# Patient Record
Sex: FEMALE | Race: WHITE | Employment: OTHER | URBAN - METROPOLITAN AREA
[De-identification: names, ages, dates, MRNs, and addresses within clinical notes are randomized per-mention and may not be internally consistent; named-entity substitution may affect disease eponyms.]

---

## 2017-01-02 ENCOUNTER — OFFICE VISIT (OUTPATIENT)
Dept: URGENT CARE | Facility: URGENT CARE | Age: 27
End: 2017-01-02
Payer: COMMERCIAL

## 2017-01-02 VITALS
DIASTOLIC BLOOD PRESSURE: 80 MMHG | HEART RATE: 83 BPM | WEIGHT: 210 LBS | RESPIRATION RATE: 16 BRPM | OXYGEN SATURATION: 98 % | TEMPERATURE: 98.6 F | SYSTOLIC BLOOD PRESSURE: 122 MMHG

## 2017-01-02 DIAGNOSIS — J98.01 ACUTE BRONCHOSPASM: Primary | ICD-10-CM

## 2017-01-02 PROCEDURE — 99213 OFFICE O/P EST LOW 20 MIN: CPT | Mod: 25 | Performed by: PHYSICIAN ASSISTANT

## 2017-01-02 PROCEDURE — 94640 AIRWAY INHALATION TREATMENT: CPT | Performed by: PHYSICIAN ASSISTANT

## 2017-01-02 RX ORDER — PREDNISONE 20 MG/1
40 TABLET ORAL DAILY
Qty: 10 TABLET | Refills: 0 | Status: SHIPPED | OUTPATIENT
Start: 2017-01-02 | End: 2017-01-07

## 2017-01-02 RX ORDER — IPRATROPIUM BROMIDE AND ALBUTEROL SULFATE 2.5; .5 MG/3ML; MG/3ML
1 SOLUTION RESPIRATORY (INHALATION) ONCE
Qty: 3 ML | Refills: 0
Start: 2017-01-02 | End: 2017-03-23

## 2017-01-02 RX ORDER — BENZONATATE 100 MG/1
200 CAPSULE ORAL 3 TIMES DAILY PRN
Qty: 42 CAPSULE | Refills: 0 | Status: SHIPPED | OUTPATIENT
Start: 2017-01-02 | End: 2017-03-23

## 2017-01-02 NOTE — NURSING NOTE
The following nebulizer treatment was given:     MEDICATION: Duoneb  : ViClone  LOT #: [id number]   EXPIRATION DATE:  04/2018   NDC # 8957-5909-40  Merry Alcantar

## 2017-01-02 NOTE — NURSING NOTE
Chief Complaint   Patient presents with     Urgent Care     Cough     Pt was here on thursday and got antibiotics for bronchitis but pt states it has not helped and her cough has not improved.        Initial /80 mmHg  Pulse 83  Temp(Src) 98.6  F (37  C) (Tympanic)  Resp 16  Wt 210 lb (95.255 kg)  SpO2 98% There is no height on file to calculate BMI.  BP completed using cuff size: DOMINIQUE Kline

## 2017-01-09 NOTE — PROGRESS NOTES
SUBJECTIVE:  Rama Gutierrez is a 26 year old female who presents to the clinic today with a chief complaint of cough , wheezing. and chest congestion for 2 week(s).  Seen 4 days ago and given Zithromax for cough.  Not improved and coughing attacks.  Chest feels tight  Her cough is described as persistent, nonproductive and spasmodic.    The patient's symptoms are moderate and worsening.  Associated symptoms include none. The patient's symptoms are exacerbated by no particular triggers  Patient has been using OTC med  to improve symptoms.    No past medical history on file.    Current Outpatient Prescriptions   Medication Sig Dispense Refill     ipratropium - albuterol 0.5 mg/2.5 mg/3 mL (DUONEB) 0.5-2.5 (3) MG/3ML neb solution Take 1 vial (3 mLs) by nebulization once for 1 dose 3 mL 0     benzonatate (TESSALON) 100 MG capsule Take 2 capsules (200 mg) by mouth 3 times daily as needed for cough 42 capsule 0     Sertraline HCl (ZOLOFT PO) Take 50 mg by mouth daily       ALPRAZolam (XANAX PO) Take 0.5 mg by mouth as needed for anxiety       azithromycin (ZITHROMAX) 250 MG tablet Two tablets first day, then one tablet daily for four days. 6 tablet 0     albuterol (PROAIR HFA/PROVENTIL HFA/VENTOLIN HFA) 108 (90 BASE) MCG/ACT Inhaler Inhale 2 puffs into the lungs every 4 hours as needed for shortness of breath / dyspnea or wheezing 1 Inhaler 0       Social History   Substance Use Topics     Smoking status: Never Smoker      Smokeless tobacco: Not on file     Alcohol Use: Not on file       ROS  Review of systems negative except as stated above.    OBJECTIVE:  /80 mmHg  Pulse 83  Temp(Src) 98.6  F (37  C) (Tympanic)  Resp 16  Wt 210 lb (95.255 kg)  SpO2 98%  GENERAL APPEARANCE: healthy, alert and no distress  EYES: EOMI,  PERRL, conjunctiva clear  HENT: ear canals and TM's normal.  Nose and mouth without ulcers, erythema or lesions  NECK: supple, nontender, no lymphadenopathy  RESP: rhonchi throughout with late  expiratory wheezing.    CV: regular rates and rhythm, normal S1 S2, no murmur noted  SKIN: no suspicious lesions or rashes    NEB:  Duoneb in clinic with improved lung sounds,      assessment/plan:  (J98.01) Acute bronchospasm  (primary encounter diagnosis)  Comment:   Plan: ipratropium - albuterol 0.5 mg/2.5 mg/3 mL         (DUONEB) 0.5-2.5 (3) MG/3ML neb solution,         INHALATION/NEBULIZER TREATMENT, INITIAL,         predniSONE (DELTASONE) 20 MG tablet,         benzonatate (TESSALON) 100 MG capsule        Med as directed of Prednisone burst and Tessalon as needed.  Declines inhalers.  FU with PCP as needed

## 2017-03-23 ENCOUNTER — OFFICE VISIT (OUTPATIENT)
Dept: PEDIATRICS | Facility: CLINIC | Age: 27
End: 2017-03-23
Payer: COMMERCIAL

## 2017-03-23 VITALS
WEIGHT: 233 LBS | SYSTOLIC BLOOD PRESSURE: 112 MMHG | HEART RATE: 88 BPM | DIASTOLIC BLOOD PRESSURE: 66 MMHG | TEMPERATURE: 97.6 F | OXYGEN SATURATION: 98 % | HEIGHT: 68 IN | BODY MASS INDEX: 35.31 KG/M2

## 2017-03-23 DIAGNOSIS — Z00.00 ROUTINE GENERAL MEDICAL EXAMINATION AT A HEALTH CARE FACILITY: Primary | ICD-10-CM

## 2017-03-23 DIAGNOSIS — F41.8 DEPRESSION WITH ANXIETY: ICD-10-CM

## 2017-03-23 DIAGNOSIS — Z23 NEED FOR TDAP VACCINATION: ICD-10-CM

## 2017-03-23 PROCEDURE — 99395 PREV VISIT EST AGE 18-39: CPT | Mod: 25 | Performed by: PEDIATRICS

## 2017-03-23 PROCEDURE — 90471 IMMUNIZATION ADMIN: CPT | Performed by: PEDIATRICS

## 2017-03-23 PROCEDURE — 90715 TDAP VACCINE 7 YRS/> IM: CPT | Performed by: PEDIATRICS

## 2017-03-23 NOTE — PATIENT INSTRUCTIONS
FOLLOW UP in 6 months for depression/anxiety.  Will also check to see if you period has returned.    Stop drinking pop.    Tetanus shot today.      Preventive Health Recommendations  Female Ages 26 - 39  Yearly exam:   See your health care provider every year in order to    Review health changes.     Discuss preventive care.      Review your medicines if you your doctor has prescribed any.    Until age 30: Get a Pap test every three years (more often if you have had an abnormal result).    After age 30: Talk to your doctor about whether you should have a Pap test every 3 years or have a Pap test with HPV screening every 5 years.   You do not need a Pap test if your uterus was removed (hysterectomy) and you have not had cancer.  You should be tested each year for STDs (sexually transmitted diseases), if you're at risk.   Talk to your provider about how often to have your cholesterol checked.  If you are at risk for diabetes, you should have a diabetes test (fasting glucose).  Shots: Get a flu shot each year. Get a tetanus shot every 10 years.   Nutrition:     Eat at least 5 servings of fruits and vegetables each day.    Eat whole-grain bread, whole-wheat pasta and brown rice instead of white grains and rice.    Talk to your provider about Calcium and Vitamin D.     Lifestyle    Exercise at least 150 minutes a week (30 minutes a day, 5 days of the week). This will help you control your weight and prevent disease.    Limit alcohol to one drink per day.    No smoking.     Wear sunscreen to prevent skin cancer.    See your dentist every six months for an exam and cleaning.

## 2017-03-23 NOTE — PROGRESS NOTES
SUBJECTIVE:     CC: Rama Gutierrez is an 26 year old woman who presents for preventive health visit.     HPI Comments:     Works night shift. C/o leg and foot pain. States has gained weight recently.    Last pap: 1/2016    Physical   Annual:     Getting at least 3 servings of Calcium per day::  NO    Bi-annual eye exam::  NO    Dental care twice a year::  NO    Sleep apnea or symptoms of sleep apnea::  None    Diet::  Regular (no restrictions)    Frequency of exercise::  None    Taking medications regularly::  Yes    Medication side effects::  None    Additional concerns today::  No    Last PAP 1/2016 at [facility] in [place], [place] - normal.  Was previously on Depo, last dose 8/2016, does not want to restart.     40# weight gain in past 6 months - patient states eating more poorly due to shift work - often they don't have good food at home, end up eating out. Sometimes skips all but one meal per day.  Drinks 1 pop per day.  Also less active - works in [employer], but only stands in one spot (used to walk all the time at old job).    Today's PHQ-2 Score:   PHQ-2 ( 1999 Pfizer) 3/23/2017   Little interest or pleasure in doing things Several days   Feeling down, depressed or hopeless Several days   PHQ-2 Score 2       Abuse: Current or Past(Physical, Sexual or Emotional)- No  Do you feel safe in your environment - Yes    Social History   Substance Use Topics     Smoking status: Never Smoker     Smokeless tobacco: Not on file     Alcohol use No     The patient does not drink >3 drinks per day nor >7 drinks per week.    No results for input(s): CHOL, HDL, LDL, TRIG, CHOLHDLRATIO, NHDL in the last 70805 hours.    Reviewed orders with patient.  Reviewed health maintenance and updated orders accordingly - Yes    Mammo Decision Support:  Mammogram not appropriate for this patient based on age.    Pertinent mammograms are reviewed under the imaging tab.  History of abnormal Pap smear: NO - age 21-29 PAP every 3  "years recommended    Reviewed and updated as needed this visit by clinical staff  Tobacco  Allergies  Meds  Soc Hx        Reviewed and updated as needed this visit by Provider         ROS:  C: NEGATIVE for fever, chills, change in weight  I: NEGATIVE for worrisome rashes, moles or lesions  E: NEGATIVE for vision changes or irritation  ENT: NEGATIVE for ear, mouth and throat problems  R: NEGATIVE for significant cough or SOB  B: NEGATIVE for masses, tenderness or discharge  CV: NEGATIVE for chest pain, palpitations or peripheral edema  GI: NEGATIVE for nausea, abdominal pain, heartburn, or change in bowel habits  : NEGATIVE for unusual urinary or vaginal symptoms. Periods are regular.  M: NEGATIVE for significant arthralgias or myalgia  N: NEGATIVE for weakness, dizziness or paresthesias  P: NEGATIVE for changes in mood or affect    Problem list, Medication list, Allergies, and Medical/Social/Surgical histories reviewed in EPIC and updated as appropriate.  OBJECTIVE:     /66 (BP Location: Right arm, Patient Position: Chair, Cuff Size: Adult Regular)  Pulse 88  Temp 97.6  F (36.4  C) (Oral)  Ht 5' 7.75\" (1.721 m)  Wt 233 lb (105.7 kg)  SpO2 98%  Breastfeeding? No  BMI 35.69 kg/m2  EXAM:  GENERAL: healthy, alert and no distress  EYES: Eyes grossly normal to inspection, PERRL and conjunctivae and sclerae normal  HENT: ear canals and TM's normal, nose and mouth without ulcers or lesions  NECK: no adenopathy, no asymmetry, masses, or scars and thyroid normal to palpation  RESP: lungs clear to auscultation - no rales, rhonchi or wheezes  BREAST: normal without masses, tenderness or nipple discharge and no palpable axillary masses or adenopathy  CV: regular rate and rhythm, normal S1 S2, no S3 or S4, no murmur, click or rub, no peripheral edema and peripheral pulses strong  ABDOMEN: soft, nontender, no hepatosplenomegaly, no masses and bowel sounds normal  MS: no gross musculoskeletal defects noted, no " "edema  SKIN: no suspicious lesions or rashes  NEURO: Normal strength and tone, mentation intact and speech normal  PSYCH: mentation appears normal, affect normal/bright    ASSESSMENT/PLAN:     1. Depression with anxiety  [facility].  Zoloft working very well per patient.  Only uses her xanax once a month when on the zoloft. Agreed to take over prescribing both.  Follow up in 6 months.  - sertraline (ZOLOFT) 50 MG tablet; Take 1 tablet (50 mg) by mouth daily  Dispense: 90 tablet; Refill: 1    2. Need for Tdap vaccination  - TDAP VACCINE (ADACEL)  - ADMIN 1st VACCINE    3. Routine general medical examination at a health care facility  Weight - see PI.  First step - cut out pop.  No menses x 6 months (also 6 months since last depo shot) - will give 6 more months for menses to regulate post depo      COUNSELING:  Reviewed preventive health counseling, as reflected in patient instructions       Regular exercise       Healthy diet/nutrition       Vaccinated for: TDAP         reports that she has never smoked. She does not have any smokeless tobacco history on file.    Estimated body mass index is 35.69 kg/(m^2) as calculated from the following:    Height as of this encounter: 5' 7.75\" (1.721 m).    Weight as of this encounter: 233 lb (105.7 kg).   Weight management plan: Discussed healthy diet and exercise guidelines and patient will follow up in 12 months in clinic to re-evaluate.    Counseling Resources:  ATP IV Guidelines  Pooled Cohorts Equation Calculator  Breast Cancer Risk Calculator  FRAX Risk Assessment  ICSI Preventive Guidelines  Dietary Guidelines for Americans, 2010  USDA's MyPlate  ASA Prophylaxis  Lung CA Screening    Rozina Ricketts MD  [facility]  Answers for HPI/ROS submitted by the patient on 3/23/2017   Q1: Little interest or pleasure in doing things: 1=Several days  Q2: Feeling down, depressed or hopeless: 1=Several days  PHQ-2 Score: 2    Screening " Questionnaire for Adult Immunization    Are you sick today?   No   Do you have allergies to medications, food, a vaccine component or latex?   Yes   Have you ever had a serious reaction after receiving a vaccination?   No   Do you have a long-term health problem with heart disease, lung disease, asthma, kidney disease, metabolic disease (e.g. diabetes), anemia, or other blood disorder?   No   Do you have cancer, leukemia, HIV/AIDS, or any other immune system problem?   No   In the past 3 months, have you taken medications that affect  your immune system, such as prednisone, other steroids, or anticancer drugs; drugs for the treatment of rheumatoid arthritis, Crohn s disease, or psoriasis; or have you had radiation treatments?   No   Have you had a seizure, or a brain or other nervous system problem?   No   During the past year, have you received a transfusion of blood or blood     products, or been given immune (gamma) globulin or antiviral drug?   No   For women: Are you pregnant or is there a chance you could become        pregnant during the next month?   No   Have you received any vaccinations in the past 4 weeks?   No     Immunization questionnaire was positive for at least one answer.  Notified AF.      MNVFC doesn't apply on this patient    Per orders of Dr. Ricketts, injection of Tdap given by Margaret Martel. Patient instructed to remain in clinic for 20 minutes afterwards, and to report any adverse reaction to me immediately.       Screening performed by Margaret Martel on 3/23/2017 at 1:45 PM.

## 2017-03-23 NOTE — LETTER
My Depression Action Plan  Name: Rama Gutierrez   Date of Birth 1990  Date: 3/23/2017    My doctor: Rozina Ricketts   My clinic: [facility]  [address] [place] [address]  [address]  [facility] [address] [phone]          GREEN    ZONE   Good Control    What it looks like:     Things are going generally well. You have normal up s and down s. You may even feel depressed from time to time, but bad moods usually last less than a day.   What you need to do:  1. Continue to care for yourself (see self care plan)  2. Check your depression survival kit and update it as needed  3. Follow your physician s recommendations including any medication.  4. Do not stop taking medication unless you consult with your physician first.           YELLOW         ZONE Getting Worse    What it looks like:     Depression is starting to interfere with your life.     It may be hard to get out of bed; you may be starting to isolate yourself from others.    Symptoms of depression are starting to last most all day and this has happened for several days.     You may have suicidal thoughts but they are not constant.   What you need to do:     1. Call your care team, your response to treatment will improve if you keep your care team informed of your progress. Yellow periods are signs an adjustment may need to be made.     2. Continue your self-care, even if you have to fake it!    3. Talk to someone in your support network    4. Open up your depression survival kit           RED    ZONE Medical Alert - Get Help    What it looks like:     Depression is seriously interfering with your life.     You may experience these or other symptoms: You can t get out of bed most days, can t work or engage in other necessary activities, you have trouble taking care of basic hygiene, or basic responsibilities, thoughts of suicide or death that will not go away, self-injurious behavior.     What you need to do:  1. Call your  care team and request a same-day appointment. If they are not available (weekends or after hours) call your local crisis line, emergency room or 911.      Electronically signed by: Margaret Martel, March 23, 2017    Depression Self Care Plan / Survival Kit    Self-Care for Depression  Here s the deal. Your body and mind are really not as separate as most people think.  What you do and think affects how you feel and how you feel influences what you do and think. This means if you do things that people who feel good do, it will help you feel better.  Sometimes this is all it takes.  There is also a place for medication and therapy depending on how severe your depression is, so be sure to consult with your medical provider and/ or Behavioral Health Consultant if your symptoms are worsening or not improving.     In order to better manage my stress, I will:    Exercise  Get some form of exercise, every day. This will help reduce pain and release endorphins, the  feel good  chemicals in your brain. This is almost as good as taking antidepressants!  This is not the same as joining a gym and then never going! (they count on that by the way ) It can be as simple as just going for a walk or doing some gardening, anything that will get you moving.      Hygiene   Maintain good hygiene (Get out of bed in the morning, Make your bed, Brush your teeth, Take a shower, and Get dressed like you were going to work, even if you are unemployed).  If your clothes don't fit try to get ones that do.    Diet  I will strive to eat foods that are good for me, drink plenty of water, and avoid excessive sugar, caffeine, alcohol, and other mood-altering substances.  Some foods that are helpful in depression are: complex carbohydrates, B vitamins, flaxseed, fish or fish oil, fresh fruits and vegetables.    Psychotherapy  I agree to participate in Individual Therapy (if recommended).    Medication  If prescribed medications, I agree to take  them.  Missing doses can result in serious side effects.  I understand that drinking alcohol, or other illicit drug use, may cause potential side effects.  I will not stop my medication abruptly without first discussing it with my provider.    Staying Connected With Others  I will stay in touch with my friends, family members, and my primary care provider/team.    Use your imagination  Be creative.  We all have a creative side; it doesn t matter if it s oil painting, sand castles, or mud pies! This will also kick up the endorphins.    Witness Beauty  (AKA stop and smell the roses) Take a look outside, even in mid-winter. Notice colors, textures. Watch the squirrels and birds.     Service to others  Be of service to others.  There is always someone else in need.  By helping others we can  get out of ourselves  and remember the really important things.  This also provides opportunities for practicing all the other parts of the program.    Humor  Laugh and be silly!  Adjust your TV habits for less news and crime-drama and more comedy.    Control your stress  Try breathing deep, massage therapy, biofeedback, and meditation. Find time to relax each day.     My support system    Clinic Contact:  Phone number:    Contact 1:  Phone number:    Contact 2:  Phone number:    Protestant/:  Phone number:    Therapist:  Phone number:    Local crisis center:    Phone number:    Other community support:  Phone number:

## 2017-03-23 NOTE — NURSING NOTE
"Chief Complaint   Patient presents with     New Patient     Physical       Initial /66 (BP Location: Right arm, Patient Position: Chair, Cuff Size: Adult Regular)  Pulse 88  Temp 97.6  F (36.4  C) (Oral)  Ht 5' 7.75\" (1.721 m)  Wt 233 lb (105.7 kg)  SpO2 98%  Breastfeeding? No  BMI 35.69 kg/m2 Estimated body mass index is 35.69 kg/(m^2) as calculated from the following:    Height as of this encounter: 5' 7.75\" (1.721 m).    Weight as of this encounter: 233 lb (105.7 kg).  Medication Reconciliation: complete  "

## 2017-03-23 NOTE — MR AVS SNAPSHOT
After Visit Summary   3/23/2017    Rama Gutierrez    MRN: [id number]           Patient Information     Date Of Birth          1990        Visit Information        Provider Department      3/23/2017 1:20 PM Rozina Ricketts MD [facility]        Today's Diagnoses     Depression with anxiety    -  1      Care Instructions    FOLLOW UP in 6 months for depression/anxiety.  Will also check to see if you period has returned.    Stop drinking pop.    Tetanus shot today.      Preventive Health Recommendations  Female Ages 26 - 39  Yearly exam:   See your health care provider every year in order to    Review health changes.     Discuss preventive care.      Review your medicines if you your doctor has prescribed any.    Until age 30: Get a Pap test every three years (more often if you have had an abnormal result).    After age 30: Talk to your doctor about whether you should have a Pap test every 3 years or have a Pap test with HPV screening every 5 years.   You do not need a Pap test if your uterus was removed (hysterectomy) and you have not had cancer.  You should be tested each year for STDs (sexually transmitted diseases), if you're at risk.   Talk to your provider about how often to have your cholesterol checked.  If you are at risk for diabetes, you should have a diabetes test (fasting glucose).  Shots: Get a flu shot each year. Get a tetanus shot every 10 years.   Nutrition:     Eat at least 5 servings of fruits and vegetables each day.    Eat whole-grain bread, whole-wheat pasta and brown rice instead of white grains and rice.    Talk to your provider about Calcium and Vitamin D.     Lifestyle    Exercise at least 150 minutes a week (30 minutes a day, 5 days of the week). This will help you control your weight and prevent disease.    Limit alcohol to one drink per day.    No smoking.     Wear sunscreen to prevent skin cancer.    See your dentist every six months for an exam and  "cleaning.          Follow-ups after your visit        Who to contact     If you have questions or need follow up information about today's clinic visit or your schedule please contact [facility] [place] directly at [phone].  Normal or non-critical lab and imaging results will be communicated to you by MyChart, letter or phone within 4 business days after the clinic has received the results. If you do not hear from us within 7 days, please contact the clinic through MyChart or phone. If you have a critical or abnormal lab result, we will notify you by phone as soon as possible.  Submit refill requests through [employer] or call your pharmacy and they will forward the refill request to us. Please allow 3 business days for your refill to be completed.          Additional Information About Your Visit        Primekss Information     Prime[employer]s lets you send messages to your doctor, view your test results, renew your prescriptions, schedule appointments and more. To sign up, go to [email or web address] . Click on \"Log in\" on the left side of the screen, which will take you to the Welcome page. Then click on \"Sign up Now\" on the right side of the page.     You will be asked to enter the access code listed below, as well as some personal information. Please follow the directions to create your username and password.     Your access code is: [email or web address]  Expires: 3/29/2017 11:44 AM     Your access code will  in 90 days. If you need help or a new code, please call your [place] clinic or [phone].        Care EveryWhere ID     This is your Care EveryWhere ID. This could be used by other organizations to access your [place] medical records  [id number]        Your Vitals Were     Pulse Temperature Height Pulse Oximetry Breastfeeding? BMI (Body Mass Index)    88 97.6  F (36.4  C) (Oral) 5' 7.75\" (1.721 m) 98% No 35.69 kg/m2       Blood Pressure from Last 3 Encounters:   17 112/66   17 122/80 "   12/29/16 98/78    Weight from Last 3 Encounters:   03/23/17 233 lb (105.7 kg)   01/02/17 210 lb (95.3 kg)   12/29/16 210 lb (95.3 kg)              Today, you had the following     No orders found for display         Today's Medication Changes          These changes are accurate as of: 3/23/17  1:40 PM.  If you have any questions, ask your nurse or doctor.               These medicines have changed or have updated prescriptions.        Dose/Directions    sertraline 50 MG tablet   Commonly known as:  ZOLOFT   This may have changed:  medication strength   Used for:  Depression with anxiety   Changed by:  Rozina Ricketts MD        Dose:  50 mg   Take 1 tablet (50 mg) by mouth daily   Quantity:  90 tablet   Refills:  1            Where to get your medicines      These medications were sent to [facility] [place] - [place] - [address]   [address]  [address], [place] [address]     Phone:  [phone]     sertraline 50 MG tablet                Primary Care Provider Fax #    [facility] [place] [phone]       No address on file        Thank you!     Thank you for choosing [facility] [place]  for your care. Our goal is always to provide you with excellent care. Hearing back from our patients is one way we can continue to improve our services. Please take a few minutes to complete the written survey that you may receive in the mail after your visit with us. Thank you!             Your Updated Medication List - Protect others around you: Learn how to safely use, store and throw away your medicines at www.disposemymeds.org.          This list is accurate as of: 3/23/17  1:40 PM.  Always use your most recent med list.                   Brand Name Dispense Instructions for use    sertraline 50 MG tablet    ZOLOFT    90 tablet    Take 1 tablet (50 mg) by mouth daily       XANAX PO      Take 0.5 mg by mouth as needed for anxiety

## 2017-03-24 ASSESSMENT — PATIENT HEALTH QUESTIONNAIRE - PHQ9: SUM OF ALL RESPONSES TO PHQ QUESTIONS 1-9: 12

## 2017-08-10 ENCOUNTER — OFFICE VISIT (OUTPATIENT)
Dept: PEDIATRICS | Facility: CLINIC | Age: 27
End: 2017-08-10
Payer: COMMERCIAL

## 2017-08-10 VITALS
SYSTOLIC BLOOD PRESSURE: 98 MMHG | WEIGHT: 231 LBS | DIASTOLIC BLOOD PRESSURE: 70 MMHG | BODY MASS INDEX: 35.38 KG/M2 | RESPIRATION RATE: 20 BRPM | HEART RATE: 64 BPM | TEMPERATURE: 98.4 F

## 2017-08-10 DIAGNOSIS — M54.50 BILATERAL LOW BACK PAIN WITHOUT SCIATICA, UNSPECIFIED CHRONICITY: Primary | ICD-10-CM

## 2017-08-10 PROCEDURE — 99213 OFFICE O/P EST LOW 20 MIN: CPT | Mod: GE | Performed by: INTERNAL MEDICINE

## 2017-08-10 RX ORDER — CYCLOBENZAPRINE HCL 10 MG
5-10 TABLET ORAL 3 TIMES DAILY PRN
Qty: 30 TABLET | Refills: 0 | Status: SHIPPED | OUTPATIENT
Start: 2017-08-10 | End: 2018-08-30

## 2017-08-10 ASSESSMENT — ANXIETY QUESTIONNAIRES
2. NOT BEING ABLE TO STOP OR CONTROL WORRYING: SEVERAL DAYS
6. BECOMING EASILY ANNOYED OR IRRITABLE: SEVERAL DAYS
IF YOU CHECKED OFF ANY PROBLEMS ON THIS QUESTIONNAIRE, HOW DIFFICULT HAVE THESE PROBLEMS MADE IT FOR YOU TO DO YOUR WORK, TAKE CARE OF THINGS AT HOME, OR GET ALONG WITH OTHER PEOPLE: SOMEWHAT DIFFICULT
GAD7 TOTAL SCORE: 5
3. WORRYING TOO MUCH ABOUT DIFFERENT THINGS: SEVERAL DAYS
7. FEELING AFRAID AS IF SOMETHING AWFUL MIGHT HAPPEN: SEVERAL DAYS
1. FEELING NERVOUS, ANXIOUS, OR ON EDGE: SEVERAL DAYS
5. BEING SO RESTLESS THAT IT IS HARD TO SIT STILL: NOT AT ALL

## 2017-08-10 ASSESSMENT — PATIENT HEALTH QUESTIONNAIRE - PHQ9
5. POOR APPETITE OR OVEREATING: NOT AT ALL
SUM OF ALL RESPONSES TO PHQ QUESTIONS 1-9: 4

## 2017-08-10 NOTE — NURSING NOTE
"Chief Complaint   Patient presents with     Back Pain       Initial BP 98/70  Pulse 64  Temp 98.4  F (36.9  C) (Oral)  Resp 20  Wt 231 lb (104.8 kg)  LMP 07/16/2017  BMI 35.38 kg/m2 Estimated body mass index is 35.38 kg/(m^2) as calculated from the following:    Height as of 3/23/17: 5' 7.75\" (1.721 m).    Weight as of this encounter: 231 lb (104.8 kg).  Medication Reconciliation: complete   Janice J, CMA,AAMA      "

## 2017-08-10 NOTE — PATIENT INSTRUCTIONS
Nice to meet you today Terra!    For your back pain:  Continue using Ibuprofen 600-800 mg every 8 hours. You may also use tylenol 650 mg every 6 hours.    For severe pain, you may use Flexeril 5-10 mg. DO NOT DRIVE while on this medication.    I have referred you to physical therapy.    Follow up with myself or Dr. Ricketts in 4 weeks to see how things are going.

## 2017-08-10 NOTE — PROGRESS NOTES
SUBJECTIVE:                                                    Rama Gutierrez is a 27 year old female who presents to clinic today for the following health issues:    Back Pain       Duration: 6 years        Specific cause: spondlyosis    Description:   Location of pain: low back bilateral   Character of pain: deep dull ache  Pain radiation:radiates into the left buttocks  New numbness or weakness in legs, not attributed to pain:  no     Intensity: Currently 6/10, At its worst 9/10    History:   Pain interferes with job: YES, pt lifts up to 50lbs  History of back problems: Dx as an infant  Any previous MRI or X-rays: Yes--at [facility].  Date 2012 , spine and orthopedic surgeon   Sees a specialist for back pain:  No  Therapies tried without relief: IBU     Alleviating factors:   Improved by: PT      Precipitating factors:  Worsened by: Lifting, Bending, Standing and Lying Flat    Functional and Psychosocial Screen (Nikki STarT Back):        27-year-old F with anxiety, obesity and prior low back pain who presents to clinic for worsening low back pain.    Patient notes that she's had back pain since an accident that occurred in her childhood. In her early 20's, while living in [place], she was evaluated by a neurosurgeon. He did scans and told her she had 'spondylosis'. He sent her to PT and her symptoms improved, though he told her her symptoms would likely worsen down the road.    Today she tells me that over the past couple years her pain has been slowly worsening in severity. Describes it as low back pain. Worse in the morning after first waking up. Sometimes has slight radiation into her buttocks. No numbness, tingling, weakness in her lower extremities. No urinary symptoms. Has been using ibuprofen intermittently for the pain with only mild-moderate relief. Works for [employer] and does a lot of lifting/hauling things. Nonsmoker.    Problem list and histories reviewed & adjusted, as  indicated.  Additional history: none    There is no problem list on file for this patient.    Past Surgical History:   Procedure Laterality Date     TONSILLECTOMY         Social History   Substance Use Topics     Smoking status: Never Smoker     Smokeless tobacco: Not on file     Alcohol use No     Family History   Problem Relation Age of Onset     Breast Cancer Paternal Grandmother      CEREBROVASCULAR DISEASE Paternal Grandfather      DIABETES No family hx of      Coronary Artery Disease No family hx of      Colon Cancer No family hx of          Current Outpatient Prescriptions   Medication Sig Dispense Refill     cyclobenzaprine (FLEXERIL) 10 MG tablet Take 0.5-1 tablets (5-10 mg) by mouth 3 times daily as needed for muscle spasms 30 tablet 0     sertraline (ZOLOFT) 50 MG tablet Take 1 tablet (50 mg) by mouth daily 90 tablet 1     ALPRAZolam (XANAX PO) Take 0.5 mg by mouth as needed for anxiety       Allergies   Allergen Reactions     Codeine Nausea         Reviewed and updated as needed this visit by clinical staffAllergies       Reviewed and updated as needed this visit by Provider         ROS:  A complete 10 pt ROS was performed and negative besides stated in the HPI.     OBJECTIVE:     BP 98/70  Pulse 64  Temp 98.4  F (36.9  C) (Oral)  Resp 20  Wt 231 lb (104.8 kg)  LMP 07/16/2017  BMI 35.38 kg/m2  Body mass index is 35.38 kg/(m^2).  GENERAL: healthy, alert and no distress  EYES: Eyes grossly normal to inspection, PERRL and conjunctivae and sclerae normal  HENT: ear canals and TM's normal, nose and mouth without ulcers or lesions  NECK: no adenopathy, no asymmetry, masses, or scars and thyroid normal to palpation  RESP: lungs clear to auscultation - no rales, rhonchi or wheezes  CV: regular rate and rhythm, normal S1 S2, no S3 or S4, no murmur, click or rub, no peripheral edema and peripheral pulses strong  ABDOMEN: Obese abdomen, otherwise soft, nontender, no hepatosplenomegaly, no masses and bowel  sounds normal  BACK: No point tenderness along spinous processes. No paraspinous tenderness. Bilateral straight leg test negative.  MS: no gross musculoskeletal defects noted, no edema  SKIN: no suspicious lesions or rashes  NEURO: Normal strength and tone, mentation intact and speech normal  PSYCH: mentation appears normal, affect normal/bright    Diagnostic Test Results:  none     ASSESSMENT/PLAN:     (M54.5) Bilateral low back pain without sciatica, unspecified chronicity  (primary encounter diagnosis)  Chronic issue for the patient, though recently worsening in severity. Previously diagnosed with spondylosis ~5 years ago while in [place] and had some improvement with regular physical therapy. No signs/symptoms for lumbar radiculopathy. No red flag symptoms. No recent trauma. Will initiate PT and continue other supportive measures. Patient to follow up in the next month to see how things are going.   -PT referral   -Ibuprofen 600-800 mg q8h, tylenol 650 mg q6h   -Flexeril 10 mg q8h TID, 30 tablets given   -Ice to the area for 10 min 3x/day   -Letter for work written--> no lifting >10 lbs for the next 2 weeks   -Follow up with myself or Dr. Ricketts in 4 weeks    The patient was staffed with the attending physician, Dr. Potts.    Shelby Roque MD  [facility]

## 2017-08-10 NOTE — MR AVS SNAPSHOT
After Visit Summary   8/10/2017    Rama Gutierrez    MRN: [id number]           Patient Information     Date Of Birth          1990        Visit Information        Provider Department      8/10/2017 1:00 PM Shelby Roque MD [facility]        Today's Diagnoses     Bilateral low back pain without sciatica, unspecified chronicity    -  1      Care Instructions    Nice to meet you today Rama!    For your back pain:  Continue using Ibuprofen 600-800 mg every 8 hours. You may also use tylenol 650 mg every 6 hours.    For severe pain, you may use Flexeril 5-10 mg. DO NOT DRIVE while on this medication.    I have referred you to physical therapy.    Follow up with myself or Dr. Ricketts in 4 weeks to see how things are going.           Follow-ups after your visit        Additional Services     [facility] PT, HAND, AND CHIROPRACTIC REFERRAL       **This order will print in the [facility] Scheduling Office**    Physical Therapy, Hand Therapy and Chiropractic Care are available through:    *[facility]  *[facility]  *[facility]    Call one number to schedule at any of the above locations: [phone].    Your provider has referred you to: Physical Therapy at [facility] or [facility]    Indication/Reason for Referral: Low Back Pain  Onset of Illness: Chronic issue, though recently worsening in severity  Therapy Orders: Evaluate and Treat  Special Programs:   Special Request:    Nikki Jordan      Additional Comments for the Therapist or Chiropractor: None    Please be aware that coverage of these services is subject to the terms and limitations of your health insurance plan.  Call member services at your health plan with any benefit or coverage questions.      Please bring the following to your appointment:    *Your personal calendar for scheduling future appointments  *Comfortable clothing                  Who to contact     If you have questions or need follow up  "information about today's clinic visit or your schedule please contact [facility] [place] directly at [phone].  Normal or non-critical lab and imaging results will be communicated to you by MyChart, letter or phone within 4 business days after the clinic has received the results. If you do not hear from us within 7 days, please contact the clinic through [employer] or phone. If you have a critical or abnormal lab result, we will notify you by phone as soon as possible.  Submit refill requests through [employer] or call your pharmacy and they will forward the refill request to us. Please allow 3 business days for your refill to be completed.          Additional Information About Your Visit        MyChart Information     [employer]o lets you send messages to your doctor, view your test results, renew your prescriptions, schedule appointments and more. To sign up, go to [email or web address] . Click on \"Log in\" on the left side of the screen, which will take you to the Welcome page. Then click on \"Sign up Now\" on the right side of the page.     You will be asked to enter the access code listed below, as well as some personal information. Please follow the directions to create your username and password.     Your access code is: [email or web address]  Expires: 2017  1:29 PM     Your access code will  in 90 days. If you need help or a new code, please call your [place] clinic or [phone].        Care EveryWhere ID     This is your Care EveryWhere ID. This could be used by other organizations to access your [place] medical records  [id number]        Your Vitals Were     Pulse Temperature Respirations Last Period BMI (Body Mass Index)       64 98.4  F (36.9  C) (Oral) 20 2017 35.38 kg/m2        Blood Pressure from Last 3 Encounters:   08/10/17 98/70   17 112/66   17 122/80    Weight from Last 3 Encounters:   08/10/17 231 lb (104.8 kg)   17 233 lb (105.7 kg)   17 210 lb (95.3 kg)    "           We Performed the Following     [facility] PT, HAND, AND CHIROPRACTIC REFERRAL          Today's Medication Changes          These changes are accurate as of: 8/10/17  1:29 PM.  If you have any questions, ask your nurse or doctor.               Start taking these medicines.        Dose/Directions    cyclobenzaprine 10 MG tablet   Commonly known as:  FLEXERIL   Used for:  Bilateral low back pain without sciatica, unspecified chronicity   Started by:  Shelby Roque MD        Dose:  5-10 mg   Take 0.5-1 tablets (5-10 mg) by mouth 3 times daily as needed for muscle spasms   Quantity:  30 tablet   Refills:  0            Where to get your medicines      Some of these will need a paper prescription and others can be bought over the counter.  Ask your nurse if you have questions.     Bring a paper prescription for each of these medications     cyclobenzaprine 10 MG tablet                Primary Care Provider Office Phone # Fax #    Rozina Ricketts [phone] [address] [place] [address]        Equal Access to Services     [facility]: Hadii aad ku hadasho Soomaali, waaxda luqadaha, qaybta kaalmada adeegyada, waxay taniyain hayroz young . So [facility] [phone].    ATENCIÓN: Si habla español, tiene a oscar disposición servicios gratuitos de asistencia lingüística. [facility] [phone].    We comply with applicable federal civil rights laws and [place] laws. We do not discriminate on the basis of race, color, national origin, age, disability sex, sexual orientation or gender identity.            Thank you!     [facility] you [facility] choosing [facility]  for your care. Our goal is always to provide you with excellent care. Hearing back from our patients is one way we can continue to improve our services. Please take a few minutes to complete the written survey that you may receive in the mail after your visit with us. Thank you!             Your Updated Medication  List - Protect others around you: Learn how to safely use, store and throw away your medicines at www.disposemymeds.org.          This list is accurate as of: 8/10/17  1:29 PM.  Always use your most recent med list.                   Brand Name Dispense Instructions for use Diagnosis    cyclobenzaprine 10 MG tablet    FLEXERIL    30 tablet    Take 0.5-1 tablets (5-10 mg) by mouth 3 times daily as needed for muscle spasms    Bilateral low back pain without sciatica, unspecified chronicity       sertraline 50 MG tablet    ZOLOFT    90 tablet    Take 1 tablet (50 mg) by mouth daily    Depression with anxiety       XANAX PO      Take 0.5 mg by mouth as needed for anxiety

## 2017-08-10 NOTE — LETTER
[facility]  [address]  [address]  [place] [address]  Phone: [phone]  Fax: [phone]    August 10, 2017        Rama Gutierrez  [address]  [place] [address]    To whom it may concern:    RE: Rama Gutierrez was seen in our clinic today. She is currently being evaluated and treated for a medical condition. Please is to refrain from lifting anything greater than 10 lbs for the next 2 weeks. At that time she may slowly increase her activity level.    Thank you for your consideration in this matter.    Please contact me for questions or concerns.      Sincerely,        Shelby Roque MD  Internal Medicine  [facility]

## 2017-08-11 ASSESSMENT — ANXIETY QUESTIONNAIRES: GAD7 TOTAL SCORE: 5

## 2017-08-18 ENCOUNTER — THERAPY VISIT (OUTPATIENT)
Dept: PHYSICAL THERAPY | Facility: CLINIC | Age: 27
End: 2017-08-18
Payer: COMMERCIAL

## 2017-08-18 DIAGNOSIS — M54.42 CHRONIC BILATERAL LOW BACK PAIN WITH LEFT-SIDED SCIATICA: Primary | ICD-10-CM

## 2017-08-18 DIAGNOSIS — G89.29 CHRONIC BILATERAL LOW BACK PAIN WITH LEFT-SIDED SCIATICA: Primary | ICD-10-CM

## 2017-08-18 PROCEDURE — 97163 PT EVAL HIGH COMPLEX 45 MIN: CPT | Mod: GP | Performed by: PHYSICAL THERAPIST

## 2017-08-18 PROCEDURE — 97110 THERAPEUTIC EXERCISES: CPT | Mod: GP | Performed by: PHYSICAL THERAPIST

## 2017-08-18 NOTE — PROGRESS NOTES
[facility] Initial Evaluation    Subjective:    Patient is a 27 year old female presenting with rehab back hpi.   Rama Gutierrez is a 27 year old female with a lumbar condition.      This is a chronic condition  Pt has had a spondylolisthesis since age 1, has had low back pain off and on since that time.  Pt went through PT 2012 and her symptoms improved, however over the past month it has really been increasingly painful.      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  .    Patient reports pain:  Central lumbar spine, lumbar spine right and lumbar spine left.  Radiates to:  Thigh left, foot left, lower leg left and knee left (with standing).  Pain is described as aching and sharp and is constant and reported as 7/10.  Associated symptoms:  Numbness and tingling. Pain is worse in the A.M..  Symptoms are exacerbated by standing, bending and lifting (lying flat on back) Relieved by: sitting relieves leg pain.  Since onset symptoms are gradually worsening.  Special tests:  MRI (2012 - hairline fracture in lumbar spine).      General health as reported by patient is fair.  Pertinent medical history includes:  Overweight and depression (numbness/tingling).      Medication history: anti anxiety.    Patient is currently not working due to present treatment problem (lifting 10#).      Barriers include:  None as reported by the patient.    Red flags:  None as reported by the patient.                        Objective:    System         Lumbar/SI Evaluation    Lumbar Myotomes:  not assessed                Lumbar Dermtomes:  normal                                                                         Dom Lumbar Evaluation    Posture:  Sitting: fair  Standing: fair  Lordosis: Reduced  Lateral Shift: no  Correction of Posture: worse    Movement Loss:  Flexion (Flex): major and pain  Extension (EXT): mod and pain  Side [place] R (SG R):  min and pain  Side [place] L (SG L): min and pain  Test Movements:  FIS:   Repeat FIS: During: produces  After: no worse  Mechanical Response: IncROM    JODI:   Repeat JODI: During: produces  After: worse    EIL:   Repeat EIL: During: produces  After: no worse  Mechanical Response: DecrROM        Conclusion: derangement  Principle of Treatment:    Flexion: x                                             ROS    Assessment/Plan:      Patient is a 27 year old female with lumbar complaints.    Patient has the following significant findings with corresponding treatment plan.                Diagnosis 1:  Low back and L leg pain      Pain -  hot/cold therapy, manual therapy, self management, education, directional preference exercise and home program  Decreased ROM/flexibility - manual therapy and therapeutic exercise  Impaired muscle performance - neuro re-education  Decreased function - therapeutic activities  Impaired posture - neuro re-education    Therapy Evaluation Codes:   1) History comprised of:   Personal factors that impact the plan of care:      Gender, Past/current experiences, Profession, Time since onset of symptoms and Work status.    Comorbidity factors that impact the plan of care are:      Depression, Numbness/tingling, Overweight and anxiety.     Medications impacting care: anti-anxiety.  2) Examination of Body Systems comprised of:   Body structures and functions that impact the plan of care:      Ankle, Hip, Knee and Lumbar spine.   Activity limitations that impact the plan of care are:      Bending, Lifting, Standing and Walking.  3) Clinical presentation characteristics are:   Unstable/Unpredictable.  4) Decision-Making    High complexity using standardized patient assessment instrument and/or measureable assessment of functional outcome.  Cumulative Therapy Evaluation is: High complexity.    Previous and current functional limitations:  (See Goal Flow Sheet for this information)    Short term and Long  term goals: (See Goal Flow Sheet for this information)     Communication ability:  Patient appears to be able to clearly communicate and understand verbal and written communication and follow directions correctly.  Treatment Explanation - The following has been discussed with the patient:   RX ordered/plan of care  Anticipated outcomes  Possible risks and side effects  This patient would benefit from PT intervention to resume normal activities.   Rehab potential is fair, see above.    Frequency:  1 X week, once daily  Duration:  for 6 weeks  Discharge Plan:  Achieve all LTG.  Independent in home treatment program.  Reach maximal therapeutic benefit.    Please refer to the daily flowsheet for treatment today, total treatment time and time spent performing 1:1 timed codes.

## 2017-08-25 ENCOUNTER — THERAPY VISIT (OUTPATIENT)
Dept: PHYSICAL THERAPY | Facility: CLINIC | Age: 27
End: 2017-08-25
Payer: COMMERCIAL

## 2017-08-25 DIAGNOSIS — G89.29 CHRONIC BILATERAL LOW BACK PAIN WITH LEFT-SIDED SCIATICA: ICD-10-CM

## 2017-08-25 DIAGNOSIS — M54.42 CHRONIC BILATERAL LOW BACK PAIN WITH LEFT-SIDED SCIATICA: ICD-10-CM

## 2017-08-25 PROCEDURE — 97110 THERAPEUTIC EXERCISES: CPT | Mod: GP | Performed by: PHYSICAL THERAPIST

## 2017-08-25 PROCEDURE — 97112 NEUROMUSCULAR REEDUCATION: CPT | Mod: GP | Performed by: PHYSICAL THERAPIST

## 2017-09-01 ENCOUNTER — THERAPY VISIT (OUTPATIENT)
Dept: PHYSICAL THERAPY | Facility: CLINIC | Age: 27
End: 2017-09-01
Payer: COMMERCIAL

## 2017-09-01 DIAGNOSIS — G89.29 CHRONIC BILATERAL LOW BACK PAIN WITH LEFT-SIDED SCIATICA: ICD-10-CM

## 2017-09-01 DIAGNOSIS — M54.42 CHRONIC BILATERAL LOW BACK PAIN WITH LEFT-SIDED SCIATICA: ICD-10-CM

## 2017-09-01 PROCEDURE — 97112 NEUROMUSCULAR REEDUCATION: CPT | Mod: GP | Performed by: PHYSICAL THERAPIST

## 2017-09-01 PROCEDURE — 97110 THERAPEUTIC EXERCISES: CPT | Mod: GP | Performed by: PHYSICAL THERAPIST

## 2017-09-08 ENCOUNTER — OFFICE VISIT (OUTPATIENT)
Dept: PEDIATRICS | Facility: CLINIC | Age: 27
End: 2017-09-08
Payer: COMMERCIAL

## 2017-09-08 VITALS
WEIGHT: 231 LBS | OXYGEN SATURATION: 99 % | SYSTOLIC BLOOD PRESSURE: 118 MMHG | HEIGHT: 68 IN | DIASTOLIC BLOOD PRESSURE: 70 MMHG | HEART RATE: 94 BPM | TEMPERATURE: 97.7 F | BODY MASS INDEX: 35.01 KG/M2

## 2017-09-08 DIAGNOSIS — G89.29 CHRONIC BILATERAL LOW BACK PAIN WITH LEFT-SIDED SCIATICA: Primary | ICD-10-CM

## 2017-09-08 DIAGNOSIS — Z23 NEED FOR PROPHYLACTIC VACCINATION AND INOCULATION AGAINST INFLUENZA: ICD-10-CM

## 2017-09-08 DIAGNOSIS — F41.8 DEPRESSION WITH ANXIETY: ICD-10-CM

## 2017-09-08 DIAGNOSIS — M54.42 CHRONIC BILATERAL LOW BACK PAIN WITH LEFT-SIDED SCIATICA: Primary | ICD-10-CM

## 2017-09-08 PROCEDURE — 90471 IMMUNIZATION ADMIN: CPT | Performed by: PEDIATRICS

## 2017-09-08 PROCEDURE — 99214 OFFICE O/P EST MOD 30 MIN: CPT | Mod: 25 | Performed by: PEDIATRICS

## 2017-09-08 PROCEDURE — 90686 IIV4 VACC NO PRSV 0.5 ML IM: CPT | Performed by: PEDIATRICS

## 2017-09-08 NOTE — PROGRESS NOTES
Injectable Influenza Immunization Documentation    1.  Are you sick today? (Fever of 100.5 or higher on the day of the clinic)   No    2.  Have you ever had Guillain-[place] Syndrome within 6 weeks of an influenza vaccionation?  No    3. Do you have a life-threatening allergy to eggs?  No    4. Do you have a life-threatening allergy to a component of the vaccine? May include antibiotics, gelatin or latex.  No     5. Have you ever had a reaction to a dose of flu vaccine that needed immediate medical attention?  No     Form completed by Yasemin Cantu MA

## 2017-09-08 NOTE — PROGRESS NOTES
"  SUBJECTIVE:   Rama Gutierrez is a 27 year old female who presents to clinic today for the following health issues:      Back Pain follow up      Duration: March 2017         Specific cause: Spondylosis     Description:   Location of pain: low back bilateral  Character of pain: dull ache  Pain radiation:none  New numbness or weakness in legs, not attributed to pain:  no     Intensity: Currently 6/10    History:   Pain interferes with job: YES history of back problems: Diagnosed as infant, Recent diagnosis in 2012  Any previous MRI or X-rays: Yes--Dr Woodard.  Date 2012  Sees a specialist for back pain:  No  Therapies tried without relief: Physical Therapy    Alleviating factors:   Improved by: activity, Physical Therapy and stretch      Precipitating factors:  Worsened by: Lifting, Bending, Standing and Lying Flat    Functional and Psychosocial Screen (Nikki STarT Back):      Not performed today    Patient has been going to physical therapy weekly - now will start every other week.  Symptoms improving - she is now able to bend over w/o pain.  She only tried using the flexeril a few times - too drowsy.  Pain manageable with tylenol and ibuprofen.  She is pleased with physical therapy results.    Problem list and histories reviewed & adjusted, as indicated.  Additional history: as documented    Reviewed and updated as needed this visit by clinical staff       Reviewed and updated as needed this visit by Provider         ROS:  Constitutional, HEENT, cardiovascular, pulmonary, gi and gu systems are negative, except as otherwise noted.      OBJECTIVE:   /70 (BP Location: Right arm, Cuff Size: Adult Large)  Pulse 94  Temp 97.7  F (36.5  C)  Ht 5' 7.75\" (1.721 m)  Wt 231 lb (104.8 kg)  LMP 08/08/2017  SpO2 99%  BMI 35.38 kg/m2  Body mass index is 35.38 kg/(m^2).  GENERAL APPEARANCE: healthy, alert and no distress  CV: regular rates and rhythm, normal S1 S2, no S3 or S4 and no murmur, click or rub    Diagnostic " Test Results:  none     ASSESSMENT/PLAN:       1. Depression with anxiety  Controlled, refill and follow up in 6 months  - sertraline (ZOLOFT) 50 MG tablet; Take 1 tablet (50 mg) by mouth daily  Dispense: 90 tablet; Refill: 1    2. Chronic bilateral low back pain with left-sided sciatica  Improving - plan to continue physical therapy.  If pain returns in the future can address imaging at that time, but none needed today.    3. Need for prophylactic vaccination and inoculation against influenza  - FLU VAC, SPLIT VIRUS IM > 3 YO (QUADRIVALENT) [29786]    Patient Instructions   Continue physical therapy    Flu shot today    Follow up in 6 months for depression/anxiety.      Rozina Ricketts MD  [facility], [place]

## 2017-09-08 NOTE — MR AVS SNAPSHOT
"              After Visit Summary   2017    Rama Gutierrez    MRN: [id number]           Patient Information     Date Of Birth          1990        Visit Information        Provider Department      2017 1:00 PM Rozina [facility] MD [facility]        Today's Diagnoses     Chronic bilateral low back pain with left-sided sciatica    -  1    Depression with anxiety          Care Instructions    Continue physical therapy    Flu shot today    Follow up in 6 months for depression/anxiety.          Follow-ups after your visit        Who to contact     If you have questions or need follow up information about today's clinic visit or your schedule please contact [facility] directly at [phone].  Normal or non-critical lab and imaging results will be communicated to you by MyChart, letter or phone within 4 business days after the clinic has received the results. If you do not hear from us within 7 days, please contact the clinic through MyChart or phone. If you have a critical or abnormal lab result, we will notify you by phone as soon as possible.  Submit refill requests through [employer] or call your pharmacy and they will forward the refill request to us. Please allow 3 business days for your refill to be completed.          Additional Information About Your Visit        MyChart Information     [employer] lets you send messages to your doctor, view your test results, renew your prescriptions, schedule appointments and more. To sign up, go to [email or web address] . Click on \"Log in\" on the left side of the screen, which will take you to the Welcome page. Then click on \"Sign up Now\" on the right side of the page.     You will be asked to enter the access code listed below, as well as some personal information. Please follow the directions to create your username and password.     Your access code is: [email or web address]  Expires: 2017  1:29 PM     Your access code will  in 90 days. If " "you need help or a new code, please call your [facility] clinic or [phone].        Care EveryWhere ID     This is your Care EveryWhere ID. This could be used by other organizations to access your [facility] medical records  [id number]        Your Vitals Were     Pulse Temperature Height Last Period Pulse Oximetry BMI (Body Mass Index)    94 97.7  F (36.5  C) 5' 7.75\" (1.721 m) 08/08/2017 99% 35.38 kg/m2       Blood Pressure from Last 3 Encounters:   09/08/17 118/70   08/10/17 98/70   03/23/17 112/66    Weight from Last 3 Encounters:   09/08/17 231 lb (104.8 kg)   08/10/17 231 lb (104.8 kg)   03/23/17 233 lb (105.7 kg)              Today, you had the following     No orders found for display         Where to get your medicines      These medications were sent to [facility] [place] - [address]   [address]  [address], [place] [address]     Phone:  [phone]     sertraline 50 MG tablet          Primary Care Provider Office Phone # Fax #    Rozina Ricketts [phone]       [address] [place] [address]        Equal Access to Services     MARIA D AUGUSTIN AH: Hadii mayela bello hadasho Soomaali, waaxda luqadaha, qaybta kaalmada adeegyada, maria eugenia billingsley. So [facility] [phone].    ATENCIÓN: Si habla español, tiene a oscar disposición servicios gratuitos de asistencia lingüística. Llame al [phone].    We comply with applicable federal civil rights laws and [place] laws. We do not discriminate on the basis of race, color, national origin, age, disability sex, sexual orientation or gender identity.            Thank you!     Thank you for choosing [facility]  for your care. Our goal is always to provide you with excellent care. Hearing back from our patients is one way we can continue to improve our services. Please take a few minutes to complete the written survey that you may receive in the mail after " your visit with us. Thank you!             Your Updated Medication List - Protect others around you: Learn how to safely use, store and throw away your medicines at www.disposemymeds.org.          This list is accurate as of: 9/8/17  1:17 PM.  Always use your most recent med list.                   Brand Name Dispense Instructions for use Diagnosis    cyclobenzaprine 10 MG tablet    FLEXERIL    30 tablet    Take 0.5-1 tablets (5-10 mg) by mouth 3 times daily as needed for muscle spasms    Bilateral low back pain without sciatica, unspecified chronicity       sertraline 50 MG tablet    ZOLOFT    90 tablet    Take 1 tablet (50 mg) by mouth daily    Depression with anxiety       XANAX PO      Take 0.5 mg by mouth as needed for anxiety

## 2017-09-08 NOTE — NURSING NOTE
"Chief Complaint   Patient presents with     Back Pain       Initial /70 (BP Location: Right arm, Cuff Size: Adult Large)  Pulse 94  Temp 97.7  F (36.5  C)  Ht 5' 7.75\" (1.721 m)  Wt 231 lb (104.8 kg)  LMP 08/08/2017  SpO2 99%  BMI 35.38 kg/m2 Estimated body mass index is 35.38 kg/(m^2) as calculated from the following:    Height as of this encounter: 5' 7.75\" (1.721 m).    Weight as of this encounter: 231 lb (104.8 kg).  Medication Reconciliation: complete   Yasemin Cantu MA    "

## 2017-09-12 PROBLEM — F41.8 DEPRESSION WITH ANXIETY: Status: ACTIVE | Noted: 2017-09-12

## 2017-09-18 NOTE — PROGRESS NOTES
I have discussed the patient's presenting complaint(s) with Dr. Roque and agree with the history, physical exam and plan as documented above. Her progress note reflects our joint assessment and plan.     Ankur Potts M.D.  Internal Medicine-Pediatrics

## 2017-09-22 ENCOUNTER — THERAPY VISIT (OUTPATIENT)
Dept: PHYSICAL THERAPY | Facility: CLINIC | Age: 27
End: 2017-09-22
Payer: COMMERCIAL

## 2017-09-22 DIAGNOSIS — G89.29 CHRONIC BILATERAL LOW BACK PAIN WITH LEFT-SIDED SCIATICA: ICD-10-CM

## 2017-09-22 DIAGNOSIS — M54.42 CHRONIC BILATERAL LOW BACK PAIN WITH LEFT-SIDED SCIATICA: ICD-10-CM

## 2017-09-22 PROCEDURE — 97112 NEUROMUSCULAR REEDUCATION: CPT | Mod: GP | Performed by: PHYSICAL THERAPIST

## 2017-09-22 PROCEDURE — 97110 THERAPEUTIC EXERCISES: CPT | Mod: GP | Performed by: PHYSICAL THERAPIST

## 2017-10-13 ENCOUNTER — THERAPY VISIT (OUTPATIENT)
Dept: PHYSICAL THERAPY | Facility: CLINIC | Age: 27
End: 2017-10-13
Payer: COMMERCIAL

## 2017-10-13 DIAGNOSIS — M54.42 CHRONIC BILATERAL LOW BACK PAIN WITH LEFT-SIDED SCIATICA: ICD-10-CM

## 2017-10-13 DIAGNOSIS — G89.29 CHRONIC BILATERAL LOW BACK PAIN WITH LEFT-SIDED SCIATICA: ICD-10-CM

## 2017-10-13 PROCEDURE — 97110 THERAPEUTIC EXERCISES: CPT | Mod: GP | Performed by: PHYSICAL THERAPIST

## 2017-10-13 PROCEDURE — 97112 NEUROMUSCULAR REEDUCATION: CPT | Mod: GP | Performed by: PHYSICAL THERAPIST

## 2018-04-03 PROBLEM — G89.29 CHRONIC BILATERAL LOW BACK PAIN WITH LEFT-SIDED SCIATICA: Status: RESOLVED | Noted: 2017-08-18 | Resolved: 2018-04-03

## 2018-04-03 PROBLEM — M54.42 CHRONIC BILATERAL LOW BACK PAIN WITH LEFT-SIDED SCIATICA: Status: RESOLVED | Noted: 2017-08-18 | Resolved: 2018-04-03

## 2018-04-03 NOTE — PROGRESS NOTES
Discharge Note    Progress reporting period is from initial eval to Oct 13, 2017.     Rama failed to return for next follow up visit and current status is unknown.  Please see information below for last relevant information on current status.  Patient seen for 5 visits.  SUBJECTIVE  Subjective changes noted by patient:  Pt experiences worst pain in the AM, when sneezing, or coughing.  Pt is doing HEP 1-2x/day.  Once a week does her strengthening.    .  Current pain level is 3/10.     Previous pain level was  7/10.   Changes in function:  Yes (See Goal flowsheet attached for changes in current functional level)  Adverse reaction to treatment or activity: None    OBJECTIVE  Changes noted in objective findings: Lumbar ROM: flex wnl, ext wnl erp (abolished after ex), SG L and R wnl.  core TrA grade 3/5.     ASSESSMENT/PLAN  Diagnosis: low back and L leg pain   DIAGP:  The encounter diagnosis was Chronic bilateral low back pain with left-sided sciatica.  STG/LTGs have been met or progress has been made towards goals:  Yes, please see goal flowsheet for most current information  Assessment of Progress: current status is unknown.    Last current status: Pt has not made progress   Self Management Plans:  HEP  I have re-evaluated this patient and find that the nature, scope, duration and intensity of the therapy is appropriate for the medical condition of the patient.  Rama continues to require the following intervention to meet STG and LTG's:  HEP.    Recommendations:  Discharge with current home program.  Patient to follow up with MD as needed.    Please refer to the daily flowsheet for treatment today, total treatment time and time spent performing 1:1 timed codes.

## 2018-08-30 ENCOUNTER — OFFICE VISIT (OUTPATIENT)
Dept: PEDIATRICS | Facility: CLINIC | Age: 28
End: 2018-08-30
Payer: COMMERCIAL

## 2018-08-30 VITALS
WEIGHT: 251 LBS | DIASTOLIC BLOOD PRESSURE: 72 MMHG | HEART RATE: 68 BPM | TEMPERATURE: 98.4 F | HEIGHT: 68 IN | SYSTOLIC BLOOD PRESSURE: 112 MMHG | BODY MASS INDEX: 38.04 KG/M2 | OXYGEN SATURATION: 99 %

## 2018-08-30 DIAGNOSIS — F41.8 DEPRESSION WITH ANXIETY: ICD-10-CM

## 2018-08-30 DIAGNOSIS — M62.830 BACK MUSCLE SPASM: ICD-10-CM

## 2018-08-30 DIAGNOSIS — M54.50 CHRONIC MIDLINE LOW BACK PAIN WITHOUT SCIATICA: Primary | ICD-10-CM

## 2018-08-30 DIAGNOSIS — G89.29 CHRONIC MIDLINE LOW BACK PAIN WITHOUT SCIATICA: Primary | ICD-10-CM

## 2018-08-30 PROCEDURE — 99214 OFFICE O/P EST MOD 30 MIN: CPT | Performed by: PEDIATRICS

## 2018-08-30 RX ORDER — SERTRALINE HYDROCHLORIDE 100 MG/1
100 TABLET, FILM COATED ORAL DAILY
Qty: 90 TABLET | Refills: 1 | Status: SHIPPED | OUTPATIENT
Start: 2018-08-30 | End: 2018-11-05

## 2018-08-30 RX ORDER — METHOCARBAMOL 750 MG/1
750 TABLET, FILM COATED ORAL 4 TIMES DAILY PRN
Qty: 180 TABLET | Refills: 0 | Status: SHIPPED | OUTPATIENT
Start: 2018-08-30 | End: 2019-12-06

## 2018-08-30 ASSESSMENT — ANXIETY QUESTIONNAIRES
6. BECOMING EASILY ANNOYED OR IRRITABLE: NOT AT ALL
1. FEELING NERVOUS, ANXIOUS, OR ON EDGE: SEVERAL DAYS
5. BEING SO RESTLESS THAT IT IS HARD TO SIT STILL: NOT AT ALL
GAD7 TOTAL SCORE: 2
IF YOU CHECKED OFF ANY PROBLEMS ON THIS QUESTIONNAIRE, HOW DIFFICULT HAVE THESE PROBLEMS MADE IT FOR YOU TO DO YOUR WORK, TAKE CARE OF THINGS AT HOME, OR GET ALONG WITH OTHER PEOPLE: SOMEWHAT DIFFICULT
3. WORRYING TOO MUCH ABOUT DIFFERENT THINGS: NOT AT ALL
2. NOT BEING ABLE TO STOP OR CONTROL WORRYING: NOT AT ALL
7. FEELING AFRAID AS IF SOMETHING AWFUL MIGHT HAPPEN: SEVERAL DAYS

## 2018-08-30 ASSESSMENT — PATIENT HEALTH QUESTIONNAIRE - PHQ9: 5. POOR APPETITE OR OVEREATING: NOT AT ALL

## 2018-08-30 NOTE — PATIENT INSTRUCTIONS
Recommend seeing an orthopedist.    Increase zoloft to 100mg - follow up in 2 months.  Consider therapy.    Change muscle relaxant to robaxin.

## 2018-08-30 NOTE — PROGRESS NOTES
"  SUBJECTIVE:   Rama Gutierrez is a 28 year old female who presents to clinic today for the following health issues:      Medication Followup of  Zoloft    Taking Medication as prescribed: yes    Side Effects:  None    Medication Helping Symptoms:  yes     Patient has somehow made 6 months of pills last for a year - she states she sometimes forgets to take them.  She thinks it helps for the anxiety, but depression has been worse the past few months.  She works for [employer] which is stressful and she doesn't want to talk about today. Worried about finances.  Has some thoughts of hurting herself, but not plan - contracts for safety - states she is too chicken to do anything.     She thinks depression is worse b/c she cannot enjoy her hobbies anymore (Specifically art) 2/2 chronic back pain - states she was diagnosed with sponlysis in 2012 - has done physical therapy which helped in the past, but wondering if anything else can be done - interested in seeing ortho.  Bilateral low back pain - worse with movement, hard to lay flat. Occ radiation up spine and sometimes left leg becomes numb.     Problem list and histories reviewed & adjusted, as indicated.  Additional history: as documented    Reviewed and updated as needed this visit by clinical staff       Reviewed and updated as needed this visit by Provider         ROS:  Constitutional, HEENT, cardiovascular, pulmonary, gi and gu systems are negative, except as otherwise noted.    OBJECTIVE:     /72 (BP Location: Right arm, Cuff Size: Adult Large)  Pulse 68  Temp 98.4  F (36.9  C) (Oral)  Ht 5' 7.75\" (1.721 m)  Wt 251 lb (113.9 kg)  SpO2 99%  BMI 38.45 kg/m2  Body mass index is 38.45 kg/(m^2).  GENERAL APPEARANCE: healthy, alert and no distress  ORTHO: no spinal tenderness, mild lumbar paraspinal tenderness, no SI or buttock tenderness. Straight leg negative.     Diagnostic Test Results:  none     ASSESSMENT/PLAN:       1. Chronic midline low back pain without " sciatica  Discussed getting updated MRI - patient concerned about cost - would like to meet with romulo first - will continue physical therapy exercises at home.   - ORTHO  REFERRAL    2. Depression with anxiety  Uncontrolled - worsened by uncontrolled backpain that is impacting her quality of life.   - sertraline (ZOLOFT) 100 MG tablet; Take 1 tablet (100 mg) by mouth daily  Dispense: 90 tablet; Refill: 1    3. Back muscle spasm  Flexeril making too sleepy - will try robaxin, although may have same side effect - warned patient.   - methocarbamol (ROBAXIN) 750 MG tablet; Take 1 tablet (750 mg) by mouth 4 times daily as needed for muscle spasms  Dispense: 180 tablet; Refill: 0    Patient Instructions   Recommend seeing an orthopedist.    Increase zoloft to 100mg - follow up in 2 months.  Consider therapy.    Change muscle relaxant to robaxin.      Rozina Ricketts MD  [facility]

## 2018-08-30 NOTE — MR AVS SNAPSHOT
After Visit Summary   8/30/2018    Rama Gutierrez    MRN: [id number]           Patient Information     Date Of Birth          1990        Visit Information        Provider Department      8/30/2018 1:00 PM Rozina Ricketts MD [facility]        Today's Diagnoses     Chronic midline low back pain without sciatica    -  1    Depression with anxiety        Back muscle spasm          Care Instructions    Recommend seeing an orthopedist.    Increase zoloft to 100mg - follow up in 2 months.  Consider therapy.    Change muscle relaxant to robaxin.          Follow-ups after your visit        Additional Services     ORTHO  REFERRAL       Coverage of these services is subject to the terms and limitations of your health insurance plan. Please call member services at your health plan with any benefit or coverage questions.       [facility] is referring you to the Orthopedic  Services at [facility].       The  representative will assist you in the coordination of your orthopedic and musculoskeletal care as prescribed by your physician.    The  representative will call you within 24 hours or   You may contact the  representative at:   [phone] for [place] and [facility]  [phone] for [facility], and [facility]  [phone] for [facility]    Type of Referral : Spine: Lumbar  **Choose Medical Spine Specialist (unless patient was seen by a Medical Spine Specialist within the past 6 months).**  Surgical Evaluation is advised if the patient presents with one or more of the following red flags: Evidence of Spinal Tumor, Infection or Fracture, Cauda Equina Syndrome, Sudden or Progressive Weakness, Loss of Bowel or Bladder Control, or any other documented emergent neurological condition resulting from a Lumbar Spinal Condition. Medical Spine Specialist        Timeframe requested: Routine       If X-rays,  "CT or MRI's   have been performed, please contact the facility where they were done, to arrange for  prior to your scheduled appointment. Please bring this referral request to your appointment and present it to your specialist.                  Follow-up notes from your care team     Return in about 2 months (around 10/30/2018) for Routine Visit.      Who to contact     If you have questions or need follow up information about today's clinic visit or your schedule please contact [facility] directly at [phone].  Normal or non-critical lab and imaging results will be communicated to you by Kaskadohart, letter or phone within 4 business days after the clinic has received the results. If you do not hear from us within 7 days, please contact the clinic through Kaskadohart or phone. If you have a critical or abnormal lab result, we will notify you by phone as soon as possible.  Submit refill requests through Vollee or call your pharmacy and they will forward the refill request to us. Please allow 3 business days for your refill to be completed.          Additional Information About Your Visit        Vollee Information     Vollee lets you send messages to your doctor, view your test results, renew your prescriptions, schedule appointments and more. To sign up, go to [email or web address] . Click on \"Log in\" on the left side of the screen, which will take you to the Welcome page. Then click on \"Sign up Now\" on the right side of the page.     You will be asked to enter the access code listed below, as well as some personal information. Please follow the directions to create your username and password.     Your access code is: [email or web address]  Expires: 2018  1:31 PM     Your access code will  in 90 days. If you need help or a new code, please call your [place] clinic or [phone].        Care EveryWhere ID     This is your Care EveryWhere ID. This could be used by other organizations to " "access your [place] medical records  [id number]        Your Vitals Were     Pulse Temperature Height Pulse Oximetry BMI (Body Mass Index)       68 98.4  F (36.9  C) (Oral) 5' 7.75\" (1.721 m) 99% 38.45 kg/m2        Blood Pressure from Last 3 Encounters:   08/30/18 112/72   09/08/17 118/70   08/10/17 98/70    Weight from Last 3 Encounters:   08/30/18 251 lb (113.9 kg)   09/08/17 231 lb (104.8 kg)   08/10/17 231 lb (104.8 kg)              We Performed the Following     ORTHO  REFERRAL          Today's Medication Changes          These changes are accurate as of 8/30/18  1:31 PM.  If you have any questions, ask your nurse or doctor.               Start taking these medicines.        Dose/Directions    methocarbamol 750 MG tablet   Commonly known as:  ROBAXIN   Used for:  Back muscle spasm   Started by:  Rozina Ricketts MD        Dose:  750 mg   Take 1 tablet (750 mg) by mouth 4 times daily as needed for muscle spasms   Quantity:  180 tablet   Refills:  0         These medicines have changed or have updated prescriptions.        Dose/Directions    * sertraline 50 MG tablet   Commonly known as:  ZOLOFT   This may have changed:  Another medication with the same name was added. Make sure you understand how and when to take each.   Used for:  Depression with anxiety   Changed by:  Rozina Ricketts MD        Dose:  50 mg   Take 1 tablet (50 mg) by mouth daily   Quantity:  90 tablet   Refills:  1       * sertraline 100 MG tablet   Commonly known as:  ZOLOFT   This may have changed:  You were already taking a medication with the same name, and this prescription was added. Make sure you understand how and when to take each.   Used for:  Depression with anxiety   Changed by:  Rozina Ricketts MD        Dose:  100 mg   Take 1 tablet (100 mg) by mouth daily   Quantity:  90 tablet   Refills:  1       * Notice:  This list has 2 medication(s) that are the same as other medications prescribed for you. Read the " directions carefully, and ask your doctor or other care provider to review them with you.      Stop taking these medicines if you haven't already. Please contact your care team if you have questions.     cyclobenzaprine 10 MG tablet   Commonly known as:  FLEXERIL   Stopped by:  Rozina Ricketts MD                Where to get your medicines      These medications were sent to [facility] [place] [facility] [place] - [address]   [address], [place] [address]     Phone:  [phone]     methocarbamol 750 MG tablet    sertraline 100 MG tablet                Primary Care Provider Office Phone # Fax #    Rozina Ricketts [phone]       [address] [place] [address]        Equal Access to Services     [facility]: Hadii aad ku hadasho Soomaali, waaxda luqadaha, qaybta kaalmada adeegyada, waxay idiin haycheman bud young . So [facility] [phone].    ATENCIÓN: Si habla español, tiene a oscar disposición servicios gratuitos de asistencia lingüística. [facility] [phone].    We comply with applicable federal civil rights laws and [place] laws. We do not discriminate on the basis of race, color, national origin, age, disability, sex, sexual orientation, or gender identity.            Thank you!     Thank you for choosing [facility]  for your care. Our goal is always to provide you with excellent care. Hearing back from our patients is one way we can continue to improve our services. Please take a few minutes to complete the written survey that you may receive in the mail after your visit with us. Thank you!             Your Updated Medication List - Protect others around you: Learn how to safely use, store and throw away your medicines at www.disposemymeds.org.          This list is accurate as of 8/30/18  1:31 PM.  Always use your most recent med list.                   Brand Name Dispense Instructions for use Diagnosis     etonogestrel 68 MG Impl    IMPLANON/NEXPLANON     1 each by Subdermal route once        methocarbamol 750 MG tablet    ROBAXIN    180 tablet    Take 1 tablet (750 mg) by mouth 4 times daily as needed for muscle spasms    Back muscle spasm       * sertraline 50 MG tablet    ZOLOFT    90 tablet    Take 1 tablet (50 mg) by mouth daily    Depression with anxiety       * sertraline 100 MG tablet    ZOLOFT    90 tablet    Take 1 tablet (100 mg) by mouth daily    Depression with anxiety       XANAX PO      Take 0.5 mg by mouth as needed for anxiety        * Notice:  This list has 2 medication(s) that are the same as other medications prescribed for you. Read the directions carefully, and ask your doctor or other care provider to review them with you.

## 2018-08-31 ASSESSMENT — ANXIETY QUESTIONNAIRES: GAD7 TOTAL SCORE: 2

## 2018-08-31 ASSESSMENT — PATIENT HEALTH QUESTIONNAIRE - PHQ9: SUM OF ALL RESPONSES TO PHQ QUESTIONS 1-9: 8

## 2018-09-27 ENCOUNTER — RADIANT APPOINTMENT (OUTPATIENT)
Dept: GENERAL RADIOLOGY | Facility: CLINIC | Age: 28
End: 2018-09-27
Attending: NURSE PRACTITIONER
Payer: COMMERCIAL

## 2018-09-27 ENCOUNTER — OFFICE VISIT (OUTPATIENT)
Dept: NEUROSURGERY | Facility: CLINIC | Age: 28
End: 2018-09-27
Attending: NURSE PRACTITIONER
Payer: COMMERCIAL

## 2018-09-27 ENCOUNTER — HOSPITAL ENCOUNTER (OUTPATIENT)
Dept: MRI IMAGING | Facility: CLINIC | Age: 28
Discharge: HOME | End: 2018-09-27
Attending: NURSE PRACTITIONER | Admitting: NURSE PRACTITIONER
Payer: COMMERCIAL

## 2018-09-27 VITALS
SYSTOLIC BLOOD PRESSURE: 116 MMHG | WEIGHT: 251 LBS | HEIGHT: 67 IN | HEART RATE: 75 BPM | BODY MASS INDEX: 39.39 KG/M2 | OXYGEN SATURATION: 97 % | DIASTOLIC BLOOD PRESSURE: 75 MMHG

## 2018-09-27 DIAGNOSIS — M54.16 LUMBAR RADICULAR PAIN: ICD-10-CM

## 2018-09-27 DIAGNOSIS — M54.16 LUMBAR RADICULAR PAIN: Primary | ICD-10-CM

## 2018-09-27 PROCEDURE — 72110 X-RAY EXAM L-2 SPINE 4/>VWS: CPT

## 2018-09-27 PROCEDURE — 99243 OFF/OP CNSLTJ NEW/EST LOW 30: CPT | Performed by: NURSE PRACTITIONER

## 2018-09-27 PROCEDURE — 72148 MRI LUMBAR SPINE W/O DYE: CPT

## 2018-09-27 ASSESSMENT — PAIN SCALES - GENERAL: PAINLEVEL: SEVERE PAIN (6)

## 2018-09-27 NOTE — LETTER
9/27/2018         RE: Rama Gutierrez  [address]  [place] [facility] [address]        Dear Colleague,    Thank you for referring your patient, Rama Gutierrez, to the [facility]. Please see a copy of my visit note below.    Dr. Demond Freeman  [facility]  Neurosurgery Clinic Visit        CC: low back and left leg pain    Primary care Provider: Rozina Ricketts      Reason For Visit:   I was asked by Dr. Ricketts to consult on the patient for lumbar radicular pain.      HPI: Rama Gutierrez is a 28 year old female with lumbar radicular.  She notes that 2 years ago she had a work up at neurosurgery in [place]. She was told she had spondylolisthesis.  She went to PT and completed 2X. She states that now the pain is progressing. She notes pain across her low back with intermittent radicular pain down her left leg.  She continues to do her PT exercises which can help.  She currently works at a warehouse doing heavy lifting and standing. She denies any foot drop or drag. She has not had injections of her pain. At the end of a shift she feels like her back is painful that she feels like she is going to fall. She notes all activities makes it worse. She takes Ibuprofen for the pain.        Pain at its worst 10  Pain right now:  5    No past medical history on file.    Past Medical History reviewed with patient during visit.    Past Surgical History:   Procedure Laterality Date     TONSILLECTOMY       Past Surgical History reviewed with patient during visit.    Current Outpatient Prescriptions   Medication     ALPRAZolam (XANAX PO)     etonogestrel (IMPLANON/NEXPLANON) 68 MG IMPL     methocarbamol (ROBAXIN) 750 MG tablet     sertraline (ZOLOFT) 100 MG tablet     sertraline (ZOLOFT) 50 MG tablet     No current facility-administered medications for this visit.        Allergies   Allergen Reactions     Codeine Nausea       Social History     Social History     Marital status: Single      "Spouse name: N/A     Number of children: N/A     Years of education: N/A     Social History Main Topics     Smoking status: Never Smoker     Smokeless tobacco: Never Used     Alcohol use No     Drug use: No     Sexual activity: No     Other Topics Concern     Not on file     Social History Narrative       Family History   Problem Relation Age of Onset     Breast Cancer Paternal Grandmother      Cerebrovascular Disease Paternal Grandfather      Diabetes No family hx of      Coronary Artery Disease No family hx of      Colon Cancer No family hx of          Review Of Systems  Skin: negative  Eyes: negative  Ears/Nose/Throat: negative  Respiratory: No shortness of breath, dyspnea on exertion, cough, or hemoptysis  Cardiovascular: negative  Gastrointestinal: negative  Genitourinary: negative  Musculoskeletal: back pain  Neurologic: left leg pain  Psychiatric: anxiety and depression  Hematologic/Lymphatic/Immunologic: negative  Endocrine: negative     ROS: 10 point ROS neg other than the symptoms noted above in the HPI.      Vital Signs: /75 (BP Location: Right arm, Patient Position: Sitting, Cuff Size: Adult Large)  Pulse 75  Ht 5' 7\" (1.702 m)  Wt 251 lb (113.9 kg)  SpO2 97%  BMI 39.31 kg/m2      Constitutional:  Alert, well nourished, NAD.  Memory: recent and remote memory intact  HEENT: Normocephalic, atraumatic.   Pulm:  Without shortness of breath   CV:  No pitting edema of BLE.    Neurological:  Awake  Alert  Oriented x 3  Speech clear  Cranial nerves II - XII intact  PERRL  EOMI  Face symmetric  Tongue midline  Motor exam   Shoulder Abduction:  Right:  5/5   Left:  5/5  Biceps:                      Right:  5/5   Left:  5/5  Triceps:                     Right:  5/5   Left:  5/5  Wrist Extensors:       Right:  5/5   Left:  5/5  Wrist Flexors:           Right:  5/5   Left:  5/5  Intrinsics:                   Right:  5/5   Left:  5/5   Hip Flexor:                Right: 5/5  Left:  5/5  Hip Adductor:         "     Right:  5/5  Left:  5/5  Hip Abductor:             Right:  5/5  Left:  5/5  Gastroc Soleus:        Right:  5/5  Left:  5/5  Tib/Ant:                      Right:  5/5  Left:  5/5  EHL:                          Right:  5/5  Left:  5/5   Sensation normal to bilateral upper and lower extremities  Muscle tone to bilateral upper and lower extremities normal   Gait: Able to stand from a seated position. Normal non-antalgic, non-myelopathic gait.  Able to heel/toe walk without loss of balance    Lumbar examination reveals tenderness of the spine and paraspinous muscles. Pain with lumbar extension. Hip height is symmetrical. Negative SI joint, sciatic notch or greater trochanteric tenderness to palpation bilaterally.  Straight leg raise is negative bilaterally.      Imaging: NONE        Assessment/Plan:   Rama Gutierrez is a 28 year old female with lumbar radicular.  She notes that 2 years ago she had a work up at neurosurgery in [place]. She was told she had spondylolisthesis.  She went to PT and completed 2X. She states that now the pain is progressing. She notes pain across her low back with intermittent radicular pain down her left leg.  She continues to do her PT exercises which can help.  She currently works at a warehouse doing heavy lifting and standing. She denies any foot drop or drag. She has not had injections of her pain. At the end of a shift she feels like her back is painful that she feels like she is going to fall. She notes all activities makes it worse. She takes Ibuprofen for the pain.  The pt has pain with ROM and palpation.  Her lumbar MRI reports from 2016 shows spondylolisthesis with multiple levels of herniations.  We will have her undergo a lumbar xray and lumbar MRI. She is open to this.   The pts BMI is over 39.  She was educated that if they are a candidate for surgery we would recommend a BMI of less than 35 due to post op recovery and incision healing risks.  She reports that the pain does  make her depressed and inactive.  She was encouraged to start walking if able.     Patient Instructions   1.  Please call [facility] to have lumbar MRI completed. Call [phone] to schedule your scan.  I will contact you with results.     2.  Xray today.             Eneida Cowan CNP  [facility]  [address] [address]    Tel [phone]  Pager [phone]      Again, thank you for allowing me to participate in the care of your patient.        Sincerely,        JUAN Negron CNP

## 2018-09-27 NOTE — NURSING NOTE
"Rama Gutierrez is a 28 year old female who presents for:  Chief Complaint   Patient presents with     Neurologic Problem     Chronic midline low back pain, when patient has the pain it can affect her hips, legs, and feet, L side is worse. The bottom of her feet have numbness.        Vitals:    Vitals:    09/27/18 1441   BP: 116/75   BP Location: Right arm   Patient Position: Sitting   Cuff Size: Adult Large   Pulse: 75   SpO2: 97%   Weight: 251 lb (113.9 kg)   Height: 5' 7\" (1.702 m)       BMI:  Estimated body mass index is 39.31 kg/(m^2) as calculated from the following:    Height as of this encounter: 5' 7\" (1.702 m).    Weight as of this encounter: 251 lb (113.9 kg).    Pain Score:  Severe Pain (6)      Do you feel safe in your environment?  Yes      [place]          "

## 2018-09-27 NOTE — MR AVS SNAPSHOT
After Visit Summary   9/27/2018    Rama Gutierrez    MRN: [id number]           Patient Information     Date Of Birth          1990        Visit Information        Provider Department      9/27/2018 2:40 PM Eneida Cowan APRN CNP [facility]        Today's Diagnoses     Lumbar radicular pain    -  1      Care Instructions    1.  Please call [facility] to have lumbar MRI completed. Call [phone] to schedule your scan.  I will contact you with results.     2.  Xray today.            Follow-ups after your visit        Your next 10 appointments already scheduled     Nov 02, 2018  2:00 PM CDT   SHORT with Rozina Ricketts MD   [facility]    [address]  [address]  [place] [address] [phone]              Future tests that were ordered for you today     Open Future Orders        Priority Expected Expires Ordered    XR Lumbar Bending Only 2/3 Views Routine 9/27/2018 9/27/2019 9/27/2018    MR Lumbar Spine w/o Contrast Routine  9/27/2019 9/27/2018            Who to contact     If you have questions or need follow up information about today's clinic visit or your schedule please contact [facility] directly at [phone].  Normal or non-critical lab and imaging results will be communicated to you by GoHealthhart, letter or phone within 4 business days after the clinic has received the results. If you do not hear from us within 7 days, please contact the clinic through GoHealthhart or phone. If you have a critical or abnormal lab result, we will notify you by phone as soon as possible.  Submit refill requests through ezeep or call your pharmacy and they will forward the refill request to us. Please allow 3 business days for your refill to be completed.          Additional Information About Your Visit        ezeep Information     ezeep lets you send messages to your doctor, view your  "test results, renew your prescriptions, schedule appointments and more. To sign up, go to [email or web address] . Click on \"Log in\" on the left side of the screen, which will take you to the Welcome page. Then click on \"Sign up Now\" on the right side of the page.     You will be asked to enter the access code listed below, as well as some personal information. Please follow the directions to create your username and password.     Your access code is: [email or web address]  Expires: 2018  1:31 PM     Your access code will  in 90 days. If you need help or a new code, please call your [place] clinic or [phone].        Care EveryWhere ID     This is your Care EveryWhere ID. This could be used by other organizations to access your [place] medical records  [id number]        Your Vitals Were     Pulse Height Pulse Oximetry BMI (Body Mass Index)          75 5' 7\" (1.702 m) 97% 39.31 kg/m2         Blood Pressure from Last 3 Encounters:   18 116/75   18 112/72   17 118/70    Weight from Last 3 Encounters:   18 251 lb (113.9 kg)   18 251 lb (113.9 kg)   17 231 lb (104.8 kg)               Primary Care Provider Office Phone # Fax #    Rozina Ricketts [phone] [address] [place] [address]        Equal Access to Services     [facility]: Hadii aad ku hadasho Soomaali, waaxda luqadaha, qaybta kaalmada adeegyada, maria eugenia billingsley. So [facility] [phone].    ATENCIÓN: Si habla español, tiene a oscar disposición servicios gratuitos de asistencia lingüística. Llame al [phone].    We comply with applicable federal civil rights laws and [place] laws. We do not discriminate on the basis of race, color, national origin, age, disability, sex, sexual orientation, or gender identity.            Thank you!     Thank you for choosing [facility]  for your care. Our goal is always to provide you with " excellent care. Hearing back from our patients is one way we can continue to improve our services. Please take a few minutes to complete the written survey that you may receive in the mail after your visit with us. Thank you!             Your Updated Medication List - Protect others around you: Learn how to safely use, store and throw away your medicines at www.disposemymeds.org.          This list is accurate as of 9/27/18  2:51 PM.  Always use your most recent med list.                   Brand Name Dispense Instructions for use Diagnosis    etonogestrel 68 MG Impl    IMPLANON/NEXPLANON     1 each by Subdermal route once        methocarbamol 750 MG tablet    ROBAXIN    180 tablet    Take 1 tablet (750 mg) by mouth 4 times daily as needed for muscle spasms    Back muscle spasm       * sertraline 50 MG tablet    ZOLOFT    90 tablet    Take 1 tablet (50 mg) by mouth daily    Depression with anxiety       * sertraline 100 MG tablet    ZOLOFT    90 tablet    Take 1 tablet (100 mg) by mouth daily    Depression with anxiety       XANAX PO      Take 0.5 mg by mouth as needed for anxiety        * Notice:  This list has 2 medication(s) that are the same as other medications prescribed for you. Read the directions carefully, and ask your doctor or other care provider to review them with you.

## 2018-09-27 NOTE — PATIENT INSTRUCTIONS
1.  Please call [facility] to have lumbar MRI completed. Call [phone] to schedule your scan.  I will contact you with results.     2.  Xray today.

## 2018-09-27 NOTE — PROGRESS NOTES
Dr. Demond Freeman  [facility]  Neurosurgery Clinic Visit        CC: low back and left leg pain    Primary care Provider: Rozina Ricketts      Reason For Visit:   I was asked by Dr. Ricketts to consult on the patient for lumbar radicular pain.      HPI: Rama Gutierrez is a 28 year old female with lumbar radicular.  She notes that 2 years ago she had a work up at neurosurgery in [place]. She was told she had spondylolisthesis.  She went to PT and completed 2X. She states that now the pain is progressing. She notes pain across her low back with intermittent radicular pain down her left leg.  She continues to do her PT exercises which can help.  She currently works at a warehouse doing heavy lifting and standing. She denies any foot drop or drag. She has not had injections of her pain. At the end of a shift she feels like her back is painful that she feels like she is going to fall. She notes all activities makes it worse. She takes Ibuprofen for the pain.        Pain at its worst 10  Pain right now:  5    No past medical history on file.    Past Medical History reviewed with patient during visit.    Past Surgical History:   Procedure Laterality Date     TONSILLECTOMY       Past Surgical History reviewed with patient during visit.    Current Outpatient Prescriptions   Medication     ALPRAZolam (XANAX PO)     etonogestrel (IMPLANON/NEXPLANON) 68 MG IMPL     methocarbamol (ROBAXIN) 750 MG tablet     sertraline (ZOLOFT) 100 MG tablet     sertraline (ZOLOFT) 50 MG tablet     No current facility-administered medications for this visit.        Allergies   Allergen Reactions     Codeine Nausea       Social History     Social History     Marital status: Single     Spouse name: N/A     Number of children: N/A     Years of education: N/A     Social History Main Topics     Smoking status: Never Smoker     Smokeless tobacco: Never Used     Alcohol use No     Drug use: No     Sexual activity: No     Other  "Topics Concern     Not on file     Social History Narrative       Family History   Problem Relation Age of Onset     Breast Cancer Paternal Grandmother      Cerebrovascular Disease Paternal Grandfather      Diabetes No family hx of      Coronary Artery Disease No family hx of      Colon Cancer No family hx of          Review Of Systems  Skin: negative  Eyes: negative  Ears/Nose/Throat: negative  Respiratory: No shortness of breath, dyspnea on exertion, cough, or hemoptysis  Cardiovascular: negative  Gastrointestinal: negative  Genitourinary: negative  Musculoskeletal: back pain  Neurologic: left leg pain  Psychiatric: anxiety and depression  Hematologic/Lymphatic/Immunologic: negative  Endocrine: negative     ROS: 10 point ROS neg other than the symptoms noted above in the HPI.      Vital Signs: /75 (BP Location: Right arm, Patient Position: Sitting, Cuff Size: Adult Large)  Pulse 75  Ht 5' 7\" (1.702 m)  Wt 251 lb (113.9 kg)  SpO2 97%  BMI 39.31 kg/m2      Constitutional:  Alert, well nourished, NAD.  Memory: recent and remote memory intact  HEENT: Normocephalic, atraumatic.   Pulm:  Without shortness of breath   CV:  No pitting edema of BLE.    Neurological:  Awake  Alert  Oriented x 3  Speech clear  Cranial nerves II - XII intact  PERRL  EOMI  Face symmetric  Tongue midline  Motor exam   Shoulder Abduction:  Right:  5/5   Left:  5/5  Biceps:                      Right:  5/5   Left:  5/5  Triceps:                     Right:  5/5   Left:  5/5  Wrist Extensors:       Right:  5/5   Left:  5/5  Wrist Flexors:           Right:  5/5   Left:  5/5  Intrinsics:                   Right:  5/5   Left:  5/5   Hip Flexor:                Right: 5/5  Left:  5/5  Hip Adductor:             Right:  5/5  Left:  5/5  Hip Abductor:             Right:  5/5  Left:  5/5  Gastroc Soleus:        Right:  5/5  Left:  5/5  Tib/Ant:                      Right:  5/5  Left:  5/5  EHL:                          Right:  5/5  Left:  " 5/5   Sensation normal to bilateral upper and lower extremities  Muscle tone to bilateral upper and lower extremities normal   Gait: Able to stand from a seated position. Normal non-antalgic, non-myelopathic gait.  Able to heel/toe walk without loss of balance    Lumbar examination reveals tenderness of the spine and paraspinous muscles. Pain with lumbar extension. Hip height is symmetrical. Negative SI joint, sciatic notch or greater trochanteric tenderness to palpation bilaterally.  Straight leg raise is negative bilaterally.      Imaging: NONE        Assessment/Plan:   Rama Gutierrez is a 28 year old female with lumbar radicular.  She notes that 2 years ago she had a work up at neurosurgery in [place]. She was told she had spondylolisthesis.  She went to PT and completed 2X. She states that now the pain is progressing. She notes pain across her low back with intermittent radicular pain down her left leg.  She continues to do her PT exercises which can help.  She currently works at a warehouse doing heavy lifting and standing. She denies any foot drop or drag. She has not had injections of her pain. At the end of a shift she feels like her back is painful that she feels like she is going to fall. She notes all activities makes it worse. She takes Ibuprofen for the pain.  The pt has pain with ROM and palpation.  Her lumbar MRI reports from 2016 shows spondylolisthesis with multiple levels of herniations.  We will have her undergo a lumbar xray and lumbar MRI. She is open to this.   The pts BMI is over 39.  She was educated that if they are a candidate for surgery we would recommend a BMI of less than 35 due to post op recovery and incision healing risks.  She reports that the pain does make her depressed and inactive.  She was encouraged to start walking if able.     Patient Instructions   1.  Please call [facility] to have lumbar MRI completed. Call [phone] to schedule your scan.  I will contact  you with results.     2.  Xray today.             [facility]  [address] [place] [address]    Tel [phone]  Pager [phone]

## 2018-10-01 ENCOUNTER — TELEPHONE (OUTPATIENT)
Dept: NEUROSURGERY | Facility: CLINIC | Age: 28
End: 2018-10-01

## 2018-10-01 ENCOUNTER — TELEPHONE (OUTPATIENT)
Dept: PALLIATIVE MEDICINE | Facility: CLINIC | Age: 28
End: 2018-10-01

## 2018-10-01 DIAGNOSIS — M51.369 LUMBAR DEGENERATIVE DISC DISEASE: Primary | ICD-10-CM

## 2018-10-01 NOTE — TELEPHONE ENCOUNTER
LM on  for pt to schedule Lumbar KARLEE.      Finn MARY    [facility] [place]

## 2018-10-01 NOTE — TELEPHONE ENCOUNTER
TC to patient to review MRI results; disc extrusion L5-S1, left sided with severe left foraminal narrowing.  Pt's pain mainly left sided into the left buttock/hip with occasional pain extending into LLE.  Pt has had PT in past and is interested in lumbar KARLEE.  Order placed.  Also, flex/ext XR stable.

## 2018-10-02 NOTE — TELEPHONE ENCOUNTER
Pre-screening Questions for Radiology Injections:    Injection to be done at which interventional clinic site? [facility]    Instruct patient to arrive as directed prior to the scheduled appointment time:    [place]: 30 minutes before      Procedure ordered by Torri Ortega    Procedure ordered? Lumbar Epidural Steroid Injection    What insurance would patient like us to bill for this procedure? BCBS      Worker's comp or MVA (motor vehicle accident) -Any injection DO NOT SCHEDULE and route to Heidy Arias.      [employer] insurance - For SI joint injections, DO NOT SCHEDULE and route Rowan Aparicio. [employer] FREEDOM NO PA REQUIRED EFFECTIVE 11/1/2017      [employer]- MBB's must be scheduled at LEAST two weeks apart      [employer] - Any injection besides hip/shoulder/knee joint DO NOT SCHEDULE and route to Rowan Aparicio. She will obtain PA and call pt back to schedule procedure or notify pt of denial.       [employer]-Route to Rowan for review    Any chance of pregnancy? NO   If YES, do NOT schedule and route to RN pool    Is an  needed? No     Patient has a drive home? (mandatory) YES: INFORMED    Is patient taking any blood thinners (plavix, coumadin, jantoven, warfarin, heparin, pradaxa or dabigatran )? No   If hold needed, do NOT schedule, route to RN pool     Is patient taking any aspirin products (includes Excedrin and Fiorinal)? No     If more than 325mg/day do NOT schedule; route to RN pool     For CERVICAL procedures, hold all aspirin products for 6 days.     Tell pt that if aspirin product is not held for 6 days, the procedure WILL BE cancelled.      Does the patient have a bleeding or clotting disorder? No     If YES, okay to schedule AND route to RN nurse pool    For any patients with platelet count <100, must be forwarded to provider    Is patient diabetic?  No  If YES, have them bring their glucometer.    Does patient have an active infection or treated for  one within the past week? No     Is patient currently taking any antibiotics?  No     For patients on chronic, preventative, or prophylactic antibiotics, procedures may be scheduled.     For patients on antibiotics for active or recent infection:    Sonny Ratliff Burton, [place]-antibiotic course must have been completed for 4 days    Is patient currently taking any steroid medications? (i.e. Prednisone, Medrol)  No     For patients on steroid medications:    Sonny Ratliff Burton, [place]-steroid course must have been completed for 4 days    Reviewed with patient:  If you are started on any steroids or antibiotics between now and your appointment, you must contact us because the procedure may need to be cancelled.  Yes    Is patient actively being treated for cancer or immunocompromised? No  If YES, do NOT schedule and route to RN pool     Are you able to get on and off an exam table with minimal or no assistance? Yes  If NO, do NOT schedule and route to RN pool    Are you able to roll over and lay on your stomach with minimal or no assistance? Yes  If NO, do NOT schedule and route to RN pool     Any allergies to contrast dye, iodine, shellfish, or numbing and steroid medications? No  If YES, route to RN pool AND add allergy information to appointment notes    Allergies: Codeine      Has the patient had a flu shot or any other vaccinations within 7 days before or after the procedure.  No     Does patient have an MRI/CT?  YES: MRI  (SI joint, hip injections, lumbar sympathetic blocks, and stellate ganglion blocks do not require an MRI)    Was the MRI done w/in the last 3 years?  Yes    Was MRI done at [facility]? Yes      If not, where was it done? N/A       If MRI was not done at [facility], [facility] or [facility] do NOT schedule and route to nursing.  If pt has an imaging disc, the injection may be scheduled but pt has to bring disc to appt. If they show up w/out disc the injection cannot be  done    Reminders (please tell patient if applicable):       Instructed pt to arrive 30 minutes early for IV start if this is for a cervical procedure, ALL sympathetic (stellate ganglion, hypogastric, or lumbar sympathetic block) and all sedation procedures (RFA, spinal cord stimulation trials).  Not Applicable   -IVs are not routinely placed for Dr. Garvin cervical cases   -Dr. Vu: IVs for cervical ESIs and cervical TBDs (not CMBBs/facet inj)      If NPO for sedation, informed patient that it is okay to take medications with sips of water (except if they are to hold blood thinners).  Not Applicable   *DO take blood pressure medication if it is prescribed*      If this is for a cervical KARLEE, informed patient that aspirin needs to be held for 6 days.   Not Applicable      For all patients not having spinal cord stimulator (SCS) trials or radiofrequency ablations (RFAs), informed patient:    IV sedation is not provided for this procedure.  If you feel that an oral anti-anxiety medication is needed, you can discuss this further with your referring provider or primary care provider.  The Pain Clinic provider will discuss specifics of what the procedure includes at your appointment.  Most procedures last 10-20 minutes.  We use numbing medications to help with any discomfort during the procedure.  Not Applicable      Do not schedule procedures requiring IV placement in the first appointment of the day or first appointment after lunch. Do NOT schedule at 0745, 0815 or 1245. N/A      For patients 85 or older we recommend having an adult stay w/ them for the remainder of the day.   N/A    Does the patient have any questions?  NO  Sherie Tomlinson  [facility]

## 2018-10-03 NOTE — PROGRESS NOTES
[facility] - Procedure Note    Date of Visit: 10/4/2018    Procedure performed: Lumbar L5-S1 interlaminar epidural steroid injection  Diagnosis: Lumbar spondylosis; Lumbar radiculitis/radiculopathy  : Montserrat Garvin MD   Anesthesia: none    Indications: Rama Gutierrez is a 28 year old female who is seen at the request of Torri Ortega CNP for a lumbar epidural steroid injection. The patient describes bilateral low back pain radiating into both hips and down the back of both legs.  Left side is worse than the right and pain can extend on that side all the way to the bottom of her foot. The patient has been exhibiting symptoms consistent with lumbar intraspinal inflammation and radiculopathy. Symptoms have been persistent, disabling, and intermittently severe. The patient reports minimal improvement with conservative treatment, including PT and medications.    Lumbar MRI was done on 9/27/2018 which showed   FINDINGS: There are 5 lumbar-type vertebral bodies assumed for the  purposes of this dictation. The distal spinal cord and cauda equina  appear normal.      There is grade 1 anterolisthesis of L5 on S1 secondary to bilateral L5  pars defects. No loss of vertebral body height. There are no  destructive osseous lesions. Marked loss of intervertebral disc space  with disc desiccation and Modic-type degenerative endplate changes at  L5-S1. No significant endplate marrow edema. There is left-sided facet  joint effusions at L4-L5 and L5-S1.      Level by level as follows:      T12-L1: No significant spinal canal or neural foraminal narrowing.      L1-L2: No significant spinal canal or neural foraminal narrowing.      L2-L3: No significant spinal canal or neural foraminal narrowing.      L3-L4: No significant spinal canal or neural foraminal narrowing.      L4-L5: Trace posterior disc bulge along with bilateral facet  hypertrophy and left facet joint effusion. Findings result in mild  bilateral  neural foraminal narrowing. No spinal canal narrowing.      L5-S1: Grade 1 anterolisthesis with uncovering of the disc and left  foraminal disc extrusion extending up into the neural foramen. This  results in severe left neural foraminal narrowing. There is also mild  right neural foraminal narrowing. No significant spinal canal  narrowing.      Paraspinous soft tissues: Normal.        IMPRESSION:    1. Grade 1 anterolisthesis of L5 on S1 with bilateral L5 pars defects.  Modic-type degenerative endplate changes and loss of disc height at  L5-S1.  2. Uncovering of the disc at L5-S1 with superimposed left foraminal  disc extrusion extending superiorly into the neural foramen. This  causes severe left neural foraminal narrowing at L5-S1.  3. Mild bilateral neural foraminal narrowing at L4-L5 and mild right  neural foraminal narrowing at L5-S1. No significant spinal canal  narrowing at any level.       Allergies:      Allergies   Allergen Reactions     Codeine Nausea        Vitals:  /77  Pulse 80  SpO2 97%    Review of Systems: The patient denies recent fever, chills, illness, use of antibiotics or anticoagulants. All other 10-point review of systems negative.     Procedure: The procedure and risks were explained, and informed written consent was obtained from the patient. Risks include but are not limited to: infection, bleeding, increased pain, and damage to soft tissue, nerve, muscle, and vasculature structures. After getting informed consent, patient was brought into the procedure suite and was placed in a prone position on the procedure table. A Pause for the Cause was performed. Patient was prepped and draped in sterile fashion.     The L5-S1 interspace was identified with use of fluoroscopy in AP view. A 25-gauge, 1.5 inch needle was used to anesthetize the skin and subcutaneous tissue entry site with a total of 2 ml of 1% lidocaine. Under fluoroscopic visualization, a 22-gauge, 4.5 inch Tuohy epidural  needle was slowly advanced towards the epidural space a few millimeters left of midline. The latter part of the needle advancement was guided with fluoroscopy in the lateral view. The epidural space was identified using loss of resistance technique. After negative aspiration for heme and cerebrospinal fluid, a total of 1 mL of non-ionic contrast was injected to confirm needle placement with 9 mL of contrast wasted. Epidurogram confirmed spread within the posterior epidural space. 2 ml of 40mg/ml of triamcinolone, 2 ml of 1% lidocaine, and 1 ml of preservative free saline was injected. The needle was removed.  Images were saved to PACS.    The patient tolerated the procedure well, and there was no evidence of procedural complications. No new sensory or motor deficits were noted following the procedure. The patient was stable and able to ambulate on discharge home. Post-procedure instructions were provided.     Pre-procedure pain score: 3/10 in the back, 2/10 in the leg  Post-procedure pain score: 0/10 in the back, 0/10 in the leg    Assessment/Plan: Rama Gutierrez is a 28 year old female s/p lumbar interlaminar epidural steroid injection today for lumbar spondylosis and radiculitis/radiculopathy.     1. Following today's procedure, the patient was advised to contact the [facility] for any of the following:   Fever, chills, or night sweats   New onset of pain, numbness, or weakness   Any questions/concerns regarding the procedure  If unable to contact the Pain Center, the patient was instructed to go to a local Emergency Room for any complications.   2. The patient will receive a follow-up call in 1 week.   3. Follow-up with the referring provider in 2 weeks for post-procedure evaluation.      Montserrat Garvin MD   [facility]

## 2018-10-04 ENCOUNTER — RADIOLOGY INJECTION OFFICE VISIT (OUTPATIENT)
Dept: PALLIATIVE MEDICINE | Facility: CLINIC | Age: 28
End: 2018-10-04
Payer: COMMERCIAL

## 2018-10-04 ENCOUNTER — RADIANT APPOINTMENT (OUTPATIENT)
Dept: GENERAL RADIOLOGY | Facility: CLINIC | Age: 28
End: 2018-10-04
Attending: ANESTHESIOLOGY
Payer: COMMERCIAL

## 2018-10-04 VITALS — DIASTOLIC BLOOD PRESSURE: 77 MMHG | OXYGEN SATURATION: 97 % | SYSTOLIC BLOOD PRESSURE: 118 MMHG | HEART RATE: 80 BPM

## 2018-10-04 DIAGNOSIS — M54.16 LUMBAR RADICULOPATHY: Primary | ICD-10-CM

## 2018-10-04 DIAGNOSIS — M54.16 LUMBAR RADICULOPATHY: ICD-10-CM

## 2018-10-04 PROCEDURE — 62323 NJX INTERLAMINAR LMBR/SAC: CPT | Performed by: ANESTHESIOLOGY

## 2018-10-04 NOTE — NURSING NOTE
Discharge Information    IV Discontiued Time:  NA    Amount of Fluid Infused:  NA    Discharge Criteria = When patient returns to baseline or as per MD order    Consciousness:  Pt is fully awake    Circulation:  BP +/- 20% of pre-procedure level    Respiration:  Patient is able to breathe deeply    O2 Sat:  Patient is able to maintain O2 Sat >92% on room air    Activity:  Moves 4 extremities on command    Ambulation:  Patient is able to stand and walk or stand and pivot into wheelchair    Dressing:  Clean/dry or No Dressing    Notes:   Discharge instructions and AVS given to patient    Patient meets criteria for discharge?  YES    Admitted to PCU?  No    Responsible adult present to accompany patient home?  Yes    Signature/Title:    Henny Crespo RN Care Coordinator  [facility] [place]

## 2018-10-04 NOTE — MR AVS SNAPSHOT
After Visit Summary   10/4/2018    Rama Gutierrez    MRN: [id number]           Patient Information     Date Of Birth          1990        Visit Information        Provider Department      10/4/2018 3:15 PM Montserrat Garvin MD [facility]        Care Instructions    [facility] Procedure Discharge Instructions    Today you saw:   Dr. Montserrat Garvin    Your procedure: Lumbar  Epidural steroid injection     Medications used:  Lidocaine (anesthetic) Kenalog (steroid)  Omnipaque (contrast)             Be cautious when walking as numbness and/or weakness in the legs may occur up to 6-8 hours after the procedure due to effect of the local anesthetic    Do not drive for 6 hours. The effect of the local anesthetic could slow your reflexes.     Avoid strenuous activity for the first 24 hours. You may resume your regular activities after that.     You may shower, however avoid swimming, tub baths or hot tubs for 24 hours following your procedure    You may have a mild to moderate increase in pain for several days following the injection.      You may use ice packs for 10-15 minutes, 3 to 4 times a day at the injection site for comfort    Do not use heat to painful areas for 6 to 8 hours. This will give the local anesthetic time to wear off and prevent you from accidentally burning your skin.    You may use anti-inflammatory medications (such as Ibuprofen/Advil or Aleve) or Tylenol for pain control if necessary    With diabetes, check your blood sugar more frequently than usual as your blood sugar may be higher than normal for 10-14 days following a steroid injection. Contact your doctor who manages your diabetes if your blood sugar is higher than usual    It may take up to 14 days for the steroid medication to start working although you may feel the effect as early as a few days after the procedure.     Follow up with your referring provider in 2-3 weeks      If you experience any of the  following, call the pain center line during work hours at [phone] or on-call physician after hours at [phone]:  -Fever over 100 degree F  -Swelling, bleeding, redness, drainage, warmth at the injection site  -Progressive weakness or numbness in your legs   -Loss of bowel or bladder function  -Unusual headache that is not relieved by Tylenol or your regular headache medication  -Unusual new onset of pain that is not improving            Follow-ups after your visit        Your next 10 appointments already scheduled     Oct 04, 2018  3:15 PM CDT   Radiology Injections with Montserrat Garvin MD   [facility] ([facility])    [address]  [address]  [facility] [address]   [phone]            Oct 04, 2018  3:15 PM CDT   XR LUMBAR EPIDURAL INJECTION with [email or web address]   [facility] ([facility])    [address]  [address]  [facility] [address]   [phone]           How do I prepare for my exam? (Food and drink instructions) No Food and Drink Restrictions.  How do I prepare for my exam? (Other instructions) * If you take Coumadin (or any other blood thinners) you may need to stop taking them up to 5 days prior to the exam. Talk to your doctor before stopping. * If you take Plavix, Ticlid, Pletal or Persantine, please ask your doctor if you should stop these medicines. You may need extra tests on the morning of your scan. * If you take Xarelto (Rivaroxaban), you may need to stop taking it 24 hours before treatment. Talk to your doctor before stopping this medicine.  What should I wear: Wear comfortable clothes.  How long does the exam take: Injections take about 30 to 45 minutes. Most people spend up to 2 hours in the clinic or hospital.  What should I bring: Please bring any scans or X-rays taken at other hospitals, if similar tests were done. Also bring a list of your medicines, including vitamins, minerals and  over-the-counter drugs. It is safest to leave personal items at home. You will need a  : Plan to have someone drive you home afterward.  What do I need to tell my doctor: Tell your doctor in advance: * If you are allergic to X-ray dye (contrast fluid). * If you may be pregnant.  What should I do after the exam: You may have slight cramping during this exam. The cramps should go away afterward. You may have some spotting after the exam.  What is this test: A spinal shot is done in or near the spine. You may receive steroid medicine (to reduce swelling) or contrast fluid (dye that makes the area show up more clearly on X-rays). A nerve root shot is a shot into the nerve near the spine. It may reduce inflammation near the nerve root or spinal cord and reduce pain in the arm or leg.  Who should I call with questions: If you have any questions, please call the Imaging Department where you will have your exam. Directions, parking instructions, and other information is available on our website, [email or web address].            Nov 02, 2018  2:00 PM CDT   [facility]    [address] [place] [address]  [address]  [facility] [address] [phone]              Who to contact     If you have questions or need follow up information about today's clinic visit or your schedule please contact [facility] directly at [phone].  Normal or non-critical lab and imaging results will be communicated to you by MyChart, letter or phone within 4 business days after the clinic has received the results. If you do not hear from us within 7 days, please contact the clinic through MyChart or phone. If you have a critical or abnormal lab result, we will notify you by phone as soon as possible.  Submit refill requests through [employer] or call your pharmacy and they will forward the refill request to us. Please allow 3 business days for your refill to  be completed.          Additional Information About Your Visit        Care EveryWhere ID     This is your Care EveryWhere ID. This could be used by other organizations to access your [facility] medical records  [id number]        Your Vitals Were     Pulse Pulse Oximetry                60 97%           Blood Pressure from Last 3 Encounters:   10/04/18 113/76   09/27/18 116/75   08/30/18 112/72    Weight from Last 3 Encounters:   09/27/18 113.9 kg (251 lb)   08/30/18 113.9 kg (251 lb)   09/08/17 104.8 kg (231 lb)              Today, you had the following     No orders found for display       Primary Care Provider Office Phone # Fax #    Rozina Ricketts [phone] [address] [place] [address]        Equal Access to Services     MARIA D AUGUSTIN : Hadii aad ku hadasho Sodavi, waaxda luqadaha, qaybta kaalmada adeegyada, maria eugenia young . So [facility] [phone].    ATENCIÓN: Si habla español, tiene a oscar disposición servicios gratuitos de asistencia lingüística. Ll[facility] [phone].    We comply with applicable federal civil rights laws and [place] laws. We do not discriminate on the basis of race, color, national origin, age, disability, sex, sexual orientation, or gender identity.            Thank you!     Thank you for choosing [facility]  for your care. Our goal is always to provide you with excellent care. Hearing back from our patients is one way we can continue to improve our services. Please take a few minutes to complete the written survey that you may receive in the mail after your visit with us. Thank you!             Your Updated Medication List - Protect others around you: Learn how to safely use, store and throw away your medicines at www.disposemymeds.org.          This list is accurate as of 10/4/18  2:43 PM.  Always use your most recent med list.                   Brand Name Dispense Instructions for use Diagnosis     etonogestrel 68 MG Impl    IMPLANON/NEXPLANON     1 each by Subdermal route once        methocarbamol 750 MG tablet    ROBAXIN    180 tablet    Take 1 tablet (750 mg) by mouth 4 times daily as needed for muscle spasms    Back muscle spasm       * sertraline 50 MG tablet    ZOLOFT    90 tablet    Take 1 tablet (50 mg) by mouth daily    Depression with anxiety       * sertraline 100 MG tablet    ZOLOFT    90 tablet    Take 1 tablet (100 mg) by mouth daily    Depression with anxiety       XANAX PO      Take 0.5 mg by mouth as needed for anxiety        * Notice:  This list has 2 medication(s) that are the same as other medications prescribed for you. Read the directions carefully, and ask your doctor or other care provider to review them with you.

## 2018-10-04 NOTE — Clinical Note
Ideally I would have liked to do a left L5-S1 TRANSFORAMINAL EPIDURAL STEROID INJECTION, however, with the degree of narrowing at that level I do not think there is any way I could accomplish this.  She does have pain bilaterally so hopefully this will be helpful.

## 2018-10-04 NOTE — PATIENT INSTRUCTIONS
[facility] Procedure Discharge Instructions    Today you saw:   Dr. Montserrat Garvin    Your procedure: Lumbar  Epidural steroid injection     Medications used:  Lidocaine (anesthetic) Kenalog (steroid)  Omnipaque (contrast)             Be cautious when walking as numbness and/or weakness in the legs may occur up to 6-8 hours after the procedure due to effect of the local anesthetic    Do not drive for 6 hours. The effect of the local anesthetic could slow your reflexes.     Avoid strenuous activity for the first 24 hours. You may resume your regular activities after that.     You may shower, however avoid swimming, tub baths or hot tubs for 24 hours following your procedure    You may have a mild to moderate increase in pain for several days following the injection.      You may use ice packs for 10-15 minutes, 3 to 4 times a day at the injection site for comfort    Do not use heat to painful areas for 6 to 8 hours. This will give the local anesthetic time to wear off and prevent you from accidentally burning your skin.    You may use anti-inflammatory medications (such as Ibuprofen/Advil or Aleve) or Tylenol for pain control if necessary    With diabetes, check your blood sugar more frequently than usual as your blood sugar may be higher than normal for 10-14 days following a steroid injection. Contact your doctor who manages your diabetes if your blood sugar is higher than usual    It may take up to 14 days for the steroid medication to start working although you may feel the effect as early as a few days after the procedure.     Follow up with your referring provider in 2-3 weeks      If you experience any of the following, call the pain center line during work hours at [phone] or on-call physician after hours at [phone]:  -Fever over 100 degree F  -Swelling, bleeding, redness, drainage, warmth at the injection site  -Progressive weakness or numbness in your legs   -Loss of bowel or bladder  function  -Unusual headache that is not relieved by Tylenol or your regular headache medication  -Unusual new onset of pain that is not improving

## 2018-10-19 ENCOUNTER — MYC MEDICAL ADVICE (OUTPATIENT)
Dept: PEDIATRICS | Facility: CLINIC | Age: 28
End: 2018-10-19

## 2018-10-19 NOTE — TELEPHONE ENCOUNTER
Reason for Call:  Form, our goal is to have forms completed with 72 hours, however, some forms may require a visit or additional information.    Type of letter, form or note:  medical    Who is the form from?: Patient    Where did the form come from: Patient or family brought in       What clinic location was the form placed at?: [facility]    Where the form was placed: 's Box    What number is listed as a contact on the form?: please complete form and call patient for  at [phone]       Additional comments: please complete form and call patient for  at [phone]    Call taken on 10/19/2018 at 3:24 PM by Merry Mar

## 2018-10-19 NOTE — TELEPHONE ENCOUNTER
Please review.  Patient dropped off the form.    Em Hernandez RN  Message handled by Nurse Triage.

## 2018-10-30 NOTE — TELEPHONE ENCOUNTER
Cannot complete forms without seeing her.   Will do forms at appointment 11/2/18    Annie Walter MA   October 30, 2018,  8:22 AM

## 2018-11-05 ENCOUNTER — OFFICE VISIT (OUTPATIENT)
Dept: PEDIATRICS | Facility: CLINIC | Age: 28
End: 2018-11-05
Payer: COMMERCIAL

## 2018-11-05 VITALS
HEIGHT: 67 IN | HEART RATE: 85 BPM | TEMPERATURE: 98.1 F | SYSTOLIC BLOOD PRESSURE: 120 MMHG | BODY MASS INDEX: 38.77 KG/M2 | WEIGHT: 247 LBS | OXYGEN SATURATION: 98 % | DIASTOLIC BLOOD PRESSURE: 78 MMHG

## 2018-11-05 DIAGNOSIS — Z23 NEED FOR PROPHYLACTIC VACCINATION AND INOCULATION AGAINST INFLUENZA: Primary | ICD-10-CM

## 2018-11-05 DIAGNOSIS — M54.42 CHRONIC BILATERAL LOW BACK PAIN WITH LEFT-SIDED SCIATICA: ICD-10-CM

## 2018-11-05 DIAGNOSIS — G89.29 CHRONIC BILATERAL LOW BACK PAIN WITH LEFT-SIDED SCIATICA: ICD-10-CM

## 2018-11-05 DIAGNOSIS — F41.8 DEPRESSION WITH ANXIETY: ICD-10-CM

## 2018-11-05 PROCEDURE — 90471 IMMUNIZATION ADMIN: CPT | Performed by: PEDIATRICS

## 2018-11-05 PROCEDURE — 90686 IIV4 VACC NO PRSV 0.5 ML IM: CPT | Performed by: PEDIATRICS

## 2018-11-05 PROCEDURE — 99214 OFFICE O/P EST MOD 30 MIN: CPT | Mod: 25 | Performed by: PEDIATRICS

## 2018-11-05 RX ORDER — SERTRALINE HYDROCHLORIDE 100 MG/1
100 TABLET, FILM COATED ORAL DAILY
Qty: 90 TABLET | Refills: 1 | Status: SHIPPED | OUTPATIENT
Start: 2018-11-05 | End: 2019-03-18

## 2018-11-05 ASSESSMENT — ANXIETY QUESTIONNAIRES
1. FEELING NERVOUS, ANXIOUS, OR ON EDGE: SEVERAL DAYS
7. FEELING AFRAID AS IF SOMETHING AWFUL MIGHT HAPPEN: NOT AT ALL
5. BEING SO RESTLESS THAT IT IS HARD TO SIT STILL: NOT AT ALL
2. NOT BEING ABLE TO STOP OR CONTROL WORRYING: NOT AT ALL
IF YOU CHECKED OFF ANY PROBLEMS ON THIS QUESTIONNAIRE, HOW DIFFICULT HAVE THESE PROBLEMS MADE IT FOR YOU TO DO YOUR WORK, TAKE CARE OF THINGS AT HOME, OR GET ALONG WITH OTHER PEOPLE: SOMEWHAT DIFFICULT
3. WORRYING TOO MUCH ABOUT DIFFERENT THINGS: SEVERAL DAYS
GAD7 TOTAL SCORE: 2
6. BECOMING EASILY ANNOYED OR IRRITABLE: NOT AT ALL

## 2018-11-05 ASSESSMENT — PATIENT HEALTH QUESTIONNAIRE - PHQ9
5. POOR APPETITE OR OVEREATING: NOT AT ALL
SUM OF ALL RESPONSES TO PHQ QUESTIONS 1-9: 9

## 2018-11-05 NOTE — PROGRESS NOTES
"  SUBJECTIVE:   Rama Gutierrez is a 28 year old female who presents to clinic today for the following health issues:    FMLA forms filled out     Medication Followup of     Taking Medication as prescribed: yes    Side Effects:  None    Medication Helping Symptoms:  yes       Sertraline dose increased from 50mg to 100mg after last visit - she does think this is helping, but currently overwhelmed with stress of work and other \"situational\" things.  She does think the med is helping her cope with things versus not being on it.    She currently works at [employer] and is asking for accommodations for her chronic bilateral back pain - she currently dose have some left sided radiculopathy as well.  She is seeing pain clinic and has injections for this and has done physical therapy in the past.  She stands for 10 hours and only has 2 breaks, the entire time at work is lifting, bending and twisting.  This is making her back pain worse.      Problem list and histories reviewed & adjusted, as indicated.  Additional history: as documented        Reviewed and updated as needed this visit by clinical staff       Reviewed and updated as needed this visit by Provider         ROS:  Constitutional, HEENT, cardiovascular, pulmonary, gi and gu systems are negative, except as otherwise noted.    OBJECTIVE:     /78 (BP Location: Right arm, Cuff Size: Adult Large)  Pulse 85  Temp 98.1  F (36.7  C) (Oral)  Ht 5' 7\" (1.702 m)  Wt 247 lb (112 kg)  SpO2 98%  BMI 38.69 kg/m2  Body mass index is 38.69 kg/(m^2).  NA    Diagnostic Test Results:  none     ASSESSMENT/PLAN:             1. Depression with anxiety  Controlled, continue current dose and follow up in 6 months.   - sertraline (ZOLOFT) 100 MG tablet; Take 1 tablet (100 mg) by mouth daily  Dispense: 90 tablet; Refill: 1    2. Need for prophylactic vaccination and inoculation against influenza    - FLU VACCINE, SPLIT VIRUS, IM (QUADRIVALENT) [39069]- >3 YRS    3. Chronic bilateral low " back pain with left-sided sciatica  Discussed accomodations for work and completed forms - recommened 10 min break every 10 hours.  Also option of sitting as well.  Also no lifting >10 pounds. Set these accommodations for 6 months - can be reevaluated if needed at next appt.  Forms abstracted and faxed.       Patient Instructions   Continue sertraline 100mg and follow up in 6 months.    Flu shot today.          Rozina Ricketts MD  [facility]    Injectable Influenza Immunization Documentation    1.  Is the person to be vaccinated sick today?   No    2. Does the person to be vaccinated have an allergy to a component   of the vaccine?   No  Egg Allergy Algorithm Link    3. Has the person to be vaccinated ever had a serious reaction   to influenza vaccine in the past?   No    4. Has the person to be vaccinated ever had Guillain-Barré syndrome?   No    Form completed by Yasemin Cantu MA

## 2018-11-05 NOTE — MR AVS SNAPSHOT
After Visit Summary   11/5/2018    Rama Gutierrez    MRN: [id number]           Patient Information     Date Of Birth          1990        Visit Information        Provider Department      11/5/2018 2:20 PM Rozina Ricketts MD [facility]        Today's Diagnoses     Depression with anxiety          Care Instructions    Continue sertraline 100mg and follow up in 6 months.    Flu shot today.              Follow-ups after your visit        Follow-up notes from your care team     Return in about 6 months (around 5/5/2019) for depression/anxiety.      Who to contact     If you have questions or need follow up information about today's clinic visit or your schedule please contact [facility] directly at [phone].  Normal or non-critical lab and imaging results will be communicated to you by [employer], letter or phone within 4 business days after the clinic has received the results. If you do not hear from us within 7 days, please contact the clinic through [employer] or phone. If you have a critical or abnormal lab result, we will notify you by phone as soon as possible.  Submit refill requests through [employer] or call your pharmacy and they will forward the refill request to us. Please allow 3 business days for your refill to be completed.          Additional Information About Your Visit        MyChart Information     [employer] gives you secure access to your electronic health record. If you see a primary care provider, you can also send messages to your care team and make appointments. If you have questions, please call your primary care clinic.  If you do not have a primary care provider, please call [phone] and they will assist you.        Care EveryWhere ID     This is your Care EveryWhere ID. This could be used by other organizations to access your [facility] medical records  [id number]        Your Vitals Were     Pulse Temperature Height Pulse Oximetry BMI (Body Mass Index)  "      85 98.1  F (36.7  C) (Oral) 5' 7\" (1.702 m) 98% 38.69 kg/m2        Blood Pressure from Last 3 Encounters:   11/05/18 120/78   10/04/18 118/77   09/27/18 116/75    Weight from Last 3 Encounters:   11/05/18 247 lb (112 kg)   09/27/18 251 lb (113.9 kg)   08/30/18 251 lb (113.9 kg)              We Performed the Following     DEPRESSION ACTION PLAN (DAP)          Where to get your medicines      These medications were sent to [facility] [place] - [address]   [address], [place] [address]     Phone:  [phone]     sertraline 100 MG tablet          Primary Care Provider Office Phone # Fax #    Rozina Ricketts [phone] [address] [place] [address]        Equal Access to Services     [facility] AH: Hadii aad ku hadasho Soomaali, waaxda luqadaha, qaybta kaalmada adeegyada, waxay taniyain hayroz young . So [facility] [phone].    ATENCIÓN: Si kelly petit, tiene a oscar disposición servicios gratuitos de asistencia lingüística. Llame al [phone].    We comply with applicable federal civil rights laws and [place] laws. We do not discriminate on the basis of race, color, national origin, age, disability, sex, sexual orientation, or gender identity.            Thank you!     Thank you for choosing [facility]  for your care. Our goal is always to provide you with excellent care. Hearing back from our patients is one way we can continue to improve our services. Please take a few minutes to complete the written survey that you may receive in the mail after your visit with us. Thank you!             Your Updated Medication List - Protect others around you: Learn how to safely use, store and throw away your medicines at www.disposemymeds.org.          This list is accurate as of 11/5/18  3:26 PM.  Always use your most recent med list.                   Brand Name Dispense Instructions for use " Diagnosis    etonogestrel 68 MG Impl    IMPLANON/NEXPLANON     1 each by Subdermal route once        methocarbamol 750 MG tablet    ROBAXIN    180 tablet    Take 1 tablet (750 mg) by mouth 4 times daily as needed for muscle spasms    Back muscle spasm       * sertraline 50 MG tablet    ZOLOFT    90 tablet    Take 1 tablet (50 mg) by mouth daily    Depression with anxiety       * sertraline 100 MG tablet    ZOLOFT    90 tablet    Take 1 tablet (100 mg) by mouth daily    Depression with anxiety       XANAX PO      Take 0.5 mg by mouth as needed for anxiety        * Notice:  This list has 2 medication(s) that are the same as other medications prescribed for you. Read the directions carefully, and ask your doctor or other care provider to review them with you.

## 2018-11-06 ASSESSMENT — ANXIETY QUESTIONNAIRES: GAD7 TOTAL SCORE: 2

## 2019-03-18 ENCOUNTER — OFFICE VISIT (OUTPATIENT)
Dept: PEDIATRICS | Facility: CLINIC | Age: 29
End: 2019-03-18
Payer: COMMERCIAL

## 2019-03-18 VITALS
DIASTOLIC BLOOD PRESSURE: 70 MMHG | SYSTOLIC BLOOD PRESSURE: 106 MMHG | BODY MASS INDEX: 39.55 KG/M2 | HEIGHT: 67 IN | TEMPERATURE: 98 F | OXYGEN SATURATION: 99 % | WEIGHT: 252 LBS | HEART RATE: 79 BPM

## 2019-03-18 DIAGNOSIS — F41.0 PANIC ATTACK: ICD-10-CM

## 2019-03-18 DIAGNOSIS — F41.8 DEPRESSION WITH ANXIETY: Primary | ICD-10-CM

## 2019-03-18 DIAGNOSIS — M54.42 CHRONIC BILATERAL LOW BACK PAIN WITH LEFT-SIDED SCIATICA: ICD-10-CM

## 2019-03-18 DIAGNOSIS — G89.29 CHRONIC BILATERAL LOW BACK PAIN WITH LEFT-SIDED SCIATICA: ICD-10-CM

## 2019-03-18 PROCEDURE — 99214 OFFICE O/P EST MOD 30 MIN: CPT | Performed by: PEDIATRICS

## 2019-03-18 RX ORDER — SERTRALINE HYDROCHLORIDE 100 MG/1
150 TABLET, FILM COATED ORAL DAILY
Qty: 135 TABLET | Refills: 0 | Status: SHIPPED | OUTPATIENT
Start: 2019-03-18 | End: 2019-05-02

## 2019-03-18 RX ORDER — ALPRAZOLAM 0.5 MG
0.5 TABLET ORAL 3 TIMES DAILY PRN
Qty: 10 TABLET | Refills: 0 | Status: SHIPPED | OUTPATIENT
Start: 2019-03-18 | End: 2019-10-21

## 2019-03-18 ASSESSMENT — MIFFLIN-ST. JEOR: SCORE: 1895.69

## 2019-03-18 NOTE — PATIENT INSTRUCTIONS
1. Contact pain clinic for repeat steroid injection and follow up.    2. Increase zoloft to 150mg and then you will be called to set up therapy and appt with psychiatry    Drop off any paperwork you need filled out.

## 2019-03-18 NOTE — PROGRESS NOTES
"  SUBJECTIVE:   Rama Gutierrez is a 28 year old female who presents to clinic today for the following health issues:      Reevaluate back problems,  Paid leave is almost up at work.  Applied for disability.  Need a note from Doctor to return to return to work.      After our last visit we had requested accomdations (see in Epic media) - she was on these for a few weeks, but then [employer] company told her they could not accomodate her and placed her on leave.    Back pain - last steroid injection on 10/4/18 - did help for a while, but now pain back - now progressing to right side as well, still with left sided radiculopathy.  She is wondering if she should get another injection.    Mental health - taking zoloft 100mg.  Worse - with thoughts of suicide, no active plans, but feel that she needs to see a therapist and would like to meet with a psychiatrist as well.  She has tried celexa and paxil in the past with side effects of vivid dreams and weight gain.    Problem list and histories reviewed & adjusted, as indicated.  Additional history: as documented    Reviewed and updated as needed this visit by clinical staff       Reviewed and updated as needed this visit by Provider         ROS:  Constitutional, HEENT, cardiovascular, pulmonary, gi and gu systems are negative, except as otherwise noted.    OBJECTIVE:     /70 (BP Location: Right arm, Cuff Size: Adult Large)   Pulse 79   Temp 98  F (36.7  C) (Oral)   Ht 1.702 m (5' 7\")   Wt 114.3 kg (252 lb)   SpO2 99%   BMI 39.47 kg/m    Body mass index is 39.47 kg/m .  GENERAL APPEARANCE: healthy, alert and no distress  ORTHO: +left SI tenderness, left straight leg positive, and right with pulling at back    Diagnostic Test Results:  none     ASSESSMENT/PLAN:       1. Depression with anxiety  Uncontrolled - increase zoloft to 150mg and plan for follow up with collaborative care.  Gave 10# zoloft for panic attacks (patient usually only uses these for flying).  - " sertraline (ZOLOFT) 100 MG tablet; Take 1.5 tablets (150 mg) by mouth daily  Dispense: 135 tablet; Refill: 0  - MENTAL HEALTH REFERRAL  - Adult; Outpatient Treatment, Psychiatry and Medication Management; Individual/Couples/Family/Group Therapy/Health Psychology; [facility] [facility] [phone]; We will contact you to schedule the appointmen...    2. Chronic bilateral low back pain with left-sided sciatica  Needs to follow up with pain clinic.     3. Panic attack  See #!  - ALPRAZolam (XANAX) 0.5 MG tablet; Take 1 tablet (0.5 mg) by mouth 3 times daily as needed (panic attacks)  Dispense: 10 tablet; Refill: 0    Patient Instructions   1. Contact pain clinic for repeat steroid injection and follow up.    2. Increase zoloft to 150mg and then you will be called to set up therapy and appt with psychiatry    Drop off any paperwork you need filled out.      Rozina Ricketts MD  [facility] [place]

## 2019-03-20 ENCOUNTER — TELEPHONE (OUTPATIENT)
Dept: PEDIATRICS | Facility: CLINIC | Age: 29
End: 2019-03-20

## 2019-03-20 NOTE — TELEPHONE ENCOUNTER
Reason for Call:  Form, our goal is to have forms completed with 72 hours, however, some forms may require a visit or additional information.    Type of letter, form or note:  medical    Who is the form from?: Patient    Where did the form come from: Patient or family brought in       What clinic location was the form placed at?:  Clinic    Where the form was placed: Drs box    What number is listed as a contact on the form?: [phone]       Additional comments: Please fax form to [phone].   Please call pt at [phone] to confirm form has been completed and faxed.  Thank you.    Call taken on 3/20/2019 at 5:22 PM by Marilee Helm

## 2019-04-02 NOTE — TELEPHONE ENCOUNTER
Patient came into clinic to check on form, form found in Dr Ricketts's inbox.  Will call patient tomorrow and give patient status of form.  Dr Ricketts not in today and form looks basically done, but unclear, fax form when completed and call patient.  Sylvai Guadarrama LPN

## 2019-04-03 NOTE — TELEPHONE ENCOUNTER
Called and talked with patient per Dr Ricketts's request, needed return to work date.  Return to work date is 4/10/19  Form faxed to [employer] at [phone]  Original copy mailed to patient.  Copy to abstracting.  Sylvia Guadarrama LPN

## 2019-05-02 ENCOUNTER — OFFICE VISIT (OUTPATIENT)
Dept: PSYCHIATRY | Facility: CLINIC | Age: 29
End: 2019-05-02
Attending: PEDIATRICS
Payer: COMMERCIAL

## 2019-05-02 ENCOUNTER — OFFICE VISIT (OUTPATIENT)
Dept: PSYCHOLOGY | Facility: CLINIC | Age: 29
End: 2019-05-02
Payer: COMMERCIAL

## 2019-05-02 VITALS
RESPIRATION RATE: 16 BRPM | OXYGEN SATURATION: 95 % | HEART RATE: 73 BPM | WEIGHT: 256 LBS | TEMPERATURE: 97.6 F | SYSTOLIC BLOOD PRESSURE: 100 MMHG | DIASTOLIC BLOOD PRESSURE: 70 MMHG | BODY MASS INDEX: 40.18 KG/M2 | HEIGHT: 67 IN

## 2019-05-02 DIAGNOSIS — F41.8 DEPRESSION WITH ANXIETY: ICD-10-CM

## 2019-05-02 DIAGNOSIS — F33.1 MAJOR DEPRESSIVE DISORDER, RECURRENT EPISODE, MODERATE (H): ICD-10-CM

## 2019-05-02 DIAGNOSIS — F40.10 SOCIAL ANXIETY DISORDER: ICD-10-CM

## 2019-05-02 DIAGNOSIS — F41.1 GENERALIZED ANXIETY DISORDER: Primary | ICD-10-CM

## 2019-05-02 DIAGNOSIS — F41.1 GAD (GENERALIZED ANXIETY DISORDER): Primary | ICD-10-CM

## 2019-05-02 PROBLEM — E66.01 MORBID OBESITY (H): Status: ACTIVE | Noted: 2019-05-02

## 2019-05-02 PROCEDURE — 90791 PSYCH DIAGNOSTIC EVALUATION: CPT | Performed by: PSYCHOLOGIST

## 2019-05-02 PROCEDURE — 90792 PSYCH DIAG EVAL W/MED SRVCS: CPT | Performed by: NURSE PRACTITIONER

## 2019-05-02 RX ORDER — BIOTIN 10 MG
TABLET ORAL
COMMUNITY
End: 2019-10-21

## 2019-05-02 RX ORDER — SERTRALINE HYDROCHLORIDE 100 MG/1
200 TABLET, FILM COATED ORAL DAILY
Qty: 60 TABLET | Refills: 1 | Status: SHIPPED | OUTPATIENT
Start: 2019-05-02 | End: 2019-08-07

## 2019-05-02 RX ORDER — BUPROPION HYDROCHLORIDE 150 MG/1
150 TABLET ORAL EVERY MORNING
Qty: 30 TABLET | Refills: 1 | Status: SHIPPED | OUTPATIENT
Start: 2019-05-02 | End: 2019-06-14 | Stop reason: SINTOL

## 2019-05-02 RX ORDER — OMEGA-3 FATTY ACIDS/FISH OIL 300-1000MG
600-800 CAPSULE ORAL EVERY 4 HOURS PRN
COMMUNITY
End: 2019-12-06

## 2019-05-02 ASSESSMENT — ANXIETY QUESTIONNAIRES
1. FEELING NERVOUS, ANXIOUS, OR ON EDGE: MORE THAN HALF THE DAYS
5. BEING SO RESTLESS THAT IT IS HARD TO SIT STILL: SEVERAL DAYS
7. FEELING AFRAID AS IF SOMETHING AWFUL MIGHT HAPPEN: SEVERAL DAYS
GAD7 TOTAL SCORE: 11
3. WORRYING TOO MUCH ABOUT DIFFERENT THINGS: MORE THAN HALF THE DAYS
4. TROUBLE RELAXING: SEVERAL DAYS
GAD7 TOTAL SCORE: 11
7. FEELING AFRAID AS IF SOMETHING AWFUL MIGHT HAPPEN: SEVERAL DAYS
6. BECOMING EASILY ANNOYED OR IRRITABLE: MORE THAN HALF THE DAYS
GAD7 TOTAL SCORE: 11
2. NOT BEING ABLE TO STOP OR CONTROL WORRYING: MORE THAN HALF THE DAYS

## 2019-05-02 ASSESSMENT — PATIENT HEALTH QUESTIONNAIRE - PHQ9
10. IF YOU CHECKED OFF ANY PROBLEMS, HOW DIFFICULT HAVE THESE PROBLEMS MADE IT FOR YOU TO DO YOUR WORK, TAKE CARE OF THINGS AT HOME, OR GET ALONG WITH OTHER PEOPLE: EXTREMELY DIFFICULT
SUM OF ALL RESPONSES TO PHQ QUESTIONS 1-9: 13
SUM OF ALL RESPONSES TO PHQ QUESTIONS 1-9: 13

## 2019-05-02 ASSESSMENT — PAIN SCALES - GENERAL: PAINLEVEL: NO PAIN (1)

## 2019-05-02 ASSESSMENT — MIFFLIN-ST. JEOR: SCORE: 1913.84

## 2019-05-02 NOTE — PROGRESS NOTES
"                                                                                                                                                                      Adult Intake Structured Interview  Standard Diagnostic Assessment      CLIENT'S NAME: Rama Gutierrez  MRN:   [id number]  :   1990  ACCT. NUMBER: [id number]  DATE OF SERVICE: 19  VIDEO VISIT: No     cgi =5/4     Identifying Information:  Client is a 28 year old, , single female. Client was referred for counseling by Dr. Ricketts  at [facility]. Client is currently employed full time. Client attended the session alone.       Client's Statement of Presenting Concern:  Client reports the reason for seeking therapy at this time as help with mood disorder.  Client stated that her symptoms have resulted in the following functional impairments: management of the household and or completion of tasks, relationship(s), self-care and social interactions      History of Presenting Concern:  Client reports that these problem(s) began : 1st dx of anxiety at 16. Client has attempted to resolve these concerns in the past through medication and therapy. Client reports that other professional(s) are not involved in providing support / services.       Social History:  Client reported she grew up in [place], [place]. They were the first born of 2 children. This is an intact family and parents remain . Client reported that her childhood was \" good\", was an outgoing kid- but that changed in adolescence . Client described her current relationships with family of origin as ok.    Client reported a history of 0 committed relationships and 0  marriages. Client has been single for  Most her adult years. Client reported having 0 children. Client identified some stable and meaningful social connections. Client reported that she has not been involved with the legal system.  Client's highest education level was college graduate. Client did " not identify any learning problems. There are no ethnic, cultural or Samaritan factors that may be relevant for therapy. Client identified her preferred language to be English. Client reported she does not need the assistance of an  or other support involved in therapy. Modifications will not be used to assist communication in therapy. Client did not serve in the .     Client reports family history includes Breast Cancer in her paternal grandmother; Cerebrovascular Disease in her paternal grandfather.    Mental Health History:  Client reported the following biological family members or relatives with mental health issues: Mother experienced Anxiety, Dad and brother with ADHD. Client previously received the following mental health diagnosis: Anxiety and Depression.  Client has received the following mental health services in the past: counseling and medication(s) from physician / PCP.  Hospitalizations: None.  Client is currently receiving the following services: counseling and psychiatry.      Chemical Health History:  Client reported no family history of chemical health issues. Client has not received chemical dependency treatment in the past. Client is not currently receiving any chemical dependency treatment. Client reports no problems as a result of their drinking / drug use.      Client Reports:  Client denies using alcohol.  Client denies using tobacco.  Client reports using marijuana edibles 1 times per week and 1 at a time. Client started using these at age 28.  Also is using CBD oil for andxiety.  Client reports using caffeine 1 times per week and drinks 1 at a time. Client started using caffeine at age unknown.  Client denies using street drugs.  Client denies the non-medical use of prescription or over the counter drugs.    CAGE: None of the patient's responses to the CAGE screening were positive / Negative CAGE score   Based on the negative Cage-Aid score and clinical interview there   are not indications of drug or alcohol abuse.    Discussed the general effects of drugs and alcohol on health and well-being. Therapist gave client printed information about the effects of chemical use on her health and well being.      Significant Losses / Trauma / Abuse / Neglect Issues:  none    Issues of possible neglect are not present.      Medical Issues:  Client has had a physical exam to rule out medical causes for current symptoms. Date of last physical exam was within the past year. Client was encouraged to follow up with PCP if symptoms were to develop. The client has a [facility] Primary Care Provider, who is named Rozina Ricketts. The client has a psychiatrist whose name and location are: [facility]. Client reports the following current medical concerns: see emr. The client reports the presence of chronic or episodic pain in the form of back pain. The pain level is moderate and has a frequency of ongoing.. There are some significant nutritional concerns: gained 50 pounds in 2 years ( she  thinks connected to starting an serotonin specific reuptake inhibitor).     Client reports current meds as:   Current Outpatient Medications   Medication Sig     ALPRAZolam (XANAX) 0.5 MG tablet Take 1 tablet (0.5 mg) by mouth 3 times daily as needed (panic attacks)     buPROPion (WELLBUTRIN XL) 150 MG 24 hr tablet Take 1 tablet (150 mg) by mouth every morning     etonogestrel (IMPLANON/NEXPLANON) 68 MG IMPL 1 each by Subdermal route once     ibuprofen (ADVIL/MOTRIN) 200 MG capsule Take 600-800 mg by mouth every 4 hours as needed for pain     methocarbamol (ROBAXIN) 750 MG tablet Take 1 tablet (750 mg) by mouth 4 times daily as needed for muscle spasms (Patient not taking: Reported on 3/18/2019)     Multiple Vitamins-Minerals (MULTIVITAMIN ADULT) CHEW      Multiple Vitamins-Minerals (ZINC PO) Take by mouth daily     sertraline (ZOLOFT) 100 MG tablet Take 2 tablets (200 mg) by mouth daily     No current  "facility-administered medications for this visit.        Client Allergies:  Allergies   Allergen Reactions     Codeine Nausea         Medical History:  No past medical history on file.      Medication Adherence:  Client reports taking prescribed medications as prescribed.    Client was provided recommendation to follow-up with prescribing physician.    Mental Status Assessment:  Appearance:   Appropriate   Eye Contact:   Good   Psychomotor Behavior: Normal   Attitude:   Cooperative   Orientation:   All  Speech   Rate / Production: Normal    Volume:  Normal   Mood:    Anxious  Normal  Affect:    Appropriate  Worrisome   Thought Content:  Clear   Thought Form:  Coherent  Logical   Insight:    Good       Review of Symptoms:  Depression: Interest Energy Concentration Ruminations Irritability  Sho:  No symptoms  Psychosis: No symptoms  Anxiety: Worries Nervousness  Panic:  Palpitations  Post Traumatic Stress Disorder: No symptoms  Obsessive Compulsive Disorder: No symptoms  Eating Disorder: No symptoms  Oppositional Defiant Disorder: No symptoms  ADD / ADHD: No symptoms  Conduct Disorder: No symptoms      Safety Assessment:    History of Safety Concerns:   Client reported a history of suicidal ideation.  Onset: adolescent years   and frequency: very occassionally (maybe quarterly) .  Client identified the following triggers to suicidal ideation: social anxiety and parents expectations.  Client denied a history of suicide attempts.    Client denied a history of homicidal ideation.    Client denied a history of self-injurious ideation and behaviors.    Client denied a history of personal safety concerns.    Client denied a history of assaultive behaviors.        Current Safety Concerns:  Client denies current suicidal ideation.   \"I have never had the desire to hurt myself, I don't see the point in it, I've never understood how that would help, I don't like pain\".  At times because of the back and anxiety , \" It would be " "nice just not to exist \".  Client denies current homicidal ideation and behaviors.  Client denies current self-injurious ideation and behaviors.    Client denies current concerns for personal safety.    Client reports the following protective factors: dedication to family/friends, safe and stable environment, secure attachment, abstinence from substances, adherence with prescribed medication, living with other people, daily obligations, structured day, effective problem-solving skills and committment to well-being    Client reports there are no firearms in the house.     Plan for Safety and Risk Management:  A safety and risk management plan has been developed including: Client consented to co-developed safety plan.  [facility]'s safety and risk management plan was completed.  Client agreed to use safety plan should any safety concerns arise.  A copy was given to the patient.    Rama Gutierrez     SAFETY PLAN:  Step 1: Warning signs / cues (Thoughts, images, mood, situation, behavior) that a crisis may be developing:    Thoughts: \"I don't matter\", \"I can't do this anymore\", \"I just want this to end\" and \"Nothing makes it better\"    Images: none    Thinking Processes: ruminations (can't stop thinking about my problems): ., intrusive thoughts (bothersome, unwanted thoughts that come out of nowhere): ., highly critical and negative thoughts: . and depersonalization    Mood: worsening depression, hopelessness, helplessness, intense anger, intense worry, agitation, disinhibited (not caring about things or consequences) and mood swings    Behaviors: isolating/withdrawing  and not taking care of my responsibilities    Situations: financial stress and medical condition / diagnosis: back pain   Step 2: Coping strategies - Things I can do to take my mind off of my problems without contacting another person (relaxation technique, physical activity):    Distress Tolerance Strategies:  arts and crafts: digital and play with my pet , " "music    Physical Activities: go for a walk    Focus on helpful thoughts:  \"It always passes\" and \"it wont last forever\"  Step 3: People and social settings that provide distraction:   Name: friend Hannah,  [phone]    zoo   Step 4: Remind myself of people and things that are important to me and worth living for:  My cat, Khadra      Step 5: When I am in crisis, I can ask these people to help me use my safety plan:   Name: Hannah  [phone]  Step 6: Making the environment safe:     be around others  Step 7: Professionals or agencies I can contact during a crisis:    [facility] Daytime and After Hours Crisis Number: [phone]    Suicide Prevention Lifeline: 1-242-623-YNHA (1174)    Crisis Text Line Service (available 24 hours a day, 7 days a week): Text [place] to 046525  Local Crisis Services: [facility]    Call 911 or go to my nearest emergency department.   I helped develop this safety plan and agree to use it when needed.  I have been given a copy of this plan.              Client's Strengths and Limitations:  Client identified the following strengths or resources that will help her succeed in counseling: commitment to health and well being. Client identified the following supports: friends. Things that may interfere with the client's success in counseling include: time.      Diagnostic Criteria:   - Restlessness or feeling keyed up or on edge.    - Being easily fatigued.    - Difficulty concentrating or mind going blank.    - Irritability.    - Muscle tension.    - Sleep disturbance (difficulty falling or staying asleep, or restless unsatisfying sleep).    - Depressed mood. Note: In children and adolescents, can be irritable mood.     - Diminished interest or pleasure in all, or almost all, activities.    - Psychomotor activity    - Fatigue or loss of energy.    - Feelings of worthlessness or  guilt.    - Diminished ability to think or concentrate, or indecisiveness.       Functional " Status:  Client's symptoms have caused and are causing reduced functional status in the following areas: Occupational / Vocational - work stress  Social / Relational - socai; anxiety      DSM5 Diagnoses: (Sustained by DSM5 Criteria Listed Above)  Diagnoses: MARÍA, MDD, Social Anxiety by history  Psychosocial & Contextual Factors:   WHODAS 2.0 (12 item)            This questionnaire asks about difficulties due to health conditions. Health conditions  include  disease or illnesses, other health problems that may be short or long lasting,  injuries, mental health or emotional problems, and problems with alcohol or drugs.                     Think back over the past 30 days and answer these questions, thinking about how much  difficulty you had doing the following activities. For each question, please [place] only  one response.    S1 Standing for long periods such as 30 minutes? None =         1   S2 Taking care of household responsibilities? Mild =           2   S3 Learning a new task, for example, learning how to get to a new place? Mild =           2   S4 How much of a problem do you have joining community activities (for example, festivals, Rastafarian or other activities) in the same way as anyone else can? Severe =       4   S5 How much have you been emotionally affected by your health problems? Severe =       4     In the past 30 days, how much difficulty did you have in:   S6 Concentrating on doing something for ten minutes? Mild =           2   S7 Walking a long distance such as a kilometer (or equivalent)? None =         1   S8 Washing your whole body? None =         1   S9 Getting dressed? None =         1   S10 Dealing with people you do not know? Severe =       4   S11 Maintaining a friendship? Moderate =   3   S12 Your day to day work? Moderate =   3     H1 Overall, in the past 30 days, how many days were these difficulties present? Record number of days 30   H2 In the past 30 days, for how many days were you  totally unable to carry out your usual activities or work because of any health condition? Record number of days  0   H3 In the past 30 days, not counting the days that you were totally unable, for how many days did you cut back or reduce your usual activities or work because of any health condition? Record number of days 1/2     Attendance Agreement:  Client has signed Attendance Agreement:Yes      Collaboration:  The client is receiving treatment / structured support from the following professional(s) / service and treatment. Collaboration will be initiated with: primary care physician and psychiatry.      Preliminary Treatment Plan:  The client reports no currently identified Hinduism, ethnic or cultural issues relevant to therapy.     services are not indicated.    Modifications to assist communication are not indicated.    The concerns identified by the client will be addressed in therapy.    Initial Treatment will focus on: Depressed Mood - .  Anxiety - .. Cgi= 5/4 on 5-02 -19.    As a preliminary treatment goal, client will experience a reduction in depressed mood, will develop more effective coping skills to manage depressive symptoms, will develop healthy cognitive patterns and beliefs, will increase ability to function adaptively and will continue to take medications as prescribed / participate in supportive activities and services  and will experience a reduction in anxiety, will develop more effective coping skills to manage anxiety symptoms, will develop healthy cognitive patterns and beliefs and will increase ability to function adaptively.    The focus of initial interventions will be to alleviate anxiety, increase ability to function adaptively, increase coping skills, reduce panic attacks, teach CBT skills, teach distress tolerance skills, teach relaxation strategies and teach stress mangement techniques.    Referral to another professional/service is not indicated at this time..    A  Release of Information is not needed at this time.    Report to child / adult protection services was NA.    Client will have access to their [facility]' medical record.    Fior Murguia LP  May 2, 2019

## 2019-05-02 NOTE — Clinical Note
Thank you for the Psychiatry referral to the [facility] ([facility]). Our psychiatry providers act as a specialty service for Primary Care Providers in the [facility] that seek to optimize medications for unstable patients.  Once medications have been optimized, our providers discharge the patient back to the referring Primary Care Provider for ongoing medication management.  This type of system allows our providers to serve a high volume of patients. Please see my Impression and Plan. If you have any questions or concerns, please let me know. Sarah

## 2019-05-02 NOTE — NURSING NOTE
"Chief Complaint   Patient presents with     Consult     Referred by Rozina Ricketts-anxiety and depression, medication increase since 11/18 helping a little not where pt would like to be .      initial /70 (BP Location: Left arm, Patient Position: Chair, Cuff Size: Adult Large)   Pulse 73   Temp 97.6  F (36.4  C) (Oral)   Resp 16   Ht 1.702 m (5' 7\")   Wt 116.1 kg (256 lb)   SpO2 95%   BMI 40.10 kg/m   Estimated body mass index is 40.1 kg/m  as calculated from the following:    Height as of this encounter: 1.702 m (5' 7\").    Weight as of this encounter: 116.1 kg (256 lb)..  bp completed using cuff size large    "

## 2019-05-02 NOTE — Clinical Note
Alvarez Ricketts, Patient has not had updated labs and this could be helpful to rule out medical causes of symptoms. She sees you for a physical next week so many be a good time to do this. I usually do TSH/T4, CBC, CMP, Vitamin D but I defer to you for those and any other labs you feel appropriate. Sarah

## 2019-05-02 NOTE — PATIENT INSTRUCTIONS
Treatment Plan:    Increase Zoloft (sertraline) to 200 mg by mouth daily for mood and anxiety    Start Wellbutrin (buproprion)  mg by mouth daily in AM for depression. May consider dose increase if needed.     Continue Xanax (alprazolam) 0.5 mg by mouth daily as needed for panic per primary care provider.     Continue all other medications as reviewed per electronic medical record today.     Safety plan reviewed. To the Emergency Department as needed or call after hours crisis line at [phone] or [phone]. [place] Crisis Text Line: Text [place] to 315308  or  Suicide LifeLine Chat: suicidepreventionlifeline.org/chat    Continue individual therapy as planned with Fior today at 11 am.    Schedule an appointment with me in 4-6 weeks or sooner as needed.    Follow up with primary care provider as planned or for acute medical concerns.    Call the psychiatric nurse line with medication questions or concerns at [phone].    MyChart may be used to communicate with your provider, but this is not intended to be used for emergencies.

## 2019-05-03 ASSESSMENT — PATIENT HEALTH QUESTIONNAIRE - PHQ9: SUM OF ALL RESPONSES TO PHQ QUESTIONS 1-9: 13

## 2019-05-03 ASSESSMENT — ANXIETY QUESTIONNAIRES: GAD7 TOTAL SCORE: 11

## 2019-05-08 ASSESSMENT — ENCOUNTER SYMPTOMS
HEMATURIA: 0
HEARTBURN: 0
EYE PAIN: 0
SHORTNESS OF BREATH: 0
PALPITATIONS: 0
FREQUENCY: 0
HEMATOCHEZIA: 0
COUGH: 0
BREAST MASS: 0
MYALGIAS: 0
SORE THROAT: 0
PARESTHESIAS: 0
CONSTIPATION: 0
NERVOUS/ANXIOUS: 0
DIZZINESS: 0
CHILLS: 0
ARTHRALGIAS: 0
DIARRHEA: 0
ABDOMINAL PAIN: 0
DYSURIA: 0
JOINT SWELLING: 0
HEADACHES: 0
FEVER: 0
WEAKNESS: 0

## 2019-05-08 ASSESSMENT — PATIENT HEALTH QUESTIONNAIRE - PHQ9
SUM OF ALL RESPONSES TO PHQ QUESTIONS 1-9: 12
10. IF YOU CHECKED OFF ANY PROBLEMS, HOW DIFFICULT HAVE THESE PROBLEMS MADE IT FOR YOU TO DO YOUR WORK, TAKE CARE OF THINGS AT HOME, OR GET ALONG WITH OTHER PEOPLE: SOMEWHAT DIFFICULT
SUM OF ALL RESPONSES TO PHQ QUESTIONS 1-9: 12

## 2019-05-09 ASSESSMENT — PATIENT HEALTH QUESTIONNAIRE - PHQ9: SUM OF ALL RESPONSES TO PHQ QUESTIONS 1-9: 12

## 2019-05-10 ENCOUNTER — OFFICE VISIT (OUTPATIENT)
Dept: PEDIATRICS | Facility: CLINIC | Age: 29
End: 2019-05-10
Payer: COMMERCIAL

## 2019-05-10 VITALS
OXYGEN SATURATION: 98 % | HEART RATE: 85 BPM | TEMPERATURE: 98.2 F | HEIGHT: 67 IN | DIASTOLIC BLOOD PRESSURE: 64 MMHG | SYSTOLIC BLOOD PRESSURE: 106 MMHG | WEIGHT: 254 LBS | BODY MASS INDEX: 39.87 KG/M2

## 2019-05-10 DIAGNOSIS — Z01.419 ENCOUNTER FOR GYNECOLOGICAL EXAMINATION WITHOUT ABNORMAL FINDING: Primary | ICD-10-CM

## 2019-05-10 DIAGNOSIS — Z12.4 SCREENING FOR CERVICAL CANCER: ICD-10-CM

## 2019-05-10 DIAGNOSIS — F41.8 DEPRESSION WITH ANXIETY: ICD-10-CM

## 2019-05-10 DIAGNOSIS — R11.0 NAUSEA: ICD-10-CM

## 2019-05-10 LAB
ERYTHROCYTE [DISTWIDTH] IN BLOOD BY AUTOMATED COUNT: 15.1 % (ref 10–15)
HCT VFR BLD AUTO: 40.1 % (ref 35–47)
HGB BLD-MCNC: 12.5 G/DL (ref 11.7–15.7)
MCH RBC QN AUTO: 24.1 PG (ref 26.5–33)
MCHC RBC AUTO-ENTMCNC: 31.2 G/DL (ref 31.5–36.5)
MCV RBC AUTO: 77 FL (ref 78–100)
PLATELET # BLD AUTO: 313 10E9/L (ref 150–450)
RBC # BLD AUTO: 5.19 10E12/L (ref 3.8–5.2)
WBC # BLD AUTO: 11.3 10E9/L (ref 4–11)

## 2019-05-10 PROCEDURE — 80053 COMPREHEN METABOLIC PANEL: CPT | Performed by: PEDIATRICS

## 2019-05-10 PROCEDURE — 36415 COLL VENOUS BLD VENIPUNCTURE: CPT | Performed by: PEDIATRICS

## 2019-05-10 PROCEDURE — 82306 VITAMIN D 25 HYDROXY: CPT | Performed by: PEDIATRICS

## 2019-05-10 PROCEDURE — G0145 SCR C/V CYTO,THINLAYER,RESCR: HCPCS | Performed by: PEDIATRICS

## 2019-05-10 PROCEDURE — 87624 HPV HI-RISK TYP POOLED RSLT: CPT | Performed by: PEDIATRICS

## 2019-05-10 PROCEDURE — 84443 ASSAY THYROID STIM HORMONE: CPT | Performed by: PEDIATRICS

## 2019-05-10 PROCEDURE — 99395 PREV VISIT EST AGE 18-39: CPT | Performed by: PEDIATRICS

## 2019-05-10 PROCEDURE — 85027 COMPLETE CBC AUTOMATED: CPT | Performed by: PEDIATRICS

## 2019-05-10 ASSESSMENT — ENCOUNTER SYMPTOMS
FEVER: 0
SORE THROAT: 0
CONSTIPATION: 0
BREAST MASS: 0
EYE PAIN: 0
MYALGIAS: 0
CHILLS: 0
PALPITATIONS: 0
FREQUENCY: 0
DIARRHEA: 0
HEADACHES: 0
PARESTHESIAS: 0
HEMATURIA: 0
HEARTBURN: 0
HEMATOCHEZIA: 0
WEAKNESS: 0
NERVOUS/ANXIOUS: 0
DYSURIA: 0
JOINT SWELLING: 0
ABDOMINAL PAIN: 0
COUGH: 0
SHORTNESS OF BREATH: 0
DIZZINESS: 0
ARTHRALGIAS: 0

## 2019-05-10 ASSESSMENT — MIFFLIN-ST. JEOR: SCORE: 1904.77

## 2019-05-10 NOTE — PROGRESS NOTES
SUBJECTIVE:   CC: Rama Gutierrez is an 28 year old woman who presents for preventive health visit.     Healthy Habits:     Getting at least 3 servings of Calcium per day:  NO    Bi-annual eye exam:  NO    Dental care twice a year:  Yes    Sleep apnea or symptoms of sleep apnea:  Daytime drowsiness    Diet:  Regular (no restrictions)    Frequency of exercise:  1 day/week    Duration of exercise:  Other    Taking medications regularly:  Yes    Medication side effects:  Other    PHQ-2 Total Score: 3    Additional concerns today:  Yes    Mild nausea since starting wellbutrin - managable.  Vomited once 5 days ago.  No other recent changes.  No recent sexual activity - not pregnant.  No diarrhea.    Today's PHQ-2 Score:   PHQ-2 ( 1999 Pfizer) 5/8/2019   Q1: Little interest or pleasure in doing things 1   Q2: Feeling down, depressed or hopeless 2   PHQ-2 Score 3   Q1: Little interest or pleasure in doing things Several days   Q2: Feeling down, depressed or hopeless More than half the days   PHQ-2 Score 3       Abuse: Current or Past(Physical, Sexual or Emotional)- No  Do you feel safe in your environment? Yes    Social History     Tobacco Use     Smoking status: Never Smoker     Smokeless tobacco: Never Used   Substance Use Topics     Alcohol use: No     Alcohol/week: 0.0 oz     Comment: on a rare occ      If you drink alcohol do you typically have >3 drinks per day or >7 drinks per week? No    Alcohol Use 5/10/2019   Prescreen: >3 drinks/day or >7 drinks/week? -   Prescreen: >3 drinks/day or >7 drinks/week? No       Reviewed orders with patient.  Reviewed health maintenance and updated orders accordingly - Yes      Mammogram not appropriate for this patient based on age.    Pertinent mammograms are reviewed under the imaging tab.  History of abnormal Pap smear: NO - age 30-65 PAP every 5 years with negative HPV co-testing recommended     Reviewed and updated as needed this visit by clinical staff  Tobacco  Allergies   "Meds  Med Hx  Surg Hx  Fam Hx  Soc Hx        Reviewed and updated as needed this visit by Provider            Review of Systems   Constitutional: Negative for chills and fever.   HENT: Negative for congestion, ear pain, hearing loss and sore throat.    Eyes: Negative for pain and visual disturbance.   Respiratory: Negative for cough and shortness of breath.    Cardiovascular: Negative for chest pain, palpitations and peripheral edema.   Gastrointestinal: Negative for abdominal pain, constipation, diarrhea, heartburn and hematochezia.   Breasts:  Negative for tenderness, breast mass and discharge.   Genitourinary: Negative for dysuria, frequency, genital sores, hematuria, pelvic pain, urgency, vaginal bleeding and vaginal discharge.   Musculoskeletal: Negative for arthralgias, joint swelling and myalgias.   Skin: Negative for rash.   Neurological: Negative for dizziness, weakness, headaches and paresthesias.   Psychiatric/Behavioral: Negative for mood changes. The patient is not nervous/anxious.           OBJECTIVE:   /64 (BP Location: Right arm, Cuff Size: Adult Large)   Pulse 85   Temp 98.2  F (36.8  C) (Oral)   Ht 1.702 m (5' 7\")   Wt 115.2 kg (254 lb)   SpO2 98%   BMI 39.78 kg/m    Physical Exam  GENERAL: healthy, alert and no distress  EYES: Eyes grossly normal to inspection, PERRL and conjunctivae and sclerae normal  HENT: ear canals and TM's normal, nose and mouth without ulcers or lesions  NECK: no adenopathy, no asymmetry, masses, or scars and thyroid normal to palpation  RESP: lungs clear to auscultation - no rales, rhonchi or wheezes  BREAST: normal without masses, tenderness or nipple discharge and no palpable axillary masses or adenopathy  CV: regular rate and rhythm, normal S1 S2, no S3 or S4, no murmur, click or rub, no peripheral edema and peripheral pulses strong  ABDOMEN: soft, nontender, no hepatosplenomegaly, no masses and bowel sounds normal   (female): normal female external " "genitalia, normal urethral meatus, vaginal mucosa pink, moist, well rugated, and normal cervix/adnexa/uterus without masses or discharge  MS: no gross musculoskeletal defects noted, no edema  SKIN: no suspicious lesions or rashes  NEURO: Normal strength and tone, mentation intact and speech normal  PSYCH: mentation appears normal, affect normal/bright    Diagnostic Test Results:  none     ASSESSMENT/PLAN:   1. Encounter for gynecological examination without abnormal finding  Given weight should have lipids at next fasting visit.    2. Depression with anxiety - just got a temp to hire job, relieved.  Labs requested by psychiatry - will continue to follow with collaborative care and then we will accept care back.   - TSH with free T4 reflex  - CBC with platelets  - Comprehensive metabolic panel  - Vitamin D Deficiency    3. Screening for cervical cancer  - Pap imaged thin layer screen with HPV - recommended age 30 - 65 years (select HPV order below)  - HPV High Risk Types DNA Cervical    4. Nausea  Secondary to wellbutrin - tolerable per patient - advised to give another 1-2 weeks, should start subsiding.      COUNSELING:  Reviewed preventive health counseling, as reflected in patient instructions    Estimated body mass index is 39.78 kg/m  as calculated from the following:    Height as of this encounter: 1.702 m (5' 7\").    Weight as of this encounter: 115.2 kg (254 lb).    Weight management plan: Discussed healthy diet and exercise guidelines     reports that she has never smoked. She has never used smokeless tobacco.      Counseling Resources:  ATP IV Guidelines  Pooled Cohorts Equation Calculator  Breast Cancer Risk Calculator  FRAX Risk Assessment  ICSI Preventive Guidelines  Dietary Guidelines for Americans, 2010  USDA's MyPlate  ASA Prophylaxis  Lung CA Screening    Rozina Ricketts MD  [facility]  Answers for HPI/ROS submitted by the patient on 5/8/2019   Annual Exam:  If you checked off any " problems, how difficult have these problems made it for you to do your work, take care of things at home, or get along with other people?: Somewhat difficult  PHQ9 TOTAL SCORE: 12

## 2019-05-11 LAB
ALBUMIN SERPL-MCNC: 3.7 G/DL (ref 3.4–5)
ALP SERPL-CCNC: 147 U/L (ref 40–150)
ALT SERPL W P-5'-P-CCNC: 42 U/L (ref 0–50)
ANION GAP SERPL CALCULATED.3IONS-SCNC: 2 MMOL/L (ref 3–14)
AST SERPL W P-5'-P-CCNC: 29 U/L (ref 0–45)
BILIRUB SERPL-MCNC: 0.3 MG/DL (ref 0.2–1.3)
BUN SERPL-MCNC: 10 MG/DL (ref 7–30)
CALCIUM SERPL-MCNC: 8.9 MG/DL (ref 8.5–10.1)
CHLORIDE SERPL-SCNC: 109 MMOL/L (ref 94–109)
CO2 SERPL-SCNC: 28 MMOL/L (ref 20–32)
CREAT SERPL-MCNC: 0.96 MG/DL (ref 0.52–1.04)
DEPRECATED CALCIDIOL+CALCIFEROL SERPL-MC: 28 UG/L (ref 20–75)
GFR SERPL CREATININE-BSD FRML MDRD: 79 ML/MIN/{1.73_M2}
GLUCOSE SERPL-MCNC: 88 MG/DL (ref 70–99)
POTASSIUM SERPL-SCNC: 4 MMOL/L (ref 3.4–5.3)
PROT SERPL-MCNC: 7.6 G/DL (ref 6.8–8.8)
SODIUM SERPL-SCNC: 139 MMOL/L (ref 133–144)
TSH SERPL DL<=0.005 MIU/L-ACNC: 1 MU/L (ref 0.4–4)

## 2019-05-14 LAB
COPATH REPORT: NORMAL
PAP: NORMAL

## 2019-05-16 LAB
FINAL DIAGNOSIS: NORMAL
HPV HR 12 DNA CVX QL NAA+PROBE: NEGATIVE
HPV16 DNA SPEC QL NAA+PROBE: NEGATIVE
HPV18 DNA SPEC QL NAA+PROBE: NEGATIVE
SPECIMEN DESCRIPTION: NORMAL
SPECIMEN SOURCE CVX/VAG CYTO: NORMAL

## 2019-05-18 ENCOUNTER — MYC MEDICAL ADVICE (OUTPATIENT)
Dept: PEDIATRICS | Facility: CLINIC | Age: 29
End: 2019-05-18

## 2019-05-20 NOTE — TELEPHONE ENCOUNTER
Pt sent a [employer] message requesting blood type.  See [employer] message.    Summer Card RN - Triage  [facility]

## 2019-05-21 ENCOUNTER — OFFICE VISIT (OUTPATIENT)
Dept: PSYCHOLOGY | Facility: CLINIC | Age: 29
End: 2019-05-21
Payer: COMMERCIAL

## 2019-05-21 DIAGNOSIS — F41.1 GENERALIZED ANXIETY DISORDER: Primary | ICD-10-CM

## 2019-05-21 DIAGNOSIS — F33.1 MAJOR DEPRESSIVE DISORDER, RECURRENT EPISODE, MODERATE (H): ICD-10-CM

## 2019-05-21 PROCEDURE — 90834 PSYTX W PT 45 MINUTES: CPT | Performed by: PSYCHOLOGIST

## 2019-05-21 ASSESSMENT — PATIENT HEALTH QUESTIONNAIRE - PHQ9
SUM OF ALL RESPONSES TO PHQ QUESTIONS 1-9: 8
5. POOR APPETITE OR OVEREATING: NOT AT ALL

## 2019-05-21 ASSESSMENT — ANXIETY QUESTIONNAIRES
3. WORRYING TOO MUCH ABOUT DIFFERENT THINGS: NOT AT ALL
6. BECOMING EASILY ANNOYED OR IRRITABLE: NOT AT ALL
2. NOT BEING ABLE TO STOP OR CONTROL WORRYING: SEVERAL DAYS
GAD7 TOTAL SCORE: 3
7. FEELING AFRAID AS IF SOMETHING AWFUL MIGHT HAPPEN: SEVERAL DAYS
5. BEING SO RESTLESS THAT IT IS HARD TO SIT STILL: NOT AT ALL
1. FEELING NERVOUS, ANXIOUS, OR ON EDGE: SEVERAL DAYS
IF YOU CHECKED OFF ANY PROBLEMS ON THIS QUESTIONNAIRE, HOW DIFFICULT HAVE THESE PROBLEMS MADE IT FOR YOU TO DO YOUR WORK, TAKE CARE OF THINGS AT HOME, OR GET ALONG WITH OTHER PEOPLE: SOMEWHAT DIFFICULT

## 2019-05-21 NOTE — PROGRESS NOTES
Progress Note    Client Name: Rama Gutierrez  Date: 5-21-19         Service Type: Individual  Video Visit: No     Session Start Time: 10:40 Session End Time: 11:25     Session Length: 38- 52 min   Session #: 2    Attendees: Client     Treatment Plan Last Reviewed: pending 3rd visit  PHQ-9 / MARÍA-7 :see emr  Anxiety - .. Cgi= 5/4 on 5-02 -19.    DATA  Interactive Complexity: No  Crisis: No       Progress Since Last Session (Related to Symptoms / Goals / Homework):   Symptoms: stable.  Has abotu 10 =15 minutes of nausea upon taking her medication.    Homework: Completed in session      Episode of Care Goals: Satisfactory progress - ACTION (Actively working towards change); Intervened by reinforcing change plan / affirming steps taken     Current / Ongoing Stressors and Concerns:   Health   Job     Treatment Objective(s) Addressed in This Session:   use at least 4 coping skills for anxiety management in the next 16 weeks       Intervention:   CBT, Interpersonal Therapy    Gathered additional history.   Reviewed symptoms and stressors.  Discussed engery deficits.  Brainstormed adaptions.  Explored nutrition and self care .   Introduced CBT.        ASSESSMENT: Current Emotional / Mental Status (status of significant symptoms):   Risk status (Self / Other harm or suicidal ideation)   Client denies current fears or concerns for personal safety.   Client denies current or recent suicidal ideation or behaviors.   Client denies current or recent homicidal ideation or behaviors.   Client denies current or recent self injurious behavior or ideation.   Client denies other safety concerns.   Client Client reports there has been no change in risk factors since their last session.     Client Client reports there has been no change in protective factors since their last session.     A safety and risk management plan has not been developed at this time, however client was given the after-hours  number / 911 should there be a change in any of these risk factors.     Appearance:   Appropriate    Eye Contact:   Good    Psychomotor Behavior: Normal    Attitude:   Cooperative    Orientation:   All   Speech    Rate / Production: Normal     Volume:  Normal    Mood:    Anxious  Depressed  Normal   Affect:    Appropriate    Thought Content:  Clear    Thought Form:  Coherent  Logical    Insight:    Good      Medication Review:   No changes to current psychiatric medication(s)     Medication Compliance:   Yes     Changes in Health Issues:   None reported     Chemical Use Review:   Substance Use: Chemical use reviewed, no active concerns identified      Tobacco Use: No current tobacco use.      Diagnosis:  1. Generalized anxiety disorder    2. Major depressive disorder, recurrent episode, moderate (H)        Collateral Reports Completed:   Routed note to PCP as needed.    PLAN: (Client Tasks / Therapist Tasks / Other)  Daily goals of self care:  Eat regular meals. Go to be at a regular time. Hydrate. Reduce inflammatory foods .  Access support system        Fior Murguia LP                                                         ______________________________________________________________________    Treatment Plan    Client's Name: Rama Gutierrez  YOB: 1990    Date: 5-21-19    DSM-V Diagnoses: MDD, MARÍA   Psychosocial / Contextual Factors:   WHODAS: see emr    Referral / Collaboration:  Referral to another professional/service is not indicated at this time..    Anticipated number of session or this episode of care: 12    Initial Treatment will focus on: Depressed Mood - .  Anxiety - .. Cgi= 5/4 on 5-02 -19.    As a preliminary treatment goal, client will experience a reduction in depressed mood, will develop more effective coping skills to manage depressive symptoms, will develop healthy cognitive patterns and beliefs, will increase ability to function adaptively and will continue to take medications as  prescribed / participate in supportive activities and services  and will experience a reduction in anxiety, will develop more effective coping skills to manage anxiety symptoms, will develop healthy cognitive patterns and beliefs and will increase ability to function adaptively.    The focus of initial interventions will be to alleviate anxiety, increase ability to function adaptively, increase coping skills, reduce panic attacks, teach CBT skills, teach distress tolerance skills, teach relaxation strategies and teach stress mangement techniques.      MeasurableTreatment Goal(s) related to diagnosis / functional impairment(s)  Goal 1: Client will     I will know I've met my goal when .            Client has reviewed and agreed to the above plan.      Fior Murguia LP  May 21, 2019

## 2019-05-22 ASSESSMENT — ANXIETY QUESTIONNAIRES: GAD7 TOTAL SCORE: 3

## 2019-06-13 ENCOUNTER — E-VISIT (OUTPATIENT)
Dept: PSYCHIATRY | Facility: CLINIC | Age: 29
End: 2019-06-13

## 2019-06-13 ENCOUNTER — TELEPHONE (OUTPATIENT)
Dept: PSYCHIATRY | Facility: CLINIC | Age: 29
End: 2019-06-13

## 2019-06-13 DIAGNOSIS — F33.1 MAJOR DEPRESSIVE DISORDER, RECURRENT EPISODE, MODERATE (H): ICD-10-CM

## 2019-06-13 DIAGNOSIS — F40.10 SOCIAL ANXIETY DISORDER: ICD-10-CM

## 2019-06-13 DIAGNOSIS — F41.1 GAD (GENERALIZED ANXIETY DISORDER): Primary | ICD-10-CM

## 2019-06-13 PROCEDURE — 99444 ZZC PHYSICIAN ONLINE EVALUATION & MANAGEMENT SERVICE: CPT | Performed by: NURSE PRACTITIONER

## 2019-06-13 NOTE — TELEPHONE ENCOUNTER
Reason for Call:  Medication / Symptoms    Do you use a [facility]?  Name of the pharmacy and phone number for the current request:  [facility] - [phone]    Name of the medication requested: Wellbutrin and zoloft    Other request: Patient indicated that while taking both medications she has been experiencing nausea, intense hot flashes/ night sweats, dizziness, and things similar to that. She indicated she needs to cx on 6/18 due to her not having insurance. Pt expressed she will not have insurance coverage until 08/2019. Pt is having concerns with medications  And not being able to come in due to costs. Writer informed her that she may be able to request an E -Visit through [facility]  But pt requested a callback to review she situation and how to proceed. Please call back pt.     Can we leave a detailed message on this number? YES    Phone number patient can be reached at: Work number on file:  There is no work phone number on file. or Cell number on file:    Telephone Information:   Mobile [phone]         Call taken on 6/13/2019 at 2:26 PM by Parish Guzman

## 2019-06-14 NOTE — TELEPHONE ENCOUNTER
I last saw Patient for psychiatry consult on 5/2/2019.  During that visit,    Increase Zoloft (sertraline) to 200 mg by mouth daily for mood and anxiety    Start Wellbutrin (buproprion)  mg by mouth daily in AM for depression. May consider dose increase if needed.     Continue Xanax (alprazolam) 0.5 mg by mouth daily as needed for panic per primary care provider.       Patient has been having side effects of combination.   Would recommend stopping the Wellbutrin (buproprion).   Follow with me in 1-2 weeks or sooner as needed to see if symptoms improve.   Follow with me in clinic in 2 months or sooner as needed.

## 2019-06-14 NOTE — TELEPHONE ENCOUNTER
Patient states she scheduled an e-visit already and it gave her the cost upfront, which she can afford. Patient is ok to wait until then to discuss her concerns with Sarah.     Carmel Huerta RN  06/14/19  9:07 AM

## 2019-08-06 DIAGNOSIS — F41.8 DEPRESSION WITH ANXIETY: ICD-10-CM

## 2019-08-06 NOTE — TELEPHONE ENCOUNTER
"Requested Prescriptions   Pending Prescriptions Disp Refills     sertraline (ZOLOFT) 100 MG tablet  Last Written Prescription Date:  5/2/19  Last Fill Quantity: 60,  # refills: 1   Last office visit: 5/2/2019 with prescribing provider:  6/13/19 e-visit with rj weinberg    Future Office Visit:   Next 5 appointments (look out 90 days)    Aug 20, 2019  5:30 PM CDT  Return Visit with Fior Murguia LP  [facility] ([facility] [address] [place] [id number] [phone]          60 tablet 1     Sig: Take 2 tablets (200 mg) by mouth daily       SSRIs Protocol Passed - 8/6/2019  2:30 PM        Passed - Recent (12 mo) or future (30 days) visit within the authorizing provider's specialty     Patient had office visit in the last 12 months or has a visit in the next 30 days with authorizing provider or within the authorizing provider's specialty.  See \"Patient Info\" tab in inbasket, or \"Choose Columns\" in Meds & Orders section of the refill encounter.              Passed - Medication is active on med list        Passed - Patient is age 18 or older        Passed - No active pregnancy on record        Passed - No positive pregnancy test in last 12 months          "

## 2019-08-07 RX ORDER — SERTRALINE HYDROCHLORIDE 100 MG/1
200 TABLET, FILM COATED ORAL DAILY
Qty: 60 TABLET | Refills: 0 | Status: SHIPPED | OUTPATIENT
Start: 2019-08-07 | End: 2019-10-16

## 2019-08-07 NOTE — TELEPHONE ENCOUNTER
Patient due back for an office visit in clinic with Sarah Martinez, PhD, APRN, CNP.   One month mary refill provided and we will attempt to schedule her.

## 2019-08-13 NOTE — TELEPHONE ENCOUNTER
Voicemail left for patient with options of e-visit vs following up with her PCP. Informed patient she will need to be in regular communication with any provider that refills her medications.

## 2019-08-14 NOTE — TELEPHONE ENCOUNTER
Agreed - patient was still in collaborative care program.  If she can't go there she needs some kind of visit with me (evisit, phone or OV).    Rozina Ricketts MD  Internal Medicine/Pediatrics  [facility]

## 2019-09-19 ENCOUNTER — OFFICE VISIT (OUTPATIENT)
Dept: URGENT CARE | Facility: URGENT CARE | Age: 29
End: 2019-09-19
Payer: COMMERCIAL

## 2019-09-19 VITALS
HEART RATE: 65 BPM | OXYGEN SATURATION: 97 % | BODY MASS INDEX: 39.87 KG/M2 | HEIGHT: 67 IN | TEMPERATURE: 98.8 F | SYSTOLIC BLOOD PRESSURE: 120 MMHG | RESPIRATION RATE: 12 BRPM | DIASTOLIC BLOOD PRESSURE: 68 MMHG | WEIGHT: 254 LBS

## 2019-09-19 DIAGNOSIS — R53.83 FATIGUE, UNSPECIFIED TYPE: ICD-10-CM

## 2019-09-19 DIAGNOSIS — R19.7 DIARRHEA, UNSPECIFIED TYPE: Primary | ICD-10-CM

## 2019-09-19 DIAGNOSIS — R11.0 NAUSEA: ICD-10-CM

## 2019-09-19 DIAGNOSIS — R19.7 DIARRHEA, UNSPECIFIED TYPE: ICD-10-CM

## 2019-09-19 LAB
ALBUMIN SERPL-MCNC: 3.5 G/DL (ref 3.4–5)
ALP SERPL-CCNC: 129 U/L (ref 40–150)
ALT SERPL W P-5'-P-CCNC: 26 U/L (ref 0–50)
ANION GAP SERPL CALCULATED.3IONS-SCNC: 7 MMOL/L (ref 3–14)
AST SERPL W P-5'-P-CCNC: 22 U/L (ref 0–45)
BASOPHILS # BLD AUTO: 0 10E9/L (ref 0–0.2)
BASOPHILS NFR BLD AUTO: 0.2 %
BILIRUB SERPL-MCNC: 0.4 MG/DL (ref 0.2–1.3)
BUN SERPL-MCNC: 11 MG/DL (ref 7–30)
CALCIUM SERPL-MCNC: 8.9 MG/DL (ref 8.5–10.1)
CHLORIDE SERPL-SCNC: 106 MMOL/L (ref 94–109)
CO2 SERPL-SCNC: 30 MMOL/L (ref 20–32)
CREAT SERPL-MCNC: 1 MG/DL (ref 0.52–1.04)
DIFFERENTIAL METHOD BLD: ABNORMAL
EOSINOPHIL # BLD AUTO: 0.1 10E9/L (ref 0–0.7)
EOSINOPHIL NFR BLD AUTO: 0.6 %
ERYTHROCYTE [DISTWIDTH] IN BLOOD BY AUTOMATED COUNT: 15.7 % (ref 10–15)
GFR SERPL CREATININE-BSD FRML MDRD: 74 ML/MIN/{1.73_M2}
GLUCOSE SERPL-MCNC: 83 MG/DL (ref 70–99)
HCT VFR BLD AUTO: 42.6 % (ref 35–47)
HGB BLD-MCNC: 12.7 G/DL (ref 11.7–15.7)
LYMPHOCYTES # BLD AUTO: 2.5 10E9/L (ref 0.8–5.3)
LYMPHOCYTES NFR BLD AUTO: 23.9 %
MCH RBC QN AUTO: 22.6 PG (ref 26.5–33)
MCHC RBC AUTO-ENTMCNC: 29.8 G/DL (ref 31.5–36.5)
MCV RBC AUTO: 76 FL (ref 78–100)
MONOCYTES # BLD AUTO: 0.7 10E9/L (ref 0–1.3)
MONOCYTES NFR BLD AUTO: 7.2 %
NEUTROPHILS # BLD AUTO: 7 10E9/L (ref 1.6–8.3)
NEUTROPHILS NFR BLD AUTO: 68.1 %
PLATELET # BLD AUTO: 318 10E9/L (ref 150–450)
POTASSIUM SERPL-SCNC: 4.3 MMOL/L (ref 3.4–5.3)
PROT SERPL-MCNC: 7.1 G/DL (ref 6.8–8.8)
RBC # BLD AUTO: 5.62 10E12/L (ref 3.8–5.2)
SODIUM SERPL-SCNC: 143 MMOL/L (ref 133–144)
WBC # BLD AUTO: 10.3 10E9/L (ref 4–11)

## 2019-09-19 PROCEDURE — 85025 COMPLETE CBC W/AUTO DIFF WBC: CPT | Performed by: FAMILY MEDICINE

## 2019-09-19 PROCEDURE — 87177 OVA AND PARASITES SMEARS: CPT | Mod: XU | Performed by: FAMILY MEDICINE

## 2019-09-19 PROCEDURE — 87206 SMEAR FLUORESCENT/ACID STAI: CPT | Performed by: FAMILY MEDICINE

## 2019-09-19 PROCEDURE — 87506 IADNA-DNA/RNA PROBE TQ 6-11: CPT | Performed by: FAMILY MEDICINE

## 2019-09-19 PROCEDURE — 82306 VITAMIN D 25 HYDROXY: CPT | Performed by: FAMILY MEDICINE

## 2019-09-19 PROCEDURE — 87328 CRYPTOSPORIDIUM AG IA: CPT | Performed by: FAMILY MEDICINE

## 2019-09-19 PROCEDURE — 87209 SMEAR COMPLEX STAIN: CPT | Performed by: FAMILY MEDICINE

## 2019-09-19 PROCEDURE — 80053 COMPREHEN METABOLIC PANEL: CPT | Performed by: FAMILY MEDICINE

## 2019-09-19 PROCEDURE — 36415 COLL VENOUS BLD VENIPUNCTURE: CPT | Performed by: FAMILY MEDICINE

## 2019-09-19 PROCEDURE — 99214 OFFICE O/P EST MOD 30 MIN: CPT | Performed by: FAMILY MEDICINE

## 2019-09-19 PROCEDURE — 84443 ASSAY THYROID STIM HORMONE: CPT | Performed by: FAMILY MEDICINE

## 2019-09-19 RX ORDER — ONDANSETRON 4 MG/1
4 TABLET, ORALLY DISINTEGRATING ORAL EVERY 8 HOURS PRN
Qty: 12 TABLET | Refills: 0 | Status: SHIPPED | OUTPATIENT
Start: 2019-09-19 | End: 2019-10-21

## 2019-09-19 ASSESSMENT — MIFFLIN-ST. JEOR: SCORE: 1899.77

## 2019-09-19 ASSESSMENT — PAIN SCALES - GENERAL: PAINLEVEL: MODERATE PAIN (5)

## 2019-09-19 NOTE — PROGRESS NOTES
SUBJECTIVE  HPI:  Rama Gutierrez is a 29 year old female who presents with the CC of nausea, diarrhea, fatigue.    Patient has been feeling unwell, thinks that symptoms started after starting Wellbutrin, this was around June.  Patient did stop the medication and felt a little better but symptoms seems to still be present.  Patient states that diarrhea has been present for several months, will vary from 1 upwards to 6 times a day - loose generally and will get watery at times.  Patient also complain of worsening symptoms of ?anal fissure due to excessive BM.  No fever.  No blood in stools.  Denies any URI symptoms.  Denies any rash.  Has been feeling more tired.  Had history of low vitamin d in the past.    Denies any family history of colitis or colon cancer.    History reviewed. No pertinent past medical history.  Current Outpatient Medications   Medication Sig Dispense Refill     ALPRAZolam (XANAX) 0.5 MG tablet Take 1 tablet (0.5 mg) by mouth 3 times daily as needed (panic attacks) 10 tablet 0     etonogestrel (IMPLANON/NEXPLANON) 68 MG IMPL 1 each by Subdermal route once       ibuprofen (ADVIL/MOTRIN) 200 MG capsule Take 600-800 mg by mouth every 4 hours as needed for pain       methocarbamol (ROBAXIN) 750 MG tablet Take 1 tablet (750 mg) by mouth 4 times daily as needed for muscle spasms 180 tablet 0     Multiple Vitamins-Minerals (MULTIVITAMIN ADULT) CHEW        Multiple Vitamins-Minerals (ZINC PO) Take by mouth daily       ondansetron (ZOFRAN ODT) 4 MG ODT tab Take 1 tablet (4 mg) by mouth every 8 hours as needed for nausea 12 tablet 0     sertraline (ZOLOFT) 100 MG tablet Take 2 tablets (200 mg) by mouth daily 60 tablet 0     Social History     Tobacco Use     Smoking status: Never Smoker     Smokeless tobacco: Never Used   Substance Use Topics     Alcohol use: No     Alcohol/week: 0.0 oz     Comment: on a rare occ        ROS:  Review of systems negative except as stated above.    OBJECTIVE:  /68   Pulse  "65   Temp 98.8  F (37.1  C) (Tympanic)   Resp 12   Ht 1.702 m (5' 7\")   Wt 115.2 kg (254 lb)   LMP  (LMP Unknown)   SpO2 97%   BMI 39.78 kg/m    GENERAL APPEARANCE: healthy, alert and no distress  PSYCH: mentation appears normal and anxious    Results for orders placed or performed in visit on 09/19/19   CBC with platelets and differential   Result Value Ref Range    WBC 10.3 4.0 - 11.0 10e9/L    RBC Count 5.62 (H) 3.8 - 5.2 10e12/L    Hemoglobin 12.7 11.7 - 15.7 g/dL    Hematocrit 42.6 35.0 - 47.0 %    MCV 76 (L) 78 - 100 fl    MCH 22.6 (L) 26.5 - 33.0 pg    MCHC 29.8 (L) 31.5 - 36.5 g/dL    RDW 15.7 (H) 10.0 - 15.0 %    Platelet Count 318 150 - 450 10e9/L    % Neutrophils 68.1 %    % Lymphocytes 23.9 %    % Monocytes 7.2 %    % Eosinophils 0.6 %    % Basophils 0.2 %    Absolute Neutrophil 7.0 1.6 - 8.3 10e9/L    Absolute Lymphocytes 2.5 0.8 - 5.3 10e9/L    Absolute Monocytes 0.7 0.0 - 1.3 10e9/L    Absolute Eosinophils 0.1 0.0 - 0.7 10e9/L    Absolute Basophils 0.0 0.0 - 0.2 10e9/L    Diff Method Automated Method    Comprehensive metabolic panel   Result Value Ref Range    Sodium 143 133 - 144 mmol/L    Potassium 4.3 3.4 - 5.3 mmol/L    Chloride 106 94 - 109 mmol/L    Carbon Dioxide 30 20 - 32 mmol/L    Anion Gap 7 3 - 14 mmol/L    Glucose 83 70 - 99 mg/dL    Urea Nitrogen 11 7 - 30 mg/dL    Creatinine 1.00 0.52 - 1.04 mg/dL    GFR Estimate 74 >60 mL/min/[1.73_m2]    GFR Estimate If Black 86 >60 mL/min/[1.73_m2]    Calcium 8.9 8.5 - 10.1 mg/dL    Bilirubin Total 0.4 0.2 - 1.3 mg/dL    Albumin 3.5 3.4 - 5.0 g/dL    Protein Total 7.1 6.8 - 8.8 g/dL    Alkaline Phosphatase 129 40 - 150 U/L    ALT 26 0 - 50 U/L    AST 22 0 - 45 U/L       ASSESSMENT/PLAN:  (R19.7) Diarrhea, unspecified type  (primary encounter diagnosis)  Comment: prolong  Plan: CBC with platelets and differential,         Comprehensive metabolic panel, TSH with free T4        reflex, Enteric Bacteria and Virus Panel by [facility]        Stool, " Cryptosporidium in stool, stain,         Clostridium difficile Toxin B PCR,         Cryptosporidium/Giardia Immunoassay, Ova and         Parasite Exam Routine            (R53.83) Fatigue, unspecified type  Plan: CBC with platelets and differential,         Comprehensive metabolic panel, TSH with free T4        reflex, Vitamin D Deficiency            (R11.0) Nausea  Plan: ondansetron (ZOFRAN ODT) 4 MG ODT tab          Patient here with concerns of GI symptoms and fatigue for several months.  Unclear etiology at this time.  Initial labs obtain to assess for bacterial or electrolyte abnormalities.  Will obtain stool testing and treat if positive.  RX zofran given to help with nausea.  Will follow up on pending results and notify if any abnormalities.      Recommend to follow up with primary provider for further evaluation of chronic GI concerns in 1-2 weeks    Johnathan Garcia MD, MD  September 20, 2019 8:13 PM

## 2019-09-20 LAB
C COLI+JEJUNI+LARI FUSA STL QL NAA+PROBE: NOT DETECTED
DEPRECATED CALCIDIOL+CALCIFEROL SERPL-MC: 24 UG/L (ref 20–75)
EC STX1 GENE STL QL NAA+PROBE: NOT DETECTED
EC STX2 GENE STL QL NAA+PROBE: NOT DETECTED
ENTERIC PATHOGEN COMMENT: NORMAL
NOROV GI+II ORF1-ORF2 JNC STL QL NAA+PR: NOT DETECTED
O+P STL MICRO: NORMAL
O+P STL MICRO: NORMAL
RVA NSP5 STL QL NAA+PROBE: NOT DETECTED
SALMONELLA SP RPOD STL QL NAA+PROBE: NOT DETECTED
SHIGELLA SP+EIEC IPAH STL QL NAA+PROBE: NOT DETECTED
SPECIMEN SOURCE: NORMAL
TSH SERPL DL<=0.005 MIU/L-ACNC: 0.96 MU/L (ref 0.4–4)
V CHOL+PARA RFBL+TRKH+TNAA STL QL NAA+PR: NOT DETECTED
Y ENTERO RECN STL QL NAA+PROBE: NOT DETECTED

## 2019-09-23 LAB
O+P STL CONC: NORMAL
O+P STL CONC: NORMAL
SPECIMEN SOURCE: NORMAL

## 2019-09-25 ENCOUNTER — OFFICE VISIT (OUTPATIENT)
Dept: PEDIATRICS | Facility: CLINIC | Age: 29
End: 2019-09-25
Payer: COMMERCIAL

## 2019-09-25 VITALS
OXYGEN SATURATION: 96 % | TEMPERATURE: 98.9 F | SYSTOLIC BLOOD PRESSURE: 118 MMHG | BODY MASS INDEX: 41.5 KG/M2 | DIASTOLIC BLOOD PRESSURE: 70 MMHG | WEIGHT: 265 LBS | HEART RATE: 100 BPM

## 2019-09-25 DIAGNOSIS — R19.7 DIARRHEA, UNSPECIFIED TYPE: Primary | ICD-10-CM

## 2019-09-25 PROCEDURE — 99213 OFFICE O/P EST LOW 20 MIN: CPT | Performed by: FAMILY MEDICINE

## 2019-09-25 NOTE — PROGRESS NOTES
CHIEF COMPLAINT    Diarrhea  Cramping abdominal pain.      HISTORY    29-year-old woman seen in follow-up of urgent care visit.  She is having episodic crampy abdominal pain and diarrhea.  Symptoms go back a couple of months.  Urgent care note was reviewed.    She is not having fever or vomiting.  No blood is present in stool and there is no melena.    Stool tests done including abnormal bacteria, Cryptosporidium and parasites were negative.    Patient has not traveled out of the area recently.  She has not recently been on antibiotics.    She has not yet had a C. difficile test.      Patient Active Problem List   Diagnosis     MARÍA (generalized anxiety disorder)     Chronic midline low back pain without sciatica     Chronic bilateral low back pain with left-sided sciatica     Major depressive disorder, recurrent episode, moderate (H)     Morbid obesity (H)     Social anxiety disorder     Current Outpatient Medications   Medication Sig Dispense Refill     ALPRAZolam (XANAX) 0.5 MG tablet Take 1 tablet (0.5 mg) by mouth 3 times daily as needed (panic attacks) 10 tablet 0     etonogestrel (IMPLANON/NEXPLANON) 68 MG IMPL 1 each by Subdermal route once       ibuprofen (ADVIL/MOTRIN) 200 MG capsule Take 600-800 mg by mouth every 4 hours as needed for pain       methocarbamol (ROBAXIN) 750 MG tablet Take 1 tablet (750 mg) by mouth 4 times daily as needed for muscle spasms 180 tablet 0     Multiple Vitamins-Minerals (MULTIVITAMIN ADULT) CHEW        Multiple Vitamins-Minerals (ZINC PO) Take by mouth daily       ondansetron (ZOFRAN ODT) 4 MG ODT tab Take 1 tablet (4 mg) by mouth every 8 hours as needed for nausea 12 tablet 0     sertraline (ZOLOFT) 100 MG tablet Take 2 tablets (200 mg) by mouth daily 60 tablet 0       REVIEW OF SYSTEMS    No weight loss.  May have gained some weight.  No cough or S OB.  No chest pain.  No urinary difficulty.  No edema.  No rashes.      History reviewed. No pertinent past medical  history.      EXAM  /70 (BP Location: Right arm, Patient Position: Sitting, Cuff Size: Adult Regular)   Pulse 100   Temp 98.9  F (37.2  C) (Oral)   Wt 120.2 kg (265 lb)   LMP  (LMP Unknown)   SpO2 96%   BMI 41.50 kg/m      BMI noted.  NAD.  HEENT: No scleral icterus.  Pharynx normal.  Neck without masses.  Nonlabored breathing.  Cardiac rhythm regular.  Rate normal.  Abdomen nondistended, no localized areas of tenderness or guarding.  Skin unremarkable.      (R19.7) Diarrhea, unspecified type  (primary encounter diagnosis)  Comment:     In view of persistent symptoms, GI opinion was recommended and she is agreeable with this plan.  I did ask her to try to obtain a C. difficile test in order to include this in her work-up.    Plan: Clostridium difficile Toxin B PCR,         GASTROENTEROLOGY ADULT REF CONSULT ONLY

## 2019-09-26 ENCOUNTER — TRANSFERRED RECORDS (OUTPATIENT)
Dept: HEALTH INFORMATION MANAGEMENT | Facility: CLINIC | Age: 29
End: 2019-09-26

## 2019-10-05 ENCOUNTER — MYC REFILL (OUTPATIENT)
Dept: PSYCHIATRY | Facility: CLINIC | Age: 29
End: 2019-10-05

## 2019-10-05 DIAGNOSIS — F41.8 DEPRESSION WITH ANXIETY: ICD-10-CM

## 2019-10-08 ENCOUNTER — MYC REFILL (OUTPATIENT)
Dept: PSYCHIATRY | Facility: CLINIC | Age: 29
End: 2019-10-08

## 2019-10-08 DIAGNOSIS — F41.8 DEPRESSION WITH ANXIETY: ICD-10-CM

## 2019-10-08 RX ORDER — SERTRALINE HYDROCHLORIDE 100 MG/1
200 TABLET, FILM COATED ORAL DAILY
Qty: 60 TABLET | Refills: 0 | OUTPATIENT
Start: 2019-10-08

## 2019-10-08 NOTE — TELEPHONE ENCOUNTER
Refill denied. Patient received her last courtesy refill in August. She has still not scheduled nor had any contact with our clinic.

## 2019-10-09 NOTE — TELEPHONE ENCOUNTER
Refill denied.   Patient last had refill in August as a mary refill.  Patient still has not been seen or scheduled a follow up.     Per TE from 8/6, PCP in agreement that patient needs to have regular contact/visits with any provider she wants to fill her meds.     Will encourage patient to schedule a visit with Sarah or PCP for future medication fills.      Carmel Huerta RN  10/09/19  9:49 AM

## 2019-10-10 NOTE — TELEPHONE ENCOUNTER
Carmel - Yes, I am booked out several months for regular appts.  If she is wishing to return back to primary care I can squeeze her in on Monday 10/21 at 2:45pm (ok to book in PCP approved slot). I will have my nurses reach out to her.     [facility] team - please reach out to patient and offer her above appt.  If she is needing an appt before then she will need to see another provider.     Rozina Ricketts MD  Internal Medicine/Pediatrics  [facility]

## 2019-10-10 NOTE — TELEPHONE ENCOUNTER
I spoke to patient and scheduled appointment as Dr. Ricketts indicated. Patient had no other questions at this time.    JENELLE HOSKINS MA on 10/10/2019 at 4:46 PM

## 2019-10-11 RX ORDER — SERTRALINE HYDROCHLORIDE 100 MG/1
200 TABLET, FILM COATED ORAL DAILY
Qty: 60 TABLET | Refills: 0 | OUTPATIENT
Start: 2019-10-11

## 2019-10-16 RX ORDER — SERTRALINE HYDROCHLORIDE 100 MG/1
200 TABLET, FILM COATED ORAL DAILY
Qty: 60 TABLET | Refills: 0 | Status: SHIPPED | OUTPATIENT
Start: 2019-10-16 | End: 2019-10-21

## 2019-10-16 NOTE — TELEPHONE ENCOUNTER
Patient would like to see if there is any way to do this appointment sooner than the 10/21 and she is out of her medication and not doing well.  I wasn't sure if we are able to offer a telephone visit at all?  Please call patient back to advise on possible telephone appointment when available     Call back is [phone]

## 2019-10-16 NOTE — TELEPHONE ENCOUNTER
I will send a months worth of medication - this will be more than enough for her to get to appt.    Rozina Ricketts MD  Internal Medicine/Pediatrics  [facility]

## 2019-10-21 ENCOUNTER — OFFICE VISIT (OUTPATIENT)
Dept: PEDIATRICS | Facility: CLINIC | Age: 29
End: 2019-10-21
Payer: COMMERCIAL

## 2019-10-21 VITALS
BODY MASS INDEX: 42.06 KG/M2 | HEIGHT: 67 IN | HEART RATE: 85 BPM | WEIGHT: 268 LBS | DIASTOLIC BLOOD PRESSURE: 70 MMHG | OXYGEN SATURATION: 96 % | TEMPERATURE: 99.1 F | SYSTOLIC BLOOD PRESSURE: 120 MMHG

## 2019-10-21 DIAGNOSIS — F41.1 GAD (GENERALIZED ANXIETY DISORDER): ICD-10-CM

## 2019-10-21 DIAGNOSIS — F41.0 PANIC ATTACK: ICD-10-CM

## 2019-10-21 DIAGNOSIS — Z23 NEED FOR PROPHYLACTIC VACCINATION AND INOCULATION AGAINST INFLUENZA: ICD-10-CM

## 2019-10-21 DIAGNOSIS — F33.1 MAJOR DEPRESSIVE DISORDER, RECURRENT EPISODE, MODERATE (H): Primary | ICD-10-CM

## 2019-10-21 PROCEDURE — 90471 IMMUNIZATION ADMIN: CPT | Performed by: PEDIATRICS

## 2019-10-21 PROCEDURE — 96127 BRIEF EMOTIONAL/BEHAV ASSMT: CPT | Performed by: PEDIATRICS

## 2019-10-21 PROCEDURE — 99214 OFFICE O/P EST MOD 30 MIN: CPT | Mod: 25 | Performed by: PEDIATRICS

## 2019-10-21 PROCEDURE — 90686 IIV4 VACC NO PRSV 0.5 ML IM: CPT | Performed by: PEDIATRICS

## 2019-10-21 RX ORDER — SERTRALINE HYDROCHLORIDE 100 MG/1
150 TABLET, FILM COATED ORAL DAILY
Qty: 60 TABLET | Refills: 0 | COMMUNITY
Start: 2019-10-21 | End: 2019-12-06

## 2019-10-21 RX ORDER — DULOXETIN HYDROCHLORIDE 60 MG/1
60 CAPSULE, DELAYED RELEASE ORAL DAILY
Qty: 90 CAPSULE | Refills: 0 | Status: SHIPPED | OUTPATIENT
Start: 2019-10-21 | End: 2019-12-06

## 2019-10-21 RX ORDER — ALPRAZOLAM 1 MG
1 TABLET ORAL 3 TIMES DAILY PRN
Qty: 10 TABLET | Refills: 0 | Status: SHIPPED | OUTPATIENT
Start: 2019-10-21 | End: 2020-09-10

## 2019-10-21 ASSESSMENT — ANXIETY QUESTIONNAIRES
5. BEING SO RESTLESS THAT IT IS HARD TO SIT STILL: NOT AT ALL
GAD7 TOTAL SCORE: 9
2. NOT BEING ABLE TO STOP OR CONTROL WORRYING: MORE THAN HALF THE DAYS
3. WORRYING TOO MUCH ABOUT DIFFERENT THINGS: MORE THAN HALF THE DAYS
6. BECOMING EASILY ANNOYED OR IRRITABLE: SEVERAL DAYS
1. FEELING NERVOUS, ANXIOUS, OR ON EDGE: MORE THAN HALF THE DAYS
IF YOU CHECKED OFF ANY PROBLEMS ON THIS QUESTIONNAIRE, HOW DIFFICULT HAVE THESE PROBLEMS MADE IT FOR YOU TO DO YOUR WORK, TAKE CARE OF THINGS AT HOME, OR GET ALONG WITH OTHER PEOPLE: SOMEWHAT DIFFICULT
7. FEELING AFRAID AS IF SOMETHING AWFUL MIGHT HAPPEN: SEVERAL DAYS

## 2019-10-21 ASSESSMENT — PATIENT HEALTH QUESTIONNAIRE - PHQ9
5. POOR APPETITE OR OVEREATING: SEVERAL DAYS
SUM OF ALL RESPONSES TO PHQ QUESTIONS 1-9: 17

## 2019-10-21 ASSESSMENT — MIFFLIN-ST. JEOR: SCORE: 1963.27

## 2019-10-21 NOTE — PATIENT INSTRUCTIONS
Cross taper:    1. Start Cymbalta 60mg daily  2. At SAME time decrease sertraline to 100mg for 10 days, then 50mg (1/2 tab) for 10 days, the done.

## 2019-10-21 NOTE — PROGRESS NOTES
Subjective     Rama Gutierrez is a 29 year old female who presents to clinic today for the following health issues:    HPI   Depression and Anxiety Follow-Up    How are you doing with your depression since your last visit? Worsened - collaborative care tried her on Wellbutrin - caused a lot of nausea - stopped this in June - also cut her sertraline back to 150mg at this same time.    How are you doing with your anxiety since your last visit?  Worsened     Are you having other symptoms that might be associated with depression or anxiety? Yes:  Tired, less motivated    Have you had a significant life event? Job Concerns and Financial Concerns     Do you have any concerns with your use of alcohol or other drugs? No    Social History     Tobacco Use     Smoking status: Never Smoker     Smokeless tobacco: Never Used   Substance Use Topics     Alcohol use: No     Alcohol/week: 0.0 standard drinks     Comment: on a rare occ      Drug use: Yes     Comment: edible marijuana treats weekly     PHQ 5/8/2019 5/21/2019 10/21/2019   PHQ-9 Total Score 12 8 17   Q9: Thoughts of better off dead/self-harm past 2 weeks Not at all Not at all Nearly every day   F/U: Thoughts of suicide or self-harm - - Yes   F/U: Self harm-plan - - Yes   F/U: Self-harm action - - No   F/U: Safety concerns - - No     MARÍA-7 SCORE 5/2/2019 5/21/2019 10/21/2019   Total Score 11 (moderate anxiety) - -   Total Score 11 3 9     No flowsheet data found.  No flowsheet data found.      Previous history with medications:    tried celexa and paxil in the past with side effects of vivid dreams and weight gain.  wellbutrin +nausea    Reviewed and updated as needed this visit by Provider         Review of Systems   ROS COMP: Constitutional, HEENT, cardiovascular, pulmonary, gi and gu systems are negative, except as otherwise noted.      Objective    /70 (BP Location: Right arm, Cuff Size: Adult Large)   Pulse 85   Temp 99.1  F (37.3  C) (Tympanic)   Ht 1.702 m (5'  "7\")   Wt 121.6 kg (268 lb)   SpO2 96%   BMI 41.97 kg/m    Body mass index is 41.97 kg/m .  Physical Exam   NA    Diagnostic Test Results:  Labs reviewed in Epic        Assessment & Plan       ICD-10-CM    1. Major depressive disorder, recurrent episode, moderate (H) - uncontrolled - see PI.  Failed three serotonin specific reuptake inhibitor, try SNRI now. F33.1 DULoxetine (CYMBALTA) 60 MG capsule   2. Panic attack F41.0 ALPRAZolam (XANAX) 1 MG tablet   3. MARÍA (generalized anxiety disorder) F41.1 DULoxetine (CYMBALTA) 60 MG capsule          Patient Instructions   Cross taper:    1. Start Cymbalta 60mg daily  2. At SAME time decrease sertraline to 100mg for 10 days, then 50mg (1/2 tab) for 10 days, the done.      Return in about 2 months (around 12/21/2019) for Depression/Anxiety.    Rozina Ricketts MD  [facility]      "

## 2019-10-22 ASSESSMENT — ANXIETY QUESTIONNAIRES: GAD7 TOTAL SCORE: 9

## 2019-11-26 ENCOUNTER — MYC MEDICAL ADVICE (OUTPATIENT)
Dept: PEDIATRICS | Facility: CLINIC | Age: 29
End: 2019-11-26

## 2019-12-02 NOTE — TELEPHONE ENCOUNTER
Please see [employer] note:    1. Form completed - in my outbox.    2. Patient requesting to change appt on 12/19/19.   I can see her on Friday 12/6/19 at 9:05am instead - have her arrive at 8:50am.    Rozina Ricketts MD  Internal Medicine/Pediatrics  [facility]

## 2019-12-03 NOTE — PROGRESS NOTES
"Subjective     Rama Gutierrez is a 29 year old female who presents to clinic today for the following health issues:    HPI   Medication Followup of ***    Taking Medication as prescribed: {.:424142::\"yes\"}    Side Effects:  {NONEORCHOOSE:674787::\"None\"}    Medication Helping Symptoms:  {.:761341::\"yes\"}     Abnormal Mood Symptoms      Duration: ***    Description:  Depression: { :679677::\"no\"}  Anxiety: { :894540::\"no\"}  Panic attacks: { :076281::\"no\"}     Accompanying signs and symptoms: see PHQ-9 and MARÍA scores    History (similar episodes/previous evaluation): {.:316325::\"None\"}    Precipitating or alleviating factors: {.:494787::\"None\"}    Therapies tried and outcome: {.:855997::\"none\"}    {additonal problems for provider to add (Optional):923755}    {HIST REVIEW/ LINKS 2 (Optional):957367}    {Additional problems for the provider to add (optional):698474}  Reviewed and updated as needed this visit by Provider         Review of Systems   {ROS COMP (Optional):335518}      Objective    There were no vitals taken for this visit.  There is no height or weight on file to calculate BMI.  Physical Exam   {Exam List (Optional):901858}    {Diagnostic Test Results (Optional):018531::\"Diagnostic Test Results:\",\"Labs reviewed in Epic\"}        {PROVIDER CHARTING PREFERENCE:650337}        Pre-Visit Planning     Future Appointments   Date Time Provider Department Center   12/6/2019  9:05 AM Rozina Ricketts MD [facility]   12/19/2019  1:45 PM Rozina Ricketts MD [facility]     Arrival Time for this Appointment:  8:45 AM   Appointment Notes for this encounter:   recheck medication     Questionnaires Reviewed/Assigned  {PCT PVP Questionnaire Review:235224}  {SCRIPTING IF PT ANSWERS \"Hi, my name is [your name] and I am calling on behalf of your provider's office at [facility].  I am calling to confirm and prep your upcoming appointment on [see above for date/time/place/provider].  I see that you are coming in for [see above " "for appt notes]. Are there any additional questions or concerns you'd like to review with your provider during your visit?\" (Optional):701014}  {SCRIPTING FOR VOICEMAIL \"Hello, this is [your name] and I am calling to get you prepped for an upcoming visit, which will help speed up your actual visit. Please call us back at [phone]\" (Optional) :108151}  Patient preferred phone number: [phone]    Coming in for a medication recheck?    ***    1. Start Cymbalta 60mg daily    ***    2. At SAME time decrease sertraline to 100mg for 10 days, then 50mg (1/2 tab) for 10 days    ***    Are you having any problems taking your medications as prescribed?    ***    Are you having any problems getting your prescriptions?    ***    Has there been any changes in your symptoms since your last appointment?    ***    Have you had any new symptoms since your last appointment?    ***    Do you have any additional questions and concerns that you would like to talk to the provider about at this upcoming appointment?    ***  "

## 2019-12-06 ENCOUNTER — OFFICE VISIT (OUTPATIENT)
Dept: PEDIATRICS | Facility: CLINIC | Age: 29
End: 2019-12-06
Payer: COMMERCIAL

## 2019-12-06 ENCOUNTER — PATIENT OUTREACH (OUTPATIENT)
Dept: PEDIATRICS | Facility: CLINIC | Age: 29
End: 2019-12-06

## 2019-12-06 VITALS
SYSTOLIC BLOOD PRESSURE: 122 MMHG | OXYGEN SATURATION: 96 % | WEIGHT: 268.6 LBS | DIASTOLIC BLOOD PRESSURE: 76 MMHG | RESPIRATION RATE: 16 BRPM | HEIGHT: 67 IN | BODY MASS INDEX: 42.16 KG/M2 | TEMPERATURE: 97.8 F | HEART RATE: 106 BPM

## 2019-12-06 DIAGNOSIS — F33.1 MAJOR DEPRESSIVE DISORDER, RECURRENT EPISODE, MODERATE (H): Primary | ICD-10-CM

## 2019-12-06 DIAGNOSIS — F41.1 GAD (GENERALIZED ANXIETY DISORDER): ICD-10-CM

## 2019-12-06 DIAGNOSIS — M62.830 BACK MUSCLE SPASM: ICD-10-CM

## 2019-12-06 PROCEDURE — 99213 OFFICE O/P EST LOW 20 MIN: CPT | Performed by: PEDIATRICS

## 2019-12-06 RX ORDER — CYCLOBENZAPRINE HCL 10 MG
5-10 TABLET ORAL 3 TIMES DAILY PRN
Qty: 30 TABLET | Refills: 3 | Status: SHIPPED | OUTPATIENT
Start: 2019-12-06 | End: 2021-08-18

## 2019-12-06 RX ORDER — DULOXETIN HYDROCHLORIDE 60 MG/1
60 CAPSULE, DELAYED RELEASE ORAL DAILY
Qty: 90 CAPSULE | Refills: 1 | Status: SHIPPED | OUTPATIENT
Start: 2019-12-06 | End: 2020-03-09

## 2019-12-06 ASSESSMENT — ANXIETY QUESTIONNAIRES
1. FEELING NERVOUS, ANXIOUS, OR ON EDGE: NEARLY EVERY DAY
GAD7 TOTAL SCORE: 12
4. TROUBLE RELAXING: SEVERAL DAYS
2. NOT BEING ABLE TO STOP OR CONTROL WORRYING: NEARLY EVERY DAY
7. FEELING AFRAID AS IF SOMETHING AWFUL MIGHT HAPPEN: NEARLY EVERY DAY
3. WORRYING TOO MUCH ABOUT DIFFERENT THINGS: MORE THAN HALF THE DAYS
GAD7 TOTAL SCORE: 14
6. BECOMING EASILY ANNOYED OR IRRITABLE: MORE THAN HALF THE DAYS
2. NOT BEING ABLE TO STOP OR CONTROL WORRYING: MORE THAN HALF THE DAYS
GAD7 TOTAL SCORE: 14
7. FEELING AFRAID AS IF SOMETHING AWFUL MIGHT HAPPEN: NEARLY EVERY DAY
5. BEING SO RESTLESS THAT IT IS HARD TO SIT STILL: NOT AT ALL
6. BECOMING EASILY ANNOYED OR IRRITABLE: SEVERAL DAYS
GAD7 TOTAL SCORE: 14
5. BEING SO RESTLESS THAT IT IS HARD TO SIT STILL: SEVERAL DAYS
3. WORRYING TOO MUCH ABOUT DIFFERENT THINGS: MORE THAN HALF THE DAYS
1. FEELING NERVOUS, ANXIOUS, OR ON EDGE: MORE THAN HALF THE DAYS
7. FEELING AFRAID AS IF SOMETHING AWFUL MIGHT HAPPEN: NEARLY EVERY DAY

## 2019-12-06 ASSESSMENT — PATIENT HEALTH QUESTIONNAIRE - PHQ9
SUM OF ALL RESPONSES TO PHQ QUESTIONS 1-9: 12
SUM OF ALL RESPONSES TO PHQ QUESTIONS 1-9: 12
5. POOR APPETITE OR OVEREATING: SEVERAL DAYS
10. IF YOU CHECKED OFF ANY PROBLEMS, HOW DIFFICULT HAVE THESE PROBLEMS MADE IT FOR YOU TO DO YOUR WORK, TAKE CARE OF THINGS AT HOME, OR GET ALONG WITH OTHER PEOPLE: VERY DIFFICULT

## 2019-12-06 ASSESSMENT — MIFFLIN-ST. JEOR: SCORE: 1965.99

## 2019-12-06 NOTE — PATIENT INSTRUCTIONS
Continue Cymbalta daily.    Ok to use Flexeril as needed.    Go back to free clinic for therapy.    Plan to follow up in 3 months.

## 2019-12-06 NOTE — PROGRESS NOTES
"Subjective       Rama Gutierrez is a 29 year old female who presents to clinic today for the following health issues:    HPI   Depression and Anxiety Follow-Up    How are you doing with your depression since your last visit? No change    How are you doing with your anxiety since your last visit?  No change    Are you having other symptoms that might be associated with depression or anxiety? Yes: with some suicidal thoughts     Have you had a significant life event? Job Concerns-Pt lost job      Do you have any concerns with your use of alcohol or other drugs? No     After last visit patient tapered from zoloft to cymbalta.  Getting off the zoloft was very bad - lowest point was 11/20/19 - she left work mid-day - had plans to hurt herself, but went to free clinic for counseling instead - she found this extremely helpful and is planning to go back.  Currently on unemployment and feelings things \"starting to even out\"  She likes that the cymbalta is helping a lot with her pain and would like to continue this.    Finances continue to be an issue and she would like to talk to someone about more resources.    She is planning to go back home to [place] [facility] [place] for the next month.       Social History     Tobacco Use     Smoking status: Never Smoker     Smokeless tobacco: Never Used   Substance Use Topics     Alcohol use: No     Alcohol/week: 0.0 standard drinks     Comment: on a rare occ      Drug use: Yes     Comment: edible marijuana treats weekly     PHQ 10/21/2019 12/6/2019 12/6/2019   PHQ-9 Total Score 17 12 14   Q9: Thoughts of better off dead/self-harm past 2 weeks Nearly every day Several days Several days   F/U: Thoughts of suicide or self-harm Yes Yes -   F/U: Self harm-plan Yes Yes -   F/U: Self-harm action No No -   F/U: Safety concerns No No -     MARÍA-7 SCORE 10/21/2019 12/6/2019 12/6/2019   Total Score - - 14 (moderate anxiety)   Total Score 9 14 12     Last PHQ-9 12/6/2019   1.  Little interest or pleasure in doing " things 1   2.  Feeling down, depressed, or hopeless 2   3.  Trouble falling or staying asleep, or sleeping too much 1   4.  Feeling tired or having little energy 3   5.  Poor appetite or overeating 3   6.  Feeling bad about yourself 2   7.  Trouble concentrating 1   8.  Moving slowly or restless 0   Q9: Thoughts of better off dead/self-harm past 2 weeks 1   PHQ-9 Total Score 14   Difficulty at work, home, or with people Very difficult   In the past two weeks have you had thoughts of suicide or self harm? -   Do you have concerns about your personal safety or the safety of others? -   In the past 2 weeks have you thought about a plan or had intention to harm yourself? -   In the past 2 weeks have you acted on these thoughts in any way? -     MARÍA-7  12/6/2019   1. Feeling nervous, anxious, or on edge 3   2. Not being able to stop or control worrying 2   3. Worrying too much about different things 2   4. Trouble relaxing 1   5. Being so restless that it is hard to sit still 0   6. Becoming easily annoyed or irritable 1   7. Feeling afraid, as if something awful might happen 3   MARÍA-7 Total Score 12   If you checked any problems, how difficult have they made it for you to do your work, take care of things at home, or get along with other people? -       Suicide Assessment Five-step Evaluation and Treatment (SAFE-T)      How many servings of fruits and vegetables do you eat daily?  0-1    On average, how many sweetened beverages do you drink each day (Examples: soda, juice, sweet tea, etc.  Do NOT count diet or artificially sweetened beverages)?   0  How many days per week do you miss taking your medication? 1    What makes it hard for you to take your medications?  remembering to take    Reviewed and updated as needed this visit by Provider  Meds         Review of Systems   ROS COMP: Constitutional, HEENT, cardiovascular, pulmonary, gi and gu systems are negative, except as otherwise noted.      Objective    /76  "(BP Location: Right arm, Patient Position: Chair, Cuff Size: Adult Large)   Pulse 106   Temp 97.8  F (36.6  C) (Oral)   Resp 16   Ht 1.702 m (5' 7\")   Wt 121.8 kg (268 lb 9.6 oz)   SpO2 96%   BMI 42.07 kg/m    Body mass index is 42.07 kg/m .  Physical Exam   NA    Diagnostic Test Results:  Labs reviewed in Epic        Assessment & Plan     1. Major depressive disorder, recurrent episode, moderate (H)  Stable - continue cymbalta.  Patient met with [facility] PAL today to discuss further resources.  Close follow up for this patient.  Encouraged to continue therapy at free clinic as well. PAL to check in with patient in 1-2 weeks.   - DULoxetine (CYMBALTA) 60 MG capsule; Take 1 capsule (60 mg) by mouth daily  Dispense: 90 capsule; Refill: 1    2. MARÍA (generalized anxiety disorder)  See #1  - DULoxetine (CYMBALTA) 60 MG capsule; Take 1 capsule (60 mg) by mouth daily  Dispense: 90 capsule; Refill: 1    3. Back muscle spasm  Ok to continue in evening for occ back spasms  - cyclobenzaprine (FLEXERIL) 10 MG tablet; Take 0.5-1 tablets (5-10 mg) by mouth 3 times daily as needed for muscle spasms  Dispense: 30 tablet; Refill: 3             Patient Instructions   Continue Cymbalta daily.    Ok to use Flexeril as needed.    Go back to free clinic for therapy.    Plan to follow up in 3 months.       Return in about 3 months (around 3/6/2020) for Depression/Anxiety.    Rozina Ricketts MD  [facility]         Answers for HPI/ROS submitted by the patient on 12/6/2019   If you checked off any problems, how difficult have these problems made it for you to do your work, take care of things at home, or get along with other people?: Very difficult  PHQ9 TOTAL SCORE: 12  MARÍA 7 TOTAL SCORE: 14    "

## 2019-12-06 NOTE — PROGRESS NOTES
Clinic Health Guide met with patient to talk about concerns with financial and work/job issues. CHG brought CC in to see patient and get Terra the paperwork needed to begin the process of finding and getting recourses needed to begin addressing said issues. CHG along with CC gave Rama their contact info and informed her she can call with questions and concerns as needed.    Randy Zhao on 12/6/2019 at 12:57 PM      CHG plans to reach out to patient via telephone on the 12/12/2019 or 12/13/2019.

## 2019-12-12 ENCOUNTER — TELEPHONE (OUTPATIENT)
Dept: PEDIATRICS | Facility: CLINIC | Age: 29
End: 2019-12-12

## 2019-12-12 NOTE — TELEPHONE ENCOUNTER
"Called patient for outreach and to see how she was doing with [facility] [employer] work paperwork. State that she has applied for financial assistance along with financial counseling.     Also discussed job hunting, she stats that she had applied for \"several jobs\" but has not yet heard back from any of them. She also says that she is interested in job training services offered through [facility]. 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    Patient will be leaving to visit family in [place] for approxomitly the next month. She says that she is coming back \"the first Friday of January\" that would be Friday January 3rd. Will reach back out to her the following week.    Randy Zhao Clinic Health Guide on 12/12/2019 at 9:24 AM   "

## 2020-01-22 ENCOUNTER — TELEPHONE (OUTPATIENT)
Dept: PEDIATRICS | Facility: CLINIC | Age: 30
End: 2020-01-22

## 2020-01-22 NOTE — TELEPHONE ENCOUNTER
"Patient outreach call to follow up with patient after last phone call and being away visiting family for a month.    Patient says that she is working on the paperwork from the [facility] for assistance but is waiting on getting some paperwork filled out by her former employer before she can proceed.    Says that she has talked to some people about advice on getting an IT job. She said that she didn't really get any advice from them other than to just apply.     Has been applying for jobs, mainly IT and graphic design jobs, but says that she has been applying to \"just about everything\"or \"anything I think I could be good at\".    Said that she has just gotten her car fixed and is looking at going to the [facility] mental health therapy clinic in [place]. Says she has to look at their hours before deciding when she will go.    When asked if there was anything else she wanted to talk about before ending the call she said that she \"feels like there was something, but I can't remember what\"     [facility] PAL reminded her that she can call if something comes up that she needs help with, patient says that she has the [facility] PALs card and number on her poster board and will call if needed.     Will call for next outreach in 2 to 3 weeks    Randy Zhao at 10:13 AM on 1/22/2020  [facility] Clinic Health Guide  Phone [phone]    "

## 2020-02-14 NOTE — TELEPHONE ENCOUNTER
CHG outreach call to patient. Patient did not answer, CHG LVM asking them to call back on CHG direct extension.    Randy Zhao at 3:51 PM on 2/14/2020  [facility] Health Guide  Phone [phone]

## 2020-02-17 NOTE — TELEPHONE ENCOUNTER
CHG outreach call to patient. Patient did not answer, CHG LVM asking them to call back on CHG direct extension.    Contact attempt #2    Randy Zhao at 1:56 PM on 2/17/2020  [facility] Health Guide  Phone [phone]

## 2020-03-06 ENCOUNTER — TELEPHONE (OUTPATIENT)
Dept: PEDIATRICS | Facility: CLINIC | Age: 30
End: 2020-03-06

## 2020-03-06 DIAGNOSIS — F41.1 GAD (GENERALIZED ANXIETY DISORDER): Primary | ICD-10-CM

## 2020-03-06 NOTE — TELEPHONE ENCOUNTER
Called Patient for PVP. Patient answered, PVP completed. Patient has financial concerns, CC referral attached to this encounter.    Randy Zhao at 3:06 PM on 3/6/2020  [facility] Health Guide  Phone [phone] \

## 2020-03-06 NOTE — PROGRESS NOTES
"Pre-Visit Planning     Future Appointments   Date Time Provider Department Center   3/9/2020  8:10 AM Rozina Ricketts MD [facility]     Arrival Time for this Appointment:  8:00 AM   Appointment Notes for this encounter:   follow up    Questionnaires Reviewed/Assigned  No additional questionnaires are needed        Patient preferred phone number: [phone]    Spoke to patient via phone. Patient does have additional questions or concerns. Adjusted Appointment Notes.       Visit is not preventive.    Health Maintenance Due   Topic Date Due     ANNUAL REVIEW OF HM ORDERS  1990   [facility]  Patient is active on [employer].    Questionnaire Review   Offered information on completing questionnaires via [employer] [facility].    Call Summary  \"Thank you for your time today.  If anything comes up before your appointment, please feel free to contact us at [phone].\"    CHG able to contact patient, see outreach dated 01/22/2020 for more in depth information.     Patient says that she has not gone to free counseling clinics yet, roommate lost their job last week and don't have rent for next month yet (Care Coordination informed and referral sent) says she has job interview on Wednesday 03/11/2020. Has not gone to free counseling clinic yet, has not started therapy yet. Has disability paperwork they want Dr. Iqbal to fill out.    Randy Zaho at 2:56 PM on 3/6/2020  [facility] Health Guide  Phone [phone]    "

## 2020-03-06 NOTE — TELEPHONE ENCOUNTER
"CHG able to contact patient as part of PVP. Patient informed CHG that their roommate lost their job last week (week of 02/24/2020 through 02/28/2020) and that they have a job interview on Wednesday 03/11/2020, but they don't have rent for next month yet. She said that she still has not gone to the free counseling clinic yet, and has not started therapy yet.    Patient stated that they the have \"medical opinion paperwork for disability paperwork\"    When addressing possible needs patient said that they are \"okay\" for food right now and that they are on SNAP and their roommate will be applying for it also, says that they \"don't yet\" have rent for next month. Are applying for MA and states that they have a [facility] . CHG huddled with  and they are unable to find records of them receiving assistance from the [facility]. CHG will fill out CC referral and submit it.     Randy Zhao at 3:04 PM on 3/6/2020  [facility] Clinic Health Guide  Phone [phone]    "

## 2020-03-09 ENCOUNTER — PATIENT OUTREACH (OUTPATIENT)
Dept: CARE COORDINATION | Facility: CLINIC | Age: 30
End: 2020-03-09

## 2020-03-09 ENCOUNTER — OFFICE VISIT (OUTPATIENT)
Dept: PEDIATRICS | Facility: CLINIC | Age: 30
End: 2020-03-09
Payer: MEDICAID

## 2020-03-09 ENCOUNTER — DOCUMENTATION ONLY (OUTPATIENT)
Dept: PEDIATRICS | Facility: CLINIC | Age: 30
End: 2020-03-09

## 2020-03-09 VITALS
TEMPERATURE: 97.9 F | SYSTOLIC BLOOD PRESSURE: 110 MMHG | OXYGEN SATURATION: 96 % | BODY MASS INDEX: 42.38 KG/M2 | HEART RATE: 96 BPM | RESPIRATION RATE: 16 BRPM | DIASTOLIC BLOOD PRESSURE: 70 MMHG | WEIGHT: 270 LBS | HEIGHT: 67 IN

## 2020-03-09 DIAGNOSIS — F41.1 GAD (GENERALIZED ANXIETY DISORDER): ICD-10-CM

## 2020-03-09 DIAGNOSIS — R53.83 OTHER FATIGUE: ICD-10-CM

## 2020-03-09 DIAGNOSIS — G89.29 CHRONIC MIDLINE LOW BACK PAIN WITHOUT SCIATICA: ICD-10-CM

## 2020-03-09 DIAGNOSIS — F33.1 MAJOR DEPRESSIVE DISORDER, RECURRENT EPISODE, MODERATE (H): Primary | ICD-10-CM

## 2020-03-09 DIAGNOSIS — M54.50 CHRONIC MIDLINE LOW BACK PAIN WITHOUT SCIATICA: ICD-10-CM

## 2020-03-09 PROCEDURE — 99214 OFFICE O/P EST MOD 30 MIN: CPT | Performed by: PEDIATRICS

## 2020-03-09 PROCEDURE — 96127 BRIEF EMOTIONAL/BEHAV ASSMT: CPT | Performed by: PEDIATRICS

## 2020-03-09 RX ORDER — DULOXETIN HYDROCHLORIDE 60 MG/1
60 CAPSULE, DELAYED RELEASE ORAL DAILY
Qty: 90 CAPSULE | Refills: 1 | Status: SHIPPED | OUTPATIENT
Start: 2020-03-09 | End: 2020-07-29

## 2020-03-09 SDOH — ECONOMIC STABILITY: TRANSPORTATION INSECURITY
IN THE PAST 12 MONTHS, HAS THE LACK OF TRANSPORTATION KEPT YOU FROM MEDICAL APPOINTMENTS OR FROM GETTING MEDICATIONS?: NO

## 2020-03-09 SDOH — ECONOMIC STABILITY: TRANSPORTATION INSECURITY
IN THE PAST 12 MONTHS, HAS LACK OF TRANSPORTATION KEPT YOU FROM MEETINGS, WORK, OR FROM GETTING THINGS NEEDED FOR DAILY LIVING?: NO

## 2020-03-09 SDOH — ECONOMIC STABILITY: FOOD INSECURITY: WITHIN THE PAST 12 MONTHS, YOU WORRIED THAT YOUR FOOD WOULD RUN OUT BEFORE YOU GOT MONEY TO BUY MORE.: SOMETIMES TRUE

## 2020-03-09 SDOH — ECONOMIC STABILITY: FOOD INSECURITY: WITHIN THE PAST 12 MONTHS, THE FOOD YOU BOUGHT JUST DIDN'T LAST AND YOU DIDN'T HAVE MONEY TO GET MORE.: SOMETIMES TRUE

## 2020-03-09 SDOH — HEALTH STABILITY: MENTAL HEALTH
STRESS IS WHEN SOMEONE FEELS TENSE, NERVOUS, ANXIOUS, OR CAN'T SLEEP AT NIGHT BECAUSE THEIR MIND IS TROUBLED. HOW STRESSED ARE YOU?: NOT ASKED

## 2020-03-09 SDOH — ECONOMIC STABILITY: INCOME INSECURITY: HOW HARD IS IT FOR YOU TO PAY FOR THE VERY BASICS LIKE FOOD, HOUSING, MEDICAL CARE, AND HEATING?: HARD

## 2020-03-09 ASSESSMENT — ANXIETY QUESTIONNAIRES
GAD7 TOTAL SCORE: 12
6. BECOMING EASILY ANNOYED OR IRRITABLE: MORE THAN HALF THE DAYS
7. FEELING AFRAID AS IF SOMETHING AWFUL MIGHT HAPPEN: NEARLY EVERY DAY
5. BEING SO RESTLESS THAT IT IS HARD TO SIT STILL: NOT AT ALL
2. NOT BEING ABLE TO STOP OR CONTROL WORRYING: MORE THAN HALF THE DAYS
1. FEELING NERVOUS, ANXIOUS, OR ON EDGE: MORE THAN HALF THE DAYS
3. WORRYING TOO MUCH ABOUT DIFFERENT THINGS: MORE THAN HALF THE DAYS
IF YOU CHECKED OFF ANY PROBLEMS ON THIS QUESTIONNAIRE, HOW DIFFICULT HAVE THESE PROBLEMS MADE IT FOR YOU TO DO YOUR WORK, TAKE CARE OF THINGS AT HOME, OR GET ALONG WITH OTHER PEOPLE: EXTREMELY DIFFICULT

## 2020-03-09 ASSESSMENT — PATIENT HEALTH QUESTIONNAIRE - PHQ9
5. POOR APPETITE OR OVEREATING: SEVERAL DAYS
SUM OF ALL RESPONSES TO PHQ QUESTIONS 1-9: 20

## 2020-03-09 ASSESSMENT — ACTIVITIES OF DAILY LIVING (ADL): DEPENDENT_IADLS:: INDEPENDENT

## 2020-03-09 ASSESSMENT — MIFFLIN-ST. JEOR: SCORE: 1972.34

## 2020-03-09 NOTE — PROGRESS NOTES
Programs Applying For: MA/SNAP/CASH/[place]  County:  Case #:  [facility] Worker:   Kyung #:   PMI #:   Tracking:   Date Applied:     Outreach:   3/9/20: FRW called patient on referral. Patient stated she applied for health insurance and is working with her financial worker to get verifications she needs. Patient is receiving SNAP and applied for CASH assistance and just faxed the Medical Opinion Form to the [facility]. Patient has FRW # in case she has questions in the future. No further FRW needs, taking patient off panel.      Referral:   Program Interested In:  MA/SNAP/CASH/GA     [facility] Benefits:     Is patient requesting help applying for SNAP/CASH?  Yes   Yes- Answer the screening questions below     Screening Questions:   1. Have you recently applied for any [facility] benefits? If so, what and when?   2. How many people in your household?   3. Do you buy/eat food together?   4. What is the monthly gross income for the household (wages, social security, workers comp, and pension)?     Insurance:   Is patient requesting help applying for insurance?   Yes   Yes- Check mn-its first and then answer the screening questions below   **If pt needs help with a renewal, you do not need to ask the questions below. Send referral to FRW**     Mn-its outcome (insert mn-its here):   Request Validation Messages   Subscriber is inactive.       Screening Questions:   1. Have you recently applied for insurance or do for a renewal? If so, when?   2. How many people in your household?   3. Do you file taxes, who do you claim?   5. What is the monthly gross income for the household (wages, social security, workers comp, and pension)?         Any other information for FRW?       CHW will tell patient:   Thank you for answering all the questions, based on screening questions, our Financial Resource Worker will reach out to you with additional questions and next steps

## 2020-03-09 NOTE — PROGRESS NOTES
Clinic Care Coordination Contact    Clinic Care Coordination Contact  OUTREACH    Referral Information:  Referral Source: Care Team    Primary Diagnosis: Psychosocial    Chief Complaint   Patient presents with     Clinic Care Coordination - Face To Face     Seen in Clinic- Financial Resources        Clinical Concerns:  Current Medical Concerns:    Patient Active Problem List   Diagnosis     MARÍA (generalized anxiety disorder)     Chronic midline low back pain without sciatica     Chronic bilateral low back pain with left-sided sciatica     Major depressive disorder, recurrent episode, moderate (H)     Morbid obesity (H)     Social anxiety disorder       Current Behavioral Concerns: Social Stressors r/t recent loss of job and financial hardship.      Education Provided to patient: Care Coordination, community health worker, and financial resource worker roles and availability.       Health Maintenance Reviewed: Up to date    Medication Management:  Filled. Independent.      Functional Status:  Dependent ADLs:: Independent  Dependent IADLs:: Independent  Bed or wheelchair confined:: No  Mobility Status: Independent  Fallen 2 or more times in the past year?: No  Any fall with injury in the past year?: No    Living Situation:  Current living arrangement:: I live alone  Type of residence:: Apartment    Lifestyle & Psychosocial Needs:     Social Needs     Financial resource strain: Hard     Food insecurity     Worry: Sometimes true     Inability: Sometimes true     Transportation needs     Medical: No     Non-medical: No     Diet:: Regular  Inadequate nutrition (GOAL):: No  Tube Feeding: No  Inadequate activity/exercise (GOAL):: No  Significant changes in sleep pattern (GOAL): No  Transportation means:: Regular car     Presybeterian or spiritual beliefs that impact treatment:: No  Mental health DX:: Yes  Mental health DX how managed:: Medication, Outpatient Counseling  Mental health management concern (GOAL):: No  Informal  Support system:: Friends   Socioeconomic History     Marital status: Single     Spouse name: Not on file     Number of children: Not on file     Years of education: Not on file     Highest education level: Not on file     Tobacco Use     Smoking status: Never Smoker     Smokeless tobacco: Never Used   Substance and Sexual Activity     Alcohol use: No     Alcohol/week: 0.0 standard drinks     Comment: on a rare occ      Drug use: Yes     Comment: edible marijuana treats weekly     Sexual activity: Never       SWCC met with pt in clinic on this date.  Huddled with PAL prior to visit to discuss pt concerns r/t housing/risk of homelessness, financial issues, job loss, and social stressors.      SWCC and PAL spoke with pt on this date to review [facility] Homelessness prevention programs.  Provided contact.  Offered 211 and discussed supports available through them. SWCC and pt review applications pt is in the process of completing.  Pt indicates she just completed Disability paperwork and is in the process of completing General Assistance.  Pt is unsure what other programs she may qualify for.      CC discussed Care Coordination role as well as Community Health Worker and Financial Resources worker [facility] has access to for assistance as well.  Pt agreed to contact from all supports discussed.  CC encouraged pt continue to work with the [facility] to complete applications for programs and assistance.     SWCC and pt discussed Mood and supports: Pt was weepy in clinic. Pt indicates she had been going to the free clinic for therapy and acknowledges she needs to get back there for follow up.  Pt states her roommate and she are close and they are experiencing similar stressors right now.  Pt expressed feeling badly for not being able to be as supportive of her roommate right now as she deals with her own stressors.  Affirmed pt feelings and encouraged pt to identify the importance of focusing on herself and navigating  "through her stressors which as they minimize will allow her to have more emotional bandwidth to support her roommate.  Additionally, explained that although it doesn't feel great, her roommate also likely can relate to having minimal emotional \"funds\" to spend beyond the effort needed to assure her affairs are taking care of first.  Pt agreed.        Resources and Interventions:  Community Resources: None  Supplies used at home:: None  Equipment Currently Used at Home: none    Advance Care Plan/Directive  Advanced Care Plans/Directives on file:: No  Advanced Care Plan/Directive Status: Not Applicable    Referrals Placed: Community Resources, County Resources, Financial Services, Prescription Assistance Programs     Goals:   Goals        General    1. Financial Wellbeing (pt-stated)     Notes - Note created  3/9/2020  9:31 AM by Terrence Paulino LSW    Goal Statement: I would like to apply and qualify for financial and County supports to allow me to afford my basic needs.   Date Goal set: 3/9/2020  Barriers: Recent loss of job.   Strengths: Independent.   Date to Achieve By: 6/1/2020  Patient expressed understanding of goal: yes    Action steps to achieve this goal:  1. I will continue to work with the [facility] to apply for [place], Cash assistance, and SNAP.    2. I will work with my Community Health worker and Financial Resource worker to assure I am applying for all appropriate programs in their entirety.    3. I will work with the [facility] for homelessness prevention.         2. Mental Health Management (pt-stated)     Notes - Note created  3/9/2020 10:29 AM by Terrence Paulino LSW    Goal Statement: I would like to establish with a therapist  Date Goal set: 3/9/2020  Date to Achieve By: 4/1/2020  Patient expressed understanding of goal: yes  Action steps to achieve this goal:  1. I will review mental health resources provided by my care coordinator and consider establishing with supports.  2. I will outreach to my care " coordinator for questions or concerns.    3. I will call to schedule an appointment to establish with a therapist.                      Patient/Caregiver understanding: Pt reports understanding and denies any additional questions or concerns at this times.  CC engaged in AIDET communication during encounter.    Outreach Frequency: monthly  Future Appointments              In 3 months Rozina Ricketts MD [facility] OCTAVIO [facility]          Plan: [facility] to refer to CHW and FRW for assist with [place], [facility] support and programs and housing/homlessness prevention.    CHW will: Refer to [facility] for assistance with [facility] program applications.   CHW Delegation:   1)  Due Date:  ASAP       Delegation: Refer to FRW    2)  Due Date: 2 weeks       Delegation: Assume Follow ups to check on Housing and mental health supports- Has she returned to free counseling clinic? Has she started working with the [facility], [facility] or [facility] for Homelessness Prevention?    CC to f/u again in 6 weeks.       GHISLAINE Nash  Clinic Care Coordinator  [facility]  [facility]  [phone]  [email or web address]

## 2020-03-09 NOTE — PROGRESS NOTES
"Subjective     Rama Gutierrez is a 29 year old female who presents to clinic today for the following health issues:     HPI   Depression and Anxiety Follow-Up    How are you doing with your depression since your last visit? Worsened     Patient has a lot of financial stresses right now.  Applying for disability due to mental health and chronic pain issues.  She does have an interview on Wed though.  Her roommate also lost job thought, so concerned about paying rent. Will meet with [facility] PAL after appt.     Taking cymbalta - however forgetting 3-4 days per week to take despite having a pill box.      Fatigued, \"whole body\" pain/aches.  Low back hurts after minimal activities, ADLS \"after 5 min of washing dishes\" has to sit down. Review recent labs - low vit D, not on supplement, otherwise CBC, CMP, TSH normal.    States she is staying hydrated 3-4 large water bottles per day.  Getting some light headed from sitting to standing though.       How are you doing with your anxiety since your last visit?  Worsened     Are you having other symptoms that might be associated with depression or anxiety? Yes fatigued     Have you had a significant life event? No     Do you have any concerns with your use of alcohol or other drugs? No    Social History     Tobacco Use     Smoking status: Never Smoker     Smokeless tobacco: Never Used   Substance Use Topics     Alcohol use: No     Alcohol/week: 0.0 standard drinks     Comment: on a rare occ      Drug use: Yes     Comment: edible marijuana treats weekly     PHQ 10/21/2019 12/6/2019 12/6/2019   PHQ-9 Total Score 17 12 14   Q9: Thoughts of better off dead/self-harm past 2 weeks Nearly every day Several days Several days   F/U: Thoughts of suicide or self-harm Yes Yes -   F/U: Self harm-plan Yes Yes -   F/U: Self-harm action No No -   F/U: Safety concerns No No -     MARÍA-7 SCORE 10/21/2019 12/6/2019 12/6/2019   Total Score - - 14 (moderate anxiety)   Total Score 9 14 12     Last PHQ-9 " 12/6/2019   1.  Little interest or pleasure in doing things 1   2.  Feeling down, depressed, or hopeless 2   3.  Trouble falling or staying asleep, or sleeping too much 1   4.  Feeling tired or having little energy 3   5.  Poor appetite or overeating 3   6.  Feeling bad about yourself 2   7.  Trouble concentrating 1   8.  Moving slowly or restless 0   Q9: Thoughts of better off dead/self-harm past 2 weeks 1   PHQ-9 Total Score 14   Difficulty at work, home, or with people Very difficult   In the past two weeks have you had thoughts of suicide or self harm? -   Do you have concerns about your personal safety or the safety of others? -   In the past 2 weeks have you thought about a plan or had intention to harm yourself? -   In the past 2 weeks have you acted on these thoughts in any way? -     MARÍA-7  12/6/2019   1. Feeling nervous, anxious, or on edge 3   2. Not being able to stop or control worrying 2   3. Worrying too much about different things 2   4. Trouble relaxing 1   5. Being so restless that it is hard to sit still 0   6. Becoming easily annoyed or irritable 1   7. Feeling afraid, as if something awful might happen 3   MARÍA-7 Total Score 12   If you checked any problems, how difficult have they made it for you to do your work, take care of things at home, or get along with other people? -         Suicide Assessment Five-step Evaluation and Treatment (SAFE-T)      How many servings of fruits and vegetables do you eat daily?  0-1    On average, how many sweetened beverages do you drink each day (Examples: soda, juice, sweet tea, etc.  Do NOT count diet or artificially sweetened beverages)?   0    How many days per week do you miss taking your medication? 0    Reviewed and updated as needed this visit by Provider         Review of Systems   ROS COMP: Constitutional, HEENT, cardiovascular, pulmonary, gi and gu systems are negative, except as otherwise noted.      Objective    /70 (BP Location: Right arm,  "Patient Position: Chair, Cuff Size: Adult Large)   Pulse 96   Temp 97.9  F (36.6  C) (Oral)   Resp 16   Ht 1.702 m (5' 7\")   Wt 122.5 kg (270 lb)   SpO2 96%   BMI 42.29 kg/m    Body mass index is 42.29 kg/m .  Physical Exam   GENERAL APPEARANCE: healthy, alert and no distress  HENT: ear canals and TM's normal and nose and mouth without ulcers or lesions  NECK: no adenopathy, no asymmetry, masses, or scars and thyroid normal to palpation  RESP: lungs clear to auscultation - no rales, rhonchi or wheezes  CV: regular rates and rhythm, normal S1 S2, no S3 or S4 and no murmur, click or rub  ABDOMEN: soft, nontender, without hepatosplenomegaly or masses and bowel sounds normal    Diagnostic Test Results:  Labs reviewed in Epic        Assessment & Plan     1. Major depressive disorder, recurrent episode, moderate (H)  Uncontrolled - on max dose cymbalta.  Current worse state likely due to current financial situation - met with PAL to go over resources etc.  Appreciate help.  Patient hesitant to do any more labs for fatigue (incl Vit D) - this was previously low (24).  She has not been replaced for this.  This could be making both mental health and body pains worse.  Also - missing medications almost half the week making symptoms hard to manage as well.  Will not add any more prescription medications until she can take cymbalta on a regular basis. Patient to follow up with therapy at free clinic.  - DULoxetine (CYMBALTA) 60 MG capsule; Take 1 capsule (60 mg) by mouth daily  Dispense: 90 capsule; Refill: 1    2. MARÍA (generalized anxiety disorder)  See #!  - DULoxetine (CYMBALTA) 60 MG capsule; Take 1 capsule (60 mg) by mouth daily  Dispense: 90 capsule; Refill: 1    3. Chronic midline low back pain without sciatica  Discussed starting walking plan - see PI. Offered topical heat, lidocaine suggestions as well.     4. Other fatigue  As #1 - patient does not want to do more labs - last vit D low/normal, but patient would " benefit from daily vitamin D 2000 international unit(s).  Also reviewed CBC - hgb normal, but microcytic, concerned for starting TRINITY - suggest starting daily iron as well.      Filled out disability paperwork (>45 d, temporary) for mental health and ongoing pain.  Cannot work for the short term.     Patient Instructions   Start taking vitamin D daily - get the 2000 international unit(s) over the counter.    Also may want to consider after this starting over the counter iron supplement - taking once per day.    Please start walking daily - start with 5 min. Outside if possible.    Follow-up in 3 months.      Return in about 3 months (around 6/9/2020) for Follow up Office Visit.    Rozina Ricketts MD  [facility]

## 2020-03-09 NOTE — PROGRESS NOTES
"CHG met with patient once they were done with their PCP appointment. CHG huddled with PCP before seeing patient to inform them they had suggested a Vitamin D Supplement and that the patient told them they were missing their medications 3 times a week. CHG informed CC that the PCP was done with the appointment and what room the patient was in, CHG met CC in the room with the patient. See CC notes for services they provided patient.    CHG discussed missed medications doses with patient, and patient has set alarm on their phone to remind them to take their medications.     Patient said that they will \"probably stop of at the pharmacy on my home to get that\" when talking about Vitamin D Supplement. CHG also scheduled 3 month follow up as stated in AVS.    CHG has provided patient with information about Food Shelves in the area.    Patient and CHG disscused phone call follow up in 2 weeks to address remembering medications, progress of [facility] paperwork Vitamin D and if she has patsy back to the free therapy clinic.    Randy Zhao at 9:58 AM on 3/9/2020  [facility] Clinic Health Guide  Phone [phone]    "

## 2020-03-09 NOTE — PATIENT INSTRUCTIONS
Start taking vitamin D daily - get the 2000 international unit(s) over the counter.    Also may want to consider after this starting over the counter iron supplement - taking once per day.    Please start walking daily - start with 5 min. Outside if possible.    Follow-up in 3 months.

## 2020-03-09 NOTE — LETTER
[place] CARE COORDINATION  [address] [place] [address]    March 9, 2020    Rama Gutierrez  [address]  [place] [address]      Dear Rama,    I am a clinic care coordinator who works with Rozina Ricketts MD. I wanted to thank you for spending the time to talk with me.  Below is a description of clinic care coordination and how I can further assist you.      The clinic care coordinator team is made up of a registered nurse,  and community health worker who understand the health care system. The goal of clinic care coordination is to help you manage your health and improve access to the health care system in the most efficient manner. The team can assist you in meeting your health care goals by providing education, coordinating services, strengthening the communication among your providers  and supporting you with any resource needs.    Please feel free to contact the Community Health Worker, at [phone] with any questions or concerns. We are focused on providing you with the highest-quality healthcare experience possible and that all starts with you.     Sincerely,     Terrence Paulino [facility]  Clinic Care Coordinator  [facility]  [facility]  [phone]  [email or web address]      Enclosed: I have enclosed a copy of the Complex Care Plan. This has helpful information and goals that we have talked about. Please keep this in an easy to access place to use as needed.

## 2020-03-09 NOTE — LETTER
Critical access hospital  Complex Care Plan  About Me:    Patient Name:  Rama Gutierrez    YOB: 1990  Age:         29 year old   [place] MRN:    [id number] Telephone Information:  Home Phone [phone]   Mobile [phone]       Address:  [address] [place] [address] [address] [place] [address] Email address:  [email or web address]      Emergency Contact(s)    Name Relationship Lgl Grd Work Phone Home Phone Mobile Phone   1. EDDIE ALICIA* Friend  NONE NONE [phone]   2. DECLLINED PER * Declined               Primary language:  English     needed? No   [facility]:  [phone] op. 1  Other communication barriers: None  Preferred Method of Communication:  Mail  Current living arrangement: I live alone  Mobility Status/ Medical Equipment: Independent    Health Maintenance  Health Maintenance Reviewed: Up to date    My Access Plan  Medical Emergency 911   Primary Clinic Line [facility] - [phone]   24 Hour Appointment Line [phone] or  [phone]) (toll-free)   24 Hour Nurse Line [phone] (toll-free)   Preferred Urgent Care [facility] [place], [phone]   Preferred Hospital [facility]  [phone]   Preferred Pharmacy [facility] - [address]      Behavioral Health Crisis Line The National Suicide Prevention Lifeline at 1-523.843.3278 or 911             My Care Team Members  Patient Care Team       Relationship Specialty Notifications Start End    Rozina Ricketts MD PCP - General Internal Medicine  3/23/17     Phone: [phone] Fax: [phone]         [address] [place] [address] [address] [place] [address]    Rozina Ricketts MD Assigned PCP   3/9/17     Phone: [phone] Fax: [phone]         [address] [place] [address] [address] [place] [address]    Sarah Martinez NP Nurse Practitioner Nurse Practitioner Psych/Mental Health  5/2/19     Phone: [phone] Fax:  [phone]         [facility] [address] [facility] [address]    Randy Zhao Personal Advocate & Liaison (PAL)  Admissions 11/27/19     Terrence Paulino LSW Lead Care Coordinator Primary Care - CC  3/9/20     Phone: [phone]         Francisco Hayden Community Health Worker   3/9/20             My Care Plans  Self Management and Treatment Plan  Goals and (Comments)  Goals        General    Financial Wellbeing (pt-stated)     Notes - Note created  3/9/2020  9:31 AM by Terrence Paulino LSW    Goal Statement: I would like to apply and qualify for financial and County supports to allow me to afford my basic needs.   Date Goal set: 3/9/2020  Barriers: Recent loss of job.   Strengths: Independent.   Date to Achieve By: 6/1/2020  Patient expressed understanding of goal: yes    Action steps to achieve this goal:  1. I will continue to work with the [facility] to apply for [place], Cash assistance, and SNAP.    2. I will work with my Community Health worker and Financial Resource worker to assure I am applying for all appropriate programs in their entirety.    3. I will work with [facility] for homelessness prevention.                     My Medical and Care Information  Problem List   Patient Active Problem List   Diagnosis     MARÍA (generalized anxiety disorder)     Chronic midline low back pain without sciatica     Chronic bilateral low back pain with left-sided sciatica     Major depressive disorder, recurrent episode, moderate (H)     Morbid obesity (H)     Social anxiety disorder      Current Medications and Allergies:    Current Outpatient Medications   Medication     ALPRAZolam (XANAX) 1 MG tablet     cyclobenzaprine (FLEXERIL) 10 MG tablet     DULoxetine (CYMBALTA) 60 MG capsule     etonogestrel (IMPLANON/NEXPLANON) 68 MG IMPL     No current facility-administered medications for this visit.          Care Coordination Start Date: 3/9/2020   Frequency of Care Coordination: monthly   Form Last Updated: 03/09/2020

## 2020-03-09 NOTE — PROGRESS NOTES
CHW delegation from CHARMAINE Nash on 03/09/20:  CHW will: Refer to [facility] for assistance with [facility] program applications.   CHW Delegation:   1)  Due Date:  ASAP       Delegation: Refer to FRW    03/09/20:  FRW referral has been made.  Delegation completed     2)  Due Date: 2 weeks       Delegation: Assume Follow ups to check on Housing and mental health supports- Has she returned to free counseling clinic? Has she started working with the [facility] or [facility] for Homelessness Prevention?    ______________________  Next Outreach: 03/24/20  Planned Outreach Frequency: Monthly  Preferred Phone Number: [phone]    Enrollment Date: 03/09/20  Last Care Plan Assessment: 03/09/20  ______________________  VANESSA Damon                                                        Clinic Care Coordination                                                  [facility] Clin[facility] : [place], and [place]                             Phone: [phone]

## 2020-03-10 ASSESSMENT — ANXIETY QUESTIONNAIRES: GAD7 TOTAL SCORE: 12

## 2020-03-23 ENCOUNTER — TELEPHONE (OUTPATIENT)
Dept: PEDIATRICS | Facility: CLINIC | Age: 30
End: 2020-03-23

## 2020-03-23 NOTE — TELEPHONE ENCOUNTER
Please call patient:    Schedule for telephone visit (atleast 20 min) Please do NOT do Thurs AM as I am precepting.    I got a lot of paperwork faxed to me that I need to review with her.    Thanks,    Rozina Ricketts MD  Internal Medicine/Pediatrics  [facility]

## 2020-03-24 ENCOUNTER — PATIENT OUTREACH (OUTPATIENT)
Dept: CARE COORDINATION | Facility: CLINIC | Age: 30
End: 2020-03-24

## 2020-03-24 NOTE — PROGRESS NOTES
Clinic Care Coordination Contact  [facility]/Voicemail       Clinical Data: Care Coordinator Outreach  Outreach attempted x 1.  Left message on patient's voicemail with call back information and requested return call.    Plan: Care Coordinator will try to reach patient again in 10 business days.    VANESSA Damon  Clinic Care Coordination  [facility] Clinics : [place], [place], [place], and [place]  Phone: [phone]    _____________________  CHW delegation from CHARMAINE Nash on 03/09/20:  CHW will: Refer to [facility] for assistance with [facility] program applications.   CHW Delegation:      2)  Due Date: 2 weeks       Delegation: Assume Follow ups to check on Housing and mental health supports- Has she returned to free counseling clinic? Has she started working with the [facility] or [facility] for Homelessness Prevention?    03/24/20:  LMTCB #1  ______________________  Next Outreach: 03/24/20  Planned Outreach Frequency: Monthly  Preferred Phone Number: [phone]    Enrollment Date: 03/09/20  Last Care Plan Assessment: 03/09/20  ______________________

## 2020-03-25 ENCOUNTER — VIRTUAL VISIT (OUTPATIENT)
Dept: PEDIATRICS | Facility: CLINIC | Age: 30
End: 2020-03-25
Payer: MEDICAID

## 2020-03-25 ENCOUNTER — TELEPHONE (OUTPATIENT)
Dept: PEDIATRICS | Facility: CLINIC | Age: 30
End: 2020-03-25

## 2020-03-25 DIAGNOSIS — F40.10 SOCIAL ANXIETY DISORDER: ICD-10-CM

## 2020-03-25 DIAGNOSIS — G89.29 CHRONIC BILATERAL LOW BACK PAIN WITH LEFT-SIDED SCIATICA: Primary | ICD-10-CM

## 2020-03-25 DIAGNOSIS — F41.1 GAD (GENERALIZED ANXIETY DISORDER): ICD-10-CM

## 2020-03-25 DIAGNOSIS — F33.1 MAJOR DEPRESSIVE DISORDER, RECURRENT EPISODE, MODERATE (H): ICD-10-CM

## 2020-03-25 DIAGNOSIS — M54.42 CHRONIC BILATERAL LOW BACK PAIN WITH LEFT-SIDED SCIATICA: Primary | ICD-10-CM

## 2020-03-25 PROCEDURE — 99213 OFFICE O/P EST LOW 20 MIN: CPT | Mod: TEL | Performed by: PEDIATRICS

## 2020-03-25 NOTE — PROGRESS NOTES
"Rama Gutierrez is a 29 year old female who is being evaluated via a telephone visit.      The patient has been notified of following (by M.A) Terra Beltran MA       \"We have found that certain health care needs can be provided without the need for a physical exam.  This service lets us provide the care you need with a short phone conversation.  If a prescription is necessary we can send it directly to your pharmacy.  If lab work is needed we can place an order for that and you can then stop by our lab to have the test done at a later time.    This telephone visit will be conducted via 3 way call with the you (the patient) , the physician/provider, and a me all on the line at the same time.  This allows your physician/provider to have the phone conversation with you while I will be taking notes for your medical record.  We will have full access to your [facility] medical record during this entire phone call.    Since this is like an office visit,  will bill your insurance company for this service.  Please check with your medical insurance if this type of telephone/virtual is covered . You may be responsible for the cost of this service if insurance coverage is denied.  The typical cost is $30 (10min), $59(11-20min) and $85 (21-30min)     If during the course of the call the physician/provider feels a telephone visit is not appropriate, you will not be charged for this service\"    Consent has been obtained for this service by care team member: yes.  See the scanned image in the medical record.    S: The history as provided by the patient to the provider during this 3 way call include:    Phone call to go over paperwork for social security disability.  Reviewed with patient.      Pertinent parts of the the patient's medical history reviewed and confirmed by the provider included : as above     Total time of call between patient and provider was 15 minutes     Rozina Ricketts MD  Internal Medicine/Pediatrics  [facility]     (MD signature)  ===================================================    I have reviewed the note as documented above.  This accurately captures the substance of my conversation with the patient,    Additional provider notes:    Most limitations are related to social interactions and physical limitations due to low back pain with radiculopathy.    Assessment/Plan:     Chronic bilateral low back pain with left-sided sciatica  MARÍA (generalized anxiety disorder)  Major depressive disorder, recurrent episode, moderate (H)  Social anxiety disorder    Paperwork faxed and abstracted.    Rozina Ricketts MD  Internal Medicine/Pediatrics  [facility]

## 2020-04-06 ENCOUNTER — TELEPHONE (OUTPATIENT)
Dept: PEDIATRICS | Facility: CLINIC | Age: 30
End: 2020-04-06

## 2020-04-06 NOTE — TELEPHONE ENCOUNTER
"CHG outreach to patient. Patient says that they have finished the process for applying for unemployment and are just waiting on the money. She says that they are \"okay\" on food. She says that she would like to go to therapy but doesn't want to to go out. CHG informed her that the free walk in clinics they gave them info for are now doing phone and video clinics for free. Patient says that the will call and look into scheduling an appointment with them. CHG will follow up next week.    Randy Zhao at 2:16 PM on 4/6/2020  [facility] Health Guide  Phone [phone]    "

## 2020-04-08 NOTE — PROGRESS NOTES
"Clinic Care Coordination Contact    Follow Up Progress Note   Spoke to Rama    Discussed the Following:  Apply for financial aid and county supports    Rama states that she's now receiving GA, CASH and MA.    She received her MA cards yesterday and chose [employer] as her plan, sent in as of yesterday.    Patient felt confident in completing this goal.    However, as for \"homelessness prevention\" patient has called around and can't really do anything at this time unless if she has an eviction notice.    However, due to pandemic, evictions are not possible.    Patient states that she's not able to pay rent this month but has been working on getting her unemployment income.    Once she receives her unemployment income she should be able to pay her rent.    CHW will hold off on completing goal until further updates.    Establishing care with a Therapist    Rama states that this goal was kind of at a stand still with pandemic.    Patient just learned recently that some clinics are still allowing phone/video appointments.    CHW encouraged and assured patient that therapy services are still available and are mainly being done over phone and video conference.    Rama states that she's planning on looking more into setting up an appointment this week.    CHW encouraged patient to reach out to CC if she's in need of any assistance or support.    Patient acknowledged understanding.    Goals addressed this encounter:   Goals Addressed                 This Visit's Progress      1. Financial Wellbeing (pt-stated)        Goal Statement: I would like to apply and qualify for financial and County supports to allow me to afford my basic needs.   Date Goal set: 3/9/2020  Barriers: Recent loss of job.   Strengths: Independent.   Date to Achieve By: 6/1/2020  Patient expressed understanding of goal: yes    Action steps to achieve this goal:  1. I will continue to work with the [facility] to apply for GA, Cash assistance, and SNAP.    2. I " will work with my Community Health worker and Financial Resource worker to assure I am applying for all appropriate programs in their entirety.    3. I will work with the [facility] for homelessness prevention.     As of today's date 4/8/2020 goal is met at 51 - 75%.   Goal Status:  Showing progress          2. Mental Health Management (pt-stated)        Goal Statement: I would like to establish with a therapist  Date Goal set: 3/9/2020  Date to Achieve By: 4/1/2020  Patient expressed understanding of goal: yes  Action steps to achieve this goal:  1. I will review mental health resources provided by my care coordinator and consider establishing with supports.  2. I will outreach to my care coordinator for questions or concerns.    3. I will call to schedule an appointment to establish with a therapist.      As of today's date 4/8/2020 goal is met at 0 - 25%.   Goal Status:  Ongoing                   Intervention/Education provided during outreach:   Patient had no other needs or assistance at this time.  CHW introduced self and role with CC.  Encouraged patient to reach out if she was in need of any other resources or support.  Patient acknowledged understanding.     Plan:   CHW will follow up in 1 month    VANESSA Damon  Clinic Care Coordination  [facility] Clinics : [place], [place], [place], and [place]  Phone: [phone]    _____________________  CHW delegation from CHARMAINE Nash on 03/09/20:  CHW will: Refer to [facility] for assistance with [facility] program applications.   CHW Delegation:      2)  Due Date: 2 weeks       Delegation: Assume Follow ups to check on Housing and mental health supports- Has she returned to free counseling clinic? Has she started working with the [facility] or [facility] for Homelessness Prevention?    03/24/20:  LMTCB #1    04/08/20:  See notes above  Delegation completed  ______________________  Next Outreach:  05/08/20  Planned Outreach Frequency: Monthly  Preferred  Phone Number: [phone]     Enrollment Date: 03/09/20  Last Care Plan Assessment: 03/09/20

## 2020-04-17 ENCOUNTER — TELEPHONE (OUTPATIENT)
Dept: PEDIATRICS | Facility: CLINIC | Age: 30
End: 2020-04-17

## 2020-04-17 NOTE — TELEPHONE ENCOUNTER
"Chg outreach to patient to follow up on calling free therapy clinic to see if they are able to do either phone or video appointment.    -patient has not called yet, said it might help to have someone continue to encourage her to call and try to schedule appointment. CHG will check in next week to see if she has done so.    -has not gotten unemployment benefits yet says there are issues that she is unable to get explanations for.    -got stimulus check and used it to pay rent along with paying off a few bills.    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    Randy Zhao at 2:16 PM on 4/17/2020  [facility] Health Guide  Phone [phone]    "

## 2020-04-22 NOTE — TELEPHONE ENCOUNTER
CHG outreach to patient to follow up on if they have called the free therapy clinic.   Patient did not answer, CHG left detailed messaged asking if they called therapy clinic and asked them to call back on CHG direct extension.    Randy Zhao at 1:49 PM on 4/22/2020  [facility] Health Guide  Phone [phone]

## 2020-04-29 NOTE — TELEPHONE ENCOUNTER
CHG followup outreach with no answer, LVM asking them to call back on CHG direct extension.    Randy Zhao at 2:39 PM on 4/29/2020  [facility] Health Guide  Phone [phone]

## 2020-04-29 NOTE — TELEPHONE ENCOUNTER
Patient returned call, says she was in a Zoom appointment with a therapist when CHG called. Is not able to get unemployment and unsure how they will pay next rent. Is still on SNAP and able to get get food.     During therapy appointment their therapist suggested scheduling an appointment with PCP to discus depression medications.    CHG will work with PCP MA to find appointment with PCP.     Randy Zhao at 3:58 PM on 4/29/2020  [facility] Health Guide  Phone [phone]

## 2020-05-06 ENCOUNTER — PATIENT OUTREACH (OUTPATIENT)
Dept: CARE COORDINATION | Facility: CLINIC | Age: 30
End: 2020-05-06

## 2020-05-06 ASSESSMENT — ACTIVITIES OF DAILY LIVING (ADL): DEPENDENT_IADLS:: INDEPENDENT

## 2020-05-06 NOTE — PROGRESS NOTES
Situation: Patient chart reviewed by care coordinator.     Background: Care Coordination following patient to assist with Goal as below.     Assessment: Per chart review, patient has been actively working with CC CHW to accomplish Goal.  Patient's goal remains appropriate and relevant at this time.    Goals       1. Financial Wellbeing (pt-stated)      Goal Statement: I would like to apply and qualify for financial and County supports to allow me to afford my basic needs.   Date Goal set: 3/9/2020  Barriers: Recent loss of job.   Strengths: Independent.   Date to Achieve By: 6/1/2020  Patient expressed understanding of goal: yes    Action steps to achieve this goal:  1. I will continue to work with the [facility] to apply for GA, Cash assistance, and SNAP.    2. I will work with my Community Health worker and Financial Resource worker to assure I am applying for all appropriate programs in their entirety.    3. I will work with [facility] for homelessness prevention.     As of today's date 4/8/2020 goal is met at 51 - 75%.   Goal Status:  Showing progress          2. Mental Health Management (pt-stated)      Goal Statement: I would like to establish with a therapist  Date Goal set: 3/9/2020  Date to Achieve By: 4/1/2020  Patient expressed understanding of goal: yes  Action steps to achieve this goal:  1. I will review mental health resources provided by my care coordinator and consider establishing with supports.  2. I will outreach to my care coordinator for questions or concerns.    3. I will call to schedule an appointment to establish with a therapist.      As of today's date 4/8/2020 goal is met at 0 - 25%.   Goal Status:  Ongoing                  Plan/Recommendations: The patient will continue working with Care Coordination to achieve Goal as above.  CC  will review patient chart again in approximately 6 weeks to assess patient status and goal progress with outreach to patient as indicated.    Terrence Paulino,  GHISLAINE  Clinic Care Coordinator  [facility]  [phone]  [email or web address]

## 2020-05-07 NOTE — TELEPHONE ENCOUNTER
CHG able to find availability for video visit with PCP on 02/28/2020 and scheduled. Patient okay with this time and date. Closing encounter.    Randy Zhao at 10:26 AM on 5/7/2020  [facility] Health Guide  Phone [phone]

## 2020-05-14 ENCOUNTER — PATIENT OUTREACH (OUTPATIENT)
Dept: CARE COORDINATION | Facility: CLINIC | Age: 30
End: 2020-05-14

## 2020-05-14 NOTE — PROGRESS NOTES
Clinic Care Coordination Contact  [facility]/Voicemail       Clinical Data: Care Coordinator Outreach  Outreach attempted x 1.  Left message on patient's voicemail with call back information and requested return call.    Plan:  Care Coordinator will try to reach patient again in 10 business days.    VANESSA Damon  Clinic Care Coordination  [facility] Clinics : [facility] [place], [place], and [place]  Phone: [phone]    ______________________  Next Outreach:  05/28/20  Planned Outreach Frequency: Monthly  Preferred Phone Number: [phone]     Enrollment Date: 03/09/20  Last Care Plan Assessment: 03/09/20

## 2020-05-27 ENCOUNTER — TELEPHONE (OUTPATIENT)
Dept: PEDIATRICS | Facility: CLINIC | Age: 30
End: 2020-05-27

## 2020-05-27 ASSESSMENT — ANXIETY QUESTIONNAIRES
IF YOU CHECKED OFF ANY PROBLEMS ON THIS QUESTIONNAIRE, HOW DIFFICULT HAVE THESE PROBLEMS MADE IT FOR YOU TO DO YOUR WORK, TAKE CARE OF THINGS AT HOME, OR GET ALONG WITH OTHER PEOPLE: SOMEWHAT DIFFICULT
1. FEELING NERVOUS, ANXIOUS, OR ON EDGE: NEARLY EVERY DAY
6. BECOMING EASILY ANNOYED OR IRRITABLE: SEVERAL DAYS
GAD7 TOTAL SCORE: 12
5. BEING SO RESTLESS THAT IT IS HARD TO SIT STILL: MORE THAN HALF THE DAYS
2. NOT BEING ABLE TO STOP OR CONTROL WORRYING: MORE THAN HALF THE DAYS
3. WORRYING TOO MUCH ABOUT DIFFERENT THINGS: SEVERAL DAYS
7. FEELING AFRAID AS IF SOMETHING AWFUL MIGHT HAPPEN: MORE THAN HALF THE DAYS

## 2020-05-27 ASSESSMENT — PATIENT HEALTH QUESTIONNAIRE - PHQ9
SUM OF ALL RESPONSES TO PHQ QUESTIONS 1-9: 14
5. POOR APPETITE OR OVEREATING: SEVERAL DAYS

## 2020-05-27 NOTE — TELEPHONE ENCOUNTER
CHG outreach to patient for PVP/video visit check in.    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    Has SSI hearing on June 6th 2020    Still going to [facility] for therapy, has therapy every other week. Is going to be assessed for ADHD through [facility].    Still on SNAP, says that they are good on food for the moment.    Randy Zhao at 1:29 PM on 5/27/2020  [facility] Health Guide  Phone [phone]

## 2020-05-27 NOTE — TELEPHONE ENCOUNTER
"Pre-Visit Planning     Future Appointments   Date Time Provider Department [place]   5/28/2020  9:10 AM Rozina Ricketts MD [facility]   6/11/2020  2:55 PM Rozina Ricketts MD [facility]     Arrival Time for this Appointment:    Appointment Notes for this encounter:   Data Unavailable    Questionnaires Reviewed/Assigned  PHQ-9 and MARÍA-7 compleated during PVP/check in call        Patient preferred phone number: [phone]      Spoke to patient via phone. Patient does not have additional questions or concerns.        Visit is not preventive.    Patient is established.    Patient reminded of date and time. Barriers addressed: none needed for this appointment.  Chief complaint confirmed. yes  Clarified visit purpose: yes  Checked for changes of symptoms or new symptoms: yes, feels like she is having hot flashes on one side of face. Sx is inconsistant  Any additional needs (transportation, financial, mobility)? No trasport needs, financial; has SSI hearing on June 6th 2020.    Health Maintenance Due   Topic Date Due     PREVENTIVE CARE VISIT  05/10/2020     Patient is due for:  none right now  No appointment needed.    [employer]  Patient is active on [employer].    Questionnaire Review   questionnaires compleated during check in    Call Summary  \"Thank you for your time today.  If anything comes up before your appointment, please feel free to contact us at [phone].\"    Randy Zhao at 1:19 PM on 5/27/2020  [facility] Health Guide  Phone [phone]      "

## 2020-05-27 NOTE — PROGRESS NOTES
"Rama Gutierrez is a 29 year old female who is being evaluated via a billable video visit.      The patient has been notified of following:     \"This video visit will be conducted via a call between you and your physician/provider. We have found that certain health care needs can be provided without the need for an in-person physical exam.  This service lets us provide the care you need with a video conversation.  If a prescription is necessary we can send it directly to your pharmacy.  If lab work is needed we can place an order for that and you can then stop by our lab to have the test done at a later time.    Video visits are billed at different rates depending on your insurance coverage.  Please reach out to your insurance provider with any questions.    If during the course of the call the physician/provider feels a video visit is not appropriate, you will not be charged for this service.\"    Patient has given verbal consent for Video visit? Yes    How would you like to obtain your AVS? Tone    Patient would like the video invitation sent by: Send to e-mail at: [email or web address]    Will anyone else be joining your video visit? No      Subjective     Rama Gutierrez is a 29 year old female who presents today via video visit for the following health issues:    HPI  Medication Followup of cymbalta    Taking Medication as prescribed: yes    Side Effects:  Feeling really hot like hot flash on one side of face.    Medication Helping Symptoms:  Yes, not as much as you like.       Overall feels the \"same as last time\".  Patient wants to discuss medication increases. Depression work than anxiety.     Doing therapy at [facility] - doing once weekly - has had 3 sessions, initially weekly, then every other week ongoing.  Patient getting ADHD assessment in place as well.     Current stresses:    \"precarious\" per patient - early April patient gave up unemployment - she didn't qualify.  Was able to pay rent with stimulus.  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  Social Security hearing is 6/4/20.     Physical pain has been \"ok\" - trying to walk more often.  When lays down at night - back pain radiating down from back. Tylenol helping with pain, occ uses flexeril.    Panic attacks - last attack in April.    Patient started taking vitamin D as well, 2000 international unit(s) per day     Video Start Time: 905am    Reviewed and updated as needed this visit by Provider  Meds         Review of Systems   Constitutional, HEENT, cardiovascular, pulmonary, gi and gu systems are negative, except as otherwise noted.      Objective    There were no vitals taken for this visit.  Estimated body mass index is 42.29 kg/m  as calculated from the following:    Height as of 3/9/20: 1.702 m (5' 7\").    Weight as of 3/9/20: 122.5 kg (270 lb).  Physical Exam     GENERAL: Healthy, alert and no distress  EYES: Eyes grossly normal to inspection.  No discharge or erythema, or obvious scleral/conjunctival abnormalities.  RESP: No audible wheeze, cough, or visible cyanosis.  No visible retractions or increased work of breathing.    SKIN: Visible skin clear. No significant rash, abnormal pigmentation or lesions.  NEURO: Cranial nerves grossly intact.  Mentation and speech appropriate for age.  PSYCH: Mentation appears normal, affect normal/bright, judgement and insight intact, normal speech and appearance well-groomed.      Diagnostic Test Results:  none         Assessment & Plan     1. Chronic bilateral low back pain with left-sided sciatica  Stable, managable with tylenol and flexeril    2. MARÍA (generalized anxiety disorder)  Uncontrolled - increase to 90mg and follow up in 1 month.  Continue therapy at [facility] every other week.  Await ADHD eval.  - DULoxetine (CYMBALTA) 30 MG capsule; Take 1 capsule (30 mg) by mouth daily Take with 60mg for total dose of 90mg daily  Dispense: 90 capsule; Refill: 0    3. Major depressive disorder, recurrent episode, moderate (H)  See " #2  - DULoxetine (CYMBALTA) 30 MG capsule; Take 1 capsule (30 mg) by mouth daily Take with 60mg for total dose of 90mg daily  Dispense: 90 capsule; Refill: 0    4. Social anxiety disorder        Patient Instructions   Alvarez Ontiveros    Nice to see you today.    Please start the increased dose of cymbalta 90mg and then I will have someone reach out to you to schedule a video visit in 1 month.    Take care!    Rozina Ricketts MD  Internal Medicine/Pediatrics  [facility]          Return in about 1 month (around 6/28/2020) for Virtual visit for depression.    Rozina Ricketts MD  [facility]      Video-Visit Details    Type of service:  Video Visit    Video End Time:917am    Originating Location (pt. Location): Home    Distant Location (provider location):  [facility]     Platform used for Video Visit: [facility] [place]    Return in about 1 month (around 6/28/2020) for Virtual visit for depression.       Rozina Ricketts MD

## 2020-05-28 ENCOUNTER — ANCILLARY PROCEDURE (OUTPATIENT)
Dept: GENERAL RADIOLOGY | Facility: CLINIC | Age: 30
End: 2020-05-28
Attending: FAMILY MEDICINE
Payer: COMMERCIAL

## 2020-05-28 ENCOUNTER — OFFICE VISIT (OUTPATIENT)
Dept: URGENT CARE | Facility: URGENT CARE | Age: 30
End: 2020-05-28
Payer: COMMERCIAL

## 2020-05-28 ENCOUNTER — VIRTUAL VISIT (OUTPATIENT)
Dept: PEDIATRICS | Facility: CLINIC | Age: 30
End: 2020-05-28
Payer: COMMERCIAL

## 2020-05-28 VITALS
BODY MASS INDEX: 41.97 KG/M2 | OXYGEN SATURATION: 98 % | TEMPERATURE: 99.1 F | WEIGHT: 268 LBS | DIASTOLIC BLOOD PRESSURE: 80 MMHG | SYSTOLIC BLOOD PRESSURE: 124 MMHG | HEART RATE: 90 BPM

## 2020-05-28 DIAGNOSIS — M79.601 PAIN OF RIGHT UPPER EXTREMITY: ICD-10-CM

## 2020-05-28 DIAGNOSIS — R20.2 PARESTHESIA: Primary | ICD-10-CM

## 2020-05-28 DIAGNOSIS — M54.42 CHRONIC BILATERAL LOW BACK PAIN WITH LEFT-SIDED SCIATICA: Primary | ICD-10-CM

## 2020-05-28 DIAGNOSIS — F40.10 SOCIAL ANXIETY DISORDER: ICD-10-CM

## 2020-05-28 DIAGNOSIS — F33.1 MAJOR DEPRESSIVE DISORDER, RECURRENT EPISODE, MODERATE (H): ICD-10-CM

## 2020-05-28 DIAGNOSIS — G89.29 CHRONIC BILATERAL LOW BACK PAIN WITH LEFT-SIDED SCIATICA: Primary | ICD-10-CM

## 2020-05-28 DIAGNOSIS — F41.1 GAD (GENERALIZED ANXIETY DISORDER): ICD-10-CM

## 2020-05-28 LAB
ALBUMIN SERPL-MCNC: 3.7 G/DL (ref 3.4–5)
ALP SERPL-CCNC: 130 U/L (ref 40–150)
ALT SERPL W P-5'-P-CCNC: 17 U/L (ref 0–50)
ANION GAP SERPL CALCULATED.3IONS-SCNC: 7 MMOL/L (ref 3–14)
AST SERPL W P-5'-P-CCNC: 22 U/L (ref 0–45)
BASOPHILS # BLD AUTO: 0 10E9/L (ref 0–0.2)
BASOPHILS NFR BLD AUTO: 0.2 %
BILIRUB SERPL-MCNC: 0.4 MG/DL (ref 0.2–1.3)
BUN SERPL-MCNC: 9 MG/DL (ref 7–30)
CALCIUM SERPL-MCNC: 9.6 MG/DL (ref 8.5–10.1)
CHLORIDE SERPL-SCNC: 107 MMOL/L (ref 94–109)
CO2 SERPL-SCNC: 29 MMOL/L (ref 20–32)
CREAT SERPL-MCNC: 1 MG/DL (ref 0.52–1.04)
DIFFERENTIAL METHOD BLD: ABNORMAL
EOSINOPHIL # BLD AUTO: 0.2 10E9/L (ref 0–0.7)
EOSINOPHIL NFR BLD AUTO: 1.5 %
ERYTHROCYTE [DISTWIDTH] IN BLOOD BY AUTOMATED COUNT: 15.4 % (ref 10–15)
GFR SERPL CREATININE-BSD FRML MDRD: 74 ML/MIN/{1.73_M2}
GLUCOSE SERPL-MCNC: 133 MG/DL (ref 70–99)
HCT VFR BLD AUTO: 41.3 % (ref 35–47)
HGB BLD-MCNC: 12.5 G/DL (ref 11.7–15.7)
LYMPHOCYTES # BLD AUTO: 2.9 10E9/L (ref 0.8–5.3)
LYMPHOCYTES NFR BLD AUTO: 28.1 %
MAGNESIUM SERPL-MCNC: 2.3 MG/DL (ref 1.6–2.3)
MCH RBC QN AUTO: 23.2 PG (ref 26.5–33)
MCHC RBC AUTO-ENTMCNC: 30.3 G/DL (ref 31.5–36.5)
MCV RBC AUTO: 77 FL (ref 78–100)
MONOCYTES # BLD AUTO: 0.6 10E9/L (ref 0–1.3)
MONOCYTES NFR BLD AUTO: 5.7 %
NEUTROPHILS # BLD AUTO: 6.7 10E9/L (ref 1.6–8.3)
NEUTROPHILS NFR BLD AUTO: 64.5 %
PLATELET # BLD AUTO: 326 10E9/L (ref 150–450)
POTASSIUM SERPL-SCNC: 4.7 MMOL/L (ref 3.4–5.3)
PROT SERPL-MCNC: 7.4 G/DL (ref 6.8–8.8)
RBC # BLD AUTO: 5.38 10E12/L (ref 3.8–5.2)
SODIUM SERPL-SCNC: 143 MMOL/L (ref 133–144)
WBC # BLD AUTO: 10.3 10E9/L (ref 4–11)

## 2020-05-28 PROCEDURE — 83735 ASSAY OF MAGNESIUM: CPT | Performed by: FAMILY MEDICINE

## 2020-05-28 PROCEDURE — 36415 COLL VENOUS BLD VENIPUNCTURE: CPT | Performed by: FAMILY MEDICINE

## 2020-05-28 PROCEDURE — 99214 OFFICE O/P EST MOD 30 MIN: CPT | Mod: 95 | Performed by: PEDIATRICS

## 2020-05-28 PROCEDURE — 99214 OFFICE O/P EST MOD 30 MIN: CPT | Performed by: FAMILY MEDICINE

## 2020-05-28 PROCEDURE — 72040 X-RAY EXAM NECK SPINE 2-3 VW: CPT

## 2020-05-28 PROCEDURE — 85025 COMPLETE CBC W/AUTO DIFF WBC: CPT | Performed by: FAMILY MEDICINE

## 2020-05-28 PROCEDURE — 80053 COMPREHEN METABOLIC PANEL: CPT | Performed by: FAMILY MEDICINE

## 2020-05-28 RX ORDER — FAMOTIDINE 20 MG
2000 TABLET ORAL DAILY
COMMUNITY

## 2020-05-28 RX ORDER — DULOXETIN HYDROCHLORIDE 30 MG/1
30 CAPSULE, DELAYED RELEASE ORAL DAILY
Qty: 90 CAPSULE | Refills: 0 | Status: SHIPPED | OUTPATIENT
Start: 2020-05-28 | End: 2020-07-29

## 2020-05-28 ASSESSMENT — ANXIETY QUESTIONNAIRES: GAD7 TOTAL SCORE: 12

## 2020-05-28 NOTE — PATIENT INSTRUCTIONS
Alvarez Ontiveros,    Nice to see you today.    Please start the increased dose of cymbalta 90mg and then I will have someone reach out to you to schedule a video visit in 1 month.    Take care!    Rozina Ricketts MD  Internal Medicine/Pediatrics  [facility]

## 2020-05-28 NOTE — PATIENT INSTRUCTIONS
"Okay to take ibuprofen 200 mg - 4 tablets (800 mg) every 8 hours as needed.  Okay to take tylenol 500 mg - 2 tablets (1000 mg) every 6-8 hours as needed, do not exceed 3000 mg in 24 hours.  Okay to take flexeril 10 mg every 8 hours as needed to help with muscle spasm  Warm compress to area of discomfort      Patient Education     Paraesthesias  Paraesthesia is a burning or prickling sensation that is sometimes felt in the hands, arms, legs or feet. It can also occur in other parts of the body. It can also feel like tingling or numbness, skin crawling, or itching. The feeling is not comfortable, but it is not painful. (The \"pins and needles\" feeling that happens when a foot or hand \"falls asleep\" is a temporary paraesthesia.)  Paraesthesias that last or come and go may be caused by medical issues that need to be treated. These include stroke, a bulging disk pressing on a nerve, a trapped nerve, vitamin deficiencies, uncontrolled diabetes, alcohol abuse, or even certain medicines.  Tests are often done. These tests may include blood tests, X-ray, CT (computerized tomography) scan, nerve conduction studies (NCS), or a muscle test (electromyography). Depending on the cause, treatment may include physical therapy.  Home care    Tell your healthcare provider about all medicines you take. This includes prescription and over-the-counter medicines, vitamins, and herbs. Ask if any of the medicines may be causing your problems. Don't make any changes to prescription medicines without talking to your healthcare provider first.    You may be prescribed medicines to help relieve the tingling feeling or for pain. Take all medicines as directed.    A numb hand or foot may be more prone to injury. To help protect it:  ? Always use oven mitts.  ? Test water with an unaffected hand or foot.  ? Use caution when trimming nails. File sharp areas.  ? Wear shoes that fit well to avoid pressure points, blisters, and ulcers.  ? Inspect your " hands and feet carefully (including the soles of your feet and between your toes) daily. If you see red areas, sores, or other problems, tell your healthcare provider.  Follow-up care  Follow up with your doctor, or as advised. You may need further testing or evaluation.     When to seek medical advice  Call your healthcare provider right away if any of the following occur:    Numbness or weakness of the face, one arm, or one leg    Slurred speech, confusion, trouble speaking, walking, or seeing    Severe headache, fainting spell, dizziness, or seizure    Chest, arm, neck, or upper back pain    Loss of bladder or bowel control    Open wound with redness, swelling, or pus     Date Last Reviewed: 4/1/2018 2000-2019 [employer]. [address] [place] [address]. All rights reserved. This information is not intended as a substitute for professional medical care. Always follow your healthcare professional's instructions.           Patient Education     Understanding Cervical Radiculopathy    Cervical radiculopathy is irritation or inflammation of a nerve root in the neck. It causes neck pain and other symptoms that may spread into the chest or down the arm. To understand this condition, it helps to understand the parts of the spine:    Vertebrae. These are bones that stack to form the spine. The cervical spine contains the 7 vertebrae in the neck.    Disks. These are soft pads of tissue between the vertebrae. They act as shock absorbers for the spine.    The spinal canal. This is a tunnel formed within the stacked vertebrae. The spinal cord runs through this canal.    Nerves. These branch off the spinal cord. As they leave the spinal canal, nerves pass through openings between the vertebrae. The nerve root is the part of the nerve that is closest to the spinal cord.   With cervical radiculopathy, nerve roots in the neck become irritated. This leads to pain and symptoms that can travel to the  nerves that go from the spinal cord down the arms and into the torso.  What causes cervical radiculopathy?  Aging, injury, poor posture, and other issues can lead to problems in the neck. These problems may then irritate nerve roots. These include:    Damage to a disk in the cervical spine. The damaged disk may then press on nearby nerve roots.    Degeneration from wear and tear, and aging. This can lead to narrowing (stenosis) of the openings between the vertebrae. The narrowed openings press on nerve roots as they leave the spinal canal.    An unstable spine. This is when a vertebra slips forward. It can then press on a nerve root.  There are other, less common causes of pressure on nerves in the neck. These include infection, cysts, and tumors.  Symptoms of cervical radiculopathy  These include:    Neck pain    Pain, numbness, tingling, or weakness that travels down the arm    Loss of neck movement    Muscle spasms  Treatment for cervical radiculopathy  In most cases, your healthcare provider will first try treatments that help relieve symptoms. These may include:    Prescription or over-the-counter pain medicines. These help relieve pain and swelling.    Cold packs. These help reduce pain.    Resting. This involves avoiding positions and activities that increase pain.    Neck brace (cervical collar). This can help relieve inflammation and pain.    Physical therapy, including exercises and stretches. This can help decrease pain and increase movement and function.    Shots of medicinesaround the nerve roots. This is done to help relieve symptoms for a time.  In some cases, your healthcare provider may advise surgery to fix the underlying problem. This depends on the cause, the symptoms, and how long the pain has lasted.  Possible complications  Over time, an irritated and inflamed nerve may become damaged. This may lead to long-lasting (permanent) numbness or weakness. If symptoms change suddenly or get worse, be  sure to let your healthcare provider know.     When to call your healthcare provider  Call your healthcare provider right away if you have any of these:    New pain or pain that gets worse    New or increasing weakness, numbness, or tingling in your arm or hand    Bowel or bladder changes   Date Last Reviewed: 3/10/2016    2528-4012 [employer]. [address] [place] [address], [place] [address]. All rights reserved. This information is not intended as a substitute for professional medical care. Always follow your healthcare professional's instructions.

## 2020-05-28 NOTE — PROGRESS NOTES
SUBJECTIVE:   Rama Gutierrez is a 29 year old female presenting with a chief complaint of generalized body aches, right arm numbness.    Woke up this morning with right arm numbness.  Denies any trauma.  Patient tried taking tylenol without improvement.  No rash in area.  Patient has underlying anxiety, chronic back pain.  Feels tightness on right side of chest but more due to discomfort from arm/shoulder area.      Patient is right handed  Has had wrist symptoms intermittently before, no official CTS diagnosis.    No possibility of pregnancy  Denies fever, cough, congestion    No past medical history on file.  Current Outpatient Medications   Medication Sig Dispense Refill     ALPRAZolam (XANAX) 1 MG tablet Take 1 tablet (1 mg) by mouth 3 times daily as needed for anxiety 10 tablet 0     cyclobenzaprine (FLEXERIL) 10 MG tablet Take 0.5-1 tablets (5-10 mg) by mouth 3 times daily as needed for muscle spasms 30 tablet 3     DULoxetine (CYMBALTA) 30 MG capsule Take 1 capsule (30 mg) by mouth daily Take with 60mg for total dose of 90mg daily 90 capsule 0     DULoxetine (CYMBALTA) 60 MG capsule Take 1 capsule (60 mg) by mouth daily 90 capsule 1     etonogestrel (IMPLANON/NEXPLANON) 68 MG IMPL 1 each by Subdermal route once       Vitamin D, Cholecalciferol, 25 MCG (1000 UT) CAPS Take 2,000 Int'l Units by mouth daily       Social History     Tobacco Use     Smoking status: Never Smoker     Smokeless tobacco: Never Used   Substance Use Topics     Alcohol use: No     Alcohol/week: 0.0 standard drinks     Comment: on a rare occ        ROS:  Review of systems negative except as stated above.    OBJECTIVE:  /80 (Cuff Size: Adult Large)   Pulse 90   Temp 99.1  F (37.3  C) (Tympanic)   Wt 121.6 kg (268 lb)   SpO2 98%   BMI 41.97 kg/m    GENERAL APPEARANCE: healthy, alert and no distress  EYES: EOMI,  PERRL, conjunctiva clear  NECK: supple, nontender on cervical spine.  Mild discomfort on right trapezius muscle  RESP: lungs  clear to auscultation - no rales, rhonchi or wheezes  CV: regular rates and rhythm, normal S1 S2, no murmur noted  EXT: ROM intact right shoulder, wrist  SKIN: no suspicious lesions or rashes  PSYCH: mentation appears normal and affect normal/bright    XRAY - cervical spine - no acute fracture, loss of normal cervical curvature personally viewed by me    Results for orders placed or performed in visit on 05/28/20   CBC with platelets and differential     Status: Abnormal   Result Value Ref Range    WBC 10.3 4.0 - 11.0 10e9/L    RBC Count 5.38 (H) 3.8 - 5.2 10e12/L    Hemoglobin 12.5 11.7 - 15.7 g/dL    Hematocrit 41.3 35.0 - 47.0 %    MCV 77 (L) 78 - 100 fl    MCH 23.2 (L) 26.5 - 33.0 pg    MCHC 30.3 (L) 31.5 - 36.5 g/dL    RDW 15.4 (H) 10.0 - 15.0 %    Platelet Count 326 150 - 450 10e9/L    % Neutrophils 64.5 %    % Lymphocytes 28.1 %    % Monocytes 5.7 %    % Eosinophils 1.5 %    % Basophils 0.2 %    Absolute Neutrophil 6.7 1.6 - 8.3 10e9/L    Absolute Lymphocytes 2.9 0.8 - 5.3 10e9/L    Absolute Monocytes 0.6 0.0 - 1.3 10e9/L    Absolute Eosinophils 0.2 0.0 - 0.7 10e9/L    Absolute Basophils 0.0 0.0 - 0.2 10e9/L    Diff Method Automated Method    Comprehensive metabolic panel     Status: Abnormal   Result Value Ref Range    Sodium 143 133 - 144 mmol/L    Potassium 4.7 3.4 - 5.3 mmol/L    Chloride 107 94 - 109 mmol/L    Carbon Dioxide 29 20 - 32 mmol/L    Anion Gap 7 3 - 14 mmol/L    Glucose 133 (H) 70 - 99 mg/dL    Urea Nitrogen 9 7 - 30 mg/dL    Creatinine 1.00 0.52 - 1.04 mg/dL    GFR Estimate 74 >60 mL/min/[1.73_m2]    GFR Estimate If Black 86 >60 mL/min/[1.73_m2]    Calcium 9.6 8.5 - 10.1 mg/dL    Bilirubin Total 0.4 0.2 - 1.3 mg/dL    Albumin 3.7 3.4 - 5.0 g/dL    Protein Total 7.4 6.8 - 8.8 g/dL    Alkaline Phosphatase 130 40 - 150 U/L    ALT 17 0 - 50 U/L    AST 22 0 - 45 U/L         ASSESSMENT/PLAN:  (R20.2) Paresthesia  (primary encounter diagnosis)    Plan: XR Cervical Spine 2/3 Views, CBC with  platelets        and differential, Comprehensive metabolic         panel, Magnesium            (M79.601) Pain of right upper extremity  Plan: XR Cervical Spine 2/3 Views, CBC with platelets        and differential, Comprehensive metabolic panel              Reassurance given, reviewed possible etiology of right arm symptoms with paresthesia from cervical radiculopathy to CTS.  Encourage symptomatic treatment with tylenol, ibuprofen, warm compress to area and flexeril to help with neck muscle spasm.  Offered prednisone burst but patient did decline at this time, can consider this if symptoms persists or worsens.  Will follow up on formal Xray report and notify if any abnormalities.  Will follow up on pending labs and notify if any abnormalities    Follow up with primary provider if no improvement of symptoms in 1 -2 weeks    Johnathan Garcia MD, MD  May 28, 2020 1:44 PM

## 2020-06-08 ENCOUNTER — TRANSFERRED RECORDS (OUTPATIENT)
Dept: HEALTH INFORMATION MANAGEMENT | Facility: CLINIC | Age: 30
End: 2020-06-08

## 2020-06-10 ENCOUNTER — PATIENT OUTREACH (OUTPATIENT)
Dept: CARE COORDINATION | Facility: CLINIC | Age: 30
End: 2020-06-10

## 2020-06-10 NOTE — PROGRESS NOTES
Clinic Care Coordination Contact  Community Health Worker Follow Up  Spoke to Terra    Patient Reports:  Applying for financial aid and county supports    Rama confirmed that she's covered for [place], [place] and C[place] benefits.    Rama states that she was not qualified for unemployment    However, she just had her hearing for Social Security Income Benefits, waiting on outcome.    Patient states that her landlord has been working with her regarding rent, as she is unable to afford full rent at this time.    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    Establishing care with a Therapist    Rama states that she's been meeting with Dr Rozina Graf from the [facility].    She meets with her every other week.    Patient agreed that goal can be completed.  Goal has been completed    Goals:   Goals Addressed as of 6/10/2020 at 9:57 AM                 Today    3/9/20      1. Financial Wellbeing (pt-stated)   70%  10%1     Added 3/9/20 by Terrence Paulino LSW     Goal Statement: I would like to apply and qualify for financial and County supports to allow me to afford my basic needs.   Date Goal set: 3/9/2020  Barriers: Recent loss of job.   Strengths: Independent.   Date to Achieve By: 6/1/2020  Patient expressed understanding of goal: yes    Action steps to achieve this goal:  1. I will continue to work with the [facility] to apply for GA, Cash assistance, and SNAP.    2. I will work with my Community Health worker and Financial Resource worker to assure I am applying for all appropriate programs in their entirety.    3. I will work with the [facility] for homelessness prevention.     As of today's date 4/8/2020 goal is met at 51 - 75%.   Goal Status:  Showing progress         1  Progress evaluated against: Financial Wellbeing     Discussed the Following:  Rama states seun is working on finding rental assistance for Terra.  CHW informed patient that there are rental assistance available through  the [facility].  CHW inquired if there was anything that CC could to assist with this.  Patient declined, However CHW suggested that she'll reach out to Terrence Paulino, CC RICKY, for further assistance.  Patient acknowledged appreciation for assistance.    CHW Next Follow-up: 1 month    CHW Plan: I will route encounter to Terrence to assess for further rental assistance. Patient states that E-Mail is fine for resources.    Francisco Santana, ABBIEW  Clinic Care Coordination  [facility] : [place], [place], [place], and [place]  Phone: [phone]    ______________________  Next Outreach:  07/10/20  Planned Outreach Frequency: Monthly  Preferred Phone Number: [phone]     Enrollment Date: 03/09/20  Last Care Plan Assessment: 03/09/20

## 2020-06-25 ENCOUNTER — PATIENT OUTREACH (OUTPATIENT)
Dept: CARE COORDINATION | Facility: CLINIC | Age: 30
End: 2020-06-25

## 2020-06-25 ASSESSMENT — ACTIVITIES OF DAILY LIVING (ADL): DEPENDENT_IADLS:: INDEPENDENT

## 2020-06-25 NOTE — LETTER
[facility]  Complex Care Plan  About Me:    Patient Name:  Rama Gutierrez    YOB: 1990  Age:         30 year old   [facility] MRN:    [id number] Telephone Information:  Home Phone [phone]   Mobile [phone]       Address:  [address] [place] [address] [address] [place] [address] Email address:  [email or web address]      Emergency Contact(s)    Name Relationship Lgl Grd Work Phone Home Phone Mobile Phone   1. EDDIE ALICIA* Friend  NONE NONE [phone]   2. DECLLINED PER * Declined               Primary language:  English     needed? No   [facility]:  [phone] op. 1  Other communication barriers: None  Preferred Method of Communication:  Mail  Current living arrangement: I live alone  Mobility Status/ Medical Equipment: Independent    Health Maintenance  Health Maintenance Reviewed:      My Access Plan  Medical Emergency 911   Primary Clinic Line [facility] - [phone]   24 Hour Appointment Line [phone] or  [phone] ([phone]) (toll-free)   24 Hour Nurse Line [phone] (toll-free)   Preferred Urgent Care [facility] [place], [phone]   Preferred Hospital [facility]  [phone]   Preferred Pharmacy [facility] KRISTINA Cruz - [address] [facility]      Behavioral Health Crisis Line The National Suicide Prevention Lifeline at 1-656.153.3599 or 911             My Care Team Members  Patient Care Team       Relationship Specialty Notifications Start End    Rozina Ricketts MD PCP - General Internal Medicine  3/23/17     Phone: [phone] Fax: [phone]         [address] [place] [address] [address] [place] [address]    Rozina Ricketts MD Assigned PCP   3/9/17     Phone: [phone] Fax: [phone] [address] [facility] [address] [place] [address]    Sarah Martinez NP Nurse Practitioner Nurse Practitioner Psych/Mental Health  5/2/19     Phone: [phone] Fax: [phone]           CLINICS [address] [facility] [address]    Randy Zhao Personal Advocate & Liaison (PAL)  Admissions 11/27/19     Terrence Paulino LSW Lead Care Coordinator Primary Care - CC  3/9/20     Phone: [phone]         Francisco Hayden Community Health Worker   3/9/20     Rozina Graf LICSW    6/10/20     [facility]    Phone: [phone] Pager: [phone]                My Care Plans  Self Management and Treatment Plan  Goals and (Comments)  Goals        General    1. Financial Wellbeing (pt-stated)     Notes - Note edited  7/23/2020 10:03 AM by Terrence Paulino LSW    Goal Statement: I would like to apply and qualify for financial and County supports to allow me to afford my basic needs over the next 6 months.   Date Goal set: 3/9/2020  Barriers: Recent loss of job.   Strengths: Independent.   Date to Achieve By: 91/2020  Patient expressed understanding of goal: yes    Action steps to achieve this goal:  1. I will continue to work with the [facility] to apply for [place], Cash assistance, and SNAP.    2. I will work with my Community Health worker and Financial Resource worker to assure I am applying for all appropriate programs in their entirety.    3. I will work with [facility] for homelessness prevention.     As of today's date 4/8/2020 goal is met at 51 - 75%.   Goal Status:  Showing progress                    My Medical and Care Information  Problem List   Patient Active Problem List   Diagnosis     MARÍA (generalized anxiety disorder)     Chronic midline low back pain without sciatica     Chronic bilateral low back pain with left-sided sciatica     Major depressive disorder, recurrent episode, moderate (H)     Morbid obesity (H)     Social anxiety disorder     Attention deficit hyperactivity disorder (ADHD), combined type      Current Medications and Allergies:    Current Outpatient Medications   Medication     ALPRAZolam (XANAX) 1 MG tablet     atomoxetine (STRATTERA) 40  MG capsule     cyclobenzaprine (FLEXERIL) 10 MG tablet     DULoxetine (CYMBALTA) 30 MG capsule     DULoxetine (CYMBALTA) 60 MG capsule     etonogestrel (IMPLANON/NEXPLANON) 68 MG IMPL     Vitamin D, Cholecalciferol, 25 MCG (1000 UT) CAPS     No current facility-administered medications for this visit.          Care Coordination Start Date: 3/9/2020   Frequency of Care Coordination: monthly   Form Last Updated: 07/23/2020

## 2020-06-25 NOTE — PROGRESS NOTES
Clinic Care Coordination Contact  Situation: Patient chart reviewed by care coordinator.     Background: Care Coordination following patient to assist with Goal as below.     Assessment: Per chart review, patient has been actively working with CC CHW to accomplish Goal.  Patient's goal remains appropriate and relevant at this time.    Goals Addressed                 This Visit's Progress      1. Financial Wellbeing (pt-stated)   On track     Goal Statement: I would like to apply and qualify for financial and County supports to allow me to afford my basic needs over the next 6 months.   Date Goal set: 3/9/2020  Barriers: Recent loss of job.   Strengths: Independent.   Date to Achieve By: 91/2020  Patient expressed understanding of goal: yes    Action steps to achieve this goal:  1. I will continue to work with the [facility] to apply for GA, Cash assistance, and SNAP.    2. I will work with my Community Health worker and Financial Resource worker to assure I am applying for all appropriate programs in their entirety.    3. I will work with the [facility] for homelessness prevention.     As of today's date 4/8/2020 goal is met at 51 - 75%.   Goal Status:  Showing progress              Plan/Recommendations: [facility] reviewed inquiry from CHW and communicated with pt via email as requested.    Provided pt with email sent as below:     Alvarez Ontiveros,   I spoke with Francisco who requested I send some additional resource options to you.  I hope some of this is helpful, here are some options in [facility]:  https://www.[facility]/housing-resources/rental-assistance/  https://www.[facility].org/housing-services/vylxxr-fasmhtp-xouqdsb/  The [facility] / [facility] Services helps people who are homeless or at risk of becoming homeless.  Our goal is to provide resources for people to maintain existing housing or secure new housing.   Assistance includes short or long-term housing services intended to help clients stabilize  their situation and become self-sufficient.  [facility] - Family Homeless Prevention Assistance Program (FHPAP)    Helps with damage deposits or first month s rent for eligible individuals    Provides resources for housing search    Assists with current rent  Am I eligible?    [facility] resident, single adults or youth (18-21), without minor children living in the home    At risk of becoming homeless    Monthly net income that is at or below 200% of federal poverty guideline and be able to support the monthly rent going forward (proof of income from all sources is required)  How do I apply?  Please call [phone] for more information  You are required to provide the following:    Proof of identity    Proof of income    Proof of expenses  [facility] Homeless Assistance  If you are homeless and in [facility], contact [facility] Coordinated Assessment for assistance.  For more information, please call [phone] or visit www.[facility]. - search housing .  CAP Housing Units  CAP Agency owns and manages 41 affordable housing units for individuals and families with low and moderate incomes who are homeless or at risk of becoming homeless . Units are located in [facility].  Participants pay 30% of their income towards rent.  Supportive services are offered to help with goals and resources toward self-sufficiency.  Vacancies in CAP housing units are filled through [facility] s Coordinated Assessment.  For more information, please call [phone] or visit www.[facility].[employer] - search housing .  ADDITIONAL RESOURCES  [facility] Community Development Agency (A)  Section 8, Public Housing and other housing assistance programs.  Please visit the CDA s site to review the best housing option for your situation: http://www.[facility].org/renters.htm  [place] Housing Redevelopment Authority (HRA)  Section 8, Public Housing and other housing assistance programs.   Please visit the A s site to review the best housing option for your situation: http://www.[facility].org  [address] [place] [address]  Phone: [phone]  Email: info@[facility].org  HousingLink  A housing resource site dedicated for rental unit search: http://www.housinglink.org/Home.aspx   Housing Benefits 101:  [place]://[place].hb101.[place]/    GHISLAINE Nash  Social Work Care Coordinator     [facility]  [address] [place] [address]   [address] [place] [address]  [email or web address]  Office: [phone]  Gender Pronouns: she/her  Employed by [facility] [facility] [employer]    The patient will continue working with Care Coordination to achieve Goal as above.  CC RICKY will review patient chart again in approximately 6 weeks to assess patient status and goal progress with outreach to patient as indicated.    UPDATE: Updated Care Plan sent this date: 7/23/2020    GHISLAINE Nsah  Clinic Care Coordinator  [facility]  [phone]  [email or web address]

## 2020-06-26 ENCOUNTER — VIRTUAL VISIT (OUTPATIENT)
Dept: PEDIATRICS | Facility: CLINIC | Age: 30
End: 2020-06-26
Payer: COMMERCIAL

## 2020-06-26 ENCOUNTER — TELEPHONE (OUTPATIENT)
Dept: PEDIATRICS | Facility: CLINIC | Age: 30
End: 2020-06-26

## 2020-06-26 DIAGNOSIS — F33.1 MAJOR DEPRESSIVE DISORDER, RECURRENT EPISODE, MODERATE (H): ICD-10-CM

## 2020-06-26 DIAGNOSIS — F90.2 ATTENTION DEFICIT HYPERACTIVITY DISORDER (ADHD), COMBINED TYPE: Primary | ICD-10-CM

## 2020-06-26 DIAGNOSIS — F41.1 GAD (GENERALIZED ANXIETY DISORDER): ICD-10-CM

## 2020-06-26 PROCEDURE — 99214 OFFICE O/P EST MOD 30 MIN: CPT | Mod: GT | Performed by: PEDIATRICS

## 2020-06-26 RX ORDER — ATOMOXETINE 40 MG/1
40 CAPSULE ORAL DAILY
Qty: 30 CAPSULE | Refills: 1 | Status: SHIPPED | OUTPATIENT
Start: 2020-06-26 | End: 2020-07-29

## 2020-06-26 ASSESSMENT — PATIENT HEALTH QUESTIONNAIRE - PHQ9
10. IF YOU CHECKED OFF ANY PROBLEMS, HOW DIFFICULT HAVE THESE PROBLEMS MADE IT FOR YOU TO DO YOUR WORK, TAKE CARE OF THINGS AT HOME, OR GET ALONG WITH OTHER PEOPLE: VERY DIFFICULT
SUM OF ALL RESPONSES TO PHQ QUESTIONS 1-9: 18
SUM OF ALL RESPONSES TO PHQ QUESTIONS 1-9: 18

## 2020-06-26 NOTE — PATIENT INSTRUCTIONS
Alvarez Ontiveros,    Nice to see you today.    Please start the Strattera 40mg daily.  This is in addition to your other medication.    We will follow up in about a month.    Sincerely,    Rozina Ricketts MD  Internal Medicine/Pediatrics  [facility]

## 2020-06-26 NOTE — PROGRESS NOTES
"Rama Gutierrez is a 30 year old female who is being evaluated via a billable video visit.      The patient has been notified of following:     \"This video visit will be conducted via a call between you and your physician/provider. We have found that certain health care needs can be provided without the need for an in-person physical exam.  This service lets us provide the care you need with a video conversation.  If a prescription is necessary we can send it directly to your pharmacy.  If lab work is needed we can place an order for that and you can then stop by our lab to have the test done at a later time.    Video visits are billed at different rates depending on your insurance coverage.  Please reach out to your insurance provider with any questions.    If during the course of the call the physician/provider feels a video visit is not appropriate, you will not be charged for this service.\"    Patient has given verbal consent for Video visit? Yes    Will anyone else be joining your video visit? No    Subjective     Rama Gutierrez is a 30 year old female who presents today via video visit for the following health issues:    HPI  Pt recently diagnosed with ADHD and would like to discuss some different medications options to start.     Had ADHD assessment at [facility] - patient interested in starting medication    We also increased the cymbalta to 90mg at the last visit - patient unsure if having any affect yet.    Also having trouble sleeping again.    Still financial stresses - had hearing earlier this month for social security - she is really hoping she will get this. If not, she is not sure what she will do .     Still doing therapy every 2 weeks at [facility].       Video Start Time: 1:50pm    Reviewed and updated as needed this visit by Provider         Review of Systems   Constitutional, HEENT, cardiovascular, pulmonary, gi and gu systems are negative, except as otherwise noted.      Objective     "         Physical Exam     GENERAL: Healthy, alert and no distress  EYES: Eyes grossly normal to inspection.  No discharge or erythema, or obvious scleral/conjunctival abnormalities.  RESP: No audible wheeze, cough, or visible cyanosis.  No visible retractions or increased work of breathing.    SKIN: Visible skin clear. No significant rash, abnormal pigmentation or lesions.  NEURO: Cranial nerves grossly intact.  Mentation and speech appropriate for age.  PSYCH: Mentation appears normal, affect normal/bright, judgement and insight intact, normal speech and appearance well-groomed.      Diagnostic Test Results:  Labs reviewed in Epic        Assessment & Plan     1. Attention deficit hyperactivity disorder (ADHD), combined type  New diagnosis.  Per therapist (and I agree) will do non stimulant medications as to not worsen her anxiety.  Start Strattera 40mg and follow up in 1 month.  - atomoxetine (STRATTERA) 40 MG capsule; Take 1 capsule (40 mg) by mouth daily  Dispense: 30 capsule; Refill: 1    2. MARÍA (generalized anxiety disorder)  Continue duloxetine 90mg - will try to wean if ADHD in better control.    3. Major depressive disorder, recurrent episode, moderate (H)  See #2             Patient Instructions   Hi Lio Ontiveros to see you today.    Please start the Strattera 40mg daily.  This is in addition to your other medication.    We will follow up in about a month.    Sincerely,    Rozina Ricketts MD  Internal Medicine/Pediatrics  [facility]          Return in about 1 month (around 7/26/2020) for Virtual visit for ADHD.    Rozina Ricketts MD  [facility]      Video-Visit Details    Type of service:  Video Visit    Video End Time:2:03pm    Originating Location (pt. Location): Home    Distant Location (provider location):  [facility]     Platform used for Video Visit: [employer]    Return in about 1 month (around 7/26/2020) for Virtual visit for ADHD.       Rozina Ricketts  MD            Answers for HPI/ROS submitted by the patient on 6/26/2020   Chronic problems general questions HPI Form  If you checked off any problems, how difficult have these problems made it for you to do your work, take care of things at home, or get along with other people?: Very difficult  PHQ9 TOTAL SCORE: 18

## 2020-06-27 ASSESSMENT — PATIENT HEALTH QUESTIONNAIRE - PHQ9: SUM OF ALL RESPONSES TO PHQ QUESTIONS 1-9: 18

## 2020-07-06 ENCOUNTER — TELEPHONE (OUTPATIENT)
Dept: PEDIATRICS | Facility: CLINIC | Age: 30
End: 2020-07-06

## 2020-07-06 NOTE — TELEPHONE ENCOUNTER
CHG outreach to follow up with patient on getting [facility] assistance. No answer, voicemail box full and unable to leave voice mail.    Will try again in 1 week.    Randy Zhao at 2:05 PM on 7/6/2020  [facility] Clinic Health Guide  Phone [phone]

## 2020-07-08 NOTE — TELEPHONE ENCOUNTER
CHG outreach to follow up on progress of county services and SSI benefits along with following up if they need rent assistance or food assistance.    She says that she thinks her court case for SSI benefits went good and should hear back from her  either sometime this week or early next week.    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    Still living with her friend from home who has been supportive and helping her as she can.    Patient still receiving SNAP benefits, CHG provided Fare For All information to try and help stretch benefits for food.    CHG also informed patient of full voicemail box, says she will empty it and try to keep a closer eye on it to prevent it from filling up. Says she has been getting a lot of scam/spam calls.    CHG will follow up on SSI benefits at end of next week if they haven't heard back by then.    Randy Zhao at 11:50 AM on 7/8/2020  [facility] Clinic Health Guide  Phone [phone]

## 2020-07-14 ENCOUNTER — TELEPHONE (OUTPATIENT)
Dept: PEDIATRICS | Facility: CLINIC | Age: 30
End: 2020-07-14

## 2020-07-14 NOTE — TELEPHONE ENCOUNTER
CHG called patient to follow up on SSI benefits request and to ask if she was still planning on coming in to fillout an STEPHEN for [facility] so CHG can as what their old psychiatrist had prescribed for Abilify and other medications..    Patient said they still have not heard anything about the SSI benefits and that she had forgotten that she had agreed to come in and fill out an STEPHEN. Said she would come right in to fill one out.    Randy Zhao at 2:35 PM on 7/14/2020  [facility] Clinic Health Guide  Phone [phone]

## 2020-07-14 NOTE — TELEPHONE ENCOUNTER
"Patient wanted to meeet with [facility] in person.    Says the med change has been making her lethargic \"super relaxed almost to relaxed\". Has been having a hard time interacting with people and says it takes effert to respond when her roommate talks to her.    Says it feels like it is getting a little better. Has been able to get up and start getting stuff done in the last 2 days, feels like she has had a little more energy. When first striating medication she was staying in bed until around noon, now getting up closer to her normal wake up time.    No change with depression.    Says her anxiety has been more manageable.    Feels like her focus is starting to come back.    Says she has thoughts of wanting to die, but not wanting to kill herself. Describes it as \"wanting to stop existing\" therapy has helped it become \"less aggressive\" and helped her find ways to distract herself from it and work on self esteem.    Is planning on working on seeing if autism spectrum [employer] and will be going to their [place] office on August 3rd and August 10th.     Randy Zhao at 3:38 PM on 7/14/2020  [facility] Clinic Health Guide  Phone [phone]    "

## 2020-07-21 ENCOUNTER — PATIENT OUTREACH (OUTPATIENT)
Dept: NURSING | Facility: CLINIC | Age: 30
End: 2020-07-21
Payer: COMMERCIAL

## 2020-07-21 NOTE — PROGRESS NOTES
Clinic Care Coordination Contact  Community Health Worker Follow Up  Spoke to Terra    Goals:   Goals Addressed as of 7/21/2020 at 2:29 PM                 Today    6/25/20      1. Financial Wellbeing (pt-stated)   80%  On track    Added 3/9/20 by Terrence Paulino LSW     Goal Statement: I would like to apply and qualify for financial and [place] supports to allow me to afford my basic needs.   Date Goal set: 3/9/2020  Barriers: Recent loss of job.   Strengths: Independent.   Date to Achieve By: 6/1/2020  Patient expressed understanding of goal: yes    Action steps to achieve this goal:  1. I will continue to work with the [facility] to apply for GA, Cash assistance, and SNAP.  DONE  2. I will work with my Community Health worker and Financial Resource worker to assure I am applying for all appropriate programs in their entirety.  DONE  3. I will work with the [facility] for homelessness prevention.     07/21, CHW:    Rama states that she's still waiting to hear from  on determination of social security income benefits.    She thought she was supposed to get an update last week, but hasn't heard anything yet.    Everything else is on hold pending that decision.        Intervention and Education during outreach:   CHW inquired if patient had received resources from Terrence Paulino, CHARMAINE GARCÍA, regarding housing/rental resources.  Patient states that she has, however, has not had the chance to contact on resources as she's still waiting to hear about her social security income benefits.  CHW questioned if her landlord is still working with her on rent.  Rama states that landlord is still being compliant on waiting.    CHW suggested for patient to contact CC once she gets update from , so we can look at next steps.    CHW Plan: We discussed follow up in one month for update.  However, CHW encouraged patient to contact CC if there any changes. Patient agreed.    VANESSA Damon  Clinic Care Coordination  [facility]  Clinics : [place], and [place]  Phone: [phone]    ______________________  Next Outreach:  08/26/20  Planned Outreach Frequency: Monthly  Preferred Phone Number: [phone]     Enrollment Date: 03/09/20  Last Care Plan Assessment: 03/09/20

## 2020-08-03 ENCOUNTER — DOCUMENTATION ONLY (OUTPATIENT)
Dept: BEHAVIORAL HEALTH | Facility: CLINIC | Age: 30
End: 2020-08-03

## 2020-08-03 NOTE — PROGRESS NOTES
[facility]CDinosaur PHQ-9 Follow-up  Behavioral Health Clinician Triage Service    [facility] PHQ-9 Responses:  [facility] Follow-up to PHQ 5/27/2020 6/26/2020 7/29/2020   PHQ-9 9. Suicide Ideation past 2 weeks Several days Nearly every day Nearly every day   Thoughts of suicide or self harm in past 2 weeks - Yes Yes   Thoughts of suicide or self harm in past 2 weeks - Yes Yes   PHQ-9 Self harm plan? - Yes No   PHQ-9 Self harm action? - No No   PHQ-9 Safety concerns? - No No   PHQ-9 Self harm plan? - Yes No   PHQ-9 Self harm action? - No No   PHQ-9 Safety concerns? - No No        [facility] performed chart review on patient, as she appeared on the BPA no show report. Upon review, it seems patient did present for her virtual visit with her PCP. Her depression was addressed and patient denied any suicidal plan or intent. Patient is prescribed medication and seems to also have a therapist, as well as an autism evaluation set up for this month. [facility] will not make outreach to patient at this time, as a result of this already being addressed.     Note routed to patient's PCP if she would like any further action.    EVELYN Zarco, Behavioral Health Clinician

## 2020-08-05 NOTE — TELEPHONE ENCOUNTER
CHG outreach to follow up on SSI benefits. Says that has not been told that it can take 1 to 3 months to find out. When asked if she has any needs at the moment she denies any, CHG informs her that if anything comes up she can call.    Randy Zhao at 9:15 AM on 8/5/2020  [facility] Clinic Health Guide  Phone [phone]

## 2020-08-18 ENCOUNTER — PATIENT OUTREACH (OUTPATIENT)
Dept: CARE COORDINATION | Facility: CLINIC | Age: 30
End: 2020-08-18

## 2020-08-18 ASSESSMENT — ACTIVITIES OF DAILY LIVING (ADL): DEPENDENT_IADLS:: INDEPENDENT

## 2020-08-18 NOTE — PROGRESS NOTES
Clinic Care Coordination Contact  Situation: Patient chart reviewed by care coordinator.     Background: Care Coordination following patient to assist with Goal as below.     Assessment: Per chart review, patient has been actively working with CC CHW to accomplish Goal.  Patient's goal remains appropriate and relevant at this time.    Goals Addressed                 This Visit's Progress      1. Financial Wellbeing (pt-stated)   80%     Goal Statement: I would like to apply and qualify for financial and [place] supports to allow me to afford my basic needs over the next 6 months.   Date Goal set: 3/9/2020  Barriers: Recent loss of job.   Strengths: Independent.   Date to Achieve By: 91/2020  Patient expressed understanding of goal: yes    Action steps to achieve this goal:  1. I will continue to work with the [facility] to apply for GA, Cash assistance, and SNAP.    2. I will work with my Community Health worker and Financial Resource worker to assure I am applying for all appropriate programs in their entirety.    3. I will work with the [facility] for homelessness prevention.     As of today's date 4/8/2020 goal is met at 51 - 75%.   Goal Status:  Showing progress              Plan/Recommendations: The patient will continue working with Care Coordination to achieve Goal as above.  CC SW will review patient chart again in approximately 6 weeks to assess patient status and goal progress with outreach to patient as indicated.    Terrence Paulino, [facility]  Clinic Care Coordinator  [facility]  [phone]  [email or web address]

## 2020-08-19 ENCOUNTER — MYC MEDICAL ADVICE (OUTPATIENT)
Dept: PEDIATRICS | Facility: CLINIC | Age: 30
End: 2020-08-19

## 2020-08-19 ENCOUNTER — TELEPHONE (OUTPATIENT)
Dept: PEDIATRICS | Facility: CLINIC | Age: 30
End: 2020-08-19

## 2020-08-19 NOTE — TELEPHONE ENCOUNTER
Forms/Letter Request    Name of form/letter: Request for Medical Opinion     Have you been seen for this request: Yes     Do we have the form/letter: Yes: Pt drop forms at the  2nd floor.    When is form/letter needed by: soon    How would you like the form/letter returned: . Pt would like a call when forms are ready for .     Patient Notified form requests are processed in 3-5 business days:Yes Pt understood.     Okay to leave a detailed message? Yes Cell number on file:    Telephone Information:   [employer]ep [phone]

## 2020-08-19 NOTE — TELEPHONE ENCOUNTER
Form placed in Alina Ricketts MD inbox.       Would you like patient to have a phone visit with you?      Nesha An CMA

## 2020-08-20 NOTE — TELEPHONE ENCOUNTER
I had brief conversation with patient.    Forms complete and in my outbox.    Patient would like to  at  - please call her when ready.    Rozina Ricketts MD  Internal Medicine/Pediatrics  [facility]

## 2020-09-01 ENCOUNTER — TRANSFERRED RECORDS (OUTPATIENT)
Dept: HEALTH INFORMATION MANAGEMENT | Facility: CLINIC | Age: 30
End: 2020-09-01

## 2020-09-01 ENCOUNTER — AMBULATORY - HEALTHEAST (OUTPATIENT)
Dept: BEHAVIORAL HEALTH | Facility: CLINIC | Age: 30
End: 2020-09-01

## 2020-09-01 NOTE — TELEPHONE ENCOUNTER
Pt called in to check the status. I informed her that it was faxed to the [facility] but she would still like to . Not sure if this is still an option or if it has been sent to abstracting. Please call [phone]    Larissa Maya Patient Representative

## 2020-09-01 NOTE — TELEPHONE ENCOUNTER
I found form in Dr. Ricketts's faxed file. I made a copy and sent that to abstracting.     I placed original in envelope for patient to  at  on first floor.     Spoke to patient and advised of above. Form brought to  on first floor.    JENELLE HOSKINS MA on 9/1/2020 at 11:44 AM

## 2020-09-02 ENCOUNTER — PATIENT OUTREACH (OUTPATIENT)
Dept: CARE COORDINATION | Facility: CLINIC | Age: 30
End: 2020-09-02

## 2020-09-02 NOTE — PROGRESS NOTES
Clinic Care Coordination Contact     Situation:  Patient chart reviewed by Community Health Worker.    Chart Review:  Community Health Worker attempted to call patient for CCC outreach.  However, upon chart review, it looks like further review is needed.     CHW CC'ed message to CHARMAINE Nash, for chart review.     Plan/Recommendations: CHW will extend outreach for 1 month to prevent duplicate CC outreaches.     VANESSA Damon  Clinic Care Coordination  [facility] Clinics : [place], [place], [place], and [place]  Phone: [phone]    ______________________  Next Outreach:  09/28/20  Planned Outreach Frequency: Monthly  Preferred Phone Number: [phone]     Enrollment Date: 03/09/20  Last Care Plan Assessment: 07/23/20

## 2020-09-03 ENCOUNTER — COMMUNICATION - HEALTHEAST (OUTPATIENT)
Dept: SCHEDULING | Facility: CLINIC | Age: 30
End: 2020-09-03

## 2020-09-08 ENCOUNTER — BEH TREATMENT PLAN (OUTPATIENT)
Dept: BEHAVIORAL HEALTH | Facility: CLINIC | Age: 30
End: 2020-09-08
Attending: PSYCHIATRY & NEUROLOGY

## 2020-09-08 ENCOUNTER — HOSPITAL ENCOUNTER (OUTPATIENT)
Dept: BEHAVIORAL HEALTH | Facility: CLINIC | Age: 30
Discharge: HOME | End: 2020-09-08
Attending: FAMILY MEDICINE | Admitting: FAMILY MEDICINE
Payer: COMMERCIAL

## 2020-09-08 DIAGNOSIS — F33.2 MAJOR DEPRESSIVE DISORDER, RECURRENT EPISODE, SEVERE WITH ANXIOUS DISTRESS (H): ICD-10-CM

## 2020-09-08 PROCEDURE — 90791 PSYCH DIAGNOSTIC EVALUATION: CPT | Mod: GT | Performed by: SOCIAL WORKER

## 2020-09-08 ASSESSMENT — PATIENT HEALTH QUESTIONNAIRE - PHQ9
SUM OF ALL RESPONSES TO PHQ QUESTIONS 1-9: 14
SUM OF ALL RESPONSES TO PHQ QUESTIONS 1-9: 14
10. IF YOU CHECKED OFF ANY PROBLEMS, HOW DIFFICULT HAVE THESE PROBLEMS MADE IT FOR YOU TO DO YOUR WORK, TAKE CARE OF THINGS AT HOME, OR GET ALONG WITH OTHER PEOPLE: EXTREMELY DIFFICULT

## 2020-09-08 NOTE — PROGRESS NOTES
Adult Mental Health Day Treatment  Evaluator Name:  LINH Dawn         PATIENT'S NAME: Rama Gutierrez  PREFERRED NAME: Rama  PREFERRED PRONOUNS:     She/Her  MRN:   [id number]  :   1990   ACCT. NUMBER: [id number]  DATE OF SERVICE: 20  START TIME: 830  END TIME: 930  PREFERRED PHONE:   May we leave a program related message: Yes  Service Modality:  Video Visit:    Telemedicine Visit: The patient's condition can be safely assessed and treated via synchronous audio and visual telemedicine encounter.      Reason for Telemedicine Visit: Patient has requested telehealth visit    Originating Site (Patient Location): Patient's home    Distant Site (Provider Location): Provider Remote Setting    Consent:  The patient/guardian has verbally consented to: the potential risks and benefits of telemedicine (video visit) versus in person care; bill my insurance or make self-payment for services provided; and responsibility for payment of non-covered services.     Patient would like the video invitation sent by: Send to e-mail at: [email or web address]     Mode of Communication:  Video Conference via [facility]    As the provider I attest to compliance with applicable laws and regulations related to telemedicine.    STANDARD ADULT DIAGNOSTIC ASSESSMENT      Identifying Information:  Patient is a 30 year old, .   The pronoun use throughout this assessment reflects the patient's chosen pronoun.   Patient was referred for an assessment by [facility] as part of discharge. Hospital.    Patient attended the session alone.     Chief Complaint:   Per discharge summary from [facility] 20- 9/3/20:   DISCHARGE SUMMARY    Rama Gutierrez    YOB: 1990   Date of admission: 2020 1:02 AM   Date of discharge: 9/3/2020  Date of service: 9/3/2020    Identifications:  Rama is 31 y/o female with depression, anxiety and ADD.  She was admitted for depression, anxiety, suicidal thought after she  "got 2nd denial for SSDI.   For details of history and physical on admission please refer to my admission dictation.    Hospital Course:  Patient was admitted to psychiatric unit for safety, stabilization and medication management.  Upon admission :  \"I reviewed video visit in [facility] from July 29, 2020. She reported she felt tired on Strattera. She reported that increased dose of Cymbalta was helping. She was not suicidal.  I reviewed ER note by Dr. Leobardo Reyes from 9/1/2020. She reported that she will kill herself by overdose on medications. She reported she did not take any medication at that time.  I evaluated her on inpatient psychiatric unit 5500.  She says depression started when she was around 15 years old. She says that she was raised in a very close.family. They did not talk about emotions. Being emotional was perceived as weakness. She saw psychiatrist firs time in 2016. She says she had bronchitis x 1 month and it exhausted her and increased depression.She was put on Zoloft which controlled depression and anxiety until this year. Then it lost it's effect and in March of this year she was put on Cymbalta.It was recently raised to 90 mg daily. She was diagnosed with ADD. She had testing at Dr Negron's office and she was put on Strattera.   She lost multiple jobs. She applied for SSDI x 2. She says that she got denial of Social Security application yesterday and that was major trigger for suicidal thoughts. She says that she has chronic lower back pain from the fracture that she had when she was a toddler and that contributes to depression and anxiety. She says that she has been depressed more and more recently. 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 She says that denial of Social Security application pushed her over the edge and she started thinking about suicide and wanting to die. She told her roommate that she really wanted to die. She thought about overdosing on medications " "and walking into the woods to pass out and die. She denies suicidal thoughts now. She is anxious and depressed. She has decreased energy and decreased concentration. Appetite fluctuates.  She says that she cannot function in work environment because of her depression and anxiety. She says that she lost multiple jobs in the past. She says has bachelor degree in for audiovisual technology.She lives with her roommate. 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 She says her parents would help her financially if she needed money for survival.   She says that she has panic attacks and mild agoraphobia. She says she has social anxiety. She says she was tested for Asperger but it was ruled out. She says she gets 30 pills of Xanax and it lasts her 1 year or longer. She says last time she had it in March. She rarely drinks. She smokes marihuana at night. She says it helps with anxiety and pain. We discussed risk of psychosis due to marihuana and my recommendation is full abstinence. She is not interested in CD treatment. She agrees with referral to IOP. She says she will see her therapist. We discussed medications. She says Cymbalta works , and she is recovering from SSDI news and the dose will stay the same. \"  She says that depression is better controlled today, as well as anxiety. She has had time to process and adjust to the news of SSDI denial. She says it is adjustment to bed news. She says she will try it again. She says she needs it for a few years, and then she hopes she will recover and be able to support herself. She says she would not want to rely on it all her life. She attends groups. She sees benefit in it, so she will start IOP.  scheduled intake. She will continue to see her psychotherapist . I added Vistaril for anxiety. She will continue other medications at present dose. She says that back pain contributes to depression and anxiety. She can see OP pain management provider.   She " "denies suicidal and homicidal ideas, delusions and hallucinations. She slept better. Appetite fluctuates. We discussed diet and exercise. Energy and concentration improving. She feels safe for discharge. She says she lives with her roommate and her roommate would take her to the hospital or call 911 if needed.      The reason for seeking services at this time is: \" I think I need extra help because I have been doing therapy and it has not been enough and I haven't done group therapy and feel it would be helpful\". Prior to hospital had told roommate she felt like overdosing and felt suicidal and roommate called 911\".   The problem(s) began : It started getting really bad all of 2020 and end of last 2019. It happened to coincide with the pandemic but it was already getting bad\"   Patient has attempted to resolve these concerns in the past through Rozina Susie therapy at [facility] 2x a month..    Social/Family History:  Patient reported they grew up in [place]..    They were raised by biological parents.       Patient reported that     childhood was \" it was ok it was very emotionally stunted, parents were not emotionally present and problems were dismissed and denied\". Being mocked for crying as a kid.    Patient described their current relationships with family of origin as: \" it's ok, I am a lot closer with mom as she has done some changing and able to acknowledge that things were not the best back then\". Love father but he \" is the kind of person who will never change\".    The patient describes their cultural background as: None, we weren't Jewish and never went to [employer].    Cultural influences and impact on patient's life structure, values, norms, and healthcare: none identified.    Contextual influences on patient's health include: Economic Factors severe financial stress and debt.   Stressors:  Finances- Not employed and worried about making enough money to make rent, has a lot of debt from " "school and not gonna get the jobs that will allow me to pay off school debt.  - feels like it all comes back to finances.   - applied for social security disability and was denied- initally applied for it due to back problems and mental health was part of it.  - unemployed- does not feel she can handle a work place. Last job had a very bad experience, could not handle the job stress- all she could think about was going to work and jumping off so left during the day. - That was at the end of November last year and this is when things took a nose dive mentally. Now has fear of returning to work force.         These factors will be addressed in the Preliminary Treatment plan.    Patient identified their preferred language to be English.   Patient reported they does not need the assistance of an  or other support involved in therapy.     Patient reported had no significant delays in developmental tasks.     Patient's highest education level was college graduate.   Patient identified the following learning problems: attention, concentration and organization. Diagnosed with ADHD just this past June.   Modifications will not be used to assist communication in therapy.    Patient reports they are  able to understand written materials.    Patient reported the following relationship history single, does not date reports too much fear.    Patient's current relationship status is single.     Patient identified their sexual orientation as pansexual.    Patient reported having zero child(cece).   Patient identified roomate and her family and mother and friends on line. as part of their support system.    Patient identified the quality of these relationships as good.      Patient's current living/housing situation involves staying in own home/apartment.    They live with roommate in [place] and they report that housing is stable. Has been able to make rent \" tentatively\".    Patient is currently unemployed.    Patient " reports their finances are obtained through employment.    Patient does identify finances as a current stressor.      Patient reported that they have not been involved with the legal system.     Patient denies being on probation / parole / under the jurisdiction of the court.        Patient's Strengths and Limitations:  Patient identified the following strengths or resources that will help them succeed in treatment: commitment to health and well being, friends / good social support, insight, intelligence, motivation and strong social skills.   Things that may interfere with the patient's success in treatment include: financial hardship, lack of family support and physical health concerns.   _______________________________________________  Personal and Family Medical History:   Patient did report a family history of mental health concerns.  Patient reports family history includes Breast Cancer in her paternal grandmother; Cerebrovascular Disease in her paternal grandfather; Irritable Bowel Syndrome in her father and mother..     Patient reported the following previous diagnoses which include(s): an Anxiety Disorder and Depression.    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 There were probably problems before that but did not recognized them.     Patient has received mental health services in the past: therapy with [facility] in [place] and psychiatry with [facility] in [place].. .    Psychiatric Hospitalizations: [facility] 9.1.20- 9.3.20.   .  Patient denies a history of civil commitment.    Currently, patient is receiving other mental health services.    [facility]     Patient has not had a physical exam to rule out medical causes for current symptoms.    Date of last physical exam was greater than a  year ago and client was encouraged to schedule an exam with PCP.   The patient has a [facility] Provider, who is named Rozina Ricketts..  Patient reports the following current medical concerns: lower back pain, spondiolostis, has a lot of pain, cannot stand for long periods, cannot sit for a long time either, numbness in legs and feel. Had worked at [employer] and had been on feet all the time..    There are significant appetite / nutritional concerns / weight changes.     Patient does not report a history of head injury / trauma / cognitive impairment.      Patient reports current meds as:   Outpatient Medications Marked as Taking for the 9/8/20 encounter (Hospital Encounter) with Janette Fitzgerald LICSW   Medication Sig     atomoxetine (STRATTERA) 40 MG capsule Take 1 capsule (40 mg) by mouth daily     DULoxetine (CYMBALTA) 60 MG capsule Take 1 capsule (60 mg) by mouth daily Take with 30mg to total dose 90mg daily.     Vitamin D, Cholecalciferol, 25 MCG (1000 UT) CAPS Take 2,000 Int'l Units by mouth daily              atomoxetine (STRATTERA) 40 MG capsule    Indications: attention-deficit hyperactivity disorder Take 40 mg by mouth daily.    0 07/29/2020   Active   cholecalciferol, vitamin D3, 25 mcg (1,000 unit) capsule    Indications: prevention of vitamin D deficiency Take 2,000 Int'l Units by mouth daily.    0     Active   DULoxetine (CYMBALTA) 60 MG capsule    Indications: major depressive disorder, pain Take 90 mg by mouth daily.   0 08/25/2020   Active   hydrOXYzine pamoate (VISTARIL) 50 MG capsule    Indications: anxiety Take 1 capsule (50 mg total) by mouth every 4 (four) hours as needed for anxiety. 30 capsule   0 09/03/2020   Active   ibuprofen (ADVIL,MOTRIN) 800 MG tablet    Indications: pain Take 1 tablet (800 mg total) by mouth 3 (three) times a day as needed (pain).    0 09/03/2020   Active   melatonin 3 mg Tab tablet    Indications: sleep Take 1 tablet (3 mg total) by mouth at bedtime as  needed.            Medication Adherence:  Patient reports taking prescribed medications as prescribed.    Patient Allergies:    Allergies   Allergen Reactions     Codeine Nausea       Medical History:  No past medical history on file.      Current Mental Status Exam:   Appearance:  Appropriate    Eye Contact:  Fair   Psychomotor:  Normal       Gait / station:  Pain with walking  Attitude / Demeanor: Cooperative   Speech      Rate / Production: Normal/ Responsive      Volume:  Normal  volume      Language:  intact  Mood:   Anxious  Depressed   Affect:   Constricted    Thought Content: Clear   Thought Process: Coherent       Associations: No loosening of associations  Insight:   Fair   Judgment:  Intact   Orientation:  All  Attention/concentration: Good    Rating Scales:    PHQ9:    PHQ-9 SCORE 7/29/2020 9/6/2020 9/8/2020   PHQ-9 Total Score MyChart 16 (Moderately severe depression) 14 (Moderate depression) 14 (Moderate depression)   PHQ-9 Total Score 16 14 14   ;    GAD7:    MARÍA-7 SCORE 5/27/2020 7/29/2020 9/6/2020   Total Score - 8 (mild anxiety) 8 (mild anxiety)   Total Score 12 8 8     CGI:     First:No data recorded;    Most recentNo data recorded    Substance Use:  Patient did report a family history of substance use concerns; see medical history section for details.  Patient has not received chemical dependency treatment in the past.  Patient has not ever been to detox.      Patient is not currently receiving any chemical dependency treatment. Patient reported the following problems as a result of their substance use: none identified.    Patient denies using alcohol.  Patient denies using tobacco.  Patient REPORTS MARIJUANA: does edibles 1x a week. Reports edibles help anxiety and depression.  Patient REPORTS CAFFEINE: a little bit. G fuel- a mix you put in water only the equivalent of 1 coffee.  Patient reports using/abusing the following substance(s). Patient reported no other substance use.     CAGE- AID:   "  CAGE-AID Total Score 5/2/2019   Total Score 0   Total Score MyChart 0 (A total score of 2 or greater is considered clinically significant)       Substance Use: No symptoms     CAGE-AID (CAGE Questions Adapted to Include Drugs)    1. Have you ever felt you ought to cut down on your drinking or drug use?  no  2. Have people annoyed you by criticizing your drinking or drug use? no   3. Have you felt bad or guilty about your drinking or drug use?  no  4. Have you ever had a drink or used drugs first thing in the morning to steady your nerves or to get rid of a hangover?  no      Based on the negative CAGE score and clinical interview there  are not indications of drug or alcohol abuse.    Significant Losses / Trauma / Abuse / Neglect Issues:   Patient did not serve in the .  There are indications or report of significant loss, trauma, abuse or neglect issues related to:    Parents were not emotionally present - came out of childhood feeling like my emotions do not matter.  Concerns for possible neglect are not present.     Safety Assessment: Prior to the hospital client reported most of year she has been thinking about death. Most of the time it has not been a plan and it is more \" I wish I was dead, a lot of I do not want to be here anymore\". No imminent plans to harm self or others, engaged in safety planning and was placed in MY CHART. Denied any past suicide attempts.  Current Safety Concerns:  [place] Suicide Severity Rating Scale (Short Version)  [place] Suicide Severity Rating (Short Version) 9/8/2020   Over the past 2 weeks have you felt down, depressed, or hopeless? yes   Over the past 2 weeks have you had thoughts of killing yourself? yes   Have you ever attempted to kill yourself? no   Q1 Wished to be Dead (Past Month) yes   Q2 Suicidal Thoughts (Past Month) yes   Q3 Suicidal Thought Method yes   Q4 Suicidal Intent without Specific Plan yes   Q5 Suicide Intent with Specific Plan yes   Q6 Suicide " Behavior (Lifetime) no     Patient denies current homicidal ideation and behaviors.  Patient denies current self-injurious ideation and behaviors.    Patient denied risk behaviors associated with substance use.  Patient denies any high risk behaviors associated with mental health symptoms.  Patient reports the following current concerns for their personal safety: None.  Patient reports there are no firearms in the house.    History of Safety Concerns:  Patient denied a history of homicidal ideation.     Patient denied a history of personal safety concerns.    Patient denied a history of assaultive behaviors.    Patient denied a history of sexual assault behaviors.     Patient denied a history of risk behaviors associated with substance use.  Patient denies any history of high risk behaviors associated with mental health symptoms.  Patient reports the following protective factors: forward/future oriented thinking, dedication to family/friends, safe and stable environment, abstinence from substances, adherence with prescribed medication, agreement to use safety plan and living with other people    Risk Plan:  See Preliminary Treatment Plan for Safety and Risk Management Plan        LOCUS Worksheet     Name: Rama Gutierrez MRN: [id number]    : 1990      Gender:  female    PMI:     Provider Name: [facility]   Provider NPI:  [id number]    Actual level of Care Provided:  DA    Service(s) receiving or referred to:  IOP    Reason for Variance: initial referral      Rating completed by: LINH Dawn        I. Risk of Harm:   3      Moderate Risk of Harm    II. Functional Status:   3      Moderate Impairment    III. Co-Morbidity:   3      Significant Co-Morbidity    IV - A. Recovery Environment - Level of Stress:   3      Moderately Stress Environment    IV - B. Recovery Environment - Level of Support:   3      Limited Support in Environment    V. Treatment and Recovery History:   2      Significant Response to  "Treatment and Recovery Management    VI. Engagement and Recovery Project:   2      Positive Engagement and Recovery       17 Composite Score    Level of Care Recommendation:   17 to 19       High Intensity Community Based Services                  Outpatient Mental Health Services - Adult    MY COPING PLAN FOR SAFETY    PATIENT'S NAME: Rama Gutierrez  MRN:   [id number]    SAFETY PLAN:    Step 1: Warning signs / cues (Thoughts, images, mood, situation, behavior) that a crisis may be developing:      Thoughts: \"I can't do this anymore\" and \"Nothing makes it better\"    Images: visions of harm: day dreams about dying    Thinking Processes: intrusive thoughts (bothersome, unwanted thoughts that come out of nowhere): thoughts of dying and highly critical and negative thoughts: \" I cannot do this anymore I want to not be here\".    Mood: worsening depression, hopelessness, helplessness and intense worry    Behaviors: isolating/withdrawing , can't stop crying, not taking care of myself, not taking care of my responsibilities and sleeping too much    Situations: financial stress       Step 2: Coping strategies - Things I can do to take my mind off of my problems without contacting another person (relaxation technique, physical activity):      Distress Tolerance Strategies:  arts and crafts: draw friends on line and streams art, listen to positive and upbeat music: yes and talks to friends and draws with them online.    Physical Activities: reported she shuts self in when she feels depressed but is planning to do more walking.    Focus on helpful thoughts:  \"I will get through this\"    Step 3: People and social settings that provide distraction:     Name: Roommate Khadra    go to park on walk     Step 4: Remind myself of people and things that are important to me and worth living for:  \" my roommate and my mom and family and cats and friends on line, my art work\".    Step 5: When I am in crisis, I can ask these people to help me " use my safety plan:     Name francois jaimes:  Phone: [phone]  Step 6: Making the environment safe:       be around others    Step 7: Professionals or agencies I can contact during a crisis:      Suicide Prevention Lifeline: 2-460-847-IQGJ (6712)    Crisis Text Line Service (available 24 hours a day, 7 days a week): Text [place] to 991810    Call  **CRISIS (527530) from a cell phone to talk to a team of professionals who can help you.    Crisis Services By [facility]: Phone Number:   [place]     [phone]   [place]    [phone]   [place]    [phone]   [place]    [phone]   [place]    [phone]   [place] [phone]   [place]     [phone]       Call 911 or go to my nearest emergency department.     I helped develop this safety plan and agree to use it when needed.  I have been given a copy of this plan.        Today s date:  9/8/2020  Adapted from Safety Plan Template 2008 Jessica Lopez and Pankaj Dc is reprinted with the express permission of the authors.  No portion of the Safety Plan Template may be reproduced without the express, written permission.  You can contact the authors at [email or web address] or [email or web address]            Review of Symptoms per patient report:  Depression: No symptoms, Change in sleep, Lack of interest, Excessive or inappropriate guilt, Change in energy level, Difficulties concentrating, Psychomotor slowing or agitation, Suicidal ideation, Feelings of hopelessness, Feelings of helplessness, Low self-worth, Feeling sad, down, or depressed, Withdrawn and Frequent crying  Sho:  No Symptoms  Psychosis: No Symptoms  Anxiety: Excessive worry, Nervousness, Physical complaints, such as headaches, stomachaches, muscle tension, Social anxiety, Sleep disturbance and Ruminations  Panic:  Palpitations, Shortness of breath, Tingling, Numbness and Hot or cold flashes  Post Traumatic Stress Disorder:  No Symptoms   Eating Disorder: No Symptoms  ADD /  ADHD:  Inattentive, Poor task completion, Forgetful, Impulsive and Restlessness/fidgety  Conduct Disorder: No symptoms  Autism Spectrum Disorder: No symptoms  Obsessive Compulsive Disorder: No Symptoms    Patient reports the following compulsive behaviors and treatment history: NA.      Diagnostic Criteria:   A. Excessive anxiety and worry about a number of events or activities (such as work or school performance).   B. The person finds it difficult to control the worry.   - Restlessness or feeling keyed up or on edge.    - Being easily fatigued.    - Difficulty concentrating or mind going blank.    - Muscle tension.    - Sleep disturbance (difficulty falling or staying asleep, or restless unsatisfying sleep).   D. The focus of the anxiety and worry is not confined to features of an Axis I disorder.  E. The anxiety, worry, or physical symptoms cause clinically significant distress or impairment in social, occupational, or other important areas of functioning.   F. The disturbance is not due to the direct physiological effects of a substance (e.g., a drug of abuse, a medication) or a general medical condition (e.g., hyperthyroidism) and does not occur exclusively during a Mood Disorder, a Psychotic Disorder, or a Pervasive Developmental Disorder.   - Depressed mood. Note: In children and adolescents, can be irritable mood.     - Diminished interest or pleasure in all, or almost all, activities.    - Psychomotor activity retardation.    - Fatigue or loss of energy.    - Feelings of worthlessness or excessive guilt.    - Diminished ability to think or concentrate, or indecisiveness.    - Recurrent thoughts of death (not just fear of dying), recurrent suicidal ideation without a specific plan, or a suicide attempt or a specific plan for committing suicide.   B) The symptoms cause clinically significant distress or impairment in social, occupational, or other important areas of functioning  C) The episode is not  attributable to the physiological effects of a substance or to another medical condition  D) The occurence of major depressive episode is not better explained by other thought / psychotic disorders  E) There has never been a manic episode or hypomanic episode    Functional Status:  Patient reports the following functional impairments: management of the household and or completion of tasks, money management, relationship(s), self-care, social interactions and work / vocational responsibilities.     WHODAS:   WHODAS 2.0 Total Score 5/2/2019 9/6/2020   Total Score 30 35   Total Score MyChart 30 35       Clinical Summary:  1. Reason for assessment: referred as part of discharge from [facility] where she had been admitted due to suicidal ideation with plan to overdose.  .  2. Psychosocial, Cultural and Contextual Factors: severe financial stress, recently denied SSDI for second time, debt, isolation.  3. Principal DSM5 Diagnoses  (Sustained by DSM5 Criteria Listed Above):   296.33 (F33.2) Major Depressive Disorder, Recurrent Episode, Severe _ and With anxious distress  300.02 (F41.1) Generalized Anxiety Disorder.  4. Other Diagnoses that is relevant to services:   Attention-Deficit/Hyperactivity Disorder  314.01 (F90.9) Unspecified Attention -Deficit / Hyperactivity Disorder. Per client she was recently- June, 2020 diagnosed with this.  5. Provisional Diagnosis: No other symptoms were reported during the assessment that would indicate alternate diagnoses.  Should symptoms arise during the course of treatment the diagnoses can be updated at that time. .  6. Prognosis: Expect Improvement.  7. Likely consequences of symptoms if not treated: Without treatment patient more than likely will experience a continuation of symptoms with decreased daily functioning, requiring an increased level of care.  .  8. Client strengths include:  caring, committed to sobriety, creative, educated, empathetic, has a previous history of therapy,  intelligent, open to learning, open to suggestions / feedback and wants to learn .     Recommendations:     1. Plan for Safety and Risk Management:A safety and risk management plan has been developed including: Patient consented to co-developed safety plan.  Safety and risk management plan was completed.  Patient agreed to use safety plan should any safety concerns arise.  A copy was given to the patient..  Report to child / adult protection services was NA.     2. Patient's identified NA.     3. Initial Treatment will focus on: Depressed Mood - assess for safety, provide support and education to improve smptoms of depression./ and increase functioning.  Anxiety - decrease symptoms by providing skill building in IOP.-CBT, somatic interventions..     4. Resources/Service Plan:       services are not indicated.     Modifications to assist communication are not indicated.     Additional disability accommodations are not indicated.      5. Collaboration:  Collaboration / coordination of treatment will be initiated with the following support professionals: PARKER.      6.  Referrals:  The following referral(s) will be initiated: Outpatient Mental Health Therapy Group. Next Scheduled Appointment: 9/10/20.  A Release of Information has been obtained for the following: outpatient therapist.    7. TOMMIE: TOMMIE:  Discussed the general effects of drugs and alcohol on health and well-being. Provider gave patient printed information about the effects of chemical use on their health and well being. Recommendations:   .     8. Records were reviewed at time of assessment.  Information in this assessment was obtained from the medical record and provided by patient who is a good historian.   Patient will have open access to their mental health medical record.      Eval type:  Mental Health    Staff Name/Credentials:  HIEU Dawn  September 8, 2020        Answers for HPI/ROS submitted by the patient on 9/8/2020   If you  checked off any problems, how difficult have these problems made it for you to do your work, take care of things at home, or get along with other people?: Extremely difficult  PHQ9 TOTAL SCORE: 14

## 2020-09-09 ENCOUNTER — TELEPHONE (OUTPATIENT)
Dept: BEHAVIORAL HEALTH | Facility: CLINIC | Age: 30
End: 2020-09-09

## 2020-09-09 ASSESSMENT — ANXIETY QUESTIONNAIRES
2. NOT BEING ABLE TO STOP OR CONTROL WORRYING: MORE THAN HALF THE DAYS
1. FEELING NERVOUS, ANXIOUS, OR ON EDGE: MORE THAN HALF THE DAYS
3. WORRYING TOO MUCH ABOUT DIFFERENT THINGS: SEVERAL DAYS
5. BEING SO RESTLESS THAT IT IS HARD TO SIT STILL: NOT AT ALL
7. FEELING AFRAID AS IF SOMETHING AWFUL MIGHT HAPPEN: NEARLY EVERY DAY
6. BECOMING EASILY ANNOYED OR IRRITABLE: MORE THAN HALF THE DAYS
GAD7 TOTAL SCORE: 11
IF YOU CHECKED OFF ANY PROBLEMS ON THIS QUESTIONNAIRE, HOW DIFFICULT HAVE THESE PROBLEMS MADE IT FOR YOU TO DO YOUR WORK, TAKE CARE OF THINGS AT HOME, OR GET ALONG WITH OTHER PEOPLE: VERY DIFFICULT

## 2020-09-09 ASSESSMENT — PATIENT HEALTH QUESTIONNAIRE - PHQ9
SUM OF ALL RESPONSES TO PHQ QUESTIONS 1-9: 14
5. POOR APPETITE OR OVEREATING: SEVERAL DAYS
SUM OF ALL RESPONSES TO PHQ QUESTIONS 1-9: 14

## 2020-09-09 NOTE — TELEPHONE ENCOUNTER
Completed admission with Rama today.  Will start the 5A track tomorrow, 9/10/2020.  Best contact is: [email or web address]  Rama reported she has a copy of her safety plan.  She is not having any suicidal ideation at this time.    Savanah Braxton, OTR/L

## 2020-09-09 NOTE — PROGRESS NOTES
Admission Date: 9/9/2020;  Will start the 5A track on 9/10/2020    Identify any current concerns with potential impact to admission:     medication/medical concerns: no     immediate safety concerns: no    Does patient have safety plan? Yes   Note: Please copy safety plan copied into BEH Encounter     Other (insurance/childcare/transportation/housing/planned absences/etc): no    Patient's insurance is: [employer] . Does patient need appointment with provider? Not applicable    If patient has Medical Assistance (MA) is LOCUS and Functional Assessment completed? Not applicable    If patient is in Partial Hospitalization Program is LOCUS completed? Not applicable                                                                                     Completed by: NATHAN Bradley/BRENNA

## 2020-09-09 NOTE — PROGRESS NOTES
"MY COPING PLAN FOR SAFETY     PATIENT'S NAME:    Rama Gutierrez  MRN:                           [id number]     SAFETY PLAN:     Step 1: Warning signs / cues (Thoughts, images, mood, situation, behavior) that a crisis may be developing:     ? Thoughts: \"I can't do this anymore\" and \"Nothing makes it better\"  ? Images: visions of harm: day dreams about dying  ? Thinking Processes: intrusive thoughts (bothersome, unwanted thoughts that come out of nowhere): thoughts of dying and highly critical and negative thoughts: \" I cannot do this anymore I want to not be here\".  ? Mood: worsening depression, hopelessness, helplessness and intense worry  ? Behaviors: isolating/withdrawing , can't stop crying, not taking care of myself, not taking care of my responsibilities and sleeping too much  ? Situations: financial stress   ?    Step 2: Coping strategies - Things I can do to take my mind off of my problems without contacting another person (relaxation technique, physical activity):     ? Distress Tolerance Strategies:  arts and crafts: draw friends on line and streams art, listen to positive and upbeat music: yes and talks to friends and draws with them online.  ? Physical Activities: reported she shuts self in when she feels depressed but is planning to do more walking.  ? Focus on helpful thoughts:  \"I will get through this\"     Step 3: People and social settings that provide distraction:                 Name: Roommate Francois                     go to park on walk            Step 4: Remind myself of people and things that are important to me and worth living for:  \" my roommate and my mom and family and cats and friends on line, my art work\".     Step 5: When I am in crisis, I can ask these people to help me use my safety plan:                 Name francois jaimes:  Phone: [phone]  Step 6: Making the environment safe:      ? be around others     Step 7: Professionals or agencies I can contact during a crisis:     ? Suicide " Prevention Lifeline: 4-298-793-TALK (1655)  ? Crisis Text Line Service (available 24 hours a day, 7 days a week): Text [place] to 054741  ? Call  **CRISIS (145065) from a cell phone to talk to a team of professionals who can help you.          Crisis Services By [facility]: Phone Number:   [place]     [phone]   [place]    [phone]   [place]    [phone]   [place]    [phone]   [place]    [phone]   [place] [phone]   [place]     [phone]      ? Call 911 or go to my nearest emergency department.             I helped develop this safety plan and agree to use it when needed.  I have been given a copy of this plan.          Today s date:  9/8/2020  Adapted from Safety Plan Template 2008 Jessica Lopez and P[employer] is reprinted with the express permission of the authors.  No portion of the Safety Plan Template may be reproduced without the express, written permission.  You can contact the authors at [email or web address] or [email or web address]

## 2020-09-10 ENCOUNTER — TELEPHONE (OUTPATIENT)
Dept: PEDIATRICS | Facility: CLINIC | Age: 30
End: 2020-09-10

## 2020-09-10 ENCOUNTER — VIRTUAL VISIT (OUTPATIENT)
Dept: PEDIATRICS | Facility: CLINIC | Age: 30
End: 2020-09-10
Payer: COMMERCIAL

## 2020-09-10 ENCOUNTER — HOSPITAL ENCOUNTER (OUTPATIENT)
Dept: BEHAVIORAL HEALTH | Facility: CLINIC | Age: 30
End: 2020-09-10
Attending: PSYCHIATRY & NEUROLOGY
Payer: COMMERCIAL

## 2020-09-10 DIAGNOSIS — R45.851 SUICIDAL IDEATION: ICD-10-CM

## 2020-09-10 DIAGNOSIS — F41.1 GAD (GENERALIZED ANXIETY DISORDER): Primary | ICD-10-CM

## 2020-09-10 PROBLEM — F33.2 MAJOR DEPRESSIVE DISORDER, RECURRENT EPISODE, SEVERE WITH ANXIOUS DISTRESS (H): Status: ACTIVE | Noted: 2020-09-10

## 2020-09-10 PROCEDURE — 90853 GROUP PSYCHOTHERAPY: CPT | Mod: GT | Performed by: SOCIAL WORKER

## 2020-09-10 PROCEDURE — 90853 GROUP PSYCHOTHERAPY: CPT | Mod: 95 | Performed by: SOCIAL WORKER

## 2020-09-10 PROCEDURE — 96127 BRIEF EMOTIONAL/BEHAV ASSMT: CPT | Performed by: INTERNAL MEDICINE

## 2020-09-10 PROCEDURE — 99495 TRANSJ CARE MGMT MOD F2F 14D: CPT | Mod: 95 | Performed by: INTERNAL MEDICINE

## 2020-09-10 RX ORDER — HYDROXYZINE PAMOATE 50 MG/1
50 CAPSULE ORAL 3 TIMES DAILY PRN
COMMUNITY
End: 2021-01-29

## 2020-09-10 ASSESSMENT — PATIENT HEALTH QUESTIONNAIRE - PHQ9
10. IF YOU CHECKED OFF ANY PROBLEMS, HOW DIFFICULT HAVE THESE PROBLEMS MADE IT FOR YOU TO DO YOUR WORK, TAKE CARE OF THINGS AT HOME, OR GET ALONG WITH OTHER PEOPLE: VERY DIFFICULT
SUM OF ALL RESPONSES TO PHQ QUESTIONS 1-9: 15
SUM OF ALL RESPONSES TO PHQ QUESTIONS 1-9: 15

## 2020-09-10 ASSESSMENT — ANXIETY QUESTIONNAIRES: GAD7 TOTAL SCORE: 11

## 2020-09-10 NOTE — GROUP NOTE
Psychotherapy Group Note    PATIENT'S NAME: Rama Gutierrez  MRN:   [id number]  :   1990  ACCT. NUMBER: [id number]  DATE OF SERVICE: 9/10/20  START TIME: 11:00 AM  END TIME: 11:50 AM  FACILITATOR: Sofia Padilla LICSW  TOPIC:  EBP Group: Enhanced Mindfulness  Adult Mental Health Day Treatment  TRACK: 5A    NUMBER OF PARTICIPANTS: 3    Summary of Group / Topics Discussed:  Enhanced Mindfulness: Body and Mind Integration: Patients received an overview and education regarding the importance of including the body in the management of emotional health and self-care and as a direct route to mindfulness practice.  Patients discussed various ways in which the body can serve as an informant to their physical and emotional experiences/need. Patients discussed the body as a direct link to the present moment and to mindfulness practice.  Patients discussed current relationship with body, self-awareness, mindfulness practice and barriers to connection with body.  Patients were guided through breath work and movement to facilitate greater self-awareness, grounding, self-expression, and connection to other.  Patients discussed how the experiential could be applied to better manage mental health and develop hankins connection to self.    Patient Session Goals / Objectives:    Identify how movement awareness could be used for grounding, stress management, self-expression, connection to other and self-regulation    Improved awareness of breath and movement preferences    Identify how movement and the body is used in mindfulness practice    Reflect on use of these practices in everyday life.    Identify barriers to attending to body    Telemedicine Visit: The patient's condition can be safely assessed and treated via synchronous audio and visual telemedicine encounter.          Reason for Telemedicine Visit: Services only offered telehealth and covid19        Originating Site (Patient Location): Patient's home        Distant Site  (Provider Location): Provider Remote Setting        Consent:  The patient/guardian has verbally consented to: the potential risks and benefits of telemedicine (video visit) versus in person care; bill my insurance or make self-payment for services provided; and responsibility for payment of non-covered services.         Mode of Communication:  Video Conference via [employer]        As the provider I attest to compliance with applicable laws and regulations related to telemedicine.         Patient Participation / Response:  Fully participated with the group by sharing personal reflections / insights and openly received / provided feedback with other participants.    Demonstrated understanding of topics discussed through group discussion and participation, Identified / Expressed readiness to act on skill suggestions discussed in topic, Identified plan to address barriers to practicing skills discussed in topic and Practiced skills in session    Treatment Plan:  Patient has an initial individualized treatment plan that was created as part of their diagnostic assessment / admission process.  A master individualized treatment plan is in the process of being developed with the patient and multi-disciplinary care team.    HIEU SoaresSW

## 2020-09-10 NOTE — PROGRESS NOTES
"Rama Gutierrez is a 30 year old female who is being evaluated via a billable telephone visit.      The patient has been notified of following:     \"This telephone visit will be conducted via a call between you and your physician/provider. We have found that certain health care needs can be provided without the need for a physical exam.  This service lets us provide the care you need with a short phone conversation.  If a prescription is necessary we can send it directly to your pharmacy.  If lab work is needed we can place an order for that and you can then stop by our lab to have the test done at a later time.    Telephone visits are billed at different rates depending on your insurance coverage. During this emergency period, for some insurers they may be billed the same as an in-person visit.  Please reach out to your insurance provider with any questions.    If during the course of the call the physician/provider feels a telephone visit is not appropriate, you will not be charged for this service.\"    Patient has given verbal consent for Telephone visit?  Yes Rafia Fulton CMA on 9/10/2020 at 3:00 PM      What phone number would you like to be contacted at? [phone]    How would you like to obtain your AVS? Tone Kirkpatrick     Rama Gutierrez is a 30 year old female who presents via phone visit today for the following health issues:    [facility]      Hospital Follow-up Visit:    Hospital/Nursing Home/IP Rehab Facility: [facility]  Date of Admission: 9/2/2020  Date of Discharge: 9/3/2020  Reason(s) for Admission: MARÍA, Pain, sleep difficulties      Was your hospitalization related to COVID-19? No   Problems taking medications regularly:  none  Medication changes since discharge: Was prescribed Hydroxyzine as needed for anxiety, has not needed.  Problems adhering to non-medication therapy:  None    Summary of hospitalization:  [facility] discharge summary reviewed  Diagnostic Tests/Treatments " reviewed.  Follow up needed: psychiatry   Other Healthcare Providers Involved in Patient s Care:         psychiatry, therapy  Update since discharge: improved.       Post Discharge Medication Reconciliation: discharge medications reconciled, continue medications without change.  Plan of care communicated with patient              Rama calls in for a hospital follow-up. Recently admitted for suicidal ideation in the setting of escalating stress and anxiety. Was started on prn hydroxyzine on discharge.     Currently been taking duloxetine 90mg daily (this is unchanged) and prn hydroxyzine. Just started day treatment (today was first day), which will be 3 days per week. Needs to get established with a psychiatrist, wasn't set up with psychiatry on discharge. Does have an appointment with her therapist next week.     No real thoughts of suicide since she left hospital. Some intrusive thoughts but is clear that she doesn't want to die. Feels like she has people she can reach out to if she needs to.     No side effects with the duloxetine or Strattera. Just got hydroxyzine, so hasn't really taken this. It's supposed to replace her Xanax.      Review of Systems   Constitutional, HEENT, psych, neuro systems are negative, except as otherwise noted.       Objective          Vitals:  No vitals were obtained today due to virtual visit.      PSYCH: Alert and oriented times 3; coherent speech, normal   rate and volume, able to articulate logical thoughts, able   to abstract reason, no tangential thoughts, no hallucinations   or delusions  Her affect is normal  RESP: No cough, no audible wheezing, able to talk in full sentences  Remainder of exam unable to be completed due to telephone visits          Assessment/Plan:    Assessment & Plan       ICD-10-CM    1. MARÍA (generalized anxiety disorder)  F41.1    2. Suicidal ideation  R45.851      Overall doing better post hospitalization. Is set up with outpatient day treatment 3 days  "per week and is seeing her therapist at [facility] next week. Needs psychiatrist, so referral placed for this today. Will route to SB4 PAL (and PCP as FYI - patient listed as SB3 but acutely may be more appropriate as SB4, will allow PCP to determine) to help coordinate, patient will also see is psychiatry available through [facility]. Plan to continue medications for now as prescribed. No active SI, contracts for safety.      BMI:   Estimated body mass index is 41.97 kg/m  as calculated from the following:    Height as of 3/9/20: 1.702 m (5' 7\").    Weight as of 5/28/20: 121.6 kg (268 lb).       Depression Screening Follow Up    PHQ 9/10/2020   PHQ-9 Total Score 15   Q9: Thoughts of better off dead/self-harm past 2 weeks More than half the days   F/U: Thoughts of suicide or self-harm Yes   F/U: Self harm-plan Yes   F/U: Self-harm action No   F/U: Safety concerns No   Some encounter information is confidential and restricted. Go to Review Flowsheets activity to see all data.       Patient Instructions   Continue your current medications.    Referral placed for psychiatry. I will work with our team here to help get that scheduled, but see if [facility] has any availability as well.       No follow-ups on file.    Sherie Yang MD  [facility]    Phone call duration:  11 minutes                Answers for HPI/ROS submitted by the patient on 9/10/2020   Chronic problems general questions HPI Form  How many servings of fruits and vegetables do you eat daily?: 0-1  On average, how many sweetened beverages do you drink each day (Examples: soda, juice, sweet tea, etc.  Do NOT count diet or artificially sweetened beverages)?: 1  How many minutes a day do you exercise enough to make your heart beat faster?: 9 or less  How many days a week do you exercise enough to make your heart beat faster?: 3 or less  How many days per week do you miss taking your medication?: 0  Your back pain is: " chronic  Where is your back pain located? : right lower back, left lower back  How would you describe your back pain? : dull ache, fullness, sharp, shooting  Where does your back pain spread? : right buttocks, left buttocks, right thigh, left thigh, right knee, left knee, right foot, left foot  Since you noticed your back pain, how has it changed? : always present, but gets better and worse  Does your back pain interfere with your job?: Yes  Depression/Anxiety: Depression & Anxiety  Status since last visit:: worse  Anxiety since last: : worse  Other associated symptoms of depression:: Yes  Other associated symotome: : Yes  Significant life event: : financial concerns  Anxious:: Yes  Current substance use:: No  If you checked off any problems, how difficult have these problems made it for you to do your work, take care of things at home, or get along with other people?: Very difficult  PHQ9 TOTAL SCORE: 15

## 2020-09-10 NOTE — TELEPHONE ENCOUNTER
CHG outreach to patient to assist with scheduling psychiatry and phycology/therapy appointments. CHG called patient to find days of the week and times that will work best for patient. She says any day of the week as long as it is after 12.    [facility] called [facility] and is able to schedule psychiatry appointment with   for 11/04/2020 at [phone].    therapy appointment scheduled for 09/14/2020 at [phone].    Patient called back and informed of appointment times.    Randy Zhao at 4:20 PM on 9/10/2020  [facility] Health Guide  Phone [phone]

## 2020-09-10 NOTE — PROGRESS NOTES
Adult Day Treatment Program:  Individualized Treatment Plan     Date of Plan: 20    Name: Rama Gutierrez MRN: [id number]    : 1990    Programs:  Adult Day Treatment (ADT)     Clinical Track (if applicable):  5A    DSM5 Diagnosis  296.33 (F33.2) Major Depressive Disorder, Recurrent Episode, Severe _ and With anxious distress  300.02 (F41.1) Generalized Anxiety Disorder  314.01 (F90.9) Unspecified Attention -Deficit / Hyperactivity Disorder    Adult Day Treatment Program Multidisciplinary Team Members: Leobardo Carter MD and/or Dr. Rowan Henderson; Sofia Ochoa, [facility], Cole Hickey, OTR/L, Sofia Padilla [facility], BC-DMT, Merry Newman RN, BSN    Rama Gutierrez will participate in the Adult Day Treatment Program  3 days per week, 3 hours per day. Anticipated duration/discharge: 12 weeks    Due to COVID-19, services will be delivered via telemedicine until further notice.     Program Start Date: 9/10/20  Anticipated Discharge Date: 12/3/20 (pending authorization/clinical changes)    NOTE: Complete CGI     Review Date: Does Rama Gutierrez continue to meet criteria to participate in the Adult Day Treatment Program, 3 days per week; 3 hours per day?   20 yes   12/3/20                    Client Strengths:   caring, committed to sobriety, creative, educated, empathetic, has a previous history of therapy, intelligent, open to learning, open to suggestions / feedback and wants to learn    Client Participation in Plan:  Contributed to goals and plan     Areas of Vulnerability:  Suicidal Ideation   Anxiety  Depressive symptoms   ADHD     Long-Term Goals:  Knowledge about illness and management of symptoms   Maintenance of personal safety     Abuse Prevention Plan:  Safe, therapeutic environment   Safety coping plan as needed   Education regarding illness and skill development   Coordination with care providers     Discharge Criteria:  Satisfactory progress toward treatment goals   Improvement re: identified problems  and symptoms   Ability to continue recovery at next level of service   Has a discharge plan in place   Has safety/coping plan in place      Areas of Treatment Focus        Area of Treatment Focus:   Personal Safety  Start Date:    9/17/20    Goal:  Target Date: 11/12/20, 12/3/20 Status: Completed  Rama will notify staff when needing assistance to develop or implement a coping plan to manage suicidal or self injurious urges.Use coping plan for safety, as needed.      Progress:   11/12/20:   Client denied current suicidal ideation.  11/23/20:   Client denied suicidal ideation at discharge.        Treatment Strategies:   Assess / reassess level of potential for harm to self or others  Engage in safety planning when indicated  Facilitate increased self awareness          Area of Treatment Focus:   Symptom Stabilization and Management  Start Date:    9/17/20    Goal:  Target Date: 11/12/20, 12/3/20 Status: Completed  When in life skills Rama  will provide an update related to perceived progress with mental health recovery weekly.      Progress:   11/12/20:   Client is working on recovery goals.    11/23/20:  Rama completed recovery goals.  She has established a daily and weekly routine to maintain stability.        Treatment Strategies:   Facilitate increased self awareness  Provide education regarding strategies and coping skills to assist with mental health recovery          Area of Treatment Focus:   Wellness and Mental Health  Start Date:    9/17/20    Goal:  Target Date: 11/12/20, 12/3/20 Status: Completed  Terra will improve wellness related behaviors by getting enough sleep,exercise, balanced nutrition and take medications (if prescribed) to maintain good mental health.      Progress:   11/12/20:  Rama is working on a stable sleep and wakefulness schedule.  Appetite has been low due to medication side effect.  She is considering a medication change.    11/23/20:  Terra obtained medication change and appetite  "is now normal.  Sleep and medication use has been stable.  Completed goal.        Treatment Strategies:   Facilitate increased self awareness  Provide education regarding wellness habits and routines which can assist with mental health recovery          Area of Treatment Focus:   Community Resources / Support and Discharge Planning  Start Date:    9/17/20    Goal:  Target Date: 11/12/20, 12/3/20 Status: Completed  Rama will establish an aftercare plan to include medical providers and social supports by discharge.      Progress:   11/12/20:  Terra resumed individual therapy.  She is seeing her psychiatry provider regularly.    11/23/20:  Goal completed.   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  She continues with the therapist and psychiatrist.    Treatment Strategies:   Facilitate increased self awareness  Provide education regarding relapse prevention,discharge planning and mental health support          Area of Treatment Focus:   Symptom Stabilization and Management  Start Date:    9/17/20    Goal:  Target Date: 11/12/20, 12/3/20 Status: Completed  Will learn and practice 1-2 coping skills to help improve depressive and anxiety symptoms.  Rama also wants to practice sharing her emotions and to increase connections with others.      Progress:   11/12/20:  Rama worked on use of self-soothing skills and distraction to manage depressive and anxiety symptoms.  Continue.  11/23/20:  Rama added use of seeking support from roommate and online friends to manage symptoms.  She also sought increased care with trying medications for ADHD.          Treatment Strategies:   Teach adaptive coping skills and communication skills         Ugo Hickey, OTR/L      NOTE: Required signatures are completed manually and scanned into the electronic medical record. See \"Media\" tab in epic.    The Individualized Treatment Plan Signature Page has been routed to the provider for co-sign.      "

## 2020-09-10 NOTE — PATIENT INSTRUCTIONS
Continue your current medications.    Referral placed for psychiatry. I will work with our team here to help get that scheduled, but see if [facility] has any availability as well.

## 2020-09-10 NOTE — GROUP NOTE
Psychotherapy Group Note    PATIENT'S NAME: Rama Gutierrez  MRN:   [id number]  :   1990  ACCT. NUMBER: [id number]  DATE OF SERVICE: 9/10/20  START TIME: 10:00 AM  END TIME: 10:50 AM  FACILITATOR: Sofia Ochoa LICSW  TOPIC: MH EBP Group: Specialty Awareness  Adult Mental Health Day Treatment  TRACK: 5A    NUMBER OF PARTICIPANTS: 5    Summary of Group / Topics Discussed:  Specialty Topics: Life Transitions: The topic of life transitions was presented in order to help patients to better understand the challenges presented by life transitions, and how to best navigate them. Exploring the phases of transition and how one works through them was discussed. Patients were provided with information regarding community resources.     Patient Session Goals / Objectives:    Discussed the timing and nature of major life transitions    Explored how life transitions may impact mental health and functioning    Discussed coping strategies to manage symptoms and help with transitioning    Discussed and planned a successful transition    Telemedicine Visit: The patient's condition can be safely assessed and treated via synchronous audio and visual telemedicine encounter.      Reason for Telemedicine Visit: Services only offered telehealth    Originating Site (Patient Location): Patient's home    Distant Site (Provider Location): Provider Remote Setting    Consent:  The patient/guardian has verbally consented to: the potential risks and benefits of telemedicine (video visit) versus in person care; bill my insurance or make self-payment for services provided; and responsibility for payment of non-covered services.     Mode of Communication:  Video Conference via [employer]    As the provider I attest to compliance with applicable laws and regulations related to telemedicine.         Patient Participation / Response:  Fully participated with the group by sharing personal reflections / insights and openly received /  provided feedback with other participants.    Identified / Expressed readiness to act on skill suggestions discussed in topic    Treatment Plan:  Patient has an initial individualized treatment plan that was created as part of their diagnostic assessment / admission process.  A master individualized treatment plan is in the process of being developed with the patient and multi-disciplinary care team.    HIEU DominguezSW

## 2020-09-10 NOTE — GROUP NOTE
Process Group Note    PATIENT'S NAME: Rama Gutierrez  MRN:   [id number]  :   1990  ACCT. NUMBER: [id number]  DATE OF SERVICE: 9/10/20  START TIME:  9:00 AM  END TIME:  9:50 AM  FACILITATOR: Sofia Ochoa LICSW  TOPIC:  Process Group    Diagnoses:  296.33 (F33.2) Major Depressive Disorder, Recurrent Episode, Severe _ and With anxious distress  300.02 (F41.1) Generalized Anxiety Disorder  314.01 (F90.9) Unspecified Attention -Deficit / Hyperactivity Disorder      Adult Mental Health Day Treatment  TRACK: 5A    NUMBER OF PARTICIPANTS: 4    Telemedicine Visit: The patient's condition can be safely assessed and treated via synchronous audio and visual telemedicine encounter.      Reason for Telemedicine Visit: Services only offered telehealth    Originating Site (Patient Location): Patient's home    Distant Site (Provider Location): Provider Remote Setting    Consent:  The patient/guardian has verbally consented to: the potential risks and benefits of telemedicine (video visit) versus in person care; bill my insurance or make self-payment for services provided; and responsibility for payment of non-covered services.     Mode of Communication:  Video Conference via [employer]    As the provider I attest to compliance with applicable laws and regulations related to telemedicine.           Data:    Session content: At the start of this group, patients were invited to check in by identifying themselves, describing their current emotional status, and identifying issues to address in this group.   Area(s) of treatment focus addressed in this session included Symptom Management, Personal Safety and Community Resources/Discharge Planning.  Writer welcomed and oriented client to groups.  Writer reviewed confidentiality, limitations of confidentiality, and group guidelines.  Client denied suicidal ideation, intent and plan. 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  She stated that she had low back pain and could not do the warehouse work any longer.  She stated that she was struggling to find work that she could succeed at.  She stated that she was recently tested for ADHD and was diagnosed with ADHD.  She stated that she would like to work on symptom management skills.   She would like to increase her comfort in sharing her emotions with others.  She also would like to increase her support system.  Currently her support system includes her therapist, her roommate, and family.  She stated that her previous psychiatrist left the clinic so she is looking for a new provider.      Therapeutic Interventions/Treatment Strategies:  Psychotherapist offered support, feedback and validation and reinforced use of skills. Treatment modalities used include Cognitive Behavioral Therapy. Interventions include Cognitive Restructuring:  Assisted patient in formulating new neutral/positive alternatives to challenge less helpful / ineffective thoughts.    Assessment:    Patient response:   Patient responded to session by focusing on goals and being attentive    Possible barriers to participation / learning include: and no barriers identified    Health Issues:   None reported       Substance Use Review:   Substance Use: No active concerns identified.    Mental Status/Behavioral Observations  Appearance:   Appropriate   Eye Contact:   Good   Psychomotor Behavior: Normal   Attitude:   Cooperative   Orientation:   All  Speech   Rate / Production: Normal    Volume:  Normal   Mood:    Anxious  Depressed   Affect:    Appropriate   Thought Content:   Clear  Thought Form:  Coherent  Logical     Insight:    Good     Plan:     Safety Plan: No current safety concerns identified.  Recommended that patient call 911 or go to the local ED should there be a change in any of these risk factors.     Barriers to treatment: None identified    Patient Contracts (see media tab):   None    Substance Use: Not addressed in session     Continue or Discharge: Patient will continue in Adult Day Treatment (ADT)  as planned. Patient is likely to benefit from learning and using skills as they work toward the goals identified in their treatment plan.      Sofia Ochoa, [facility]  September 10, 2020

## 2020-09-10 NOTE — Clinical Note
Routing to PCP and SB4 PAL.   PCP - patient is currently SB3 but seems acutely more appropriate for SB4, just wanted to confirm before changing  SB4 PAL - can we help patient get expedited appointment with psychiatry? Recently hospitalized with suicidal ideation and worsening anxiety.

## 2020-09-10 NOTE — DISCHARGE SUMMARY
Adult Mental Health Intensive Outpatient Discharge Summary/Instructions      Patient: Rama Gutierrez MRN: [id number]   : 1990 Age: 30 year old Sex: female     Admission Date: 9/10/20  Discharge Date: 20  Diagnosis: 296.33 (F33.2) Major Depressive Disorder, Recurrent Episode, Severe _ and With anxious distress  300.02 (F41.1) Generalized Anxiety Disorder  314.01 (F90.9) Unspecified Attention -Deficit / Hyperactivity Disorder    Focus of Treatment / Progress    Personal Safety: No suicidal ideation at discharge.     * Follow your safety plan     * Call crisis lines as needed:    [facility] [phone] [facility] [phone]  [facility] [phone] Crisis Connection [phone]  [facility] [phone] [facility] [phone]  [facility] [phone] National Suicide Prevention [phone]  [facility] [phone] Suicide Prevention [phone]  [facility] [phone]    Managing symptoms of:  Rama worked on skills to manage depression, anxiety.  She worked n skills to increase concentration and focus.      Community support/health:  [place] [address] ([phone]) [email or web address] [email or web address], [place] Crisis text line( Text [place] to 426261),  [facility]  [place], [place] ([phone])  [facility] [place], [place] ( [phone])     Managing Symptoms and Preventing Relapse    * Go to all of your appointments    * Take all medications as directed.      * Carry a current list if medication with you    * Do not use illicit (street) drugs.  Avoid alcohol    * Report these symptoms to your care team. These are early signs of relapse:   Thoughts of suicide   Losing more sleep   Increased confusion   Mood getting worse   Other:  Increased daytime sleep, increased isolation such as not talking to people online, laying in bed all day    *Use these skills daily:  Talk to someone you trust at least one time weekly, set boundaries  "and say \"no\", be assertive, act opposite of negative feelings, accept challenges with a positive attitude, exercise at least three times per week for 30 minutes,  get enough sleep, eat healthy foods, get into a good routine    Copy of summary sent to: In Epic    Follow up with psychiatrist / main caregiver: Kaibeh Benson, [facility]    Next visit: 11/25/20    Follow up with your therapist: Rozina Graf [facility]   Next visit: monthly visits or as scheduled    Go to group therapy and / or support groups at: [facility] and [facility] [place]([facility]) support groups    See your medical doctor about:  For an annual physical exam or any general wellness or illness as needed.    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  Rama lives with a roommate.    Your treatment team appreciates having the opportunity to work with you and wishes you the best.    Client Signature:Sent via [employer]  Due to COVID pandemic  Date / Time: 11/23/20 at 1230  Staff Signature:EMANUEL Bernard, [facility]  Date / Time:11/23/20 at 1230      "

## 2020-09-11 ASSESSMENT — PATIENT HEALTH QUESTIONNAIRE - PHQ9: SUM OF ALL RESPONSES TO PHQ QUESTIONS 1-9: 15

## 2020-09-14 ENCOUNTER — TELEPHONE (OUTPATIENT)
Dept: BEHAVIORAL HEALTH | Facility: CLINIC | Age: 30
End: 2020-09-14

## 2020-09-14 ENCOUNTER — HOSPITAL ENCOUNTER (OUTPATIENT)
Dept: BEHAVIORAL HEALTH | Facility: CLINIC | Age: 30
End: 2020-09-14
Attending: PSYCHIATRY & NEUROLOGY
Payer: COMMERCIAL

## 2020-09-14 PROCEDURE — 90853 GROUP PSYCHOTHERAPY: CPT | Mod: GT | Performed by: SOCIAL WORKER

## 2020-09-14 PROCEDURE — G0177 OPPS/PHP; TRAIN & EDUC SERV: HCPCS | Mod: 95

## 2020-09-14 NOTE — GROUP NOTE
Process Group Note    PATIENT'S NAME: Rama Gutierrez  MRN:   [id number]  :   1990  ACCT. NUMBER: [id number]  DATE OF SERVICE: 20  START TIME:  9:00 AM  END TIME:  9:50 AM  FACILITATOR: Sofia Ochoa LICSW  TOPIC:  Process Group    Diagnoses:  296.33 (F33.2) Major Depressive Disorder, Recurrent Episode, Severe _ and With anxious distress  300.02 (F41.1) Generalized Anxiety Disorder  314.01 (F90.9) Unspecified Attention -Deficit / Hyperactivity Disorder      Adult Mental Health Day Treatment  TRACK: 5A    NUMBER OF PARTICIPANTS: 4    Telemedicine Visit: The patient's condition can be safely assessed and treated via synchronous audio and visual telemedicine encounter.      Reason for Telemedicine Visit: Services only offered telehealth    Originating Site (Patient Location): Patient's home    Distant Site (Provider Location): Provider Remote Setting    Consent:  The patient/guardian has verbally consented to: the potential risks and benefits of telemedicine (video visit) versus in person care; bill my insurance or make self-payment for services provided; and responsibility for payment of non-covered services.     Mode of Communication:  Video Conference via [employer]    As the provider I attest to compliance with applicable laws and regulations related to telemedicine.            Data:    Session content: At the start of this group, patients were invited to check in by identifying themselves, describing their current emotional status, and identifying issues to address in this group.   Area(s) of treatment focus addressed in this session included Symptom Management, Personal Safety and Community Resources/Discharge Planning.  Rama reported having a pretty good weekend.  She stated that her mood is depressed.   She stated that she is not enjoying activities she usually enjoys, such as art.  She stated taht when she tried to do art she noticed a return of perfectionism.  She stated that she took a  nap as she was not enjoying things.  Peers offered support about ways to increase enjoyment of activities.      Therapeutic Interventions/Treatment Strategies:  Psychotherapist offered support, feedback and validation and reinforced use of skills. Treatment modalities used include Cognitive Behavioral Therapy. Interventions include Cognitive Restructuring:  Assisted patient in formulating new neutral/positive alternatives to challenge less helpful / ineffective thoughts.    Assessment:    Patient response:   Patient responded to session by listening and focusing on goals    Possible barriers to participation / learning include: and no barriers identified    Health Issues:   None reported       Substance Use Review:   Substance Use: No active concerns identified.    Mental Status/Behavioral Observations  Appearance:   Appropriate   Eye Contact:   Fair   Psychomotor Behavior: Normal   Attitude:   Cooperative   Orientation:   All  Speech   Rate / Production: Normal    Volume:  Normal   Mood:    Depressed  Anhedonia  Affect:    Appropriate   Thought Content:   Clear  Thought Form:  Coherent  Logical     Insight:    Good     Plan:     Safety Plan: No current safety concerns identified.  Recommended that patient call 911 or go to the local ED should there be a change in any of these risk factors.     Barriers to treatment: None identified    Patient Contracts (see media tab):  None    Substance Use: Not addressed in session     Continue or Discharge: Patient will continue in Adult Day Treatment (ADT)  as planned. Patient is likely to benefit from learning and using skills as they work toward the goals identified in their treatment plan.      Sofia Ochoa, [facility]  September 14, 2020

## 2020-09-14 NOTE — PROGRESS NOTES
"RN Review of Medical History / Physical Health Screen  Outpatient Mental Health Programs - Adult    Adult Mental Health Day Treatment    PATIENT'S NAME: Rama Gutierrez  MRN:   [id number]  :   1990  ACCT. NUMBER: [id number]  CURRENT AGE:  30 year old    DATE OF DIAGNOSTIC ASSESSMENT: 20  DATE OF ADMISSION: 9/10/20     Please see Diagnostic Assessment for additional Medical History.     General Health:   Have you had any exposure to any communicable disease in the past 2-3 weeks? no     Are you aware of safe sex practices? yes       Nutrition:    Are you on a special diet? If yes, please explain:  no   Do you have any concerns regarding your nutritional status? If yes, please explain:  no   Have you had any appetite changes in the last 3 months?  No     Have you had any weight loss or weight gain in the last 3 months?  No     Do you have a history of an eating disorder? no   Do you have a history of being in an eating disorder program? no     Patient height and weight recorded by RN in epic flowsheet: no No; Unable to measure  Because of temporary in-person programmatic suspension due to COVID-19 pandemic, all pt weights and heights will be collected through patient self-report an recorded in physical health screening progress note upon admission to the program.                            Height/Weight Review:  Patient reported height:  5'7\"      Patient reports weight:  Date last checked:  264 pounds; last week or 2 weeks ago   Any referrals/needs identified?                BMI Review:  Was the patient informed of BMI? no see above      Findings See above       Fall Risk:   Have you had any falls in the past 3 months? no     Do you currently use any assistive devices for mobility?   no      Additional Comments/Assessment: Reports getting lightheaded at times like when over-exherting (mild); otherwise, Pt denies seizures (current/historical), dizziness, mobility concerns. No fall risk assessed; No safety " concerns r/t falls.      Per completion of the Medical History / Physical Health Screen, is there a recommendation to see / follow up with a primary care physician/clinic or dentist?    No.      Merry Newman RN  9/14/2020

## 2020-09-14 NOTE — GROUP NOTE
Psychotherapy Group Note    PATIENT'S NAME: Rama Gutierrez  MRN:   [id number]  :   1990  ACCT. NUMBER: [id number]  DATE OF SERVICE: 20  START TIME: 10:00 AM  END TIME: 10:50 AM  FACILITATOR: Sofia Ochoa LICSW  TOPIC: MH EBP Group: Mindfulness  Adult Mental Health Day Treatment  TRACK: 5A    NUMBER OF PARTICIPANTS: 5    Summary of Group / Topics Discussed:  Mindfulness: What is Mindfulness: Patients received an overview on what mindfulness is and how mindfulness can benefit general health, mental health symptoms, and stressors. The history of mindfulness, its application to mental health therapies, and key concepts were also discussed. Patients discussed current awareness, knowledge, and practice of mindfulness skills. Patients also discussed barriers to mindfulness practice.     Patient Session Goals / Objectives:    Demonstrated understanding of key concepts and application to daily life    Identified when/how to use mindfulness     Resolved barriers to practice    Identified plan to use mindfulness in daily life    Telemedicine Visit: The patient's condition can be safely assessed and treated via synchronous audio and visual telemedicine encounter.      Reason for Telemedicine Visit: Services only offered telehealth    Originating Site (Patient Location): Patient's home    Distant Site (Provider Location): Provider Remote Setting    Consent:  The patient/guardian has verbally consented to: the potential risks and benefits of telemedicine (video visit) versus in person care; bill my insurance or make self-payment for services provided; and responsibility for payment of non-covered services.     Mode of Communication:  Video Conference via [employer]    As the provider I attest to compliance with applicable laws and regulations related to telemedicine.       Patient Participation / Response:  Fully participated with the group by sharing personal reflections / insights and openly received /  provided feedback with other participants.    Identified / Expressed personal readiness to practice mindfulness skills    Treatment Plan:  Patient has an initial individualized treatment plan that was created as part of their diagnostic assessment / admission process.  A master individualized treatment plan is in the process of being developed with the patient and multi-disciplinary care team.    Sofia Ochoa [facility] CenterSW

## 2020-09-14 NOTE — GROUP NOTE
Psychoeducation Group Note    PATIENT'S NAME: Rama Gutierrez  MRN:   [id number]  :   1990  [id number]  DATE OF SERVICE: 20  START TIME: 11:00 AM  END TIME: 11:50 AM  FACILITATOR: Merry Newman RN  TOPIC: MH RN Group: [facility]  Telemedicine Visit: The patient's condition can be safely assessed and treated via synchronous audio and visual telemedicine encounter.      Reason for Telemedicine Visit:  covid19    Originating Site (Patient Location): Patient's home    Distant Site (Provider Location): Provider Remote Setting    Consent:  The patient/guardian has verbally consented to: the potential risks and benefits of telemedicine (video visit) versus in person care; bill my insurance or make self-payment for services provided; and responsibility for payment of non-covered services.     Mode of Communication:  Video Conference via [employer]    As the provider I attest to compliance with applicable laws and regulations related to telemedicine.      Adult Mental Health Day Treatment  TRACK: 5A    NUMBER OF PARTICIPANTS: 5    Summary of Group / Topics Discussed:  Foundations of Health: Sleep: Pathophysiology of sleep disorders: The anatomy of sleep was reviewed including structures, stages, mechanisms, and the purpose that sleep has on the brain. Sleep disorders were discussed within the group with a focus on gaining knowledge about the etiology and pathophysiology of insomnia, sleep apnea, restless leg syndrome, narcolepsy, medications that can cause risk for sleeping disorders, and other symptoms/factors that may interfere with sleep. Risk factors for developing sleep disorders were discussed and treatments/pharmacological options were explored.     Patient Session Goals / Objectives:  ? Described the effect and purpose of sleep on the brain and identify the amount of sleep needed  ? Identified differences between sleep disorders and risks associated with sleep  disorders  ? Described the connection between sleep disturbances and mental illness    Increased knowledge about treatments for sleep disorders      Patient Participation / Response:  Fully participated with the group by sharing personal reflections / insights and openly received / provided feedback with other participants.    Demonstrated understanding of topics discussed through group discussion and participation and Identified / Expressed personal readiness to practice skills    Treatment Plan:  Patient has an initial individualized treatment plan that was created as part of their diagnostic assessment / admission process.  A master individualized treatment plan is in the process of being developed with the patient and multi-disciplinary care team.    Merry Newman RN

## 2020-09-17 ENCOUNTER — VIRTUAL VISIT (OUTPATIENT)
Dept: BEHAVIORAL HEALTH | Facility: CLINIC | Age: 30
End: 2020-09-17
Payer: COMMERCIAL

## 2020-09-17 ENCOUNTER — HOSPITAL ENCOUNTER (OUTPATIENT)
Dept: BEHAVIORAL HEALTH | Facility: CLINIC | Age: 30
End: 2020-09-17
Attending: PSYCHIATRY & NEUROLOGY
Payer: COMMERCIAL

## 2020-09-17 DIAGNOSIS — F33.2 MAJOR DEPRESSIVE DISORDER, RECURRENT EPISODE, SEVERE WITH ANXIOUS DISTRESS (H): Primary | ICD-10-CM

## 2020-09-17 PROCEDURE — 90839 PSYTX CRISIS INITIAL 60 MIN: CPT | Mod: 95 | Performed by: COUNSELOR

## 2020-09-17 PROCEDURE — 90853 GROUP PSYCHOTHERAPY: CPT | Mod: 95 | Performed by: SOCIAL WORKER

## 2020-09-17 PROCEDURE — 90853 GROUP PSYCHOTHERAPY: CPT | Mod: GT | Performed by: SOCIAL WORKER

## 2020-09-17 ASSESSMENT — COLUMBIA-SUICIDE SEVERITY RATING SCALE - C-SSRS
LETHALITY/MEDICAL DAMAGE CODE MOST LETHAL ACTUAL ATTEMPT: NO PHYSICAL DAMAGE OR VERY MINOR PHYSICAL DAMAGE
1. IN THE PAST MONTH, HAVE YOU WISHED YOU WERE DEAD OR WISHED YOU COULD GO TO SLEEP AND NOT WAKE UP?: YES
REASONS FOR IDEATION LIFETIME: MOSTLY TO END OR STOP THE PAIN (YOU COULDN'T GO ON LIVING WITH THE PAIN OR HOW YOU WERE FEELING)
REASONS FOR IDEATION PAST MONTH: MOSTLY TO END OR STOP THE PAIN (YOU COULDN'T GO ON LIVING WITH THE PAIN OR HOW YOU WERE FEELING)
LETHALITY/MEDICAL DAMAGE CODE FIRST PROTENTIAL ATTEMPT: BEHAVIOR LIKELY TO RESULT IN INJURY BUT NOT LIKELY TO CAUSE DEATH
4. HAVE YOU HAD THESE THOUGHTS AND HAD SOME INTENTION OF ACTING ON THEM?: YES
MOST LETHAL DATE: 65623
2. HAVE YOU ACTUALLY HAD ANY THOUGHTS OF KILLING YOURSELF?: YES
5. HAVE YOU STARTED TO WORK OUT OR WORKED OUT THE DETAILS OF HOW TO KILL YOURSELF? DO YOU INTEND TO CARRY OUT THIS PLAN?: YES
TOTAL  NUMBER OF INTERRUPTED ATTEMPTS PAST 3 MONTHS: 1
LETHALITY/MEDICAL DAMAGE CODE MOST RECENT POTENTIAL ATTEMPT: BEHAVIOR LIKELY TO RESULT IN INJURY BUT NOT LIKELY TO CAUSE DEATH
6. HAVE YOU EVER DONE ANYTHING, STARTED TO DO ANYTHING, OR PREPARED TO DO ANYTHING TO END YOUR LIFE?: YES
2. HAVE YOU ACTUALLY HAD ANY THOUGHTS OF KILLING YOURSELF LIFETIME?: YES
TOTAL  NUMBER OF ABORTED OR SELF INTERRUPTED ATTEMPTS PAST 3 MONTHS: NO
MOST RECENT DATE: 65623
TOTAL  NUMBER OF INTERRUPTED ATTEMPTS PAST 3 MONTHS: YES
TOTAL  NUMBER OF ABORTED OR SELF INTERRUPTED ATTEMPTS PAST LIFETIME: NO
TOTAL  NUMBER OF INTERRUPTED ATTEMPTS PAST 3 MONTHS: 1
LETHALITY/MEDICAL DAMAGE CODE MOST RECENT ACTUAL ATTEMPT: NO PHYSICAL DAMAGE OR VERY MINOR PHYSICAL DAMAGE
1. IN THE PAST MONTH, HAVE YOU WISHED YOU WERE DEAD OR WISHED YOU COULD GO TO SLEEP AND NOT WAKE UP?: YES
5. HAVE YOU STARTED TO WORK OUT OR WORKED OUT THE DETAILS OF HOW TO KILL YOURSELF? DO YOU INTEND TO CARRY OUT THIS PLAN?: YES
3. HAVE YOU BEEN THINKING ABOUT HOW YOU MIGHT KILL YOURSELF?: YES
4. HAVE YOU HAD THESE THOUGHTS AND HAD SOME INTENTION OF ACTING ON THEM?: YES
ATTEMPT PAST THREE MONTHS: NO
ATTEMPT LIFETIME: NO
6. HAVE YOU EVER DONE ANYTHING, STARTED TO DO ANYTHING, OR PREPARED TO DO ANYTHING TO END YOUR LIFE?: YES
TOTAL  NUMBER OF INTERRUPTED ATTEMPTS LIFETIME: YES

## 2020-09-17 NOTE — PROGRESS NOTES
Progress Note - Initial Session    Client Name:  Rama Gutierrez Date: 9/17/2020           Service Type: Individual2     Session Start Time: 12:30pm  Session End Time: 1:25pm     Session Length: 55 mins     Session #: 1    Attendees: Client attended alone    Service Modality:  Video Visit:    Telemedicine Visit: The patient's condition can be safely assessed and treated via synchronous audio and visual telemedicine encounter.      Reason for Telemedicine Visit: Services only offered telehealth    Originating Site (Patient Location): Patient's home    Distant Site (Provider Location): Provider Remote Setting    Consent:  The patient/guardian has verbally consented to: the potential risks and benefits of telemedicine (video visit) versus in person care; bill my insurance or make self-payment for services provided; and responsibility for payment of non-covered services.     Patient would like the video invitation sent by: Send to e-mail at: [email or web address]    Mode of Communication:  Video Conference via [employer]    As the provider I attest to compliance with applicable laws and regulations related to telemedicine.       DATA:  Diagnostic Assessment in progress.  Unable to complete documentation at the conclusion of the first session due to need for creation of safety plan due to recent and current suicidal ideation and plan.    Interactive Complexity: No  Crisis: Yes, visit entailed Crisis Management / Stabilization requiring urgent assessment and history of the crisis state, mental status exam and disposition    Intervention:  Solution Focused: created safety plan to mitigate risk. Assist with crisis planning     ASSESSMENT:  Mental Status Assessment:  Appearance:   Appropriate   Eye Contact:   Good   Psychomotor Behavior: Normal   Attitude:   Cooperative   Orientation:   All  Speech   Rate / Production: Monotone    Volume:  Normal   Mood:    Anxious  Depressed   Affect:    Flat   Thought  "Content:  Rumination   Thought Form:  Coherent   Insight:    Fair       Safety Issues and Plan for Safety and Risk Management:     [place] Suicide Severity Rating Scale (Lifetime/Recent)  [place] Suicide Severity Rating (Lifetime/Recent) 9/17/2020   1. Wish to be Dead (Lifetime) Yes   1. Wish to be Dead (Recent) Yes   2. Non-Specific Active Suicidal Thoughts (Lifetime) Yes   2. Non-Specific Active Suicidal Thoughts (Recent) Yes   3. Active Suicidal Ideation with any Methods (Not Plan) Without Intent to Act (Lifetime) Yes   3. Active Sucidal Ideation with any Methods (Not Plan) Without Intent to Act (Recent) Yes   4. Active Suicidal Ideation with Some Intent to Act, Without Specific Plan (Lifetime) Yes   4. Active Suicidal Ideation with Some Intent to Act, Without Specific Plan (Recent) Yes   5. Active Suicidal Ideation with Specific Plan and Intent (Lifetime) Yes   5. Active Suicidal Ideation with Specific Plan and Intent (Recent) Yes   Active Suicidal Ideation with Specific Plan and Intent Description (Recent) \"I was going to take all the pills in the house I could find and walk into the woods\"   Most Severe Ideation Rating (Lifetime) 5   Frequency (Lifetime) 3   Duration (Lifetime) 3   Controllability (Lifetime) 4   Protective Factors  (Lifetime) 1   Reasons for Ideation (Lifetime) 4   Most Severe Ideation Rating (Past Month) 5   Frequency (Past Month) 2   Duration (Past Month) 5   Controllability (Past Month) 5   Protective Factors (Past Month) 4   Reasons for Ideation (Past Month) 4   Actual Attempt (Lifetime) No   Actual Attempt (Past 3 Months) No   Has subject engaged in non-suicidal self-injurious behavior? (Lifetime) No   Has subject engaged in non-suicidal self-injurious behavior? (Past 3 Months) No   Interrupted Attempts (Lifetime) Yes   Total Number of Interrupted Attempts (Lifetime) 1   Interrupted Attempts (Past 3 Months) Yes   Total Number of Interrupted Attempts (Past 3 Months) 1   Aborted or " "Self-Interrupted Attempt (Lifetime) No   Aborted or Self-Interrupted Attempt (Past 3 Months) No   Preparatory Acts or Behavior (Lifetime) Yes   Preparatory Acts or Behavior (Past 3 Months) Yes   Preparatory Acts or Behavior Description (Past 3 Months) \"Organizing things I needed to finish and I was preparing to write notes to friends\"   Most Recent Attempt Date [id number]   Most Recent Attempt Actual Lethality Code 0   Most Recent Attempt Potential Lethality Code 1   Most Lethal Attempt Actual Lethality Code 0   Most Lethal Attemplt Potential Lethality Code 1     Patient denies current fears or concerns for personal safety.  Patient reports the following current or recent suicidal ideation or behaviors: Client reported recent thoughts about taking all of the pills in the home and going into the woods to die. Told her roommate who brought her to ER and client is current in Day Treatment and established with another therapist at [facility] but would like to change so sought out this provider . .  Patient denies current or recent homicidal ideation or behaviors.  Patient denies current or recent self injurious behavior or ideation.  Patient denies other safety concerns.  A safety and risk management plan has been developed including: Patient consented to co-developed safety plan.  Safety and risk management plan was completed.  Patient agreed to use safety plan should any safety concerns arise.  A copy was given to the patient.  Referred patient to: Day Treatment. Sent client safety plan through [employer] and she confirmed receipt. Client agreed to show her roommate as well. Provider encouraged client to use day tx or crisis numbers if needed.   Patient reports there are no firearms in the house.     Diagnostic Criteria:  CRITERIA (A-C) REPRESENT A MAJOR DEPRESSIVE EPISODE - SELECT THESE CRITERIA   - Depressed mood. Note: In children and adolescents, can be irritable mood.     - Diminished interest or pleasure in all, " or almost all, activities.    - Increased sleep.    - Fatigue or loss of energy.    - Feelings of worthlessness or inappropriate and excessive guilt.    - Diminished ability to think or concentrate, or indecisiveness.    - Recurrent thoughts of death (not just fear of dying), recurrent suicidal ideation without a specific plan, or a suicide attempt or a specific plan for committing suicide.       DSM5 Diagnoses: (Sustained by DSM5 Criteria Listed Above)  Diagnoses: 296.33 (F33.2) Major Depressive Disorder, Recurrent Episode, Severe _ and With anxious distress  Psychosocial & Contextual Factors: lost job, finances, physical pain, family relationships   WHODAS 2.0 (12 item):   WHODAS 2.0 Total Score 5/2/2019 9/6/2020   Total Score 30 -   Total Score MyChart 30 35   Some encounter information is confidential and restricted. Go to Review Flowsheets activity to see all data.       Collateral Reports Completed:  Communicated with: Yasmin Watkins [facility] sent note for safety review       PLAN: (Homework, other):  Patient stated that she may follow up for ongoing services with [facility].  F/u Oct 2, 2020. Client reported she is actively engaged in day tx through [facility] 3x per week and sees a therapist at [facility].       Tresa Guerrero, [facility]AYLEE  September 17, 2020

## 2020-09-17 NOTE — GROUP NOTE
Psychotherapy Group Note    PATIENT'S NAME: Rama Gutierrez  MRN:   [id number]  :   1990  ACCT. NUMBER: [id number]  DATE OF SERVICE: 20  START TIME: 11:00 AM  END TIME: 11:50 AM  FACILITATOR: Sofia Padilla LICSW  TOPIC:  EBP Group: Enhanced Mindfulness  Adult Mental Health Day Treatment  TRACK: 5A    NUMBER OF PARTICIPANTS: 5    Summary of Group / Topics Discussed:  Enhanced Mindfulness: Body and Mind Integration: Patients received an overview and education regarding the importance of including the body in the management of emotional health and self-care and as a direct route to mindfulness practice.  Patients discussed various ways in which the body can serve as an informant to their physical and emotional experiences/need. Patients discussed the body as a direct link to the present moment and to mindfulness practice.  Patients discussed current relationship with body, self-awareness, mindfulness practice and barriers to connection with body.  Patients were guided through breath work and movement to facilitate greater self-awareness, grounding, self-expression, and connection to other.  Patients discussed how the experiential could be applied to better manage mental health and develop hankins connection to self.    Patient Session Goals / Objectives:    Identify how movement awareness could be used for grounding, stress management, self-expression, connection to other and self-regulation    Improved awareness of breath and movement preferences    Identify how movement and the body is used in mindfulness practice    Reflect on use of these practices in everyday life.    Identify barriers to attending to body  Telemedicine Visit: The patient's condition can be safely assessed and treated via synchronous audio and visual telemedicine encounter.      Reason for Telemedicine Visit: Services only offered telehealth and covid19    Originating Site (Patient Location): Patient's home    Distant Site (Provider  Location): Provider Remote Setting    Consent:  The patient/guardian has verbally consented to: the potential risks and benefits of telemedicine (video visit) versus in person care; bill my insurance or make self-payment for services provided; and responsibility for payment of non-covered services.     Mode of Communication:  Video Conference via [employer]    As the provider I attest to compliance with applicable laws and regulations related to telemedicine.       Patient Participation / Response:  Fully participated with the group by sharing personal reflections / insights and openly received / provided feedback with other participants.    Demonstrated understanding of topics discussed through group discussion and participation and Practiced skills in session    Treatment Plan:  Patient has a current master individualized treatment plan.  See Epic treatment plan for more information.    HIEU SoaresSW

## 2020-09-17 NOTE — GROUP NOTE
Process Group Note    PATIENT'S NAME: Rama Gutierrez  MRN:   [id number]  :   1990  ACCT. NUMBER: [id number]  DATE OF SERVICE: 20  START TIME:  9:00 AM  END TIME:  9:50 AM  FACILITATOR: Sofia Ochoa LICSW  TOPIC:  Process Group    Diagnoses:  296.33 (F33.2) Major Depressive Disorder, Recurrent Episode, Severe _ and With anxious distress  300.02 (F41.1) Generalized Anxiety Disorder  314.01 (F90.9) Unspecified Attention -Deficit / Hyperactivity Disorder      Adult Mental Health Day Treatment  TRACK: 5A    NUMBER OF PARTICIPANTS: 4    Telemedicine Visit: The patient's condition can be safely assessed and treated via synchronous audio and visual telemedicine encounter.      Reason for Telemedicine Visit: Services only offered telehealth    Originating Site (Patient Location): Patient's home    Distant Site (Provider Location): Provider Remote Setting    Consent:  The patient/guardian has verbally consented to: the potential risks and benefits of telemedicine (video visit) versus in person care; bill my insurance or make self-payment for services provided; and responsibility for payment of non-covered services.     Mode of Communication:  Video Conference via [employer]    As the provider I attest to compliance with applicable laws and regulations related to telemedicine.           Data:    Session content: At the start of this group, patients were invited to check in by identifying themselves, describing their current emotional status, and identifying issues to address in this group.   Area(s) of treatment focus addressed in this session included Symptom Management, Personal Safety and Community Resources/Discharge Planning.  Rama reported low mood, poor sleep, and low motivation.   She reported that she did research books and workbooks that she wants to read or complete and she added those items to a saved item list at an online bookstore.  She stated that she was able to enjoy working on her art  (cartooning) and was able to play video games. Client denied suicidal ideation, intent and plan.     Therapeutic Interventions/Treatment Strategies:  Psychotherapist offered support, feedback and validation and reinforced use of skills. Treatment modalities used include Motivational Interviewing and Cognitive Behavioral Therapy. Interventions include Behavioral Activation: Explored how behaviors effect mood and interact with thoughts and feelings.    Assessment:    Patient response:   Patient responded to session by focusing on goals and being attentive    Possible barriers to participation / learning include: and no barriers identified    Health Issues:   None reported       Substance Use Review:   Substance Use: No active concerns identified.    Mental Status/Behavioral Observations  Appearance:   Appropriate   Eye Contact:   Good   Psychomotor Behavior: Normal   Attitude:   Cooperative   Orientation:   All  Speech   Rate / Production: Normal    Volume:  Normal   Mood:    Anxious  Depressed   Affect:    Appropriate   Thought Content:   Clear  Thought Form:  Coherent  Logical     Insight:    Good     Plan:     Safety Plan: No current safety concerns identified.  Recommended that patient call 911 or go to the local ED should there be a change in any of these risk factors.     Barriers to treatment: None identified    Patient Contracts (see media tab):  None    Substance Use: Not addressed in session     Continue or Discharge: Patient will continue in Adult Day Treatment (ADT)  as planned. Patient is likely to benefit from learning and using skills as they work toward the goals identified in their treatment plan.      Sofia Ochoa, [facility]  September 17, 2020

## 2020-09-17 NOTE — LETTER
9/17/2020        RE: Rama Gutierrez  [address]  [place] [address]                         Progress Note - Initial Session    Client Name:  Rama Gutierrez Date: 9/17/2020           Service Type: Individual2     Session Start Time: 12:30pm  Session End Time: 1:25pm     Session Length: 55 mins     Session #: 1    Attendees: Client attended alone    Service Modality:  Video Visit:    Telemedicine Visit: The patient's condition can be safely assessed and treated via synchronous audio and visual telemedicine encounter.      Reason for Telemedicine Visit: Services only offered telehealth    Originating Site (Patient Location): Patient's home    Distant Site (Provider Location): Provider Remote Setting    Consent:  The patient/guardian has verbally consented to: the potential risks and benefits of telemedicine (video visit) versus in person care; bill my insurance or make self-payment for services provided; and responsibility for payment of non-covered services.     Patient would like the video invitation sent by: Send to e-mail at: [email or web address]    Mode of Communication:  Video Conference via Amwell    As the provider I attest to compliance with applicable laws and regulations related to telemedicine.       DATA:  Diagnostic Assessment in progress.  Unable to complete documentation at the conclusion of the first session due to need for creation of safety plan due to recent and current suicidal ideation and plan.    Interactive Complexity: No  Crisis: Yes, visit entailed Crisis Management / Stabilization requiring urgent assessment and history of the crisis state, mental status exam and disposition    Intervention:  Solution Focused: created safety plan to mitigate risk. Assist with crisis planning     ASSESSMENT:  Mental Status Assessment:  Appearance:   Appropriate   Eye Contact:   Good   Psychomotor Behavior: Normal   Attitude:   Cooperative   Orientation:   All  Speech   Rate / Production: Monotone  "   Volume:  Normal   Mood:    Anxious  Depressed   Affect:    Flat   Thought Content:  Rumination   Thought Form:  Coherent   Insight:    Fair       Safety Issues and Plan for Safety and Risk Management:     Holmes Suicide Severity Rating Scale (Lifetime/Recent)  Holmes Suicide Severity Rating (Lifetime/Recent) 9/17/2020   1. Wish to be Dead (Lifetime) Yes   1. Wish to be Dead (Recent) Yes   2. Non-Specific Active Suicidal Thoughts (Lifetime) Yes   2. Non-Specific Active Suicidal Thoughts (Recent) Yes   3. Active Suicidal Ideation with any Methods (Not Plan) Without Intent to Act (Lifetime) Yes   3. Active Sucidal Ideation with any Methods (Not Plan) Without Intent to Act (Recent) Yes   4. Active Suicidal Ideation with Some Intent to Act, Without Specific Plan (Lifetime) Yes   4. Active Suicidal Ideation with Some Intent to Act, Without Specific Plan (Recent) Yes   5. Active Suicidal Ideation with Specific Plan and Intent (Lifetime) Yes   5. Active Suicidal Ideation with Specific Plan and Intent (Recent) Yes   Active Suicidal Ideation with Specific Plan and Intent Description (Recent) \"I was going to take all the pills in the house I could find and walk into the woods\"   Most Severe Ideation Rating (Lifetime) 5   Frequency (Lifetime) 3   Duration (Lifetime) 3   Controllability (Lifetime) 4   Protective Factors  (Lifetime) 1   Reasons for Ideation (Lifetime) 4   Most Severe Ideation Rating (Past Month) 5   Frequency (Past Month) 2   Duration (Past Month) 5   Controllability (Past Month) 5   Protective Factors (Past Month) 4   Reasons for Ideation (Past Month) 4   Actual Attempt (Lifetime) No   Actual Attempt (Past 3 Months) No   Has subject engaged in non-suicidal self-injurious behavior? (Lifetime) No   Has subject engaged in non-suicidal self-injurious behavior? (Past 3 Months) No   Interrupted Attempts (Lifetime) Yes   Total Number of Interrupted Attempts (Lifetime) 1   Interrupted Attempts (Past 3 Months) Yes " "  Total Number of Interrupted Attempts (Past 3 Months) 1   Aborted or Self-Interrupted Attempt (Lifetime) No   Aborted or Self-Interrupted Attempt (Past 3 Months) No   Preparatory Acts or Behavior (Lifetime) Yes   Preparatory Acts or Behavior (Past 3 Months) Yes   Preparatory Acts or Behavior Description (Past 3 Months) \"Organizing things I needed to finish and I was preparing to write notes to friends\"   Most Recent Attempt Date 65623   Most Recent Attempt Actual Lethality Code 0   Most Recent Attempt Potential Lethality Code 1   Most Lethal Attempt Actual Lethality Code 0   Most Lethal Attemplt Potential Lethality Code 1     Patient denies current fears or concerns for personal safety.  Patient reports the following current or recent suicidal ideation or behaviors: Client reported recent thoughts about taking all of the pills in the home and going into the woods to die. Told her roommate who brought her to ER and client is current in Day Treatment and established with another therapist at [facility] but would like to change so sought out this provider . .  Patient denies current or recent homicidal ideation or behaviors.  Patient denies current or recent self injurious behavior or ideation.  Patient denies other safety concerns.  A safety and risk management plan has been developed including: Patient consented to co-developed safety plan.  Safety and risk management plan was completed.  Patient agreed to use safety plan should any safety concerns arise.  A copy was given to the patient.  Referred patient to: Day Treatment. Sent client safety plan through [employer] and she confirmed receipt. Client agreed to show her roommate as well. Provider encouraged client to use day tx or crisis numbers if needed.   Patient reports there are no firearms in the house.     Diagnostic Criteria:  CRITERIA (A-C) REPRESENT A MAJOR DEPRESSIVE EPISODE - SELECT THESE CRITERIA   - Depressed mood. Note: In children and adolescents, " can be irritable mood.     - Diminished interest or pleasure in all, or almost all, activities.    - Increased sleep.    - Fatigue or loss of energy.    - Feelings of worthlessness or inappropriate and excessive guilt.    - Diminished ability to think or concentrate, or indecisiveness.    - Recurrent thoughts of death (not just fear of dying), recurrent suicidal ideation without a specific plan, or a suicide attempt or a specific plan for committing suicide.       DSM5 Diagnoses: (Sustained by DSM5 Criteria Listed Above)  Diagnoses: 296.33 (F33.2) Major Depressive Disorder, Recurrent Episode, Severe _ and With anxious distress  Psychosocial & Contextual Factors: lost job, finances, physical pain, family relationships   WHODAS 2.0 (12 item):   WHODAS 2.0 Total Score 5/2/2019 9/6/2020   Total Score 30 -   Total Score MyChart 30 35   Some encounter information is confidential and restricted. Go to Review Flowsheets activity to see all data.       Collateral Reports Completed:  Communicated with: Yasmin Watkins [facility] sent note for safety review       PLAN: (Homework, other):  Patient stated that she may follow up for ongoing services with [facility].  F/u Oct 2, 2020. Client reported she is actively engaged in day tx through [facility] 3x per week and sees a therapist at [facility].       TREVOR Musa  September 17, 2020            Sincerely,        TREVOR Musa

## 2020-09-17 NOTE — PATIENT INSTRUCTIONS
"Rama Gutierrez     SAFETY PLAN:  Step 1: Warning signs / cues (Thoughts, images, mood, situation, behavior) that a crisis may be developing:    Thoughts: \"I don't matter\", \"People would be better off without me\", \"I'm a burden\", \"I can't do this anymore\", \"I just want this to end\" and \"Nothing makes it better\"    Images: obsessive thoughts of death or dying: repeating thoughts and visions of harm: taking pills and going into woods     Thinking Processes: ruminations (can't stop thinking about my problems): finances, independence, feeling like a burden, worry about success, racing thoughts, intrusive thoughts (bothersome, unwanted thoughts that come out of nowhere): suicidal thoughts and negative self talk and highly critical and negative thoughts: about self and life     Mood: worsening depression, hopelessness, helplessness, intense worry and agitation    Behaviors: isolating/withdrawing , can't stop crying, not taking care of myself, not taking care of my responsibilities, sleeping too much, not sleeping enough and increasing frequency and duration of dissociation    Situations: loss: support friendship, public shame: career related , pain, relationship problems, financial stress and medical condition / diagnosis: ADHD, lower back pain   Step 2: Coping strategies - Things I can do to take my mind off of my problems without contacting another person (relaxation technique, physical activity):    Distress Tolerance Strategies:  relaxation activities: video games, arts and crafts: drawing, paint by stickers, play with my pet , listen to positive and upbeat music: Ambient ( Retro synthwave, electronic) , sensory based activities/self-soothe with five senses:  , watch a funny movie: Jason Mendoza, Rory Barrera, Datlon Jennings  and paced breathing/progressive muscle relaxation    Physical Activities: go for a walk, exercise: swimming, yoga, gardening, meditation, deep " "breathing and stretching bike riding    Focus on helpful thoughts:  \"This is temporary\", \"I will get through this\", \"It always passes\", think about happy memories: time with friends, places I have been, remind myself of what is important to me: art, cat, friends, family and self-compassion statements: \"Be kind to myself\" \" I have a lot of compassion for others and should have for myself\"  Step 3: People and social settings that provide distraction:   Name: Khadra Phone: [phone]   Name: Alexandre Phone: [phone]   Name: [employer] friend  Bernie- online friend     pet store/humane society, zoo, coffee shop, park, community center and support group (i.e. twelve-step)   Step 4: Remind myself of people and things that are important to me and worth living for:  Friends, cats, family, visit [place], own a small home and have a quiet life, living with my roommate, getting more animals       Step 5: When I am in crisis, I can ask these people to help me use my safety plan:   Name: Khadra Phone: [phone]   Name: Alexandre Phone: [phone]  Step 6: Making the environment safe:     secure medications: give to someone else if needed  and be around others  Step 7: Professionals or agencies I can contact during a crisis:    [facility] Daytime Number: [phone]    Suicide Prevention Lifeline: 0-972-362-PWIW (7434)    Crisis Text Line Service (available 24 hours a day, 7 days a week): Text [place] to 339300  [facility] Crisis Services:  (24/7)    Call 911 or go to my nearest emergency department.   I helped develop this safety plan and agree to use it when needed.  I have been given a copy of this plan.      Client signature _________________________________________________________________  Today s date:  9/17/2020  Adapted from Safety Plan Template 2008 Jessica Lopez and Pankaj Dc is reprinted with the express permission of the authors.  No portion of the Safety Plan Template may be reproduced " "without the express, written permission.  You can contact the authors at [email or web address] or [email or web address].                                             Rama Gutierrez     SAFETY PLAN:  Step 1: Warning signs / cues (Thoughts, images, mood, situation, behavior) that a crisis may be developing:    Thoughts: \"I don't matter\", \"People would be better off without me\", \"I'm a burden\", \"I can't do this anymore\", \"I just want this to end\" and \"Nothing makes it better\"    Images: obsessive thoughts of death or dying: repeating thoughts and visions of harm: taking pills and going into woods     Thinking Processes: ruminations (can't stop thinking about my problems): finances, independence, feeling like a burden, worry about success, racing thoughts, intrusive thoughts (bothersome, unwanted thoughts that come out of nowhere): suicidal thoughts and negative self talk and highly critical and negative thoughts: about self and life     Mood: worsening depression, hopelessness, helplessness, intense worry and agitation    Behaviors: isolating/withdrawing , can't stop crying, not taking care of myself, not taking care of my responsibilities, sleeping too much, not sleeping enough and increasing frequency and duration of dissociation    Situations: loss: support friendship, public shame: career related , pain, relationship problems, financial stress and medical condition / diagnosis: ADHD, lower back pain   Step 2: Coping strategies - Things I can do to take my mind off of my problems without contacting another person (relaxation technique, physical activity):    Distress Tolerance Strategies:  relaxation activities: video games, arts and crafts: drawing, paint by stickers, play with my pet , listen to positive and upbeat music: Ambient ( Retro synthwave, electronic) , sensory based activities/self-soothe with five senses:  , watch a funny movie: Jason Mendoza, Dalton Peñaloza  and paced " "breathing/progressive muscle relaxation    Physical Activities: go for a walk, exercise: swimming, yoga, gardening, meditation, deep breathing and stretching bike riding    Focus on helpful thoughts:  \"This is temporary\", \"I will get through this\", \"It always passes\", think about happy memories: time with friends, places I have been, remind myself of what is important to me: art, cat, friends, family and self-compassion statements: \"Be kind to myself\" \" I have a lot of compassion for others and should have for myself\"  Step 3: People and social settings that provide distraction:   Name: Khadra Phone: [phone]   Name: Alexandre Phone: [phone]   Name: [employer] friend  Bernie- online friend     pet store/humane society, zoo, coffee shop, park, community center and support group (i.e. twelve-step)   Step 4: Remind myself of people and things that are important to me and worth living for:  Friends, cats, family, visit [place], own a small home and have a quiet life, living with my roommate, getting more animals       Step 5: When I am in crisis, I can ask these people to help me use my safety plan:   Name: Khadra Phone: [phone]   Name: Alexandre Phone: [phone]  Step 6: Making the environment safe:     secure medications: give to someone else if needed  and be around others  Step 7: Professionals or agencies I can contact during a crisis:    [facility] Daytime Number: [phone]    Suicide Prevention Lifeline: 0-255-662-ACJJ (6557)    Crisis Text Line Service (available 24 hours a day, 7 days a week): Text [place] to 361134  Local Crisis Services:  (24/7)    Call 911 or go to my nearest emergency department.   I helped develop this safety plan and agree to use it when needed.  I have been given a copy of this plan.      Client signature _________________________________________________________________  Today s date:  9/17/2020  Adapted from Safety Plan Template 2008 Jessica Lopez and " Pankaj Dc is reprinted with the express permission of the authors.  No portion of the Safety Plan Template may be reproduced without the express, written permission.  You can contact the authors at [email or web address] or [email or web address].

## 2020-09-17 NOTE — GROUP NOTE
Psychotherapy Group Note    PATIENT'S NAME: Rama Gutierrez  MRN:   [id number]  :   1990  ACCT. NUMBER: [id number]  DATE OF SERVICE: 20  START TIME: 10:00 AM  END TIME: 10:50 AM  FACILITATOR: Sofia Ochoa LICSW  TOPIC: MH EBP Group: Behavioral Activation  Adult Mental Health Day Treatment  TRACK: 5A    NUMBER OF PARTICIPANTS: 4    Summary of Group / Topics Discussed:  Behavioral Activation: Introduction and Overview of Behavioral Activation: Patients explored how behaviors affect mood and interact with thoughts and feelings (CBT/DBT).   Discussions included reviewing the benefits of making behavioral changes, and the barriers to doing so.  Specific skills introduced: taking small steps, increasing motivation, decreasing procrastination and withdrawal.  Encouraged patient exploration / reflection on the relationship between their behaviors, thoughts, and feelings.    Patient Session Goals / Objectives:    Understand the importance of behavioral patterns and how making behavioral changes benefits overall mental and physical health.    Share experiences and challenges with making behavioral changes      Identify personal barriers to change    Telemedicine Visit: The patient's condition can be safely assessed and treated via synchronous audio and visual telemedicine encounter.      Reason for Telemedicine Visit: Services only offered telehealth    Originating Site (Patient Location): Patient's home    Distant Site (Provider Location): Provider Remote Setting    Consent:  The patient/guardian has verbally consented to: the potential risks and benefits of telemedicine (video visit) versus in person care; bill my insurance or make self-payment for services provided; and responsibility for payment of non-covered services.     Mode of Communication:  Video Conference via Bnooki    As the provider I attest to compliance with applicable laws and regulations related to telemedicine.         Patient  Participation / Response:  Fully participated with the group by sharing personal reflections / insights and openly received / provided feedback with other participants.    Expressed understanding of the relationship between behaviors, thoughts, and feelings and Identified barriers to change    Treatment Plan:  Patient has a current master individualized treatment plan.  See Epic treatment plan for more information.    Sofia Ochoa, LICSW

## 2020-09-17 NOTE — GROUP NOTE
"Process Group Note    PATIENT'S NAME: Rama Gutierrez  MRN:   [id number]  :   1990  ACCT. NUMBER: [id number]  DATE OF SERVICE: 20  START TIME: 10:00 AM  END TIME: 10:50 AM  FACILITATOR: Sofia Ochoa LICSW  TOPIC: MH Process Group    Diagnoses:  ***    Adult Mental Health Day Treatment  TRACK: 5A    NUMBER OF PARTICIPANTS: 4    Telemedicine Visit: The patient's condition can be safely assessed and treated via synchronous audio and visual telemedicine encounter.      Reason for Telemedicine Visit: Services only offered telehealth    Originating Site (Patient Location): Patient's home    Distant Site (Provider Location): Provider Remote Setting    Consent:  The patient/guardian has verbally consented to: the potential risks and benefits of telemedicine (video visit) versus in person care; bill my insurance or make self-payment for services provided; and responsibility for payment of non-covered services.     Mode of Communication:  Video Conference via [employer]ocrata    As the provider I attest to compliance with applicable laws and regulations related to telemedicine.           Data:    Session content: At the start of this group, patients were invited to check in by identifying themselves, describing their current emotional status, and identifying issues to address in this group.   Area(s) of treatment focus addressed in this session included {OP BEH ADULT MH AREA OF TREATMENT FOCUS:625740}.  ***    Therapeutic Interventions/Treatment Strategies:  {OP MH THERAPEUTIC INTERVENTIONS/TREATMENT:139857}    Assessment:    Patient response:   Patient responded to session by {PATIENT RESPONSE:615016}    Possible barriers to participation / learning include: {POSSIBLE BARRIERS:852044}    Health Issues:   {YES / NO:793999}       Substance Use Review:   Substance Use: {YES / NO:893398}    Mental Status/Behavioral Observations  Appearance:   {Appearance:500668::\"Appropriate \"}  Eye Contact:   {Eye " "Contact:146873::\"Good \"}  Psychomotor Behavior: {Psychomotor Behavior:956007::\"Normal \"}  Attitude:   {Attitude:006086::\"Cooperative \"}  Orientation:   {Orientation:275989::\"All\"}  Speech   Rate / Production: {Speech Rate/Production:442822::\"Normal \"}   Volume:  {Speech Volume:929861::\"Normal \"}  Mood:    {Mood:951939::\"Normal\"}  Affect:    {Affect:964797::\"Appropriate \"}  Thought Content:   {Thought Content with Safety:229769}  Thought Form:  {Thought Form:639133::\"Coherent \",\"Logical \"}    Insight:    {Insight:212578::\"Good \"}    Plan:     Safety Plan: {SAFETY PLAN:616984}     Barriers to treatment: {Barriers to Treatment:052059}    Patient Contracts (see media tab):  {Patient Contracts:356569}    Substance Use: {SUBSTANCE USE ASSESSMENT/INTERVENTION:257655}     Continue or Discharge: {Continue or Discharge:872338}      Sofia Ochoa, [facility]  September 17, 2020  "

## 2020-09-21 ENCOUNTER — HOSPITAL ENCOUNTER (OUTPATIENT)
Dept: BEHAVIORAL HEALTH | Facility: CLINIC | Age: 30
End: 2020-09-21
Attending: PSYCHIATRY & NEUROLOGY
Payer: COMMERCIAL

## 2020-09-21 PROCEDURE — G0177 OPPS/PHP; TRAIN & EDUC SERV: HCPCS | Mod: 95

## 2020-09-21 PROCEDURE — 90853 GROUP PSYCHOTHERAPY: CPT | Mod: 95 | Performed by: SOCIAL WORKER

## 2020-09-21 PROCEDURE — 90853 GROUP PSYCHOTHERAPY: CPT | Mod: GT | Performed by: SOCIAL WORKER

## 2020-09-21 NOTE — ADDENDUM NOTE
Encounter addended by: Leoabrdo Carter MD on: 9/21/2020 8:23 AM   Actions taken: Clinical Note Signed

## 2020-09-21 NOTE — GROUP NOTE
Psychotherapy Group Note    PATIENT'S NAME: Rama Gutierrez  MRN:   [id number]  :   1990  ACCT. NUMBER: [id number]  DATE OF SERVICE: 20  START TIME: 10:00 AM  END TIME: 10:50 AM  FACILITATOR: Sofia Ochoa LICSW  TOPIC: MH EBP Group: Relationship Skills  Adult Mental Health Day Treatment  TRACK: 5A    NUMBER OF PARTICIPANTS: 3    Summary of Group / Topics Discussed:  Relationship Skills: Assertive Communication: Patients were provided with a general overview of assertive communication skills and how practicing assertive communication skills will assist patients in developing healthier and more effective relationships. Patients reviewed their current awareness on ability to practice assertive communication, ways to increase assertive communication, and identified/problem solved barriers to assertive communication.     Patient Session Goals / Objectives:    Identified and discussed patient individual challenges with communication    Presented and practiced effective communication skills in session    Assisted patients in implementing more effective communication skills in their relationships    Telemedicine Visit: The patient's condition can be safely assessed and treated via synchronous audio and visual telemedicine encounter.      Reason for Telemedicine Visit: Services only offered telehealth    Originating Site (Patient Location): Patient's home    Distant Site (Provider Location): Provider Remote Setting    Consent:  The patient/guardian has verbally consented to: the potential risks and benefits of telemedicine (video visit) versus in person care; bill my insurance or make self-payment for services provided; and responsibility for payment of non-covered services.     Mode of Communication:  Video Conference via [employer]    As the provider I attest to compliance with applicable laws and regulations related to telemedicine.       Patient Participation / Response:  Fully participated with the  group by sharing personal reflections / insights and openly received / provided feedback with other participants.    Identified / Expressed personal readiness to incorporate effective communication skills and Practiced skills in session    Treatment Plan:  Patient has a current master individualized treatment plan.  See Epic treatment plan for more information.    Sofia Ochoa, [facility] Center[facility]

## 2020-09-21 NOTE — GROUP NOTE
Process Group Note    PATIENT'S NAME: aRma Gutierrez  MRN:   [id number]  :   1990  ACCT. NUMBER: [id number]  DATE OF SERVICE: 20  START TIME:  9:00 AM  END TIME:  9:50 AM  FACILITATOR: Sofia Ochoa LICSW  TOPIC:  Process Group    Diagnoses:  296.33 (F33.2) Major Depressive Disorder, Recurrent Episode, Severe _ and With anxious distress  300.02 (F41.1) Generalized Anxiety Disorder  314.01 (F90.9) Unspecified Attention -Deficit / Hyperactivity Disorder      Adult Mental Health Day Treatment  TRACK: 5A    NUMBER OF PARTICIPANTS: 3    Telemedicine Visit: The patient's condition can be safely assessed and treated via synchronous audio and visual telemedicine encounter.      Reason for Telemedicine Visit: Services only offered telehealth    Originating Site (Patient Location): Patient's home    Distant Site (Provider Location): Provider Remote Setting    Consent:  The patient/guardian has verbally consented to: the potential risks and benefits of telemedicine (video visit) versus in person care; bill my insurance or make self-payment for services provided; and responsibility for payment of non-covered services.     Mode of Communication:  Video Conference via beSUCCESS    As the provider I attest to compliance with applicable laws and regulations related to telemedicine.           Data:    Session content: At the start of this group, patients were invited to check in by identifying themselves, describing their current emotional status, and identifying issues to address in this group.   Area(s) of treatment focus addressed in this session included Symptom Management, Personal Safety and Community Resources/Discharge Planning.  Rama reported having low mood, feeling sad, and increased anxiety today.  She stated that her weekend was okay although she did not complete as much as she had wanted.  She stated that she did follow through with her goal to go on a walk.  She stated that she does not have  things to look forward to in the future.  Peers shared what they look forward to to help keep a sense of hope.  Client denied suicidal ideation, intent and plan.     Therapeutic Interventions/Treatment Strategies:  Psychotherapist offered support, feedback and validation and reinforced use of skills. Treatment modalities used include Cognitive Behavioral Therapy. Interventions include Coping Skills: Promoted understanding of how and when to apply grounding strategies to reduce distress and increase presence in the moment and reviewed skills to increase hope.    Assessment:    Patient response:   Patient responded to session by listening, focusing on goals and being attentive    Possible barriers to participation / learning include: and no barriers identified    Health Issues:   None reported       Substance Use Review:   Substance Use: No active concerns identified.    Mental Status/Behavioral Observations  Appearance:   Appropriate   Eye Contact:   Good   Psychomotor Behavior: Normal   Attitude:   Cooperative   Orientation:   All  Speech   Rate / Production: Normal    Volume:  Normal   Mood:    Anxious  Depressed   Affect:    Appropriate   Thought Content:   Clear  Thought Form:  Coherent  Logical     Insight:    Good     Plan:     Safety Plan: No current safety concerns identified.  Recommended that patient call 911 or go to the local ED should there be a change in any of these risk factors.     Barriers to treatment: None identified    Patient Contracts (see media tab):  None    Substance Use: Not addressed in session     Continue or Discharge: Patient will continue in Adult Day Treatment (ADT)  as planned. Patient is likely to benefit from learning and using skills as they work toward the goals identified in their treatment plan.      Sofia Ochoa, [facility]  September 21, 2020

## 2020-09-21 NOTE — GROUP NOTE
"Psychoeducation Group Note    PATIENT'S NAME: Rama Gutierrez  MRN:   [id number]  :   1990  ACCT. NUMBER: [id number]  DATE OF SERVICE: 20  START TIME: 11:00 AM  END TIME: 11:50 AM  FACILITATOR: Merry Newman RN  TOPIC:  RN Group: Specialty Health  Telemedicine Visit: The patient's condition can be safely assessed and treated via synchronous audio and visual telemedicine encounter.      Reason for Telemedicine Visit:  covid19    Originating Site (Patient Location): Patient's home    Distant Site (Provider Location): Provider Remote Setting    Consent:  The patient/guardian has verbally consented to: the potential risks and benefits of telemedicine (video visit) versus in person care; bill my insurance or make self-payment for services provided; and responsibility for payment of non-covered services.     Mode of Communication:  Video Conference via Mang[employer]rKart    As the provider I attest to compliance with applicable laws and regulations related to telemedicine.       Adult Mental Health Day Treatment  TRACK: 5A    NUMBER OF PARTICIPANTS: 3    Summary of Group / Topics Discussed:  Specialty Health:  Specialty Health: Anatomy of Trust: Patients watched Maureen Dc's \"Anatomy of Trust\" and discussed the components of trust and ways to build trusting relationships with self and others.    Patient Session Goals / Objectives:  ? Patients will understand the components of trust and be able to apply them to their lives  ? Patients will discuss ways to build self trust and self love                Patient Participation / Response:  Fully participated with the group by sharing personal reflections / insights and openly received / provided feedback with other participants.    Demonstrated understanding of topics discussed through group discussion and participation and Identified / Expressed personal readiness to practice skills    Treatment Plan:  Patient has a current master individualized treatment plan.  See " Epic treatment plan for more information.    Merry Newman RN

## 2020-09-21 NOTE — PROGRESS NOTES
Patient Active Problem List   Diagnosis     MARÍA (generalized anxiety disorder)     Chronic midline low back pain without sciatica     Chronic bilateral low back pain with left-sided sciatica     Major depressive disorder, recurrent episode, moderate (H)     Morbid obesity (H)     Social anxiety disorder     Attention deficit hyperactivity disorder (ADHD), combined type     Major depressive disorder, recurrent episode, severe with anxious distress (H)       Current Outpatient Medications:      atomoxetine (STRATTERA) 40 MG capsule, Take 1 capsule (40 mg) by mouth daily, Disp: 90 capsule, Rfl: 1     cyclobenzaprine (FLEXERIL) 10 MG tablet, Take 0.5-1 tablets (5-10 mg) by mouth 3 times daily as needed for muscle spasms (Patient not taking: Reported on 9/8/2020), Disp: 30 tablet, Rfl: 3     DULoxetine (CYMBALTA) 30 MG capsule, Take 1 capsule (30 mg) by mouth daily Take with 60mg for total dose of 90mg daily, Disp: 90 capsule, Rfl: 1     DULoxetine (CYMBALTA) 60 MG capsule, Take 1 capsule (60 mg) by mouth daily Take with 30mg to total dose 90mg daily., Disp: 90 capsule, Rfl: 1     etonogestrel (IMPLANON/NEXPLANON) 68 MG IMPL, 1 each by Subdermal route once, Disp: , Rfl:      hydrOXYzine (VISTARIL) 50 MG capsule, Take 50 mg by mouth 3 times daily as needed, Disp: , Rfl:      Vitamin D, Cholecalciferol, 25 MCG (1000 UT) CAPS, Take 2,000 Int'l Units by mouth daily, Disp: , Rfl:   Psychiatry staffing: case discussed  Diagnosis:  As above, here about one week.  Depressive symptoms as a focus of care.

## 2020-09-22 ENCOUNTER — HOSPITAL ENCOUNTER (OUTPATIENT)
Dept: BEHAVIORAL HEALTH | Facility: CLINIC | Age: 30
End: 2020-09-22
Attending: PSYCHIATRY & NEUROLOGY
Payer: COMMERCIAL

## 2020-09-22 PROCEDURE — 90853 GROUP PSYCHOTHERAPY: CPT | Mod: GT | Performed by: SOCIAL WORKER

## 2020-09-22 PROCEDURE — G0177 OPPS/PHP; TRAIN & EDUC SERV: HCPCS | Mod: 95

## 2020-09-22 PROCEDURE — 90853 GROUP PSYCHOTHERAPY: CPT | Mod: 95 | Performed by: SOCIAL WORKER

## 2020-09-22 NOTE — GROUP NOTE
Process Group Note    PATIENT'S NAME: Rama Gutierrez  MRN:   [id number]  :   1990  ACCT. NUMBER: [id number]  DATE OF SERVICE: 20  START TIME:  9:00 AM  END TIME:  9:50 AM  FACILITATOR: Sofia Ochoa LICSW  TOPIC:  Process Group    Diagnoses:  296.33 (F33.2) Major Depressive Disorder, Recurrent Episode, Severe _ and With anxious distress  300.02 (F41.1) Generalized Anxiety Disorder  314.01 (F90.9) Unspecified Attention -Deficit / Hyperactivity Disorder      Adult Mental Health Day Treatment  TRACK: 5A    NUMBER OF PARTICIPANTS: 4    Telemedicine Visit: The patient's condition can be safely assessed and treated via synchronous audio and visual telemedicine encounter.      Reason for Telemedicine Visit: Services only offered telehealth    Originating Site (Patient Location): Patient's home    Distant Site (Provider Location): Provider Remote Setting    Consent:  The patient/guardian has verbally consented to: the potential risks and benefits of telemedicine (video visit) versus in person care; bill my insurance or make self-payment for services provided; and responsibility for payment of non-covered services.     Mode of Communication:  Video Conference via Figment    As the provider I attest to compliance with applicable laws and regulations related to telemedicine.           Data:    Session content: At the start of this group, patients were invited to check in by identifying themselves, describing their current emotional status, and identifying issues to address in this group.   Area(s) of treatment focus addressed in this session included Symptom Management, Personal Safety and Community Resources/Discharge Planning.  Rama reported that she cared for herself after group yesterday by allowing herself to have a low key and restful day.  She stated that she woke up feeling better today.  She stated that she felt refreshed.  She plans to do her in person absentee voting today then work on her  art.  Mood was calm.  Client denied suicidal ideation, intent and plan.     Therapeutic Interventions/Treatment Strategies:  Psychotherapist offered support, feedback and validation and reinforced use of skills. Treatment modalities used include Cognitive Behavioral Therapy. Interventions include Behavioral Activation: Encouraged strategies to reduce individual procrastination and increase motivation by increasing goal-directed activities to enhance mood and reduce symptoms..    Assessment:    Patient response:   Patient responded to session by focusing on goals and being attentive    Possible barriers to participation / learning include: and no barriers identified    Health Issues:   None reported       Substance Use Review:   Substance Use: No active concerns identified.    Mental Status/Behavioral Observations  Appearance:   Appropriate   Eye Contact:   Good   Psychomotor Behavior: Normal   Attitude:   Cooperative   Orientation:   All  Speech   Rate / Production: Normal    Volume:  Normal   Mood:    Euthymic  Affect:    Appropriate   Thought Content:   Clear  Thought Form:  Coherent  Logical     Insight:    Good     Plan:     Safety Plan: No current safety concerns identified.  Recommended that patient call 911 or go to the local ED should there be a change in any of these risk factors.     Barriers to treatment: None identified    Patient Contracts (see media tab):  None    Substance Use: Not addressed in session     Continue or Discharge: Patient will continue in Adult Day Treatment (ADT)  as planned. Patient is likely to benefit from learning and using skills as they work toward the goals identified in their treatment plan.      Sofia Ochoa, [facility]  September 22, 2020

## 2020-09-22 NOTE — GROUP NOTE
Psychotherapy Group Note    PATIENT'S NAME: Rama Gutierrez  MRN:   [id number]  :   1990  ACCT. NUMBER: [id number]  DATE OF SERVICE: 20  START TIME: 11:00 AM  END TIME: 11:45 AM  FACILITATOR: Sofia Ochoa LICSW  TOPIC: MH EBP Group: Relationship Skills  Adult Mental Health Day Treatment  TRACK: 5A    NUMBER OF PARTICIPANTS: 3    Summary of Group / Topics Discussed:  Relationship Skills: Boundaries: Patients were provided with a general overview of interpersonal boundaries and how lack of boundaries relates to symptoms and functioning. The purpose is to help patients identify boundary issues and gain awareness and skills to work towards healthier interpersonal boundaries. Current awareness of healthy boundary characteristics and barriers to establishing healthy boundaries were discussed.    Patient Session Goals / Objectives:    Familiarized patients with the concept of interpersonal boundaries and their characteristics    Discussed and practiced strategies to promote healthier interpersonal boundaries    Identified boundary issues and identified plan to improve boundaries    Telemedicine Visit: The patient's condition can be safely assessed and treated via synchronous audio and visual telemedicine encounter.      Reason for Telemedicine Visit: Services only offered telehealth    Originating Site (Patient Location): Patient's home    Distant Site (Provider Location): Provider Remote Setting    Consent:  The patient/guardian has verbally consented to: the potential risks and benefits of telemedicine (video visit) versus in person care; bill my insurance or make self-payment for services provided; and responsibility for payment of non-covered services.     Mode of Communication:  Video Conference via [employer]    As the provider I attest to compliance with applicable laws and regulations related to telemedicine.       Patient Participation / Response:  Fully participated with the group by sharing  personal reflections / insights and openly received / provided feedback with other participants.    Identified / Expressed personal readiness to incorporate effective communication skills and Practiced skills in session    Treatment Plan:  Patient has a current master individualized treatment plan.  See Epic treatment plan for more information.    Sofia Ochoa, [facility]

## 2020-09-22 NOTE — GROUP NOTE
Psychoeducation Group Note    PATIENT'S NAME: Rama Gutierrez  MRN:   [id number]  :   1990  ACCT. NUMBER: [id number]  DATE OF SERVICE: 20  START TIME: 10:00 AM  END TIME: 10:50 AM  FACILITATOR: Ugo Hickey OTR/L  TOPIC: MH Life Skills Group: Communication and Social Skills Development  Telemedicine Visit: The patient's condition can be safely assessed and treated via synchronous audio and visual telemedicine encounter.      Reason for Telemedicine Visit: COVID-19    Originating Site (Patient Location): Patient's home    Distant Site (Provider Location): [facility]: [facility]    Consent:  The patient/guardian has verbally consented to: the potential risks and benefits of telemedicine (video visit) versus in person care; bill my insurance or make self-payment for services provided; and responsibility for payment of non-covered services.     Mode of Communication:  Video Conference via BESOS    As the provider I attest to compliance with applicable laws and regulations related to telemedicine.   Adult Mental Health Day Treatment  TRACK: 5A    NUMBER OF PARTICIPANTS: 4    Summary of Group / Topics Discussed:  Communication and Social Skills Development: Communication Styles: Communication Skills Scale:Patients completed a self assessment of personal communication skills by identifying both strengths and opportunities for growth in areas of messages, emotions, assertiveness and listening skills.  Patients gained awareness of effective communication skills to improve overall communication and connection with other people.      Patient Session Goals / Objectives:    Identified strengths and opportunities for growth in communication skills and how these  impact their ability to communicate clearly with other people       Improved awareness of important aspects of communication skills and how this relates to mental health recovery        Established a plan for practice of these  skills in their own environments    Practiced and reflected on how to generalize taught skills to their everyday life      Patient Participation / Response:  Fully participated with the group by sharing personal reflections / insights and openly received / provided feedback with other participants.    Demonstrated understanding of content through group discussion  and Verbalized understanding of content    Treatment Plan:  Patient has a current master individualized treatment plan.  See Epic treatment plan for more information.    Ugo Hickey, OTR/L

## 2020-09-24 ENCOUNTER — HOSPITAL ENCOUNTER (OUTPATIENT)
Dept: BEHAVIORAL HEALTH | Facility: CLINIC | Age: 30
End: 2020-09-24
Attending: PSYCHIATRY & NEUROLOGY
Payer: COMMERCIAL

## 2020-09-24 PROCEDURE — 90853 GROUP PSYCHOTHERAPY: CPT | Mod: 95 | Performed by: SOCIAL WORKER

## 2020-09-24 PROCEDURE — 90853 GROUP PSYCHOTHERAPY: CPT | Mod: GT | Performed by: SOCIAL WORKER

## 2020-09-24 NOTE — GROUP NOTE
Psychotherapy Group Note    PATIENT'S NAME: Rama Gutierrez  MRN:   [id number]  :   1990  ACCT. NUMBER: [id number]  DATE OF SERVICE: 20  START TIME: 10:00 AM  END TIME: 10:50 AM  FACILITATOR: Sofia Padilla LICSW  TOPIC:  EBP Group: Enhanced Mindfulness  Adult Mental Health Day Treatment  TRACK: 5A    NUMBER OF PARTICIPANTS: 3    Summary of Group / Topics Discussed:  Enhanced Mindfulness: Body and Mind Integration: Patients received an overview and education regarding the importance of including the body in the management of emotional health and self-care and as a direct route to mindfulness practice.  Patients discussed various ways in which the body can serve as an informant to their physical and emotional experiences/need. Patients discussed the body as a direct link to the present moment and to mindfulness practice.  Patients discussed current relationship with body, self-awareness, mindfulness practice and barriers to connection with body.  Patients were guided through breath work and movement to facilitate greater self-awareness, grounding, self-expression, and connection to other.  Patients discussed how the experiential could be applied to better manage mental health and develop hankins connection to self.    Patient Session Goals / Objectives:    Identify how movement awareness could be used for grounding, stress management, self-expression, connection to other and self-regulation    Improved awareness of breath and movement preferences    Identify how movement and the body is used in mindfulness practice    Reflect on use of these practices in everyday life.    Identify barriers to attending to body  Telemedicine Visit: The patient's condition can be safely assessed and treated via synchronous audio and visual telemedicine encounter.      Reason for Telemedicine Visit: Services only offered telehealth and covid19    Originating Site (Patient Location): Patient's home    Distant Site (Provider  Location): Provider Remote Setting    Consent:  The patient/guardian has verbally consented to: the potential risks and benefits of telemedicine (video visit) versus in person care; bill my insurance or make self-payment for services provided; and responsibility for payment of non-covered services.     Mode of Communication:  Video Conference via CueThink    As the provider I attest to compliance with applicable laws and regulations related to telemedicine.       Patient Participation / Response:  Moderately participated, sharing some personal reflections / insights and adequately adequately received / provided feedback with other participants.    Demonstrated understanding of topics discussed through group discussion and participation, Identified / Expressed readiness to act on skill suggestions discussed in topic and Practiced skills in session    Treatment Plan:  Patient has a current master individualized treatment plan.  See Epic treatment plan for more information.    HIEU SoaresSW

## 2020-09-24 NOTE — GROUP NOTE
Psychotherapy Group Note    PATIENT'S NAME: Rama Gutierrez  MRN:   [id number]  :   1990  ACCT. NUMBER: [id number]  DATE OF SERVICE: 20  START TIME: 11:00 AM  END TIME: 11:50 AM  FACILITATOR: Sofia Ochoa LICSW  TOPIC: MH EBP Group: Relationship Skills  Adult Mental Health Day Treatment  TRACK: 5A    NUMBER OF PARTICIPANTS: 3    Summary of Group / Topics Discussed:  Relationship Skills: Conflict Resolution: Topic of conflict resolution in interpersonal communication was discussed. Patients received an overview of how communication skills during times of conflict impact symptoms and functioning. Patients discussed their current communication challenges and how this impacts their functioning. Patients also verbalized understanding of skills learned and practiced in session.     Patient Session Goals / Objectives:    Identified and discussed patient individual challenges with communication    Presented and practiced effective communication skills in session    Assisted patients in implementing more effective communication skills in their relationships    Telemedicine Visit: The patient's condition can be safely assessed and treated via synchronous audio and visual telemedicine encounter.      Reason for Telemedicine Visit: Services only offered telehealth    Originating Site (Patient Location): Patient's home    Distant Site (Provider Location): Provider Remote Setting    Consent:  The patient/guardian has verbally consented to: the potential risks and benefits of telemedicine (video visit) versus in person care; bill my insurance or make self-payment for services provided; and responsibility for payment of non-covered services.     Mode of Communication:  Video Conference via En[employer]ate    As the provider I attest to compliance with applicable laws and regulations related to telemedicine.       Patient Participation / Response:  Fully participated with the group by sharing personal reflections /  insights and openly received / provided feedback with other participants.    Demonstrated understanding of relationship skills and communication skills and Identified plan to address barriers to practicing relationship skills     Treatment Plan:  Patient has a current master individualized treatment plan.  See Epic treatment plan for more information.    Sofia Ochoa, [facility]

## 2020-09-24 NOTE — ADDENDUM NOTE
Encounter addended by: Sofia Ochoa [facility] on: 9/24/2020 4:08 PM   Actions taken: Clinical Note Signed

## 2020-09-24 NOTE — GROUP NOTE
Process Group Note    PATIENT'S NAME: Rama Gutierrez  MRN:   [id number]  :   1990  ACCT. NUMBER: [id number]  DATE OF SERVICE: 20  START TIME:  9:00 AM  END TIME:  9:50 AM  FACILITATOR: Sofia Ochoa LICSW  TOPIC:  Process Group    Diagnoses:  296.33 (F33.2) Major Depressive Disorder, Recurrent Episode, Severe _ and With anxious distress  300.02 (F41.1) Generalized Anxiety Disorder  314.01 (F90.9) Unspecified Attention -Deficit / Hyperactivity Disorder      Adult Mental Health Day Treatment  TRACK: 5A    NUMBER OF PARTICIPANTS: 3    Telemedicine Visit: The patient's condition can be safely assessed and treated via synchronous audio and visual telemedicine encounter.      Reason for Telemedicine Visit: Services only offered telehealth    Originating Site (Patient Location): Patient's home    Distant Site (Provider Location): Provider Remote Setting    Consent:  The patient/guardian has verbally consented to: the potential risks and benefits of telemedicine (video visit) versus in person care; bill my insurance or make self-payment for services provided; and responsibility for payment of non-covered services.     Mode of Communication:  Video Conference via [employer]    As the provider I attest to compliance with applicable laws and regulations related to telemedicine.           Data:    Session content: At the start of this group, patients were invited to check in by identifying themselves, describing their current emotional status, and identifying issues to address in this group.   Area(s) of treatment focus addressed in this session included Symptom Management, Personal Safety and Community Resources/Discharge Planning.  Rama reported feeling a little less depressed and less anxious.  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  She stated that she recently started staying up until 2 am focusing on positive interests.  She stated that she started Strattera  for the ADHD.  She stated that staying up feels like hyper focus of the ADHD as she is focused on enjoyable topics.  Client denied suicidal ideation, intent and plan. Writer shared sleep hygiene strategies.      Therapeutic Interventions/Treatment Strategies:  Psychotherapist offered support, feedback and validation and reinforced use of skills. Treatment modalities used include Cognitive Behavioral Therapy. Interventions include Mindfulness: Facilitated discussion of when/how to use mindfulness skills to benefit general health, mental health symptoms, and stressors and Reviewed sleep hygiene skills..    Assessment:    Patient response:   Patient responded to session by listening and focusing on goals    Possible barriers to participation / learning include: and no barriers identified    Health Issues:   None reported       Substance Use Review:   Substance Use: No active concerns identified.    Mental Status/Behavioral Observations  Appearance:   Appropriate   Eye Contact:   Good   Psychomotor Behavior: Normal   Attitude:   Cooperative   Orientation:   All  Speech   Rate / Production: Normal    Volume:  Normal   Mood:    Anxious  Depressed   Affect:    Appropriate   Thought Content:   Clear  Thought Form:  Coherent  Logical     Insight:    Good     Plan:     Safety Plan: No current safety concerns identified.  Recommended that patient call 911 or go to the local ED should there be a change in any of these risk factors.     Barriers to treatment: None identified    Patient Contracts (see media tab):  None    Substance Use: Not addressed in session     Continue or Discharge: Patient will continue in Adult Day Treatment (ADT)  as planned. Patient is likely to benefit from learning and using skills as they work toward the goals identified in their treatment plan.      Sofia Ochoa, [facility]  September 24, 2020

## 2020-09-24 NOTE — PROGRESS NOTES
Acknowledgement of Current Treatment Plan       I have reviewed my treatment plan with my therapist  on 9/17/20.   I agree with the plan as it is written in the electronic health record. (5A)    Name:      Signature:  Rama Gutierrez Unable to sign due to COVID 19     Dr Leobardo Carter MD  Psychiatrist    Dr. Rowan Henderson, [facility],     Sofia Ochoa, [facility]  Psychotherapist Sofia Ochoa, [facility], [facility]

## 2020-09-28 ENCOUNTER — HOSPITAL ENCOUNTER (OUTPATIENT)
Dept: BEHAVIORAL HEALTH | Facility: CLINIC | Age: 30
End: 2020-09-28
Attending: PSYCHIATRY & NEUROLOGY
Payer: COMMERCIAL

## 2020-09-28 PROCEDURE — 90853 GROUP PSYCHOTHERAPY: CPT | Mod: GT

## 2020-09-28 PROCEDURE — G0177 OPPS/PHP; TRAIN & EDUC SERV: HCPCS | Mod: GT

## 2020-09-28 PROCEDURE — 90853 GROUP PSYCHOTHERAPY: CPT | Mod: 95

## 2020-09-28 PROCEDURE — G0177 OPPS/PHP; TRAIN & EDUC SERV: HCPCS | Mod: 95

## 2020-09-28 NOTE — GROUP NOTE
Psychotherapy Group Note    PATIENT'S NAME: Rama Gutierrez  MRN:   [id number]  :   1990  ACCT. NUMBER: [id number]  DATE OF SERVICE: 20  START TIME: 10:00 AM  END TIME: 10:50 AM  FACILITATOR: Rafia Carter  TOPIC:  EBP Group: Self-Awareness  Adult Mental Health Day Treatment  TRACK: 1a5A Combined    Telemedicine Visit: The patient's condition can be safely assessed and treated via synchronous audio and visual telemedicine encounter.      Reason for Telemedicine Visit:  covid 19    Originating Site (Patient Location): Patient's home    Distant Site (Provider Location): Provider Remote Setting    Consent:  The patient/guardian has verbally consented to: the potential risks and benefits of telemedicine (video visit) versus in person care; bill my insurance or make self-payment for services provided; and responsibility for payment of non-covered services.     Mode of Communication:  Video Conference via [employer]    As the provider I attest to compliance with applicable laws and regulations related to telemedicine. /    NUMBER OF PARTICIPANTS: 3 from each group for a total of 6    Summary of Group / Topics Discussed:  Self-Awareness: Values: Patients identified personal values by examining development of their current values and how their values influence their daily functioning and life choices. Patients explored the impact of their values on their thoughts, feelings, and actions. Patients discussed definition of personal values and how they develop and change over time. The goal is to help patients reconcile value conflicts and achieve balance and flexibility to improve mood and daily functioning.     Patient Session Goals / Objectives:    Examined development of values and impact of values on functioning    Identified and prioritized important values related to current well-being     Identified strategies to change or enhance values to positively impact symptoms    Assisted patients to find ways to  adapt functioning to better fit their values        Patient Participation / Response:  Fully participated with the group by sharing personal reflections / insights and openly received / provided feedback with other participants.    Demonstrated understanding of topics discussed through group discussion and participation, Demonstrated understanding of values, strengths, and challenges to learn about themselves and Identified / Expressed readiness to act intentionally, increase self-compassion, promote personal growth    Treatment Plan:  Patient has a current master individualized treatment plan.  See Epic treatment plan for more information.    Rafia Carter

## 2020-09-28 NOTE — GROUP NOTE
Psychoeducation Group Note    PATIENT'S NAME: Rama Gutierrez  MRN:   [id number]  :   1990  ACCT. NUMBER: [id number]  DATE OF SERVICE: 20  START TIME: 11:00 AM  END TIME: 11:50 AM  FACILITATOR: Ugo Hickey OTR/L  TOPIC: MH Life Skills Group: Resiliency Development  Telemedicine Visit: The patient's condition can be safely assessed and treated via synchronous audio and visual telemedicine encounter.      Reason for Telemedicine Visit:  COVID-19    Originating Site (Patient Location): Patient's home    Distant Site (Provider Location): [facility]: [facility]    Consent:  The patient/guardian has verbally consented to: the potential risks and benefits of telemedicine (video visit) versus in person care; bill my insurance or make self-payment for services provided; and responsibility for payment of non-covered services.     Mode of Communication:  Video Conference via [employer]    As the provider I attest to compliance with applicable laws and regulations related to telemedicine.   Adult Mental Health Day Treatment  TRACK: 5A    NUMBER OF PARTICIPANTS: 3    Summary of Group / Topics Discussed:  Resiliency Development:  Stages of Coping with Stress: Patients were taught how to identify stressors, signs of stress, coping skills, and prevention strategies for overall stress management.  Patients were given the opportunity to identify both ongoing and acute mental health symptoms and how to effectively manage these symptoms by developing an effective aftercare plan.  Patients increased awareness of community based resources.    Patient Session Goals / Objectives:    Identified how using coping skills can be used for symptom and stress management       Improved awareness of individualed symptoms and stressors and how to effectively cope     Established a relapse prevention plan to practice these skills in their own environments    Practiced and reflected on how to generalize taught  skills to their everyday life          Patient Participation / Response:  Fully participated with the group by sharing personal reflections / insights and openly received / provided feedback with other participants.    Demonstrated understanding of content through group handout and discussion  and Verbalized understanding of content    Treatment Plan:  Patient has a current master individualized treatment plan.  See Epic treatment plan for more information.    Ugo Hickey, OTR/L

## 2020-09-28 NOTE — GROUP NOTE
Process Group Note    PATIENT'S NAME: Rama Gutierrez  MRN:   [id number]  :   1990  ACCT. NUMBER: [id number]  DATE OF SERVICE: 20  START TIME:  9:00 AM  END TIME:  9:50 AM  FACILITATOR: Rafia Carter  TOPIC:  Process Group    Diagnoses:  296.33 (F33.2) Major Depressive Disorder, Recurrent Episode, Severe _ and With anxious distress  300.02 (F41.1) Generalized Anxiety Disorder  314.01 (F90.9) Unspecified Attention -Deficit / Hyperactivity Disorder      Adult Mental Health Day Treatment  TRACK:  Combined    Telemedicine Visit: The patient's condition can be safely assessed and treated via synchronous audio and visual telemedicine encounter.      Reason for Telemedicine Visit:  covid 19    Originating Site (Patient Location): Patient's home    Distant Site (Provider Location): Provider Remote Setting    Consent:  The patient/guardian has verbally consented to: the potential risks and benefits of telemedicine (video visit) versus in person care; bill my insurance or make self-payment for services provided; and responsibility for payment of non-covered services.     Mode of Communication:  Video Conference via [employer]    As the provider I attest to compliance with applicable laws and regulations related to telemedicine.      NUMBER OF PARTICIPANTS:  3 from each group for a total of 6          Data:    Session content: At the start of this group, patients were invited to check in by identifying themselves, describing their current emotional status, and identifying issues to address in this group.   Area(s) of treatment focus addressed in this session included Symptom Management, Personal Safety, Develop / Improve Independent Living Skills and Develop Socialization / Interpersonal Relationship Skills.  Paul reported mood is okay.  She reported some ruminations and over thinking.  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  She is proud of herself for  being able to do this over the weekend.  She denied safety concerns and chemical health issues.  She reported no medication issues.  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  She is working on setting and maintaining a routine with work but wants to make sure to take breaks as needed for balance.      Therapeutic Interventions/Treatment Strategies:  Psychotherapist offered support, feedback and validation and reinforced use of skills. Treatment modalities used include Motivational Interviewing, Cognitive Behavioral Therapy and Dialectical Behavioral Therapy. Validated and normalized.  Highlighted and reinforced skills used; connected to positive outcomes.  Provided additional skill suggestions.  Aided in creating a plan to help work towards goals.        Assessment:    Patient response:   Patient responded to session by accepting feedback, giving feedback, listening, focusing on goals, being attentive and accepting support    Possible barriers to participation / learning include: and no barriers identified    Health Issues:   None reported       Substance Use Review:   Substance Use: No active concerns identified.    Mental Status/Behavioral Observations  Appearance:   Appropriate   Eye Contact:   Good   Psychomotor Behavior: Normal   Attitude:   Cooperative   Orientation:   All  Speech   Rate / Production: Normal    Volume:  Normal   Mood:    Anxious  Depressed   Affect:    Appropriate   Thought Content:   Rumination  Thought Form:  Coherent  Logical     Insight:    Good     Plan:     Safety Plan: No current safety concerns identified.  Recommended that patient call 911 or go to the local ED should there be a change in any of these risk factors.     Barriers to treatment: None identified    Patient Contracts (see media tab):  None    Substance Use: Not addressed in session     Continue or Discharge: Patient will continue in Adult Day Treatment (ADT)  as planned. Patient  is likely to benefit from learning and using skills as they work toward the goals identified in their treatment plan.      Rafia Carter  September 28, 2020

## 2020-09-28 NOTE — ADDENDUM NOTE
Encounter addended by: Ugo Hickey, OTR/L on: 9/28/2020 2:55 PM   Actions taken: Charge Capture section accepted

## 2020-09-28 NOTE — ADDENDUM NOTE
Encounter addended by: Ugo Hickey, OTR/L on: 9/28/2020 2:48 PM   Actions taken: Charge Capture section accepted

## 2020-09-29 ENCOUNTER — HOSPITAL ENCOUNTER (OUTPATIENT)
Dept: BEHAVIORAL HEALTH | Facility: CLINIC | Age: 30
End: 2020-09-29
Attending: PSYCHIATRY & NEUROLOGY
Payer: COMMERCIAL

## 2020-09-29 PROCEDURE — 90853 GROUP PSYCHOTHERAPY: CPT | Mod: 95 | Performed by: SOCIAL WORKER

## 2020-09-29 PROCEDURE — G0177 OPPS/PHP; TRAIN & EDUC SERV: HCPCS | Mod: 95

## 2020-09-29 NOTE — GROUP NOTE
Process Group Note    PATIENT'S NAME: Rama Gutierrez  MRN:   [id number]  :   1990  ACCT. NUMBER: [id number]  DATE OF SERVICE: 20  START TIME:  9:00 AM  END TIME:  9:50 AM  FACILITATOR: Sofia Ochoa LICSW  TOPIC:  Process Group    Diagnoses:  296.33 (F33.2) Major Depressive Disorder, Recurrent Episode, Severe _ and With anxious distress  300.02 (F41.1) Generalized Anxiety Disorder  314.01 (F90.9) Unspecified Attention -Deficit / Hyperactivity Disorder      Adult Mental Health Day Treatment  TRACK: 5A    NUMBER OF PARTICIPANTS: 3    Telemedicine Visit: The patient's condition can be safely assessed and treated via synchronous audio and visual telemedicine encounter.      Reason for Telemedicine Visit: Services only offered telehealth    Originating Site (Patient Location): Patient's home    Distant Site (Provider Location): Provider Remote Setting    Consent:  The patient/guardian has verbally consented to: the potential risks and benefits of telemedicine (video visit) versus in person care; bill my insurance or make self-payment for services provided; and responsibility for payment of non-covered services.     Mode of Communication:  Video Conference via [employer]    As the provider I attest to compliance with applicable laws and regulations related to telemedicine.           Data:    Session content: At the start of this group, patients were invited to check in by identifying themselves, describing their current emotional status, and identifying issues to address in this group.   Area(s) of treatment focus addressed in this session included Symptom Management, Personal Safety and Community Resources/Discharge Planning.  Rama reported struggling to attend group today due to depressed mood and back pain.  She stated that she has to force herself to focus.  She stated that she wants to crawl back in bed.   She stated that the back pain is variable and ongoing.  She has over the counter pain  relief and a muscle relaxant to use if needed.  She is using baths, sleep, and distraction to manage the pain.  She needed to be absent third hour due to a in person doctor's appointment.  Client denied suicidal ideation, intent and plan.     Therapeutic Interventions/Treatment Strategies:  Psychotherapist offered support, feedback and validation and reinforced use of skills. Treatment modalities used include Cognitive Behavioral Therapy. Interventions include Coping Skills: Discussed use of self-soothe skills to decrease distress in the body.    Assessment:    Patient response:   Patient responded to session by focusing on goals and being attentive    Possible barriers to participation / learning include: and no barriers identified    Health Issues:   Yes: Pain, Associated Psychological Distress       Substance Use Review:   Substance Use: No active concerns identified.    Mental Status/Behavioral Observations  Appearance:   Appropriate   Eye Contact:   Good   Psychomotor Behavior: Normal   Attitude:   Cooperative   Orientation:   All  Speech   Rate / Production: Normal    Volume:  Normal   Mood:    Anxious  Depressed   Affect:    Appropriate   Thought Content:   Clear  Thought Form:  Coherent  Logical     Insight:    Good     Plan:     Safety Plan: No current safety concerns identified.  Recommended that patient call 911 or go to the local ED should there be a change in any of these risk factors.     Barriers to treatment: None identified    Patient Contracts (see media tab):  None    Substance Use: Not addressed in session     Continue or Discharge: Patient will continue in Adult Day Treatment (ADT)  as planned. Patient is likely to benefit from learning and using skills as they work toward the goals identified in their treatment plan.      Sofia Ochoa, [facility]  September 29, 2020

## 2020-09-29 NOTE — GROUP NOTE
Psychoeducation Group Note    PATIENT'S NAME: Rama Gutierrez  MRN:   [id number]  :   1990  ACCT. NUMBER: [id number]  DATE OF SERVICE: 20  START TIME: 10:00 AM  END TIME: 10:50 AM  FACILITATOR: Ugo Hickey OTR/L  TOPIC: MH Life Skills Group: Communication and Social Skills Development  Telemedicine Visit: The patient's condition can be safely assessed and treated via synchronous audio and visual telemedicine encounter.      Reason for Telemedicine Visit: COVID-19    Originating Site (Patient Location): Patient's home    Distant Site (Provider Location): [facility]: [facility]    Consent:  The patient/guardian has verbally consented to: the potential risks and benefits of telemedicine (video visit) versus in person care; bill my insurance or make self-payment for services provided; and responsibility for payment of non-covered services.     Mode of Communication:  Video Conference via [employer]    As the provider I attest to compliance with applicable laws and regulations related to telemedicine.   Adult Mental Health Day Treatment  TRACK: 5A    NUMBER OF PARTICIPANTS: 3    Summary of Group / Topics Discussed:  Communication and Social Skills Development: Communication Styles: Conflict Life Macias (Personal Conflicts): Patients discussed and were taught about different macias of conflict in life and how it impacts or is impacted by mental health symptoms.  Patients gained awareness of personal conflict macias.  Patients were taught how to identify and take active steps to resolve these conflicts.      Patient Session Goals / Objectives:    Identified personal conflict macias and how these impact or are impacted by mental health symptoms        Improved awareness of important life macias were conflict is being experienced        Established a plan for practice of these skills in their own environments    Practiced and reflected on how to generalize taught skills to their everyday  life        Patient Participation / Response:  Fully participated with the group by sharing personal reflections / insights and openly received / provided feedback with other participants.    Demonstrated understanding of content through handout and group discussion  and Verbalized understanding of content    Treatment Plan:  Patient has a current master individualized treatment plan.  See Epic treatment plan for more information.    Ugo Hickey, OTR/L

## 2020-10-01 ENCOUNTER — HOSPITAL ENCOUNTER (OUTPATIENT)
Dept: BEHAVIORAL HEALTH | Facility: CLINIC | Age: 30
End: 2020-10-01
Attending: PSYCHIATRY & NEUROLOGY
Payer: COMMERCIAL

## 2020-10-01 PROCEDURE — 90853 GROUP PSYCHOTHERAPY: CPT | Mod: GT | Performed by: SOCIAL WORKER

## 2020-10-01 NOTE — GROUP NOTE
Psychotherapy Group Note    PATIENT'S NAME: Rama Gutierrez  MRN:   [id number]  :   1990  ACCT. NUMBER: [id number]  DATE OF SERVICE: 10/01/20  START TIME: 11:00 AM  END TIME: 11:50 AM  FACILITATOR: Sofia Padilla LICSW  TOPIC:  EBP Group: Enhanced Mindfulness  Adult Mental Health Day Treatment  TRACK: 5A    NUMBER OF PARTICIPANTS: 5    Summary of Group / Topics Discussed:  Enhanced Mindfulness: Body and Mind Integration: Patients received an overview and education regarding the importance of including the body in the management of emotional health and self-care and as a direct route to mindfulness practice.  Patients discussed various ways in which the body can serve as an informant to their physical and emotional experiences/need. Patients discussed the body as a direct link to the present moment and to mindfulness practice.  Patients discussed current relationship with body, self-awareness, mindfulness practice and barriers to connection with body.  Patients were guided through breath work and movement to facilitate greater self-awareness, grounding, self-expression, and connection to other.  Patients discussed how the experiential could be applied to better manage mental health and develop hankins connection to self.    Patient Session Goals / Objectives:    Identify how movement awareness could be used for grounding, stress management, self-expression, connection to other and self-regulation    Improved awareness of breath and movement preferences    Identify how movement and the body is used in mindfulness practice    Reflect on use of these practices in everyday life.    Identify barriers to attending to body    Telemedicine Visit: The patient's condition can be safely assessed and treated via synchronous audio and visual telemedicine encounter.          Reason for Telemedicine Visit: Services only offered telehealth and covid19        Originating Site (Patient Location): Patient's home        Distant Site  (Provider Location): Provider Remote Setting        Consent:  The patient/guardian has verbally consented to: the potential risks and benefits of telemedicine (video visit) versus in person care; bill my insurance or make self-payment for services provided; and responsibility for payment of non-covered services.         Mode of Communication:  Video Conference via [employer]        As the provider I attest to compliance with applicable laws and regulations related to telemedicine.         Patient Participation / Response:  Moderately participated, sharing some personal reflections / insights and adequately adequately received / provided feedback with other participants.    Demonstrated understanding of topics discussed through group discussion and participation and Practiced skills in session    Treatment Plan:  Patient has a current master individualized treatment plan.  See Epic treatment plan for more information.    HIEU SoaresSW

## 2020-10-01 NOTE — GROUP NOTE
Psychotherapy Group Note    PATIENT'S NAME: Rama Gutierrez  MRN:   [id number]  :   1990  ACCT. NUMBER: [id number]  DATE OF SERVICE: 10/01/20  START TIME: 10:00 AM  END TIME: 10:50 AM  FACILITATOR: Sofia Ochoa LICSW  TOPIC: MH EBP Group: Self-Awareness  Adult Mental Health Day Treatment  TRACK: 5A    NUMBER OF PARTICIPANTS: 5    Summary of Group / Topics Discussed:  Self-Awareness: Personal Strengths: Topic focused on assisting patients in identifying personal strengths and how they relate to the management of mental health symptoms. Patients discussed the benefits of acknowledging their personal strengths and their impact on mood improvement, mindfulness, and perspective. Patients worked to increase time spent on recognition and appreciation of what is positive and working in their lives. The goal is to reduce rumination and negative thinking resulting in increased mindfulness and resilience. Patients will work to put skills into practice and problem-solve barriers. This was part two of the topic discussion.    Patient Session Goals / Objectives:    Identified personal strengths    Identified barriers to recognition of personal strengths    Verbalized understanding of strategies to increase use of their strengths in management of daily symptoms    Telemedicine Visit: The patient's condition can be safely assessed and treated via synchronous audio and visual telemedicine encounter.      Reason for Telemedicine Visit: Services only offered telehealth    Originating Site (Patient Location): Patient's home    Distant Site (Provider Location): Provider Remote Setting    Consent:  The patient/guardian has verbally consented to: the potential risks and benefits of telemedicine (video visit) versus in person care; bill my insurance or make self-payment for services provided; and responsibility for payment of non-covered services.     Mode of Communication:  Video Conference via [employer]    As the provider  I attest to compliance with applicable laws and regulations related to telemedicine.       Patient Participation / Response:  Fully participated with the group by sharing personal reflections / insights and openly received / provided feedback with other participants.    Demonstrated understanding of values, strengths, and challenges to learn about themselves    Treatment Plan:  Patient has a current master individualized treatment plan.  See Epic treatment plan for more information.    Sofia Ochoa, [facility]

## 2020-10-01 NOTE — GROUP NOTE
Process Group Note    PATIENT'S NAME: Rama Gutierrez  MRN:   [id number]  :   1990  ACCT. NUMBER: [id number]  DATE OF SERVICE: 10/01/20  START TIME:  9:00 AM  END TIME:  9:50 AM  FACILITATOR: Sofia Ochoa LICSW  TOPIC:  Process Group    Diagnoses:  296.33 (F33.2) Major Depressive Disorder, Recurrent Episode, Severe _ and With anxious distress  300.02 (F41.1) Generalized Anxiety Disorder  314.01 (F90.9) Unspecified Attention -Deficit / Hyperactivity Disorder      Adult Mental Health Day Treatment  TRACK: 5A    NUMBER OF PARTICIPANTS: 5    Telemedicine Visit: The patient's condition can be safely assessed and treated via synchronous audio and visual telemedicine encounter.      Reason for Telemedicine Visit: Services only offered telehealth    Originating Site (Patient Location): Patient's home    Distant Site (Provider Location): Provider Remote Setting    Consent:  The patient/guardian has verbally consented to: the potential risks and benefits of telemedicine (video visit) versus in person care; bill my insurance or make self-payment for services provided; and responsibility for payment of non-covered services.     Mode of Communication:  Video Conference via [employer]    As the provider I attest to compliance with applicable laws and regulations related to telemedicine.           Data:    Session content: At the start of this group, patients were invited to check in by identifying themselves, describing their current emotional status, and identifying issues to address in this group.   Area(s) of treatment focus addressed in this session included Symptom Management, Personal Safety and Community Resources/Discharge Planning.  Rama reported difficulty with disturbing dreams most nights.   She stated that she wakes up and thinks about the dreams and feels distressed.   She stated that she is also feeling numb during the day.  Mood is anxious and depressed.  Appetite and concentration are improving.   Client denied suicidal ideation, intent and plan.     Therapeutic Interventions/Treatment Strategies:  Psychotherapist offered support, feedback and validation and reinforced use of skills. Treatment modalities used include Cognitive Behavioral Therapy. Interventions include Coping Skills: Addressed barriers to utilizing coping skills when in distress.    Assessment:    Patient response:   Patient responded to session by listening, focusing on goals and being attentive    Possible barriers to participation / learning include: and no barriers identified    Health Issues:   None reported       Substance Use Review:   Substance Use: No active concerns identified.    Mental Status/Behavioral Observations  Appearance:   Appropriate   Eye Contact:   Good   Psychomotor Behavior: Normal   Attitude:   Cooperative   Orientation:   All  Speech   Rate / Production: Normal    Volume:  Normal   Mood:    Anxious  Depressed   Affect:    Appropriate   Thought Content:   Clear  Thought Form:  Coherent  Logical     Insight:    Good     Plan:     Safety Plan: No current safety concerns identified.  Recommended that patient call 911 or go to the local ED should there be a change in any of these risk factors.     Barriers to treatment: None identified    Patient Contracts (see media tab):  None    Substance Use: Not addressed in session     Continue or Discharge: Patient will continue in Adult Day Treatment (ADT)  as planned. Patient is likely to benefit from learning and using skills as they work toward the goals identified in their treatment plan.      Sofia Ochoa, [facility]  October 1, 2020

## 2020-10-02 ENCOUNTER — VIRTUAL VISIT (OUTPATIENT)
Dept: BEHAVIORAL HEALTH | Facility: CLINIC | Age: 30
End: 2020-10-02
Payer: COMMERCIAL

## 2020-10-02 DIAGNOSIS — F90.0 ATTENTION DEFICIT HYPERACTIVITY DISORDER (ADHD), PREDOMINANTLY INATTENTIVE TYPE: ICD-10-CM

## 2020-10-02 DIAGNOSIS — F41.0 PANIC DISORDER WITHOUT AGORAPHOBIA: ICD-10-CM

## 2020-10-02 DIAGNOSIS — F33.2 MAJOR DEPRESSIVE DISORDER, RECURRENT EPISODE, SEVERE WITH ANXIOUS DISTRESS (H): Primary | ICD-10-CM

## 2020-10-02 DIAGNOSIS — F41.1 GAD (GENERALIZED ANXIETY DISORDER): ICD-10-CM

## 2020-10-02 PROCEDURE — 90791 PSYCH DIAGNOSTIC EVALUATION: CPT | Mod: 95 | Performed by: COUNSELOR
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
## 2020-10-05 ENCOUNTER — HOSPITAL ENCOUNTER (OUTPATIENT)
Dept: BEHAVIORAL HEALTH | Facility: CLINIC | Age: 30
End: 2020-10-05
Attending: PSYCHIATRY & NEUROLOGY
Payer: COMMERCIAL

## 2020-10-05 PROCEDURE — G0177 OPPS/PHP; TRAIN & EDUC SERV: HCPCS | Mod: 95

## 2020-10-05 PROCEDURE — 90853 GROUP PSYCHOTHERAPY: CPT | Mod: 95 | Performed by: SOCIAL WORKER

## 2020-10-05 PROCEDURE — 90853 GROUP PSYCHOTHERAPY: CPT | Mod: GT | Performed by: SOCIAL WORKER

## 2020-10-05 NOTE — GROUP NOTE
Psychoeducation Group Note    PATIENT'S NAME: Rama Gutierrez  MRN:   [id number]  :   1990  ACCT. NUMBER: [id number]  DATE OF SERVICE: 10/05/20  START TIME: 11:00 AM  END TIME: 11:50 AM  FACILITATOR: Merry Newman RN  TOPIC: VIVIAN RN Group: Mind/Body Practice & Complementary  Telemedicine Visit: The patient's condition can be safely assessed and treated via synchronous audio and visual telemedicine encounter.      Reason for Telemedicine Visit:  covid19    Originating Site (Patient Location): Patient's home    Distant Site (Provider Location): Provider Remote Setting    Consent:  The patient/guardian has verbally consented to: the potential risks and benefits of telemedicine (video visit) versus in person care; bill my insurance or make self-payment for services provided; and responsibility for payment of non-covered services.     Mode of Communication:  Video Conference via [employer]    As the provider I attest to compliance with applicable laws and regulations related to telemedicine.      Adult Mental Health Day Treatment  TRACK: 5A    NUMBER OF PARTICIPANTS: 5    Summary of Group / Topics Discussed:  Mind/Body Practice & Complementary Therapies:  Progressive Muscle Relaxation: In addition to affecting our mood and behavior, psychological stress can cause a myriad of physical symptoms in our body. Patients were educated on these effects and guided to increased self-awareness of how stress affects their body. The purpose, benefits, history, and techniques of progressive muscle relaxation were discussed. In an instructor guided experiential, patients were guided to practice PMR to help reduce physical symptoms of psychological stress and achieve a more balanced feeling of well-being.    Patient Session Goals / Objectives:  ? Identified physiological symptoms of stress on the body  ? Listed & Explained the purpose and benefits to practicing PMR   ? Practiced progressive muscle relaxation  experiential        Patient Participation / Response:  Fully participated with the group by sharing personal reflections / insights and openly received / provided feedback with other participants.    Demonstrated understanding of topics discussed through group discussion and participation and Identified / Expressed personal readiness to practice skills    Treatment Plan:  Patient has a current master individualized treatment plan.  See Epic treatment plan for more information.    Merry Newman RN

## 2020-10-05 NOTE — GROUP NOTE
Psychotherapy Group Note    PATIENT'S NAME: Rama Gutierrez  MRN:   [id number]  :   1990  ACCT. NUMBER: [id number]  DATE OF SERVICE: 10/05/20  START TIME: 10:00 AM  END TIME: 10:50 AM  FACILITATOR: Sofia Ochoa LICSW  TOPIC: MH EBP Group: Emotions Management  Adult Mental Health Day Treatment  TRACK: 5A    NUMBER OF PARTICIPANTS: 5    Summary of Group / Topics Discussed:  Emotions Management: Understanding Emotions: Patients discussed the purpose of emotions and function they serve in our lives.  Reviewed core emotions, why they happen (triggers), and how they occur. The group assisted one anothers' understanding that: emotional experiences are important; difficult emotions have a place in our lives; and the differences between various emotions.    Patient Session Goals / Objectives:    Demonstrate understanding of types various emotions    Identify and discuss specific emotions and when they occur; understand triggers    Discuss barriers to emotional regulation    Telemedicine Visit: The patient's condition can be safely assessed and treated via synchronous audio and visual telemedicine encounter.      Reason for Telemedicine Visit: Services only offered telehealth    Originating Site (Patient Location): Patient's home    Distant Site (Provider Location): Provider Remote Setting    Consent:  The patient/guardian has verbally consented to: the potential risks and benefits of telemedicine (video visit) versus in person care; bill my insurance or make self-payment for services provided; and responsibility for payment of non-covered services.     Mode of Communication:  Video Conference via [employer]    As the provider I attest to compliance with applicable laws and regulations related to telemedicine.       Patient Participation / Response:  Fully participated with the group by sharing personal reflections / insights and openly received / provided feedback with other participants.    Demonstrated  knowledge of when to consider applying a variety of emotions management skills in daily life and Demonstrated understanding and practice strategies to manage difficult emotions and move towards healing    Treatment Plan:  Patient has a current master individualized treatment plan.  See Epic treatment plan for more information.    Sofia Ochoa, [facility]

## 2020-10-05 NOTE — GROUP NOTE
Process Group Note    PATIENT'S NAME: Rama Gutierrez  MRN:   [id number]  :   1990  ACCT. NUMBER: [id number]  DATE OF SERVICE: 10/05/20  START TIME:  9:00 AM  END TIME:  9:50 AM  FACILITATOR: Sofia Ochoa LICSW  TOPIC:  Process Group    Diagnoses:  296.33 (F33.2) Major Depressive Disorder, Recurrent Episode, Severe _ and With anxious distress  300.02 (F41.1) Generalized Anxiety Disorder  314.01 (F90.9) Unspecified Attention -Deficit / Hyperactivity Disorder      Adult Mental Health Day Treatment  TRACK: 5A    NUMBER OF PARTICIPANTS: 5    Telemedicine Visit: The patient's condition can be safely assessed and treated via synchronous audio and visual telemedicine encounter.      Reason for Telemedicine Visit: Services only offered telehealth    Originating Site (Patient Location): Patient's home    Distant Site (Provider Location): Provider Remote Setting    Consent:  The patient/guardian has verbally consented to: the potential risks and benefits of telemedicine (video visit) versus in person care; bill my insurance or make self-payment for services provided; and responsibility for payment of non-covered services.     Mode of Communication:  Video Conference via [employer]    As the provider I attest to compliance with applicable laws and regulations related to telemedicine.           Data:    Session content: At the start of this group, patients were invited to check in by identifying themselves, describing their current emotional status, and identifying issues to address in this group.   Area(s) of treatment focus addressed in this session included Symptom Management, Personal Safety and Community Resources/Discharge Planning.  Rama reported sleep disturbance with difficulty falling asleep.  She reported a changing medication from Straterra to Adderall.  She stated that she tried Melatonin to help her fall asleep and it helped.  She stated that she made a mistake of locking her keys in the car.    She stated that this increased her negative self-talk out of proportion with the event.  She reported some passive suicidal ideation but no intent or plan after the negative self-talk increased.  Peers offered support and feedback.      Therapeutic Interventions/Treatment Strategies:  Psychotherapist offered support, feedback and validation and reinforced use of skills. Treatment modalities used include Cognitive Behavioral Therapy. Interventions include Cognitive Restructuring:  Assisted patient in formulating new neutral/positive alternatives to challenge less helpful / ineffective thoughts.    Assessment:    Patient response:   Patient responded to session by focusing on goals and being attentive    Possible barriers to participation / learning include: and no barriers identified    Health Issues:   None reported       Substance Use Review:   Substance Use: No active concerns identified.    Mental Status/Behavioral Observations  Appearance:   Appropriate   Eye Contact:   Good   Psychomotor Behavior: Normal   Attitude:   Cooperative   Orientation:   All  Speech   Rate / Production: Normal    Volume:  Normal   Mood:    Anxious  Depressed   Affect:    Appropriate   Thought Content:   Clear  Thought Form:  Coherent  Logical     Insight:    Good     Plan:     Safety Plan: No current safety concerns identified.  Recommended that patient call 911 or go to the local ED should there be a change in any of these risk factors.     Barriers to treatment: None identified    Patient Contracts (see media tab):  None    Substance Use: Not addressed in session     Continue or Discharge: Patient will continue in Adult Day Treatment (ADT)  as planned. Patient is likely to benefit from learning and using skills as they work toward the goals identified in their treatment plan.      Sofia Ochoa, [facility]  October 5, 2020

## 2020-10-06 ENCOUNTER — HOSPITAL ENCOUNTER (OUTPATIENT)
Dept: BEHAVIORAL HEALTH | Facility: CLINIC | Age: 30
End: 2020-10-06
Attending: PSYCHIATRY & NEUROLOGY
Payer: COMMERCIAL

## 2020-10-06 PROCEDURE — 90853 GROUP PSYCHOTHERAPY: CPT | Mod: 95 | Performed by: SOCIAL WORKER

## 2020-10-06 PROCEDURE — 90853 GROUP PSYCHOTHERAPY: CPT | Mod: GT | Performed by: SOCIAL WORKER

## 2020-10-06 PROCEDURE — G0177 OPPS/PHP; TRAIN & EDUC SERV: HCPCS | Mod: GT

## 2020-10-06 NOTE — GROUP NOTE
Process Group Note    PATIENT'S NAME: Rama Gutierrez  MRN:   [id number]  :   1990  ACCT. NUMBER: [id number]  DATE OF SERVICE: 10/06/20  START TIME:  9:00 AM  END TIME:  9:50 AM  FACILITATOR: Sofia Ochoa LICSW  TOPIC:  Process Group    Diagnoses:  296.33 (F33.2) Major Depressive Disorder, Recurrent Episode, Severe _ and With anxious distress  300.02 (F41.1) Generalized Anxiety Disorder  314.01 (F90.9) Unspecified Attention -Deficit / Hyperactivity Disorder      Adult Mental Health Day Treatment  TRACK: 5A    NUMBER OF PARTICIPANTS: 6    Telemedicine Visit: The patient's condition can be safely assessed and treated via synchronous audio and visual telemedicine encounter.      Reason for Telemedicine Visit: Services only offered telehealth    Originating Site (Patient Location): Patient's home    Distant Site (Provider Location): Provider Remote Setting    Consent:  The patient/guardian has verbally consented to: the potential risks and benefits of telemedicine (video visit) versus in person care; bill my insurance or make self-payment for services provided; and responsibility for payment of non-covered services.     Mode of Communication:  Video Conference via [employer]    As the provider I attest to compliance with applicable laws and regulations related to telemedicine.           Data:    Session content: At the start of this group, patients were invited to check in by identifying themselves, describing their current emotional status, and identifying issues to address in this group.   Area(s) of treatment focus addressed in this session included Symptom Management, Personal Safety and Community Resources/Discharge Planning.  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  She is waiting for the new ADHD medication to be effective.  Client denied suicidal ideation, intent and plan. Peers shared  "strategies to help with making progress and strategies to decrease perfectionism.    Therapeutic Interventions/Treatment Strategies:  Psychotherapist offered support, feedback and validation and reinforced use of skills. Treatment modalities used include Cognitive Behavioral Therapy. Interventions include Coping Skills: Discussed how the use of intentional \"in the moment\" actions can help reduce distress.    Assessment:    Patient response:   Patient responded to session by focusing on goals and being attentive    Possible barriers to participation / learning include: and no barriers identified    Health Issues:   None reported       Substance Use Review:   Substance Use: No active concerns identified.    Mental Status/Behavioral Observations  Appearance:   Appropriate   Eye Contact:   Good   Psychomotor Behavior: Normal   Attitude:   Cooperative   Orientation:   All  Speech   Rate / Production: Normal    Volume:  Normal   Mood:    Anxious  Depressed   Affect:    Appropriate   Thought Content:   Clear  Thought Form:  Coherent  Logical     Insight:    Good     Plan:     Safety Plan: No current safety concerns identified.  Recommended that patient call 911 or go to the local ED should there be a change in any of these risk factors.     Barriers to treatment: None identified    Patient Contracts (see media tab):  None    Substance Use: Not addressed in session     Continue or Discharge: Patient will continue in Adult Day Treatment (ADT)  as planned. Patient is likely to benefit from learning and using skills as they work toward the goals identified in their treatment plan.      Sofia Ochoa, [facility]  October 6, 2020  "

## 2020-10-06 NOTE — GROUP NOTE
Psychotherapy Group Note    PATIENT'S NAME: Rama Gutierrez  MRN:   [id number]  :   1990  ACCT. NUMBER: [id number]  DATE OF SERVICE: 10/06/20  START TIME: 11:00 AM  END TIME: 11:50 AM  FACILITATOR: Sofia Ochoa LICSW  TOPIC:  EBP Group: Emotions Management  Adult Mental Health Day Treatment  TRACK: 5A    NUMBER OF PARTICIPANTS: 6    Summary of Group / Topics Discussed:  Emotions Management: Anger: Patients explored and shared personal experiences associated with feelings of anger.  Group explored how these feelings develop, what they mean to each individual, and how to increase acceptance and usefulness of these feelings.  Discussed anger as a  secondary  emotion and reviewed ways to manage anger and challenge associated cognitive distortions. Group members worked to contextualize these concepts and promote healing.     Patient Session Goals / Objectives:    Discuss and review definitions and personal views/experiences with anger    Explore how feelings of anger impact functioning    Understand and practice strategies to manage difficult emotions and move towards healing    Demonstrate understanding of the feelings of anger    Verbalize how these emotions have impacted their lives/functioning    Verbalize of knowledge gained and possible interventions to manage feelings    Telemedicine Visit: The patient's condition can be safely assessed and treated via synchronous audio and visual telemedicine encounter.      Reason for Telemedicine Visit: Services only offered telehealth    Originating Site (Patient Location): Patient's home    Distant Site (Provider Location): Provider Remote Setting    Consent:  The patient/guardian has verbally consented to: the potential risks and benefits of telemedicine (video visit) versus in person care; bill my insurance or make self-payment for services provided; and responsibility for payment of non-covered services.     Mode of Communication:  Video Conference via  [employer]    As the provider I attest to compliance with applicable laws and regulations related to telemedicine.       Patient Participation / Response:  Fully participated with the group by sharing personal reflections / insights and openly received / provided feedback with other participants.    Identified barriers to applying emotions management strategies    Treatment Plan:  Patient has a current master individualized treatment plan.  See Epic treatment plan for more information.    Sofia Ochoa, [facility]

## 2020-10-06 NOTE — GROUP NOTE
Psychoeducation Group Note    PATIENT'S NAME: Rama Gutierrez  MRN:   [id number]  :   1990  ACCT. NUMBER: [id number]  DATE OF SERVICE: 10/06/20  START TIME: 10:00 AM  END TIME: 10:50 AM  FACILITATOR: Ugo Hickey OTR/L  TOPIC: MH Life Skills Group: Lifestyle Balance and Structure  Telemedicine Visit: The patient's condition can be safely assessed and treated via synchronous audio and visual telemedicine encounter.      Reason for Telemedicine Visit: COVID-19    Originating Site (Patient Location): Patient's home    Distant Site (Provider Location): [facility]    Consent:  The patient/guardian has verbally consented to: the potential risks and benefits of telemedicine (video visit) versus in person care; bill my insurance or make self-payment for services provided; and responsibility for payment of non-covered services.     Mode of Communication:  Video Conference via [employer]    As the provider I attest to compliance with applicable laws and regulations related to telemedicine.   Adult Mental Health Day Treatment  TRACK: 5A    NUMBER OF PARTICIPANTS: 6    Summary of Group / Topics Discussed:  Lifestyle Balance and Strucure:  Occupations: Healthy Lifestyle: Patients were introduced to the importance of daily occupations in support of mental health management by exploring a balanced lifestyle.  Patients identified how they spend their time and skills to bring this into better balance.  Patients were assisted to establish, restore, and/or modify performance skills and patterns for improved engagement and promotion of positive mental health through meaningful occupations.  Patients gained awareness of the connection between who they are (self identity) with what they do.    Patient Session Goals / Objectives:    Increased awareness of how patient s functioning in identified meaningful occupations are impacted by their mental health status     Developed performance skills and  performance patterns to enhance occupational engagement through creating a balanced lifestyle    Explored ways to generalize new awareness and skills to their everyday life        Patient Participation / Response:  Fully participated with the group by sharing personal reflections / insights and openly received / provided feedback with other participants.    Demonstrated understanding of content through handout and group discussion  and Verbalized understanding of content    Treatment Plan:  Patient has a current master individualized treatment plan.  See Epic treatment plan for more information.    Ugo Hickey, OTR/L

## 2020-10-07 ENCOUNTER — PATIENT OUTREACH (OUTPATIENT)
Dept: CARE COORDINATION | Facility: CLINIC | Age: 30
End: 2020-10-07

## 2020-10-07 ENCOUNTER — FCC EXTENDED DOCUMENTATION (OUTPATIENT)
Dept: BEHAVIORAL HEALTH | Facility: CLINIC | Age: 30
End: 2020-10-07

## 2020-10-07 ASSESSMENT — ACTIVITIES OF DAILY LIVING (ADL): DEPENDENT_IADLS:: INDEPENDENT

## 2020-10-07 NOTE — PROGRESS NOTES
Clinic Care Coordination Contact  [facility]/Voicemail    Referral Source: Care Team  Clinical Data: Care Coordinator Outreach  Outreach attempted x 1.  Left message on patient's voicemail with call back information and requested return call.  Plan:  Care Coordinator will try to reach patient again in 10 business days.    Terrence Paulino [facility]  Clinic Care Coordinator  [facility]  [phone]  [email or web address]

## 2020-10-07 NOTE — PROGRESS NOTES
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    Therapist: Tresa Guerrero [facility]      Team Members Present:  Fior HAGENSW  Charli Knutson [facility]  Denisse Bowers [facility]SW and LMFT    Purpose:   Risk Management and Treatment Planning    Recommendations:  Client is to finish Day Tx program   Provider will reach out to clients current therapist (that she plans to term with) for further information gathering       Tresa Guerrero, [facility]  October 7, 2020

## 2020-10-08 ENCOUNTER — HOSPITAL ENCOUNTER (OUTPATIENT)
Dept: BEHAVIORAL HEALTH | Facility: CLINIC | Age: 30
End: 2020-10-08
Attending: PSYCHIATRY & NEUROLOGY
Payer: COMMERCIAL

## 2020-10-08 PROCEDURE — 90853 GROUP PSYCHOTHERAPY: CPT | Mod: GT | Performed by: SOCIAL WORKER

## 2020-10-08 PROCEDURE — 90853 GROUP PSYCHOTHERAPY: CPT | Mod: 95 | Performed by: SOCIAL WORKER

## 2020-10-08 NOTE — GROUP NOTE
Psychotherapy Group Note    PATIENT'S NAME: Rama Gutierrez  MRN:   [id number]  :   1990  ACCT. NUMBER: [id number]  DATE OF SERVICE: 10/08/20  START TIME: 11:00 AM  END TIME: 11:50 AM  FACILITATOR: Sofia Padilla LICSW  TOPIC:  EBP Group: Enhanced Mindfulness  Adult Mental Health Day Treatment  TRACK: 5A    NUMBER OF PARTICIPANTS: 6    Summary of Group / Topics Discussed:  Enhanced Mindfulness: Body and Mind Integration: Patients received an overview and education regarding the importance of including the body in the management of emotional health and self-care and as a direct route to mindfulness practice.  Patients discussed various ways in which the body can serve as an informant to their physical and emotional experiences/need. Patients discussed the body as a direct link to the present moment and to mindfulness practice.  Patients discussed current relationship with body, self-awareness, mindfulness practice and barriers to connection with body.  Patients were guided through breath work and movement to facilitate greater self-awareness, grounding, self-expression, and connection to other.  Patients discussed how the experiential could be applied to better manage mental health and develop hankins connection to self.    Patient Session Goals / Objectives:    Identify how movement awareness could be used for grounding, stress management, self-expression, connection to other and self-regulation    Improved awareness of breath and movement preferences    Identify how movement and the body is used in mindfulness practice    Reflect on use of these practices in everyday life.    Identify barriers to attending to body    Telemedicine Visit: The patient's condition can be safely assessed and treated via synchronous audio and visual telemedicine encounter.          Reason for Telemedicine Visit: Services only offered telehealth and covid19        Originating Site (Patient Location): Patient's home        Distant Site  (Provider Location): Provider Remote Setting        Consent:  The patient/guardian has verbally consented to: the potential risks and benefits of telemedicine (video visit) versus in person care; bill my insurance or make self-payment for services provided; and responsibility for payment of non-covered services.         Mode of Communication:  Video Conference via [employer]        As the provider I attest to compliance with applicable laws and regulations related to telemedicine.         Patient Participation / Response:  Moderately participated, sharing some personal reflections / insights and adequately adequately received / provided feedback with other participants.    Practiced skills in session    Treatment Plan:  Patient has a current master individualized treatment plan.  See Epic treatment plan for more information.    Sofia Padilla, HIEUSW

## 2020-10-08 NOTE — GROUP NOTE
Process Group Note    PATIENT'S NAME: Rama Gutierrez  MRN:   [id number]  :   1990  ACCT. NUMBER: [id number]  DATE OF SERVICE: 10/08/20  START TIME:  9:00 AM  END TIME:  9:50 AM  FACILITATOR: Sofia Ochoa LICSW  TOPIC:  Process Group    Diagnoses:  296.33 (F33.2) Major Depressive Disorder, Recurrent Episode, Severe _ and With anxious distress  300.02 (F41.1) Generalized Anxiety Disorder  314.01 (F90.9) Unspecified Attention -Deficit / Hyperactivity Disorder      Adult Mental Health Day Treatment  TRACK: 5A    NUMBER OF PARTICIPANTS: 6    Telemedicine Visit: The patient's condition can be safely assessed and treated via synchronous audio and visual telemedicine encounter.      Reason for Telemedicine Visit: Services only offered telehealth    Originating Site (Patient Location): Patient's home    Distant Site (Provider Location): Provider Remote Setting    Consent:  The patient/guardian has verbally consented to: the potential risks and benefits of telemedicine (video visit) versus in person care; bill my insurance or make self-payment for services provided; and responsibility for payment of non-covered services.     Mode of Communication:  Video Conference via [employer]    As the provider I attest to compliance with applicable laws and regulations related to telemedicine.           Data:    Session content: At the start of this group, patients were invited to check in by identifying themselves, describing their current emotional status, and identifying issues to address in this group.   Area(s) of treatment focus addressed in this session included Symptom Management, Personal Safety and Community Resources/Discharge Planning.  Rama took time to report difficulty with high frustration.   She satted that she is having artists block and feels frustrated.   As she does work for herself and work on commission she is frustrated by the difficulty to complete the commissioned work.  She stated that she  has a goal of completing commissioned work within two weeks.  Peers shared coping strategies including using positive self-talk, taking a break from the work, and drawing by hand rather than by computer.  Client denied suicidal ideation, intent and plan.     Therapeutic Interventions/Treatment Strategies:  Psychotherapist offered support, feedback and validation and reinforced use of skills. Treatment modalities used include Cognitive Behavioral Therapy. Interventions include Coping Skills: Addressed barriers to utilizing coping skills when in distress.    Assessment:    Patient response:   Patient responded to session by being attentive and accepting support    Possible barriers to participation / learning include: and no barriers identified    Health Issues:   None reported       Substance Use Review:   Substance Use: No active concerns identified.    Mental Status/Behavioral Observations  Appearance:   Appropriate   Eye Contact:   Good   Psychomotor Behavior: Normal   Attitude:   Cooperative   Orientation:   All  Speech   Rate / Production: Normal    Volume:  Normal   Mood:    Anxious  Depressed  frustrated  Affect:    Appropriate   Thought Content:   Clear  Thought Form:  Coherent  Logical     Insight:    Good     Plan:     Safety Plan: No current safety concerns identified.  Recommended that patient call 911 or go to the local ED should there be a change in any of these risk factors.     Barriers to treatment: None identified    Patient Contracts (see media tab):  None    Substance Use: Not addressed in session     Continue or Discharge: Patient will continue in Adult Day Treatment (ADT)  as planned. Patient is likely to benefit from learning and using skills as they work toward the goals identified in their treatment plan.      Sofia Ochoa, [facility]  October 8, 2020

## 2020-10-08 NOTE — GROUP NOTE
Psychotherapy Group Note    PATIENT'S NAME: Rama Gutierrez  MRN:   [id number]  :   1990  ACCT. NUMBER: [id number]  DATE OF SERVICE: 10/08/20  START TIME: 10:00 AM  END TIME: 10:50 AM  FACILITATOR: Sofia Ochoa LICSW  TOPIC: MH EBP Group: Emotions Management  Adult Mental Health Day Treatment  TRACK: 5A    NUMBER OF PARTICIPANTS: 6    Summary of Group / Topics Discussed:  Emotions Management: Opposite to Emotion: Patients discussed past and present struggles with knowing how to make changes in their lives due to difficult emotional experiences.  Explored desires to experience and feel less anger, sadness, guilt, and fear.  Reviewed the therapeutic skill of opposite action and patients explored opportunities to use their behaviors as a tool to reduce an emotion that they want to change.     Patient Session Goals / Objectives:    Review DBT concepts and focus on patient s experiences of distress and difficult emotional experiences.    Learn how to do the opposite of what an emotion makes us want to do in an effort to decrease an unwanted emotional experience.    Demonstrate understanding of the skill of opposite action by sharing experiences where the technique could be useful in past / present situations.    Telemedicine Visit: The patient's condition can be safely assessed and treated via synchronous audio and visual telemedicine encounter.      Reason for Telemedicine Visit: Services only offered telehealth    Originating Site (Patient Location): Patient's home    Distant Site (Provider Location): Provider Remote Setting    Consent:  The patient/guardian has verbally consented to: the potential risks and benefits of telemedicine (video visit) versus in person care; bill my insurance or make self-payment for services provided; and responsibility for payment of non-covered services.     Mode of Communication:  Video Conference via [employer]    As the provider I attest to compliance with applicable  laws and regulations related to telemedicine.       Patient Participation / Response:  Fully participated with the group by sharing personal reflections / insights and openly received / provided feedback with other participants.    Demonstrated knowledge of when to consider applying a variety of emotions management skills in daily life    Treatment Plan:  Patient has a current master individualized treatment plan.  See Epic treatment plan for more information.    Sofia Ochoa, LICSW

## 2020-10-12 ENCOUNTER — HOSPITAL ENCOUNTER (OUTPATIENT)
Dept: BEHAVIORAL HEALTH | Facility: CLINIC | Age: 30
End: 2020-10-12
Attending: PSYCHIATRY & NEUROLOGY
Payer: COMMERCIAL

## 2020-10-12 PROCEDURE — G0177 OPPS/PHP; TRAIN & EDUC SERV: HCPCS | Mod: GT

## 2020-10-12 PROCEDURE — 90853 GROUP PSYCHOTHERAPY: CPT | Mod: 95 | Performed by: SOCIAL WORKER

## 2020-10-12 PROCEDURE — 90853 GROUP PSYCHOTHERAPY: CPT | Mod: GT | Performed by: SOCIAL WORKER

## 2020-10-12 NOTE — GROUP NOTE
Psychotherapy Group Note    PATIENT'S NAME: Rama Gutierrez  MRN:   [id number]  :   1990  ACCT. NUMBER: [id number]  DATE OF SERVICE: 10/12/20  START TIME: 10:00 AM  END TIME: 10:50 AM  FACILITATOR: Sofia Ochoa LICSW  TOPIC: MH EBP Group: Cognitive Restructuring  Adult Mental Health Day Treatment  TRACK: 5A    NUMBER OF PARTICIPANTS: 7    Summary of Group / Topics Discussed:  Cognitive Restructuring: Distortions: Patients received an overview of how to identify common cognitive distortions. Patients will explore alternatives to cognitive distortions and practice challenging their negative thought patterns. The goal is to help patients target modify ineffective thought patterns.     Patient Session Goals / Objectives:    Familiarized self with ineffective / unhealthy thoughts and how they develop.      Explored impact of ineffective thoughts / distortions on mood and activity    Formulated new neutral/positive alternatives to challenge less helpful / ineffective thoughts.    Practiced and plan to apply in daily life      Telemedicine Visit: The patient's condition can be safely assessed and treated via synchronous audio and visual telemedicine encounter.      Reason for Telemedicine Visit: Services only offered telehealth    Originating Site (Patient Location): Patient's home    Distant Site (Provider Location): Provider Remote Setting    Consent:  The patient/guardian has verbally consented to: the potential risks and benefits of telemedicine (video visit) versus in person care; bill my insurance or make self-payment for services provided; and responsibility for payment of non-covered services.     Mode of Communication:  Video Conference via [employer]    As the provider I attest to compliance with applicable laws and regulations related to telemedicine.          Patient Participation / Response:  Fully participated with the group by sharing personal reflections / insights and openly received /  provided feedback with other participants.    Identified barriers to changing thought patterns    Treatment Plan:  Patient has a current master individualized treatment plan.  See Epic treatment plan for more information.    Sofia Ochoa, [facility]IEUSW

## 2020-10-12 NOTE — GROUP NOTE
Psychoeducation Group Note    PATIENT'S NAME: Rama Gutierrez  MRN:   [id number]  :   1990  [id number]  DATE OF SERVICE: 10/12/20  START TIME: 11:00 AM  END TIME: 11:50 AM  FACILITATOR: Merry Newman RN  TOPIC:  RN Group: Brain Health  Telemedicine Visit: The patient's condition can be safely assessed and treated via synchronous audio and visual telemedicine encounter.      Reason for Telemedicine Visit:  covid19    Originating Site (Patient Location): Patient's home    Distant Site (Provider Location): Provider Remote Setting    Consent:  The patient/guardian has verbally consented to: the potential risks and benefits of telemedicine (video visit) versus in person care; bill my insurance or make self-payment for services provided; and responsibility for payment of non-covered services.     Mode of Communication:  Video Conference via [employer]    As the provider I attest to compliance with applicable laws and regulations related to telemedicine.      Adult Mental Health Day Treatment  TRACK: 5A    NUMBER OF PARTICIPANTS: 6    Summary of Group / Topics Discussed:  Brain Health:  Pathophysiology of Mood Disorders: Patients were educated on mood disorder etiology and neuroscience, risk factors, symptoms, and pharmacologic, psychotherapeutic, and complementary treatment options. Patients were guided on a discussion of mental, behavioral, and physical symptoms and shared their symptoms with the group.     Patient Session Goals / Objectives:  ? Described what mood disorders are and identified risk factors   ? Explained how chemical imbalances in the brain can cause symptoms and how medications work to reverse this imbalance   ? Identified and described pharmacologic, psychotherapeutic, and complementary treatment options        Patient Participation / Response:  Moderately participated, sharing some personal reflections / insights and adequately adequately received / provided feedback with other  participants.    Verbalized understanding of brain health topic    Treatment Plan:  Patient has a current master individualized treatment plan.  See Epic treatment plan for more information.    Merry Newman RN

## 2020-10-12 NOTE — GROUP NOTE
Process Group Note    PATIENT'S NAME: Rama Gutierrez  MRN:   [id number]  :   1990  ACCT. NUMBER: [id number]  DATE OF SERVICE: 10/12/20  START TIME:  9:00 AM  END TIME:  9:50 AM  FACILITATOR: Sofia Ochoa LICSW  TOPIC:  Process Group    Diagnoses:  296.33 (F33.2) Major Depressive Disorder, Recurrent Episode, Severe _ and With anxious distress  300.02 (F41.1) Generalized Anxiety Disorder  314.01 (F90.9) Unspecified Attention -Deficit / Hyperactivity Disorder      Adult Mental Health Day Treatment  TRACK: 5A    NUMBER OF PARTICIPANTS: 7    Telemedicine Visit: The patient's condition can be safely assessed and treated via synchronous audio and visual telemedicine encounter.      Reason for Telemedicine Visit: Services only offered telehealth    Originating Site (Patient Location): Patient's home    Distant Site (Provider Location): Provider Remote Setting    Consent:  The patient/guardian has verbally consented to: the potential risks and benefits of telemedicine (video visit) versus in person care; bill my insurance or make self-payment for services provided; and responsibility for payment of non-covered services.     Mode of Communication:  Video Conference via [employer]    As the provider I attest to compliance with applicable laws and regulations related to telemedicine.           Data:    Session content: At the start of this group, patients were invited to check in by identifying themselves, describing their current emotional status, and identifying issues to address in this group.   Area(s) of treatment focus addressed in this session included Symptom Management, Personal Safety and Community Resources/Discharge Planning.  Paul reported doing okay today.  She stated that she continues to feel ambivalent about things, which meant that she I snot enjoying things and is not looking forward to future events.  She stated that back pain is interfering with her sleep so she is using muscle relaxants.   She stated that they make her groggy for most of the next day.  She shared with the group the treatment options she has tried for the pain.      Therapeutic Interventions/Treatment Strategies:  Psychotherapist offered support, feedback and validation and reinforced use of skills. Treatment modalities used include Cognitive Behavioral Therapy. Interventions include Coping Skills: Assisted patient in understanding the purpose of planning / creating / participating / sharing in positive experiences.    Assessment:    Patient response:   Patient responded to session by listening and focusing on goals    Possible barriers to participation / learning include: and no barriers identified    Health Issues:   None reported       Substance Use Review:   Substance Use: No active concerns identified.    Mental Status/Behavioral Observations  Appearance:   Appropriate   Eye Contact:   Good   Psychomotor Behavior: Normal   Attitude:   Cooperative   Orientation:   All  Speech   Rate / Production: Normal    Volume:  Normal   Mood:    Anxious  Depressed   Affect:    Appropriate   Thought Content:   Clear  Thought Form:  Coherent  Logical     Insight:    Good     Plan:     Safety Plan: No current safety concerns identified.  Recommended that patient call 911 or go to the local ED should there be a change in any of these risk factors.     Barriers to treatment: None identified    Patient Contracts (see media tab):  None    Substance Use: Not addressed in session     Continue or Discharge: Patient will continue in Adult Day Treatment (ADT)  as planned. Patient is likely to benefit from learning and using skills as they work toward the goals identified in their treatment plan.      Sofia Ochoa, [facility]  October 12, 2020

## 2020-10-13 ENCOUNTER — HOSPITAL ENCOUNTER (OUTPATIENT)
Dept: BEHAVIORAL HEALTH | Facility: CLINIC | Age: 30
End: 2020-10-13
Attending: PSYCHIATRY & NEUROLOGY
Payer: COMMERCIAL

## 2020-10-13 PROCEDURE — G0177 OPPS/PHP; TRAIN & EDUC SERV: HCPCS | Mod: GT

## 2020-10-13 PROCEDURE — 90853 GROUP PSYCHOTHERAPY: CPT | Mod: GT | Performed by: SOCIAL WORKER

## 2020-10-13 PROCEDURE — 90853 GROUP PSYCHOTHERAPY: CPT | Mod: 95 | Performed by: SOCIAL WORKER

## 2020-10-13 NOTE — GROUP NOTE
Psychoeducation Group Note    PATIENT'S NAME: Rama Gutierrez  MRN:   [id number]  :   1990  ACCT. NUMBER: [id number]  DATE OF SERVICE: 10/13/20  START TIME: 10:00 AM  END TIME: 10:50 AM  FACILITATOR: Ugo Hickey OTR/L  TOPIC: MH Life Skills Group: Lifestyle Balance and Structure  Telemedicine Visit: The patient's condition can be safely assessed and treated via synchronous audio and visual telemedicine encounter.      Reason for Telemedicine Visit:  COVID-19    Originating Site (Patient Location): Patient's home    Distant Site (Provider Location): [facility]: [facility]    Consent:  The patient/guardian has verbally consented to: the potential risks and benefits of telemedicine (video visit) versus in person care; bill my insurance or make self-payment for services provided; and responsibility for payment of non-covered services.     Mode of Communication:  Video Conference via [employer]    As the provider I attest to compliance with applicable laws and regulations related to telemedicine.   Adult Mental Health Day Treatment  TRACK: 5A    NUMBER OF PARTICIPANTS: 8    Summary of Group / Topics Discussed:  Lifestyle Balance and Strucure:  Time Management: Time for Tips and Tips for Time: Patients were introduced to how effective time management is beneficial to self esteem, relationships with others, life balance, and other aspects of daily life.   Patients were taught strategies on how to improve time management skills.    Patient Session Goals / Objectives:    Facilitated the discussion on challenges/barriers and personal situations with time management     Identified specific techniques and strategies to improve time management to support mental health recovery       Identified a plan to implement strategies into daily life to support improved time management         Patient Participation / Response:  Fully participated with the group by sharing personal reflections / insights and  openly received / provided feedback with other participants.    Demonstrated understanding of content through handout and group discussion  and Verbalized understanding of content    Treatment Plan:  Patient has a current master individualized treatment plan.  See Epic treatment plan for more information.    Ugo Hickey, OTR/L

## 2020-10-13 NOTE — GROUP NOTE
Psychotherapy Group Note    PATIENT'S NAME: Rama Gutierrez  MRN:   [id number]  :   1990  ACCT. NUMBER: [id number]  DATE OF SERVICE: 10/13/20  START TIME: 11:00 AM  END TIME: 11:50 AM  FACILITATOR: Sofia Ochoa LICSW  TOPIC: MH EBP Group: Cognitive Restructuring  Adult Mental Health Day Treatment  TRACK: 5A    NUMBER OF PARTICIPANTS: 7    Summary of Group / Topics Discussed:  Cognitive Restructuring: Distortions: Patients received an overview of how to identify common cognitive distortions. Patients will explore alternatives to cognitive distortions and practice challenging their negative thought patterns. The goal is to help patients target modify ineffective thought patterns.  This was session two on this topic.      Patient Session Goals / Objectives:    Familiarized self with ineffective / unhealthy thoughts and how they develop.      Explored impact of ineffective thoughts / distortions on mood and activity    Formulated new neutral/positive alternatives to challenge less helpful / ineffective thoughts.    Practiced and plan to apply in daily life      Telemedicine Visit: The patient's condition can be safely assessed and treated via synchronous audio and visual telemedicine encounter.      Reason for Telemedicine Visit: Services only offered telehealth    Originating Site (Patient Location): Patient's home    Distant Site (Provider Location): Provider Remote Setting    Consent:  The patient/guardian has verbally consented to: the potential risks and benefits of telemedicine (video visit) versus in person care; bill my insurance or make self-payment for services provided; and responsibility for payment of non-covered services.     Mode of Communication:  Video Conference via [employer]    As the provider I attest to compliance with applicable laws and regulations related to telemedicine.          Patient Participation / Response:  Fully participated with the group by sharing personal reflections  / insights and openly received / provided feedback with other participants.    Expressed understanding of the relationship between behaviors, thoughts, and feelings and Demonstrated knowledge of personal thought patterns and how they impact their mood and behavior.    Treatment Plan:  Patient has a current master individualized treatment plan.  See Epic treatment plan for more information.    Sofia Ochoa, [facility]

## 2020-10-13 NOTE — GROUP NOTE
Process Group Note    PATIENT'S NAME: Rama Gutierrez  MRN:   [id number]  :   1990  ACCT. NUMBER: [id number]  DATE OF SERVICE: 10/13/20  START TIME:  9:00 AM  END TIME:  9:50 AM  FACILITATOR: Sofia Ochoa LICSW  TOPIC:  Process Group    Diagnoses:  296.33 (F33.2) Major Depressive Disorder, Recurrent Episode, Severe _ and With anxious distress  300.02 (F41.1) Generalized Anxiety Disorder  314.01 (F90.9) Unspecified Attention -Deficit / Hyperactivity Disorder      Adult Mental Health Day Treatment  TRACK: 5A    NUMBER OF PARTICIPANTS: 7    Telemedicine Visit: The patient's condition can be safely assessed and treated via synchronous audio and visual telemedicine encounter.      Reason for Telemedicine Visit: Services only offered telehealth    Originating Site (Patient Location): Patient's home    Distant Site (Provider Location): Provider Remote Setting    Consent:  The patient/guardian has verbally consented to: the potential risks and benefits of telemedicine (video visit) versus in person care; bill my insurance or make self-payment for services provided; and responsibility for payment of non-covered services.     Mode of Communication:  Video Conference via [employer]    As the provider I attest to compliance with applicable laws and regulations related to telemedicine.           Data:    Session content: At the start of this group, patients were invited to check in by identifying themselves, describing their current emotional status, and identifying issues to address in this group.   Area(s) of treatment focus addressed in this session included Symptom Management, Personal Safety and Community Resources/Discharge Planning.  Rama reported having back pain today from a known low back issue.  She stated that she had good sleep last night.   She stated that mentally she is feeling better today.  She stated that she was able to draw for a long time yesterday after group.   She also chatted with  friends and completed an art tutorial with friends online.  Client denied suicidal ideation, intent and plan.     Therapeutic Interventions/Treatment Strategies:  Psychotherapist offered support, feedback and validation and reinforced use of skills. Treatment modalities used include Cognitive Behavioral Therapy. Interventions include Behavioral Activation: Explored how behaviors effect mood and interact with thoughts and feelings.    Assessment:    Patient response:   Patient responded to session by focusing on goals and being attentive    Possible barriers to participation / learning include: and no barriers identified    Health Issues:   None reported       Substance Use Review:   Substance Use: No active concerns identified.    Mental Status/Behavioral Observations  Appearance:   Appropriate   Eye Contact:   Good   Psychomotor Behavior: Normal   Attitude:   Cooperative   Orientation:   All  Speech   Rate / Production: Normal    Volume:  Normal   Mood:    Anxious  Depressed   Affect:    Appropriate   Thought Content:   Clear  Thought Form:  Coherent  Logical     Insight:    Good     Plan:     Safety Plan: No current safety concerns identified.  Recommended that patient call 911 or go to the local ED should there be a change in any of these risk factors.     Barriers to treatment: None identified    Patient Contracts (see media tab):  None    Substance Use: Not addressed in session     Continue or Discharge: Patient will continue in Adult Day Treatment (ADT)  as planned. Patient is likely to benefit from learning and using skills as they work toward the goals identified in their treatment plan.      Sofia Ochoa, [facility]  October 13, 2020

## 2020-10-15 ENCOUNTER — TELEPHONE (OUTPATIENT)
Dept: BEHAVIORAL HEALTH | Facility: CLINIC | Age: 30
End: 2020-10-15

## 2020-10-15 NOTE — TELEPHONE ENCOUNTER
Writer called pt to check in about attending group today. Reports feeling GI upset, abdominal pain. Also reported starting Adderral XR a couple of weeks ago and has noticed these Sx since then. Pt is monitoring her Sx and will contact Dr soon if persisting.     Merry Newman RN on 10/15/2020 at 9:38 AM

## 2020-10-19 ENCOUNTER — HOSPITAL ENCOUNTER (OUTPATIENT)
Dept: BEHAVIORAL HEALTH | Facility: CLINIC | Age: 30
End: 2020-10-19
Attending: PSYCHIATRY & NEUROLOGY
Payer: COMMERCIAL

## 2020-10-19 PROCEDURE — 90853 GROUP PSYCHOTHERAPY: CPT | Mod: GT | Performed by: SOCIAL WORKER

## 2020-10-19 PROCEDURE — G0177 OPPS/PHP; TRAIN & EDUC SERV: HCPCS | Mod: 95

## 2020-10-19 NOTE — GROUP NOTE
Process Group Note    PATIENT'S NAME: Rama Gutierrez  MRN:   [id number]  :   1990  ACCT. NUMBER: [id number]  DATE OF SERVICE: 10/19/20  START TIME:  9:00 AM  END TIME:  9:50 AM  FACILITATOR: Sofia Ochoa LICSW  TOPIC:  Process Group    Diagnoses:  296.33 (F33.2) Major Depressive Disorder, Recurrent Episode, Severe _ and With anxious distress  300.02 (F41.1) Generalized Anxiety Disorder  314.01 (F90.9) Unspecified Attention -Deficit / Hyperactivity Disorder      Adult Mental Health Day Treatment  TRACK: 5A    NUMBER OF PARTICIPANTS: 6    Telemedicine Visit: The patient's condition can be safely assessed and treated via synchronous audio and visual telemedicine encounter.      Reason for Telemedicine Visit: Services only offered telehealth    Originating Site (Patient Location): Patient's home    Distant Site (Provider Location): Provider Remote Setting    Consent:  The patient/guardian has verbally consented to: the potential risks and benefits of telemedicine (video visit) versus in person care; bill my insurance or make self-payment for services provided; and responsibility for payment of non-covered services.     Mode of Communication:  Video Conference via [employer] ([place]    As the provider I attest to compliance with applicable laws and regulations related to telemedicine.           Data:    Session content: At the start of this group, patients were invited to check in by identifying themselves, describing their current emotional status, and identifying issues to address in this group.   Area(s) of treatment focus addressed in this session included Symptom Management, Personal Safety and Community Resources/Discharge Planning.  Rama reported concerns that her medications are giving her side effects of nausea  She stated that she is hungry but when she looks at food she is unable to eat.   She stated that she also is not getting the improved focus she would like from the Adderall.  She is  considering calling the psychiatry provider in between appointments to get assistance.  She reported that she was able to chat with friends online over the weekend.  She stated that she talked with her mother, but did not find the conversation supportive about the concentration issues.  Client denied suicidal ideation, intent and plan.     Therapeutic Interventions/Treatment Strategies:  Psychotherapist offered support, feedback and validation and reinforced use of skills. Treatment modalities used include Cognitive Behavioral Therapy. Interventions include Behavioral Activation: Reinforced benefits/challenges of change process through applying skills to replace unwanted behaviors.    Assessment:    Patient response:   Patient responded to session by listening and focusing on goals    Possible barriers to participation / learning include: and no barriers identified    Health Issues:   None reported       Substance Use Review:   Substance Use: No active concerns identified.    Mental Status/Behavioral Observations  Appearance:   Appropriate   Eye Contact:   Good   Psychomotor Behavior: Normal   Attitude:   Cooperative   Orientation:   All  Speech   Rate / Production: Normal    Volume:  Normal   Mood:    Anxious  Depressed   Affect:    Appropriate   Thought Content:   Clear  Thought Form:  Coherent  Logical     Insight:    Good     Plan:     Safety Plan: No current safety concerns identified.  Recommended that patient call 911 or go to the local ED should there be a change in any of these risk factors.     Barriers to treatment: None identified    Patient Contracts (see media tab):  None    Substance Use: Not addressed in session     Continue or Discharge: Patient will continue in Adult Day Treatment (ADT)  as planned. Patient is likely to benefit from learning and using skills as they work toward the goals identified in their treatment plan.      Sofia Ochoa, [facility]  October 19, 2020

## 2020-10-19 NOTE — GROUP NOTE
Psychotherapy Group Note    PATIENT'S NAME: Rama Gutierrez  MRN:   [id number]  :   1990  ACCT. NUMBER: [id number]  DATE OF SERVICE: 10/19/20  START TIME: 10:00 AM  END TIME: 10:50 AM  FACILITATOR: Sofia Ochoa LICSW  TOPIC: MH EBP Group: Mindfulness  Adult Mental Health Day Treatment  TRACK: 5A    NUMBER OF PARTICIPANTS: 5    Summary of Group / Topics Discussed:  Mindfulness: What is Mindfulness: Patients received an overview on what mindfulness is and how mindfulness can benefit general health, mental health symptoms, and stressors. The history of mindfulness, its application to mental health therapies, and key concepts were also discussed. Patients discussed current awareness, knowledge, and practice of mindfulness skills. Patients also discussed barriers to mindfulness practice.     Patient Session Goals / Objectives:    Demonstrated understanding of key concepts and application to daily life    Identified when/how to use mindfulness     Resolved barriers to practice    Identified plan to use mindfulness in daily life  Telemedicine Visit: The patient's condition can be safely assessed and treated via synchronous audio and visual telemedicine encounter.      Reason for Telemedicine Visit: Services only offered telehealth    Originating Site (Patient Location): Patient's home    Distant Site (Provider Location): Provider Remote Setting    Consent:  The patient/guardian has verbally consented to: the potential risks and benefits of telemedicine (video visit) versus in person care; bill my insurance or make self-payment for services provided; and responsibility for payment of non-covered services.     Mode of Communication:  Video Conference via [employer]    As the provider I attest to compliance with applicable laws and regulations related to telemedicine.       Patient Participation / Response:  Moderately participated, sharing some personal reflections / insights and adequately adequately received  / provided feedback with other participants.    Verbalized understanding of how mindfulness can benefit mental health symptoms    Treatment Plan:  Patient has a current master individualized treatment plan.  See Epic treatment plan for more information.    Sofia Ochoa, HIEUSW

## 2020-10-19 NOTE — GROUP NOTE
Psychoeducation Group Note    PATIENT'S NAME: Rama Gutierrez  MRN:   [id number]  :   1990  ACCT. NUMBER: [id number]  DATE OF SERVICE: 10/19/20  START TIME: 11:00 AM  END TIME: 11:50 AM  FACILITATOR: Merry Newman RN  TOPIC: VIVIAN RN Group: Medication Education and Management  Telemedicine Visit: The patient's condition can be safely assessed and treated via synchronous audio and visual telemedicine encounter.      Reason for Telemedicine Visit:  covid19    Originating Site (Patient Location): Patient's home    Distant Site (Provider Location): Provider Remote Setting    Consent:  The patient/guardian has verbally consented to: the potential risks and benefits of telemedicine (video visit) versus in person care; bill my insurance or make self-payment for services provided; and responsibility for payment of non-covered services.     Mode of Communication:  Video Conference via [employer]    As the provider I attest to compliance with applicable laws and regulations related to telemedicine.      Adult Mental Health Day Treatment  TRACK: 5A    NUMBER OF PARTICIPANTS: 5    Summary of Group / Topics Discussed:  Medication Educations and Management:  Medication Assessment/Review: Patients were given a medication quiz to assess their current knowledge about the medications that are prescribed and why they are taking them. Medication lists were reviewed with each patient individually to provide education and answer questions. Safe medication storage, handling, management, and disposal were discussed within the group. Patients completed medication wallet cards    Patient Session Goals / Objectives:  ? Assessed patient understanding of their current medications and indication  ? Identify what to do if a dose is missed  ? Assessed medication adherence  ? Assessed knowledge of medication side effects and how to treat them      Patient Participation / Response:  Moderately participated, sharing some personal reflections /  insights and adequately adequately received / provided feedback with other participants.     Demonstrated understanding of topics discussed through group discussion and participation and Identified / Expressed personal readiness to practice skills    Treatment Plan:  Patient has a current master individualized treatment plan.  See Epic treatment plan for more information.    Merry Newman RN

## 2020-10-20 ENCOUNTER — HOSPITAL ENCOUNTER (OUTPATIENT)
Dept: BEHAVIORAL HEALTH | Facility: CLINIC | Age: 30
End: 2020-10-20
Attending: PSYCHIATRY & NEUROLOGY
Payer: COMMERCIAL

## 2020-10-20 PROCEDURE — 90853 GROUP PSYCHOTHERAPY: CPT | Mod: GT | Performed by: SOCIAL WORKER

## 2020-10-20 PROCEDURE — 90853 GROUP PSYCHOTHERAPY: CPT | Mod: 95 | Performed by: SOCIAL WORKER

## 2020-10-20 PROCEDURE — G0177 OPPS/PHP; TRAIN & EDUC SERV: HCPCS | Mod: 95

## 2020-10-21 NOTE — GROUP NOTE
"Process Group Note    PATIENT'S NAME: Rama Gutierrez  MRN:   [id number]  :   1990  ACCT. NUMBER: [id number]  DATE OF SERVICE: 10/20/20  START TIME:  9:00 AM  END TIME:  9:50 AM  FACILITATOR: Sofia Ochoa LICSW  TOPIC:  Process Group    Diagnoses:  296.33 (F33.2) Major Depressive Disorder, Recurrent Episode, Severe _ and With anxious distress  300.02 (F41.1) Generalized Anxiety Disorder  314.01 (F90.9) Unspecified Attention -Deficit / Hyperactivity Disorder      Adult Mental Health Day Treatment  TRACK: 5A    NUMBER OF PARTICIPANTS: 5    Telemedicine Visit: The patient's condition can be safely assessed and treated via synchronous audio and visual telemedicine encounter.      Reason for Telemedicine Visit: Services only offered telehealth    Originating Site (Patient Location): Patient's home    Distant Site (Provider Location): Provider Remote Setting    Consent:  The patient/guardian has verbally consented to: the potential risks and benefits of telemedicine (video visit) versus in person care; bill my insurance or make self-payment for services provided; and responsibility for payment of non-covered services.     Mode of Communication:  Video Conference via [employer]    As the provider I attest to compliance with applicable laws and regulations related to telemedicine.           Data:    Session content: At the start of this group, patients were invited to check in by identifying themselves, describing their current emotional status, and identifying issues to address in this group.   Area(s) of treatment focus addressed in this session included Symptom Management, Personal Safety and Community Resources/Discharge Planning.  Rama reported that she was just waking up and felt \"spaced out\".   She stated that she took a Melatonin to help hr sleep and this frequently happens afterwards.  She stated that she was having low energy and had poor sleep.  She stated that her mood was okay.  She stated " that she went to sleep late as she ws doing activities.  Client denied suicidal ideation, intent and plan.     Therapeutic Interventions/Treatment Strategies:  Psychotherapist offered support, feedback and validation and reinforced use of skills. Treatment modalities used include Cognitive Behavioral Therapy. Interventions include Cognitive Restructuring:  Explored impact of ineffective thoughts / distortions on mood and activity.    Assessment:    Patient response:   Patient responded to session by being attentive and accepting support    Possible barriers to participation / learning include: and no barriers identified    Health Issues:   None reported       Substance Use Review:   Substance Use: No active concerns identified.    Mental Status/Behavioral Observations  Appearance:   Appropriate   Eye Contact:   Good   Psychomotor Behavior: Normal   Attitude:   Cooperative   Orientation:   All  Speech   Rate / Production: Normal    Volume:  Normal   Mood:    Anxious  Depressed   Affect:    Appropriate   Thought Content:   Clear  Thought Form:  Coherent  Logical     Insight:    Good     Plan:     Safety Plan: No current safety concerns identified.  Recommended that patient call 911 or go to the local ED should there be a change in any of these risk factors.     Barriers to treatment: None identified    Patient Contracts (see media tab):  None    Substance Use: Not addressed in session     Continue or Discharge: Patient will continue in Adult Day Treatment (ADT)  as planned. Patient is likely to benefit from learning and using skills as they work toward the goals identified in their treatment plan.      Sofia Ochoa, [facility]  October 21, 2020

## 2020-10-21 NOTE — GROUP NOTE
Psychotherapy Group Note    PATIENT'S NAME: Rama Gutierrez  MRN:   [id number]  :   1990  ACCT. NUMBER: [id number]  DATE OF SERVICE: 10/20/20  START TIME: 11:00 AM  END TIME: 11:50 AM  FACILITATOR: Sofia Ochoa LICSW  TOPIC: MH EBP Group: Symptom Awareness  Adult Mental Health Day Treatment  TRACK: 5A    NUMBER OF PARTICIPANTS: 5    Summary of Group / Topics Discussed:  Symptom Awareness: Mood Disorders: Patients received a general overview of mood disorders including depressive disorders, anxiety disorders, and bipolar disorders and how it relates to their current symptoms. The purpose is to promote understanding of their diagnoses and how it impacts their functioning. Patients reviewed their current awareness of symptoms and diagnoses. Patients received information regarding diagnoses, etiology, cultural, and environmental factors as well as impact on functioning.     Patient Session Goals / Objectives:    Discussed patient individual symptoms and experiences    Reviewed diagnostic criteria and etiology of diagnoses     Telemedicine Visit: The patient's condition can be safely assessed and treated via synchronous audio and visual telemedicine encounter.      Reason for Telemedicine Visit: Services only offered telehealth    Originating Site (Patient Location): Patient's home    Distant Site (Provider Location): Provider Remote Setting    Consent:  The patient/guardian has verbally consented to: the potential risks and benefits of telemedicine (video visit) versus in person care; bill my insurance or make self-payment for services provided; and responsibility for payment of non-covered services.     Mode of Communication:  Video Conference via [employer]    As the provider I attest to compliance with applicable laws and regulations related to telemedicine.       Patient Participation / Response:  Fully participated with the group by sharing personal reflections / insights and openly received /  provided feedback with other participants.    Identified / Expressed personal readiness to increase awareness of symptoms and apply skills as necessary    Treatment Plan:  Patient has a current master individualized treatment plan.  See Epic treatment plan for more information.    Sofia Ochoa, HIEUSW

## 2020-10-22 ENCOUNTER — TELEPHONE (OUTPATIENT)
Dept: BEHAVIORAL HEALTH | Facility: CLINIC | Age: 30
End: 2020-10-22

## 2020-10-22 NOTE — TELEPHONE ENCOUNTER
Called and left VM regarding attending group today. Requested call back or to join group if able.  Merry Newman RN on 10/22/2020 at 9:31 AM

## 2020-10-26 ENCOUNTER — HOSPITAL ENCOUNTER (OUTPATIENT)
Dept: BEHAVIORAL HEALTH | Facility: CLINIC | Age: 30
End: 2020-10-26
Attending: PSYCHIATRY & NEUROLOGY
Payer: COMMERCIAL

## 2020-10-26 ENCOUNTER — TRANSFERRED RECORDS (OUTPATIENT)
Dept: HEALTH INFORMATION MANAGEMENT | Facility: CLINIC | Age: 30
End: 2020-10-26

## 2020-10-26 PROCEDURE — G0177 OPPS/PHP; TRAIN & EDUC SERV: HCPCS | Mod: GT

## 2020-10-26 PROCEDURE — 90853 GROUP PSYCHOTHERAPY: CPT | Mod: GT | Performed by: SOCIAL WORKER

## 2020-10-26 NOTE — GROUP NOTE
Process Group Note    PATIENT'S NAME: Rama Gutierrez  MRN:   [id number]  :   1990  ACCT. NUMBER: [id number]  DATE OF SERVICE: 10/26/20  START TIME:  9:00 AM  END TIME:  9:50 AM  FACILITATOR: Sofia Ochoa LICSW  TOPIC:  Process Group    Diagnoses:  296.33 (F33.2) Major Depressive Disorder, Recurrent Episode, Severe _ and With anxious distress  300.02 (F41.1) Generalized Anxiety Disorder  314.01 (F90.9) Unspecified Attention -Deficit / Hyperactivity Disorder      Adult Mental Health Day Treatment  TRACK: 5A    NUMBER OF PARTICIPANTS: 6    Telemedicine Visit: The patient's condition can be safely assessed and treated via synchronous audio and visual telemedicine encounter.      Reason for Telemedicine Visit: Services only offered telehealth    Originating Site (Patient Location): Patient's home    Distant Site (Provider Location): Provider Remote Setting    Consent:  The patient/guardian has verbally consented to: the potential risks and benefits of telemedicine (video visit) versus in person care; bill my insurance or make self-payment for services provided; and responsibility for payment of non-covered services.     Mode of Communication:  Video Conference via [employer]    As the provider I attest to compliance with applicable laws and regulations related to telemedicine.           Data:    Session content: At the start of this group, patients were invited to check in by identifying themselves, describing their current emotional status, and identifying issues to address in this group.   Area(s) of treatment focus addressed in this session included Symptom Management, Personal Safety and Community Resources/Discharge Planning.  Rama reported having a psychiatry appointment schedule for tomorrow.  She stated that she feels prepared with the questions that she wants to ask.  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  Client plans to discuss the new  "medication for the attention deficit with the provider.  Client denied suicidal ideation, intent and plan. Mood was \"okay\".  Client reported low motivation.      Therapeutic Interventions/Treatment Strategies:  Psychotherapist offered support, feedback and validation and reinforced use of skills. Treatment modalities used include Cognitive Behavioral Therapy. Interventions include Coping Skills: Promoted understanding of how and when to apply grounding strategies to reduce distress and increase presence in the moment.    Assessment:    Patient response:   Patient responded to session by focusing on goals and being attentive    Possible barriers to participation / learning include: and no barriers identified    Health Issues:   None reported       Substance Use Review:   Substance Use: No active concerns identified.    Mental Status/Behavioral Observations  Appearance:   Appropriate   Eye Contact:   Good   Psychomotor Behavior: Normal   Attitude:   Cooperative   Orientation:   All  Speech   Rate / Production: Normal    Volume:  Normal   Mood:    Anxious  Depressed   Affect:    Appropriate   Thought Content:   Clear  Thought Form:  Coherent  Logical     Insight:    Good     Plan:     Safety Plan: No current safety concerns identified.  Recommended that patient call 911 or go to the local ED should there be a change in any of these risk factors.     Barriers to treatment: None identified    Patient Contracts (see media tab):  None    Substance Use: Not addressed in session     Continue or Discharge: Patient will continue in Adult Day Treatment (ADT)  as planned. Patient is likely to benefit from learning and using skills as they work toward the goals identified in their treatment plan.      Sofia Ochoa, [facility]  October 26, 2020  "

## 2020-10-26 NOTE — GROUP NOTE
Psychoeducation Group Note    PATIENT'S NAME: Rama Gutierrez  MRN:   [id number]  :   1990  ACCT. NUMBER: [id number]  DATE OF SERVICE: 10/26/20  START TIME: 11:00 AM  END TIME: 11:50 AM  FACILITATOR: Merry Newman RN  TOPIC: VIVIAN RN Group: Medication Education and Management  Telemedicine Visit: The patient's condition can be safely assessed and treated via synchronous audio and visual telemedicine encounter.      Reason for Telemedicine Visit:  covid19    Originating Site (Patient Location): Patient's home    Distant Site (Provider Location): Provider Remote Setting    Consent:  The patient/guardian has verbally consented to: the potential risks and benefits of telemedicine (video visit) versus in person care; bill my insurance or make self-payment for services provided; and responsibility for payment of non-covered services.     Mode of Communication:  Video Conference via [email or web address]    As the provider I attest to compliance with applicable laws and regulations related to telemedicine.      Adult Mental Health Day Treatment  TRACK: 5A    NUMBER OF PARTICIPANTS: 6    Summary of Group / Topics Discussed:  Medication Educations and Management:  Medication Categories: Patient were provided with a brief overview of four major psychotropic medication categories. Expected effects, potential side effects, adverse reactions, and contraindications were discussed. Patients learned about how their medications work to treat their illness and reviewed safe medication management, handling, and disposal of medications.     Patient Session Goals / Objectives:  ? Listed the four major categories of psychotropic medications (antidepressants, antipsychotics, mood stabilizers, anti-anxiety)  ? Identified medications in each category and important adverse reactions/contraindications for use  ? Explained how the medications they take work to treat their illness       Patient Participation / Response:  Moderately participated,  sharing some personal reflections / insights and adequately adequately received / provided feedback with other participants.     Verbalized understanding of medication education and management topic    Treatment Plan:  Patient has a current master individualized treatment plan.  See Epic treatment plan for more information.    Merry Newman RN

## 2020-10-26 NOTE — GROUP NOTE
Psychotherapy Group Note    PATIENT'S NAME: Rama Gutierrez  MRN:   [id number]  :   1990  ACCT. NUMBER: [id number]  DATE OF SERVICE: 10/26/20  START TIME: 10:00 AM  END TIME: 10:50 AM  FACILITATOR: Sofia Ochoa LICSW  TOPIC: MH EBP Group: Coping Skills  Adult Mental Health Day Treatment  TRACK: 5A    NUMBER OF PARTICIPANTS: 6    Summary of Group / Topics Discussed:  Coping Skills: Self-Soothe: Patients learned to apply self-soothe as a way to decrease heightened stress in the moment.  Patients identified situations that necessitate self-soothe strategies.  They focused on ways to manage physical symptoms of distress using the senses. They discussed how to distinguish when this can be useful in their lives when other strategies are more relevant or helpful.    Patient Session Goals / Objectives:    Understand the purpose of using the senses to decrease distress    Process what happens in the body when using self-soothe strategies    Demonstrate understanding of when to use self-soothe strategies    Identify and problem solve barriers to applying self-soothe strategies.    Choose 1-2 self-soothe strategies to apply during times of distress.    Telemedicine Visit: The patient's condition can be safely assessed and treated via synchronous audio and visual telemedicine encounter.      Reason for Telemedicine Visit: Services only offered telehealth    Originating Site (Patient Location): Patient's home    Distant Site (Provider Location): Provider Remote Setting    Consent:  The patient/guardian has verbally consented to: the potential risks and benefits of telemedicine (video visit) versus in person care; bill my insurance or make self-payment for services provided; and responsibility for payment of non-covered services.     Mode of Communication:  Video Conference via [employer]    As the provider I attest to compliance with applicable laws and regulations related to telemedicine.       Patient  Participation / Response:  Fully participated with the group by sharing personal reflections / insights and openly received / provided feedback with other participants.    Identified barriers to applying coping skills    Treatment Plan:  Patient has a current master individualized treatment plan.  See Epic treatment plan for more information.    Sofia Ochoa, [facility]

## 2020-10-27 ENCOUNTER — PATIENT OUTREACH (OUTPATIENT)
Dept: NURSING | Facility: CLINIC | Age: 30
End: 2020-10-27
Payer: COMMERCIAL

## 2020-10-27 SDOH — ECONOMIC STABILITY: INCOME INSECURITY: HOW HARD IS IT FOR YOU TO PAY FOR THE VERY BASICS LIKE FOOD, HOUSING, MEDICAL CARE, AND HEATING?: NOT VERY HARD

## 2020-10-27 ASSESSMENT — ACTIVITIES OF DAILY LIVING (ADL): DEPENDENT_IADLS:: INDEPENDENT

## 2020-10-27 NOTE — PROGRESS NOTES
"Clinic Care Coordination Contact    Follow Up Progress Note      Assessment: [facility] outreached to pt on this date to check on status of goals established.      Pt reports she has been stable since the hospitalization in the start of September.  Pt indicates since then she was admitted to a group therapy/day program 3 days a week which she has been doing for 2 months now.  Pt anticipates it being a 3 month program which results in about 4 weeks left of treatment.  Pt has established with a psychiatrist and follows with them to manage medications.  Upon completing the day program pt states she plans to transition to individual therapy for ongoing treatment.  Pt denies any concerns with her mood, has had no SI and states her current supports are managing her symptoms well.      CC transitioned to discussion of financial concerns and housing.  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 Pt states she has not missed rent and living setting is stable at this time.  Additionally, pt states she canceled an insurance plan she had had which reimbursed her some cash which allowed her to fix her car and \"get her through the rest of the year.\"    [facility] commended pt on progress and management of mental health. Pt states she has been feeling much better and hopes to continue in this direction.      Pt denied any care coordination needs at this time.     Goals addressed this encounter:   Goals       COMPLETED: 1. Financial Wellbeing (pt-stated)      Goal Statement: I would like to apply and qualify for financial and [facility] supports to allow me to afford my basic needs over the next 6 months.   Date Goal set: 3/9/2020  Barriers: Recent loss of job.   Strengths: Independent.   Date to Achieve By: 10/27/2020  Patient expressed understanding of goal: yes    Action steps to achieve this goal:  1. I will continue to work with the [facility] to apply for [place], Cash assistance, and " SNAP.    2. I will work with my Community Health worker and Financial Resource worker to assure I am applying for all appropriate programs in their entirety.    3. I will work with the [facility] for homelessness prevention.     As of today's date 4/8/2020 goal is met at 51 - 75%.   Goal Status:  Showing progress          COMPLETED: 2. Mental Health Management (pt-stated)      Goal Statement: I would like to establish with a therapist  Date Goal set: 3/9/2020  Date to Achieve By: 10/27/2020  Patient expressed understanding of goal: yes  Action steps to achieve this goal:  1. I will review mental health resources provided by my care coordinator and consider establishing with supports.  2. I will outreach to my care coordinator for questions or concerns.    3. I will call to schedule an appointment to establish with a therapist.      As of today's date 4/8/2020 goal is met at 0 - 25%.   Goal Status:  Ongoing                     Intervention/Education provided during outreach: Care Coordination availability.       Outreach Frequency: 2 months.     Plan: Pt to continue to f/u with recommended MH supports and outreach to clinic/careteam or care coordination for any questions or concerns.  Care Coordinator will follow up in 2 months.    Terrence Paulino [facility]  Clinic Care Coordinator  [facility]  [facility]  [phone]  [email or web address]

## 2020-10-29 ENCOUNTER — HOSPITAL ENCOUNTER (OUTPATIENT)
Dept: BEHAVIORAL HEALTH | Facility: CLINIC | Age: 30
End: 2020-10-29
Attending: PSYCHIATRY & NEUROLOGY
Payer: COMMERCIAL

## 2020-10-29 PROCEDURE — 90853 GROUP PSYCHOTHERAPY: CPT | Mod: GT | Performed by: SOCIAL WORKER

## 2020-10-29 NOTE — GROUP NOTE
Psychotherapy Group Note    PATIENT'S NAME: Raam Gutierrez  MRN:   [id number]  :   1990  ACCT. NUMBER: [id number]  DATE OF SERVICE: 10/29/20  START TIME: 10:00 AM  END TIME: 10:50 AM  FACILITATOR: Sofia Ochoa LICSW  TOPIC: MH EBP Group: Coping Skills  Adult Mental Health Day Treatment  TRACK: 5A    NUMBER OF PARTICIPANTS: 8    Summary of Group / Topics Discussed:  Coping Skills: Calming Strategies: Patients discussed and practiced strategies to increase sense of calm in the body.  Reviewed the benefits of calming strategies as well as past / current practices of each member.  Patients identified situations in which using these strategies would reduce stress. They developed the ability to distinguish when these strategies can be useful in their lives for management and stress and psychological well-being.    Patient Session Goals / Objectives:    Understand the purpose of using calming strategies to reduce stress    Review patients current calming practices and discuss a more formal way of practicing and accessing skills.    Increase ability to decide when to use various calming strategies (e.g. Progressive muscle relaxation, deep breathing, guided imagery, + thoughts).    Choose 1-2 calming strategies to apply during times of distress.    Telemedicine Visit: The patient's condition can be safely assessed and treated via synchronous audio and visual telemedicine encounter.      Reason for Telemedicine Visit: Services only offered telehealth    Originating Site (Patient Location): Patient's home    Distant Site (Provider Location): Provider Remote Setting    Consent:  The patient/guardian has verbally consented to: the potential risks and benefits of telemedicine (video visit) versus in person care; bill my insurance or make self-payment for services provided; and responsibility for payment of non-covered services.     Mode of Communication:  Video Conference via [employer]    As the provider I  attest to compliance with applicable laws and regulations related to telemedicine.       Patient Participation / Response:  Fully participated with the group by sharing personal reflections / insights and openly received / provided feedback with other participants.    Identified barriers to applying coping skills    Treatment Plan:  Patient has a current master individualized treatment plan.  See Epic treatment plan for more information.    Sofia Ochoa, LICSW

## 2020-10-29 NOTE — GROUP NOTE
Process Group Note    PATIENT'S NAME: Rama Gutierrez  MRN:   [id number]  :   1990  ACCT. NUMBER: [id number]  DATE OF SERVICE: 10/29/20  START TIME:  9:00 AM  END TIME:  9:50 AM  FACILITATOR: Sofia Ochoa [facility]  TOPIC:  Process Group    Diagnoses:  296.33 (F33.2) Major Depressive Disorder, Recurrent Episode, Severe _ and With anxious distress  300.02 (F41.1) Generalized Anxiety Disorder  314.01 (F90.9) Unspecified Attention -Deficit / Hyperactivity Disorder  296.33 (F33.2) Major Depressive Disorder, Recurrent Episode, Severe _ and With anxious distress  300.02 (F41.1) Generalized Anxiety Disorder  314.01 (F90.9) Unspecified Attention -Deficit / Hyperactivity Disorder      Adult Mental Health Day Treatment  TRACK: 5A    NUMBER OF PARTICIPANTS: 8    Telemedicine Visit: The patient's condition can be safely assessed and treated via synchronous audio and visual telemedicine encounter.      Reason for Telemedicine Visit: Services only offered telehealth    Originating Site (Patient Location): Patient's home    Distant Site (Provider Location): Provider Remote Setting    Consent:  The patient/guardian has verbally consented to: the potential risks and benefits of telemedicine (video visit) versus in person care; bill my insurance or make self-payment for services provided; and responsibility for payment of non-covered services.     Mode of Communication:  Video Conference via [employer]    As the provider I attest to compliance with applicable laws and regulations related to telemedicine.           Data:    Session content: At the start of this group, patients were invited to check in by identifying themselves, describing their current emotional status, and identifying issues to address in this group.   Area(s) of treatment focus addressed in this session included Symptom Management, Personal Safety and Community Resources/Discharge Planning.  Paul reported improvement in depressed mood.  She stated  that she saw her psychiatrist about the issues with the Adderall.   She stated that the provider switched her from an extended release formula to a standard immediate release form.  She stated that she hopes this will help her concentration and sleep pattern.  Client denied suicidal ideation, intent and plan.     Therapeutic Interventions/Treatment Strategies:  Psychotherapist offered support, feedback and validation and reinforced use of skills. Treatment modalities used include Cognitive Behavioral Therapy. Interventions include Coping Skills: Discussed use of self-soothe skills to decrease distress in the body.    Assessment:    Patient response:   Patient responded to session by giving feedback and listening    Possible barriers to participation / learning include: and no barriers identified    Health Issues:   None reported       Substance Use Review:   Substance Use: No active concerns identified.    Mental Status/Behavioral Observations  Appearance:   Appropriate   Eye Contact:   Good   Psychomotor Behavior: Normal   Attitude:   Cooperative   Orientation:   All  Speech   Rate / Production: Normal    Volume:  Normal   Mood:    Anxious  Depressed   Affect:    Appropriate   Thought Content:   Clear  Thought Form:  Coherent  Logical     Insight:    Good     Plan:     Safety Plan: No current safety concerns identified.  Recommended that patient call 911 or go to the local ED should there be a change in any of these risk factors.     Barriers to treatment: None identified    Patient Contracts (see media tab):  None    Substance Use: Not addressed in session     Continue or Discharge: Patient will continue in Adult Day Treatment (ADT)  as planned. Patient is likely to benefit from learning and using skills as they work toward the goals identified in their treatment plan.      Sofia Ochoa, [facility]  October 29, 2020

## 2020-11-02 ENCOUNTER — HOSPITAL ENCOUNTER (OUTPATIENT)
Dept: BEHAVIORAL HEALTH | Facility: CLINIC | Age: 30
End: 2020-11-02
Attending: PSYCHIATRY & NEUROLOGY
Payer: COMMERCIAL

## 2020-11-02 PROCEDURE — 90853 GROUP PSYCHOTHERAPY: CPT | Mod: 95 | Performed by: SOCIAL WORKER

## 2020-11-02 PROCEDURE — G0177 OPPS/PHP; TRAIN & EDUC SERV: HCPCS | Mod: GT

## 2020-11-02 NOTE — GROUP NOTE
Psychoeducation Group Note    PATIENT'S NAME: Rama Gutierrez  MRN:   [id number]  :   1990  [id number]  DATE OF SERVICE: 20  START TIME: 11:00 AM  END TIME: 11:50 AM  FACILITATOR: Merry Newman RN  TOPIC: MH RN Group: Health Maintenance  Telemedicine Visit: The patient's condition can be safely assessed and treated via synchronous audio and visual telemedicine encounter.      Reason for Telemedicine Visit:  covid19    Originating Site (Patient Location): Patient's home    Distant Site (Provider Location): Provider Remote Setting    Consent:  The patient/guardian has verbally consented to: the potential risks and benefits of telemedicine (video visit) versus in person care; bill my insurance or make self-payment for services provided; and responsibility for payment of non-covered services.     Mode of Communication:  Video Conference via [employer]    As the provider I attest to compliance with applicable laws and regulations related to telemedicine.      Adult Mental Health Day Treatment  TRACK: 5A    NUMBER OF PARTICIPANTS: 5    Summary of Group / Topics Discussed:  Health Maintenance: Eight Dimensions of Wellness: The concept of holistic health through the model of eight dimensions was introduced. Group members participated in identifying behaviors and activities in each of the dimensions of wellness.  The importance of each dimension was reinforced and the concept of balance in life as it relates to wellness was explored.      Patient Session Goals / Objectives:    Verbalized understanding of balance in wellness and how it relates to their life    Identified and explained the eight dimensions of wellness    Categorized activities and wellness needs into corresponding dimensions appropriately during exercise          Patient Participation / Response:  Fully participated with the group by sharing personal reflections / insights and openly received / provided feedback with other  participants.    Demonstrated understanding of topics discussed through group discussion and participation and Identified / Expressed personal readiness to practice skills    Treatment Plan:  Patient has a current master individualized treatment plan.  See Epic treatment plan for more information.    Merry Newman RN

## 2020-11-02 NOTE — GROUP NOTE
Psychotherapy Group Note    PATIENT'S NAME: Rama Gutierrez  MRN:   [id number]  :   1990  ACCT. NUMBER: [id number]  DATE OF SERVICE: 20  START TIME: 10:00 AM  END TIME: 10:50 AM  FACILITATOR: Sofia Ochoa LICSW  TOPIC: MH EBP Group: Behavioral Activation  Adult Mental Health Day Treatment  TRACK: 5A    NUMBER OF PARTICIPANTS: 6    Summary of Group / Topics Discussed:  Behavioral Activation: The Change Process - Behavior Change: Patients explored the process and types of change, including but not limited to, theories of change, steps to making change, methods of changing behavior, and potential barriers.  Patients worked to identify what changes may benefit their daily lives, and work towards a plan to implement change.      Patient Session Goals / Objectives:    Demonstrate understanding of the change process.      Identify positive and negative behavioral patterns.    Make plans to track and implement changes and share experiences in group.    Identify personal barriers to change    Telemedicine Visit: The patient's condition can be safely assessed and treated via synchronous audio and visual telemedicine encounter.      Reason for Telemedicine Visit: Services only offered telehealth    Originating Site (Patient Location): Patient's home    Distant Site (Provider Location): Provider Remote Setting    Consent:  The patient/guardian has verbally consented to: the potential risks and benefits of telemedicine (video visit) versus in person care; bill my insurance or make self-payment for services provided; and responsibility for payment of non-covered services.     Mode of Communication:  Video Conference via [employer]    As the provider I attest to compliance with applicable laws and regulations related to telemedicine.       Patient Participation / Response:  Fully participated with the group by sharing personal reflections / insights and openly received / provided feedback with other  participants.    Shared experiences and challenges with making behavioral changes    Treatment Plan:  Patient has a current master individualized treatment plan.  See Epic treatment plan for more information.    Sofia Ochoa, [facility]

## 2020-11-02 NOTE — GROUP NOTE
Process Group Note    PATIENT'S NAME: Rama Gutierrez  MRN:   [id number]  :   1990  ACCT. NUMBER: [id number]  DATE OF SERVICE: 20  START TIME:  9:00 AM  END TIME:  9:50 AM  FACILITATOR: Sofia Ochoa LICSW  TOPIC:  Process Group    Diagnoses:  296.33 (F33.2) Major Depressive Disorder, Recurrent Episode, Severe _ and With anxious distress  300.02 (F41.1) Generalized Anxiety Disorder  314.01 (F90.9) Unspecified Attention -Deficit / Hyperactivity Disorder      Adult Mental Health Day Treatment  TRACK: 5A    NUMBER OF PARTICIPANTS: 6    Telemedicine Visit: The patient's condition can be safely assessed and treated via synchronous audio and visual telemedicine encounter.      Reason for Telemedicine Visit: Services only offered telehealth    Originating Site (Patient Location): Patient's home    Distant Site (Provider Location): Provider Remote Setting    Consent:  The patient/guardian has verbally consented to: the potential risks and benefits of telemedicine (video visit) versus in person care; bill my insurance or make self-payment for services provided; and responsibility for payment of non-covered services.     Mode of Communication:  Video Conference via [employer]    As the provider I attest to compliance with applicable laws and regulations related to telemedicine.           Data:    Session content: At the start of this group, patients were invited to check in by identifying themselves, describing their current emotional status, and identifying issues to address in this group.   Area(s) of treatment focus addressed in this session included Symptom Management, Personal Safety and Community Resources/Discharge Planning.  Rama reported increased stress due to the election.  She reported fear of violence after the election.   She stated that she is less scared of being harmed but is more scared about the emotional impact if others get hurt.  She stated that her sleep and focus are improved.   She thinks the new version of Adderal is working.  Client denied suicidal ideation, intent and plan. She reported low appetite as a medication side effect.   She is working to eat regular meals despite lack of appetite.      Therapeutic Interventions/Treatment Strategies:  Psychotherapist offered support, feedback and validation and reinforced use of skills. Treatment modalities used include Cognitive Behavioral Therapy. Interventions include Coping Skills: Reviewed patients current calming practices and discussed a more formal way of practicing and accessing skills.    Assessment:    Patient response:   Patient responded to session by focusing on goals and being attentive    Possible barriers to participation / learning include: and no barriers identified    Health Issues:   Yes: Pain, No Psychological Distress       Substance Use Review:   Substance Use: No active concerns identified.    Mental Status/Behavioral Observations  Appearance:   Appropriate   Eye Contact:   Good   Psychomotor Behavior: Normal   Attitude:   Cooperative  Friendly  Orientation:   All  Speech   Rate / Production: Normal    Volume:  Normal   Mood:    Anxious  Depressed   Affect:    Appropriate   Thought Content:   Clear  Thought Form:  Coherent  Logical     Insight:    Good     Plan:     Safety Plan: No current safety concerns identified.  Recommended that patient call 911 or go to the local ED should there be a change in any of these risk factors.     Barriers to treatment: None identified    Patient Contracts (see media tab):  None    Substance Use: Not addressed in session     Continue or Discharge: Patient will continue in Adult Day Treatment (ADT)  as planned. Patient is likely to benefit from learning and using skills as they work toward the goals identified in their treatment plan.      Sofia Ochoa, [facility]  November 2, 2020

## 2020-11-03 ENCOUNTER — HOSPITAL ENCOUNTER (OUTPATIENT)
Dept: BEHAVIORAL HEALTH | Facility: CLINIC | Age: 30
End: 2020-11-03
Attending: PSYCHIATRY & NEUROLOGY
Payer: COMMERCIAL

## 2020-11-03 PROCEDURE — 90853 GROUP PSYCHOTHERAPY: CPT | Mod: GT | Performed by: SOCIAL WORKER

## 2020-11-04 NOTE — GROUP NOTE
Process Group Note    PATIENT'S NAME: Rama Gutierrez  MRN:   [id number]  :   1990  ACCT. NUMBER: [id number]  DATE OF SERVICE: 20  START TIME:  9:00 AM  END TIME:  9:50 AM  FACILITATOR: Sofia Ochoa LICSW  TOPIC:  Process Group    Diagnoses:  296.33 (F33.2) Major Depressive Disorder, Recurrent Episode, Severe _ and With anxious distress  300.02 (F41.1) Generalized Anxiety Disorder  314.01 (F90.9) Unspecified Attention -Deficit / Hyperactivity Disorder      Adult Mental Health Day Treatment  TRACK: 5A    NUMBER OF PARTICIPANTS: 4    Telemedicine Visit: The patient's condition can be safely assessed and treated via synchronous audio and visual telemedicine encounter.      Reason for Telemedicine Visit: Services only offered telehealth    Originating Site (Patient Location): Patient's home    Distant Site (Provider Location): Provider Remote Setting    Consent:  The patient/guardian has verbally consented to: the potential risks and benefits of telemedicine (video visit) versus in person care; bill my insurance or make self-payment for services provided; and responsibility for payment of non-covered services.     Mode of Communication:  Video Conference via [employer]    As the provider I attest to compliance with applicable laws and regulations related to telemedicine.           Data:    Session content: At the start of this group, patients were invited to check in by identifying themselves, describing their current emotional status, and identifying issues to address in this group.   Area(s) of treatment focus addressed in this session included Symptom Management, Personal Safety and Community Resources/Discharge Planning.  Rama reported not feeling well due to back and stomach pain.  She asked to leave the group after the first hours due to the pain.   She stated that she had poor seep and depressed mood.  Client denied suicidal ideation, intent and plan.     Therapeutic  Interventions/Treatment Strategies:  Psychotherapist offered support, feedback and validation and reinforced use of skills. Treatment modalities used include Cognitive Behavioral Therapy. Interventions include Coping Skills: Discussed use of self-soothe skills to decrease distress in the body.    Assessment:    Patient response:   Patient responded to session by listening    Possible barriers to participation / learning include: physical discomfort pain    Health Issues:   Yes: Pain, Associated Psychological Distress       Substance Use Review:   Substance Use: No active concerns identified.    Mental Status/Behavioral Observations  Appearance:   Appropriate   Eye Contact:   Fair   Psychomotor Behavior: Retarded (Slowed)   Attitude:   Cooperative   Orientation:   All  Speech   Rate / Production: Normal    Volume:  Normal   Mood:    Anxious  Depressed   Affect:    Appropriate   Thought Content:   Clear  Thought Form:  Coherent  Logical     Insight:    Good     Plan:     Safety Plan: No current safety concerns identified.  Recommended that patient call 911 or go to the local ED should there be a change in any of these risk factors.     Barriers to treatment: None identified    Patient Contracts (see media tab):  None    Substance Use: Not addressed in session     Continue or Discharge: Patient will continue in Adult Day Treatment (ADT)  as planned. Patient is likely to benefit from learning and using skills as they work toward the goals identified in their treatment plan.      Sofia Ochoa, [facility]  November 4, 2020

## 2020-11-09 ENCOUNTER — TELEPHONE (OUTPATIENT)
Dept: BEHAVIORAL HEALTH | Facility: CLINIC | Age: 30
End: 2020-11-09

## 2020-11-10 ENCOUNTER — HOSPITAL ENCOUNTER (OUTPATIENT)
Dept: BEHAVIORAL HEALTH | Facility: CLINIC | Age: 30
End: 2020-11-10
Attending: PSYCHIATRY & NEUROLOGY
Payer: COMMERCIAL

## 2020-11-10 PROCEDURE — G0177 OPPS/PHP; TRAIN & EDUC SERV: HCPCS | Mod: GT

## 2020-11-10 PROCEDURE — 90853 GROUP PSYCHOTHERAPY: CPT | Mod: 95 | Performed by: SOCIAL WORKER

## 2020-11-10 PROCEDURE — 90853 GROUP PSYCHOTHERAPY: CPT | Mod: GT | Performed by: SOCIAL WORKER

## 2020-11-10 NOTE — GROUP NOTE
Process Group Note    PATIENT'S NAME: Rama Gutierrez  MRN:   [id number]  :   1990  ACCT. NUMBER: [id number]  DATE OF SERVICE: 11/10/20  START TIME:  9:00 AM  END TIME:  9:50 AM  FACILITATOR: Sofia Ochoa LICSW  TOPIC:  Process Group    Diagnoses:  296.33 (F33.2) Major Depressive Disorder, Recurrent Episode, Severe _ and With anxious distress  300.02 (F41.1) Generalized Anxiety Disorder  314.01 (F90.9) Unspecified Attention -Deficit / Hyperactivity Disorder      Adult Mental Health Day Treatment  TRACK: 5A    NUMBER OF PARTICIPANTS: 6    Telemedicine Visit: The patient's condition can be safely assessed and treated via synchronous audio and visual telemedicine encounter.      Reason for Telemedicine Visit: Services only offered telehealth    Originating Site (Patient Location): Patient's home    Distant Site (Provider Location): Provider Remote Setting    Consent:  The patient/guardian has verbally consented to: the potential risks and benefits of telemedicine (video visit) versus in person care; bill my insurance or make self-payment for services provided; and responsibility for payment of non-covered services.     Mode of Communication:  Video Conference via [employer]    As the provider I attest to compliance with applicable laws and regulations related to telemedicine.           Data:    Session content: At the start of this group, patients were invited to check in by identifying themselves, describing their current emotional status, and identifying issues to address in this group.   Area(s) of treatment focus addressed in this session included Symptom Management, Personal Safety and Community Resources/Discharge Planning.  Rama reported having better mood.   She stated that she was switched to Ritalin and is noticing improvement with both attention and side effects.  He stated that she had better sleep and is going to bed at a reasonable time.  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  She is working on managing guilty feelings and is trying to forgive herself.  Client denied suicidal ideation, intent and plan.     Therapeutic Interventions/Treatment Strategies:  Psychotherapist offered support, feedback and validation and reinforced use of skills. Treatment modalities used include Cognitive Behavioral Therapy. Interventions include Coping Skills: Facilitated understanding of  what factors may contribute to symptom relapse and skills plan to manage symptom relapse .    Assessment:    Patient response:   Patient responded to session by listening and focusing on goals    Possible barriers to participation / learning include: and no barriers identified    Health Issues:   None reported       Substance Use Review:   Substance Use: No active concerns identified.    Mental Status/Behavioral Observations  Appearance:   Appropriate   Eye Contact:   Good   Psychomotor Behavior: Normal   Attitude:   Cooperative  Friendly  Orientation:   All  Speech   Rate / Production: Normal    Volume:  Normal   Mood:    Anxious  Depressed   Affect:    Appropriate   Thought Content:   Clear  Thought Form:  Coherent  Logical     Insight:    Good     Plan:     Safety Plan: No current safety concerns identified.  Recommended that patient call 911 or go to the local ED should there be a change in any of these risk factors.     Barriers to treatment: None identified    Patient Contracts (see media tab):  None    Substance Use: Not addressed in session     Continue or Discharge: Patient will continue in Adult Day Treatment (ADT)  as planned. Patient is likely to benefit from learning and using skills as they work toward the goals identified in their treatment plan.      Sofia Ochoa, [facility]  November 10, 2020

## 2020-11-10 NOTE — GROUP NOTE
Psychoeducation Group Note    PATIENT'S NAME: Rama Gutierrez  MRN:   [id number]  :   1990  ACCT. NUMBER: [id number]  DATE OF SERVICE: 11/10/20  START TIME: 10:00 AM  END TIME: 10:50 AM  FACILITATOR: Ugo Hickey OTR/L  TOPIC: MH Life Skills Group: Resiliency Development  Telemedicine Visit: The patient's condition can be safely assessed and treated via synchronous audio and visual telemedicine encounter.      Reason for Telemedicine Visit: COVID-19    Originating Site (Patient Location): Patient's home    Distant Site (Provider Location): [facility]: [facility]    Consent:  The patient/guardian has verbally consented to: the potential risks and benefits of telemedicine (video visit) versus in person care; bill my insurance or make self-payment for services provided; and responsibility for payment of non-covered services.     Mode of Communication:  Video Conference via [employer]    As the provider I attest to compliance with applicable laws and regulations related to telemedicine.   Adult Mental Health Day Treatment  TRACK: 5A    NUMBER OF PARTICIPANTS: 6    Summary of Group / Topics Discussed:  Resiliency Development:  Self Esteem and Self Compassion: A Letter to Myself: Patients were given time for reflection and built self awareness around components of self compassion and how it relates to self esteem and overall functioning.  Patients were also given the opportunity to improve self efficacy, self sufficiency, quality of life and a sense of mastery and competency by identifying what is good and to instill hope. Patients were taught about the importance of self kindness and ways to challenge negative thinking.      Patient Session Goals / Objectives:    Identified personal strengths and qualities about themselves as a way to provide hope and build self-compassion as a way to effectively manage both mental health and substance abuse/abuse symptoms     Improved awareness of  positive qualities and how these contribute to the process of recovery        Established a plan for practice of these skills in their own environments    Practiced and reflected on how to generalize taught skills to their everyday life        Patient Participation / Response:  Fully participated with the group by sharing personal reflections / insights and openly received / provided feedback with other participants.    Demonstrated understanding of content through handout and group discussion,  and Verbalized understanding of content    Treatment Plan:  Patient has a current master individualized treatment plan.  See Epic treatment plan for more information.    Ugo Hickey, OTR/L

## 2020-11-10 NOTE — GROUP NOTE
Psychotherapy Group Note    PATIENT'S NAME: Rama Gutierrez  MRN:   [id number]  :   1990  ACCT. NUMBER: [id number]  DATE OF SERVICE: 11/10/20  START TIME: 11:00 AM  END TIME: 11:50 AM  FACILITATOR: Sofia Ochoa LICSW  TOPIC: MH EBP Group: Emotions Management  Adult Mental Health Day Treatment  TRACK: 5A    NUMBER OF PARTICIPANTS: 6    Summary of Group / Topics Discussed:  Emotions Management: Guilt and Shame: Patients explored and shared personal experiences associated with feelings of guilt and shame.  Group explored how these feelings develop, what they mean to each individual, and how to increase acceptance and usefulness of these feelings.  Group members assisted one another to contextualize these concepts and promote healing.     Patient Session Goals / Objectives:    Discuss and review definitions and personal views/experiences with guilt and shame    Understand the differences between guilt and shame    Explore how feelings of guilt and shame impact functioning    Understand and practice strategies to manage difficult emotions and move towards healing    Understand and normalize difficult emotions through group discussion    Understand the utility of guilt and shame    Target  unwanted  emotions for change    Telemedicine Visit: The patient's condition can be safely assessed and treated via synchronous audio and visual telemedicine encounter.      Reason for Telemedicine Visit: Services only offered telehealth    Originating Site (Patient Location): Patient's home    Distant Site (Provider Location): Provider Remote Setting    Consent:  The patient/guardian has verbally consented to: the potential risks and benefits of telemedicine (video visit) versus in person care; bill my insurance or make self-payment for services provided; and responsibility for payment of non-covered services.     Mode of Communication:  Video Conference via [employer]    As the provider I attest to compliance with  applicable laws and regulations related to telemedicine.       Patient Participation / Response:  Fully participated with the group by sharing personal reflections / insights and openly received / provided feedback with other participants.    Demonstrated knowledge of when to consider applying a variety of emotions management skills in daily life    Treatment Plan:  Patient has a current master individualized treatment plan.  See Epic treatment plan for more information.    Sofia Ochoa, LICSW

## 2020-11-17 ENCOUNTER — TELEPHONE (OUTPATIENT)
Dept: BEHAVIORAL HEALTH | Facility: CLINIC | Age: 30
End: 2020-11-17

## 2020-11-19 ENCOUNTER — TELEPHONE (OUTPATIENT)
Dept: BEHAVIORAL HEALTH | Facility: CLINIC | Age: 30
End: 2020-11-19

## 2020-11-19 ENCOUNTER — HOSPITAL ENCOUNTER (OUTPATIENT)
Dept: BEHAVIORAL HEALTH | Facility: CLINIC | Age: 30
End: 2020-11-19
Attending: PSYCHIATRY & NEUROLOGY
Payer: COMMERCIAL

## 2020-11-19 PROCEDURE — 90853 GROUP PSYCHOTHERAPY: CPT | Mod: 95 | Performed by: SOCIAL WORKER

## 2020-11-19 PROCEDURE — 90853 GROUP PSYCHOTHERAPY: CPT | Mod: GT | Performed by: SOCIAL WORKER

## 2020-11-19 NOTE — GROUP NOTE
Psychotherapy Group Note    PATIENT'S NAME: Rama Gutierrez  MRN:   [id number]  :   1990  ACCT. NUMBER: [id number]  DATE OF SERVICE: 20  START TIME: 11:00 AM  END TIME: 11:50 AM  FACILITATOR: Sofia Padilla LICSW  TOPIC: MH EBP Group: Enhanced Mindfulness  [facility] Adult Mental Health Day Treatment  TRACK: 5A    NUMBER OF PARTICIPANTS: 5    Summary of Group / Topics Discussed:  Enhanced Mindfulness: Body and Mind Integration: Patients received an overview and education regarding the importance of including the body in the management of emotional health and self-care and as a direct route to mindfulness practice.  Patients discussed various ways in which the body can serve as an informant to their physical and emotional experiences/need. Patients discussed the body as a direct link to the present moment and to mindfulness practice.  Patients discussed current relationship with body, self-awareness, mindfulness practice and barriers to connection with body.  Patients were guided through breath work and movement to facilitate greater self-awareness, grounding, self-expression, and connection to other.  Patients discussed how the experiential could be applied to better manage mental health and develop hankins connection to self.    Patient Session Goals / Objectives:    Identify how movement awareness could be used for grounding, stress management, self-expression, connection to other and self-regulation    Improved awareness of breath and movement preferences    Identify how movement and the body is used in mindfulness practice    Reflect on use of these practices in everyday life.    Identify barriers to attending to body  Telemedicine Visit: The patient's condition can be safely assessed and treated via synchronous audio and visual telemedicine encounter.      Reason for Telemedicine Visit: Services only offered telehealth and covid19    Originating Site (Patient Location): Patient's  home    Distant Site (Provider Location): Provider Remote Setting    Consent:  The patient/guardian has verbally consented to: the potential risks and benefits of telemedicine (video visit) versus in person care; bill my insurance or make self-payment for services provided; and responsibility for payment of non-covered services.     Mode of Communication:  Video Conference via [employer]    As the provider I attest to compliance with applicable laws and regulations related to telemedicine.       Patient Participation / Response:  Moderately participated, sharing some personal reflections / insights and adequately adequately received / provided feedback with other participants.    Practiced skills in session    Treatment Plan:  Patient has a current master individualized treatment plan.  See Epic treatment plan for more information.    HIEU SoaresSW

## 2020-11-19 NOTE — GROUP NOTE
Psychotherapy Group Note    PATIENT'S NAME: Rama Gutierrez  MRN:   [id number]  :   1990  ACCT. NUMBER: [id number]  DATE OF SERVICE: 20  START TIME: 10:00 AM  END TIME: 10:50 AM  FACILITATOR: Sofia Ochoa LICSW  TOPIC: MH EBP Group: Coping Skills  [facility] Adult Mental Health Day Treatment  TRACK: 5A    NUMBER OF PARTICIPANTS: 7    Summary of Group / Topics Discussed:  Coping Skills: Building Positive Experiences: Patients discussed the importance of planning and engaging in positive experiences, as strategies to increase positive thinking, hope, and self-worth.  Explored the benefits of planning / creating positive experiences, including recognizing and reducing negativity bias by focusing on and building positive experiences.   Several approaches to building positive experiences were presented and discussed relevant to each patient.      Patient Session Goals / Objectives:    Understand the purpose of planning / creating / participating / sharing in positive experiences.    Explore patient s experiences related to negative thinking and how it influences activities and moodIdentify current positive events in patient s life.     Set goals to increase a variety of positive experiences.    Address barriers to planning / engaging in positive experiences.    Telemedicine Visit: The patient's condition can be safely assessed and treated via synchronous audio and visual telemedicine encounter.      Reason for Telemedicine Visit: Services only offered telehealth    Originating Site (Patient Location): Patient's home    Distant Site (Provider Location): Provider Remote Setting    Consent:  The patient/guardian has verbally consented to: the potential risks and benefits of telemedicine (video visit) versus in person care; bill my insurance or make self-payment for services provided; and responsibility for payment of non-covered services.     Mode of Communication:  Video Conference via  [employer]    As the provider I attest to compliance with applicable laws and regulations related to telemedicine.         Patient Participation / Response:  Fully participated with the group by sharing personal reflections / insights and openly received / provided feedback with other participants.    Demonstrated knowledge of when to consider using a variety of coping skills in daily life    Treatment Plan:  Patient has a current master individualized treatment plan.  See Epic treatment plan for more information.    Sofia Ochoa, [facility]

## 2020-11-23 ENCOUNTER — HOSPITAL ENCOUNTER (OUTPATIENT)
Dept: BEHAVIORAL HEALTH | Facility: CLINIC | Age: 30
End: 2020-11-23
Attending: PSYCHIATRY & NEUROLOGY
Payer: COMMERCIAL

## 2020-11-23 PROCEDURE — G0177 OPPS/PHP; TRAIN & EDUC SERV: HCPCS | Mod: 95

## 2020-11-23 PROCEDURE — 90853 GROUP PSYCHOTHERAPY: CPT | Mod: GT | Performed by: SOCIAL WORKER

## 2020-11-23 NOTE — ADDENDUM NOTE
Encounter addended by: Leobardo Carter MD on: 11/23/2020 8:22 AM   Actions taken: Clinical Note Signed

## 2020-11-23 NOTE — GROUP NOTE
Psychoeducation Group Note    PATIENT'S NAME: Rama Gutierrez  MRN:   [id number]  :   1990  ACCT. NUMBER: [id number]  DATE OF SERVICE: 20  START TIME: 11:00 AM  END TIME: 11:50 AM  FACILITATOR: Merry Newman RN  TOPIC:  RN Group: [facility]    Telemedicine Visit: The patient's condition can be safely assessed and treated via synchronous audio and visual telemedicine encounter.      Reason for Telemedicine Visit:  covid19    Originating Site (Patient Location): Patient's home    Distant Site (Provider Location): Provider Remote Setting    Consent:  The patient/guardian has verbally consented to: the potential risks and benefits of telemedicine (video visit) versus in person care; bill my insurance or make self-payment for services provided; and responsibility for payment of non-covered services.     Mode of Communication:  Video Conference via [employer]Out    As the provider I attest to compliance with applicable laws and regulations related to telemedicine.    [facility] Mental Health Day Treatment  TRACK: 5A    NUMBER OF PARTICIPANTS: 5    Summary of Group / Topics Discussed:  Foundations of Health: Nutrition: Super Nutrients & Micronutrients: Super Nutrients are Foods that have high nutritional yield. Micronutrients are essential elements found in food or taken through supplements that are necessary for normal physiological functioning. This group was designed to complement the macronutrients group and build upon previous education. The changes that food makes on the brain (how the brain uses sugar) and nutrition as it relates to mental health was also discussed.       Patient Session Goals / Objectives:  ? Identified the health enhancing benefits to good nutrition  ? Verbalized ways in which the food we eat affects the brain  ? Explained the role of micronutrients in the body, how much we need, and how to get it        Patient Participation / Response:  Moderately  participated, sharing some personal reflections / insights and adequately adequately received / provided feedback with other participants.    Verbalized understanding of foundations of health topic    Treatment Plan:  Patient has See Epic Treatment Plan - Patient is discharging.    Merry Newman RN

## 2020-11-23 NOTE — ADDENDUM NOTE
Encounter addended by: Sofia Ochoa [facility] on: 11/23/2020 4:29 PM   Actions taken: Clinical Note Signed

## 2020-11-23 NOTE — GROUP NOTE
Process Group Note    PATIENT'S NAME: Rama Gutierrez  MRN:   [id number]  :   1990  ACCT. NUMBER: [id number]  DATE OF SERVICE: 20  START TIME:  9:00 AM  END TIME:  9:50 AM  FACILITATOR: Sofia Ochoa LICSW  TOPIC: MH Process Group    Diagnoses:  296.33 (F33.2) Major Depressive Disorder, Recurrent Episode, Severe _ and With anxious distress  300.02 (F41.1) Generalized Anxiety Disorder  314.01 (F90.9) Unspecified Attention -Deficit / Hyperactivity Disorder      [facility] Mental Health Day Treatment  TRACK: 5A    NUMBER OF PARTICIPANTS: 6  Telemedicine Visit: The patient's condition can be safely assessed and treated via synchronous audio and visual telemedicine encounter.      Reason for Telemedicine Visit: Services only offered telehealth    Originating Site (Patient Location): Patient's home    Distant Site (Provider Location): Provider Remote Setting    Consent:  The patient/guardian has verbally consented to: the potential risks and benefits of telemedicine (video visit) versus in person care; bill my insurance or make self-payment for services provided; and responsibility for payment of non-covered services.     Mode of Communication:  Video Conference via [employer]    As the provider I attest to compliance with applicable laws and regulations related to telemedicine.           Data:    Session content: At the start of this group, patients were invited to check in by identifying themselves, describing their current emotional status, and identifying issues to address in this group.   Area(s) of treatment focus addressed in this session included Symptom Management, Personal Safety and Community Resources/Discharge Planning.  Rama reported some anxiety over the stressor of going to the dentist for possible tooth removal tonight. She stated that she is less depressed.   She shared that her attention and focus are improving on the Ritalin.  Client denied suicidal ideation, intent  and plan. 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  Client shared discharge plan with the group.  Client was receptive to feedback from peers on their strengths and progress in the program.    Therapeutic Interventions/Treatment Strategies:  Psychotherapist offered support, feedback and validation and reinforced use of skills. Treatment modalities used include Cognitive Behavioral Therapy. Interventions include Behavioral Activation: Explored how behaviors effect mood and interact with thoughts and feelings.    Assessment:    Patient response:   Patient responded to session by focusing on goals and being attentive    Possible barriers to participation / learning include: and no barriers identified    Health Issues:   None reported       Substance Use Review:   Substance Use: No active concerns identified.    Mental Status/Behavioral Observations  Appearance:   Appropriate   Eye Contact:   Good   Psychomotor Behavior: Normal   Attitude:   Cooperative   Orientation:   All  Speech   Rate / Production: Normal    Volume:  Normal   Mood:    Anxious  Depressed   Affect:    Appropriate   Thought Content:   Clear  Thought Form:  Coherent  Logical     Insight:    Good     Plan:     Safety Plan: No current safety concerns identified.  Recommended that patient call 911 or go to the local ED should there be a change in any of these risk factors.     Barriers to treatment: None identified    Patient Contracts (see media tab):  None    Substance Use: Not addressed in session     Continue or Discharge: Patient is being discharged today. See Treatment Plan and Discharge Summary.       Sofia Ochoa, [facility]  November 23, 2020

## 2020-11-23 NOTE — GROUP NOTE
Psychotherapy Group Note    PATIENT'S NAME: Rama Gutierrez  MRN:   [id number]  :   1990  ACCT. NUMBER: [id number]  DATE OF SERVICE: 20  START TIME: 10:00 AM  END TIME: 10:50 AM  FACILITATOR: Sofia Ochoa LICSW  TOPIC: MH EBP Group: Coping Skills  [facility] Adult Mental Health Day Treatment  TRACK: 5A    NUMBER OF PARTICIPANTS: 6    Summary of Group / Topics Discussed:  Coping Skills: Grounding: Patients discussed and practiced strategies to increase attachment / presence to the current moment.  Patients identified situations in which using these strategies will help improve emotion regulation sense of calm in the body.  Reviewed the benefits of applying grounding strategies, as well as past / current practices of each member.  Patients identified situations in which using these strategies would reduce stress. They developed the ability to distinguish when these strategies can be useful in their lives for management and stress and psychological well-being.    Patient Session Goals / Objectives:    Understand the purpose of using grounding strategies to reduce stress.    Verbalize understanding of how and when to apply grounding strategies to reduce distress and increase presence in the moment.    Review patients current grounding practices and discuss a more formal way of practicing and accessing skills.    Practice using various calming strategies (e.g. 5-4-3-2-1; mental and body awareness).    Choose 1-2 grounding strategies to apply during times of distress.    Telemedicine Visit: The patient's condition can be safely assessed and treated via synchronous audio and visual telemedicine encounter.      Reason for Telemedicine Visit: Services only offered telehealth    Originating Site (Patient Location): Patient's home    Distant Site (Provider Location): Provider Remote Setting    Consent:  The patient/guardian has verbally consented to: the potential risks and benefits of telemedicine  (video visit) versus in person care; bill my insurance or make self-payment for services provided; and responsibility for payment of non-covered services.     Mode of Communication:  Video Conference via [employer]    As the provider I attest to compliance with applicable laws and regulations related to telemedicine.       Patient Participation / Response:  Fully participated with the group by sharing personal reflections / insights and openly received / provided feedback with other participants.    Expressed understanding of the relevance / importance of coping skills at distressing times in life    Treatment Plan:  Patient has See Epic Treatment Plan - Patient is discharging.    Sofia Ochoa, [facility]

## 2020-11-23 NOTE — PROGRESS NOTES
Patient Active Problem List   Diagnosis     MARÍA (generalized anxiety disorder)     Chronic midline low back pain without sciatica     Chronic bilateral low back pain with left-sided sciatica     Major depressive disorder, recurrent episode, moderate (H)     Morbid obesity (H)     Social anxiety disorder     Attention deficit hyperactivity disorder (ADHD), combined type     Major depressive disorder, recurrent episode, severe with anxious distress (H)       Current Outpatient Medications:      atomoxetine (STRATTERA) 40 MG capsule, Take 1 capsule (40 mg) by mouth daily, Disp: 90 capsule, Rfl: 1     cyclobenzaprine (FLEXERIL) 10 MG tablet, Take 0.5-1 tablets (5-10 mg) by mouth 3 times daily as needed for muscle spasms (Patient not taking: Reported on 9/8/2020), Disp: 30 tablet, Rfl: 3     DULoxetine (CYMBALTA) 30 MG capsule, Take 1 capsule (30 mg) by mouth daily Take with 60mg for total dose of 90mg daily, Disp: 90 capsule, Rfl: 1     DULoxetine (CYMBALTA) 60 MG capsule, Take 1 capsule (60 mg) by mouth daily Take with 30mg to total dose 90mg daily., Disp: 90 capsule, Rfl: 1     etonogestrel (IMPLANON/NEXPLANON) 68 MG IMPL, 1 each by Subdermal route once, Disp: , Rfl:      hydrOXYzine (VISTARIL) 50 MG capsule, Take 50 mg by mouth 3 times daily as needed, Disp: , Rfl:      Vitamin D, Cholecalciferol, 25 MCG (1000 UT) CAPS, Take 2,000 Int'l Units by mouth daily, Disp: , Rfl:   Psychiatry staffing: case discussed  Diagnosis: as above, working to improve functioning.  Finishing this week

## 2020-12-27 ENCOUNTER — HEALTH MAINTENANCE LETTER (OUTPATIENT)
Age: 30
End: 2020-12-27

## 2021-01-04 ENCOUNTER — PATIENT OUTREACH (OUTPATIENT)
Dept: CARE COORDINATION | Facility: CLINIC | Age: 31
End: 2021-01-04

## 2021-01-04 ENCOUNTER — PATIENT OUTREACH (OUTPATIENT)
Dept: NURSING | Facility: CLINIC | Age: 31
End: 2021-01-04
Payer: COMMERCIAL

## 2021-01-04 NOTE — PROGRESS NOTES
Clinic Care Coordination Contact    Assessment: Care Coordinator contacted patient for 2 month follow up.  Patient has continued to follow the plan of care and assessment is negative for any new needs or concerns.    Enrollment status: Graduated.      Plan: No further outreaches at this time.  Patient will continue to follow the plan of care.  If new needs arise a new Care Coordination referral may be placed.  FYI to PCP        Kristian Valdes [facility]  Clinic Care Coordinator  Ph. [phone]  [email or web address]

## 2021-01-04 NOTE — LETTER
[place] CARE COORDINATION  [address] [place] [address]  January 4, 2021    Rama Gutierrez  [address] [place]  [place] [address]    Dear Terra,  Your Care Team congratulates you on your journey to maintain wellness. This document will help guide you on your journey to maintain a healthy lifestyle.  You can use this to help you overcome any barriers you may encounter.  If you should have any questions or concerns, you can contact the members of your Care Team or contact your Primary Care Clinic for assistance.     Health Maintenance  Health Maintenance Reviewed:    Health Maintenance Due   Topic Date Due     HEPATITIS C SCREENING  06/18/2006     PREVENTIVE CARE VISIT  05/10/2020     INFLUENZA VACCINE (1) 09/01/2020       My Access Plan  Medical Emergency 911   Primary Clinic Line [facility] - [phone]   24 Hour Appointment Line [phone] or  [phone]) (toll-free)   24 Hour Nurse Line [phone] (toll-free)   Preferred Urgent Care     Preferred Hospital     Preferred Pharmacy [facility] KRISTINA Cruz - [address] [facility] [address] [facility]      Behavioral Health Crisis Line The National Suicide Prevention Lifeline at 1-132.409.3537 or 911     My Care Team Members  Patient Care Team       Relationship Specialty Notifications Start End    Rozina Ricketts MD PCP - General Internal Medicine  3/23/17     Phone: [phone] Fax: [phone]         [address] [place] [address] [address] [place] [address]    Rozina Ricketts MD Assigned PCP   3/9/17     Phone: [phone] Fax: [phone]         [address] [place] [address] [address] [place] [address]    Sarah Martinez NP Nurse Practitioner Nurse Practitioner Psych/Mental Health  5/2/19     Phone: [phone] Fax: [phone]         [place] [address] [place] [address]    Terrence Paulino LSW Clinic Care Coordinator Primary Care - CC  3/9/20     Phone: [phone]         Francisco Hayden [facility]  Health Worker   3/9/20     Rozina Graf, [facility]    6/10/20     [place] [facility]    Phone: [phone] Pager: [phone]        Luanne Moore, RN Personal Advocate & Liaison (PAL)   7/23/20     Randy Zhao Personal Advocate & Liaison (PAL)  Abnormal results only, Admissions 9/10/20     Kristian Rogres, [facility] Lead Care Coordinator   11/18/20               Goals        Patient Stated      COMPLETED: 1. Financial Wellbeing (pt-stated)      Goal Statement: I would like to apply and qualify for financial and County supports to allow me to afford my basic needs over the next 6 months.   Date Goal set: 3/9/2020  Barriers: Recent loss of job.   Strengths: Independent.   Date to Achieve By: 10/27/2020  Patient expressed understanding of goal: yes    Action steps to achieve this goal:  1. I will continue to work with the [facility] to apply for [place], Cash assistance, and SNAP.    2. I will work with my Community Health worker and Financial Resource worker to assure I am applying for all appropriate programs in their entirety.    3. I will work with the [facility] for homelessness prevention.     As of today's date 4/8/2020 goal is met at 51 - 75%.   Goal Status:  Showing progress          COMPLETED: 2. Mental Health Management (pt-stated)      Goal Statement: I would like to establish with a therapist  Date Goal set: 3/9/2020  Date to Achieve By: 10/27/2020  Patient expressed understanding of goal: yes  Action steps to achieve this goal:  1. I will review mental health resources provided by my care coordinator and consider establishing with supports.  2. I will outreach to my care coordinator for questions or concerns.    3. I will call to schedule an appointment to establish with a therapist.      As of today's date 4/8/2020 goal is met at 0 - 25%.   Goal Status:  Ongoing                     It has been your Clinic Care Team's pleasure to work with you on your goals.    Regards,  Your Clinic Care  Team    Kristian Valdes, [facility] Care Coordinator  Ph. [phone]  [email or web address]

## 2021-01-04 NOTE — PROGRESS NOTES
Clinic Care Coordination Contact  Community Health Worker Follow Up  Spoke to Terra    Intervention and Education during outreach:   CHW inquired how patient has been doing since last outreach with CHARMAINE Nash.  Patient reports that she's doing fine, just taking thing day by day.  We reviewed previous goals that we were working on before, which patient reports that everything was completed and good.    CHW inquired if there was any other support or resources needed at this time.  However, patient has no other needs or concerns.  We discussed transitioning to graduate from .  Which patient acknowledged and agreed to graduate.  Patient acknowledged understanding that she can always reach back out to  if anything changes.    CHW Plan: CHW has routed encounter to CHARMAINE Simmons To complete graduation.    VANESSA Damon  Clinic Care Coordination  [facility] : [place], [place], and [place]  Phone: [phone]

## 2021-01-27 ASSESSMENT — ANXIETY QUESTIONNAIRES
7. FEELING AFRAID AS IF SOMETHING AWFUL MIGHT HAPPEN: MORE THAN HALF THE DAYS
1. FEELING NERVOUS, ANXIOUS, OR ON EDGE: MORE THAN HALF THE DAYS
5. BEING SO RESTLESS THAT IT IS HARD TO SIT STILL: NOT AT ALL
2. NOT BEING ABLE TO STOP OR CONTROL WORRYING: SEVERAL DAYS
6. BECOMING EASILY ANNOYED OR IRRITABLE: MORE THAN HALF THE DAYS
GAD7 TOTAL SCORE: 9
3. WORRYING TOO MUCH ABOUT DIFFERENT THINGS: SEVERAL DAYS
4. TROUBLE RELAXING: SEVERAL DAYS

## 2021-01-27 ASSESSMENT — PATIENT HEALTH QUESTIONNAIRE - PHQ9: SUM OF ALL RESPONSES TO PHQ QUESTIONS 1-9: 12

## 2021-01-28 ENCOUNTER — TELEPHONE (OUTPATIENT)
Dept: PEDIATRICS | Facility: CLINIC | Age: 31
End: 2021-01-28

## 2021-01-28 ASSESSMENT — ANXIETY QUESTIONNAIRES
GAD7 TOTAL SCORE: 9
7. FEELING AFRAID AS IF SOMETHING AWFUL MIGHT HAPPEN: MORE THAN HALF THE DAYS
GAD7 TOTAL SCORE: 9

## 2021-01-28 ASSESSMENT — PATIENT HEALTH QUESTIONNAIRE - PHQ9
SUM OF ALL RESPONSES TO PHQ QUESTIONS 1-9: 12
10. IF YOU CHECKED OFF ANY PROBLEMS, HOW DIFFICULT HAVE THESE PROBLEMS MADE IT FOR YOU TO DO YOUR WORK, TAKE CARE OF THINGS AT HOME, OR GET ALONG WITH OTHER PEOPLE: VERY DIFFICULT

## 2021-01-28 NOTE — TELEPHONE ENCOUNTER
"CHG outreach to follow up on PHQ-9 results where patient said they had thoughts of self harm and SI. Patient says things have been going downhill mentally and that they are requesting new therapist. Says that she things that part of the problem is that their back/chronic pain has been getting worse and is just feeing burned out and that things are \"all crashing down at once\"    Patient denies active SI thoughts. Has safety plan in place and roommate is part of it and aware of patients current depression status.    Patient has appointment scheduled for tomorrow morning with PCP. CHG able to adjust appointment to start 15min early to allow patient and PCP to address changes in phq-9.    Randy Zhao at 3:12 PM on 1/28/2021  [facility] Health Guide  Phone [phone]    "

## 2021-01-29 ENCOUNTER — OFFICE VISIT (OUTPATIENT)
Dept: PEDIATRICS | Facility: CLINIC | Age: 31
End: 2021-01-29
Payer: COMMERCIAL

## 2021-01-29 VITALS
OXYGEN SATURATION: 97 % | HEIGHT: 68 IN | BODY MASS INDEX: 41.97 KG/M2 | DIASTOLIC BLOOD PRESSURE: 72 MMHG | HEART RATE: 86 BPM | RESPIRATION RATE: 18 BRPM | TEMPERATURE: 98.4 F | SYSTOLIC BLOOD PRESSURE: 126 MMHG | WEIGHT: 276.9 LBS

## 2021-01-29 DIAGNOSIS — M54.42 CHRONIC BILATERAL LOW BACK PAIN WITH LEFT-SIDED SCIATICA: Primary | ICD-10-CM

## 2021-01-29 DIAGNOSIS — G89.29 CHRONIC BILATERAL LOW BACK PAIN WITH LEFT-SIDED SCIATICA: Primary | ICD-10-CM

## 2021-01-29 PROCEDURE — 99213 OFFICE O/P EST LOW 20 MIN: CPT | Performed by: PEDIATRICS

## 2021-01-29 RX ORDER — LANOLIN ALCOHOL/MO/W.PET/CERES
3 CREAM (GRAM) TOPICAL
COMMUNITY
Start: 2020-09-03

## 2021-01-29 RX ORDER — IBUPROFEN 800 MG/1
800 TABLET, FILM COATED ORAL
Status: ON HOLD | COMMUNITY
Start: 2020-09-03 | End: 2024-05-28

## 2021-01-29 RX ORDER — METHYLPHENIDATE HYDROCHLORIDE 20 MG/1
TABLET ORAL
COMMUNITY
Start: 2021-01-18 | End: 2022-01-05

## 2021-01-29 ASSESSMENT — MIFFLIN-ST. JEOR: SCORE: 2014.38

## 2021-01-29 ASSESSMENT — ANXIETY QUESTIONNAIRES: GAD7 TOTAL SCORE: 9

## 2021-01-29 ASSESSMENT — PATIENT HEALTH QUESTIONNAIRE - PHQ9: SUM OF ALL RESPONSES TO PHQ QUESTIONS 1-9: 12

## 2021-01-29 NOTE — PROGRESS NOTES
Assessment & Plan     Chronic bilateral low back pain with left-sided sciatica  Patient with worsening symptoms since last lumbar epidural two years ago - imaging is within 3 year - will ordered repeat lumbar epidural.  If this does not give adequate relief, patient will like need repeat imaging and neurosurgery referral.  - PAIN MANAGEMENT REFERRAL; Future     Patient following with [facility] psychiatry now for mental health.    Depression Screening Follow Up    PHQ 1/27/2021   PHQ-9 Total Score 12   Q9: Thoughts of better off dead/self-harm past 2 weeks Several days   F/U: Thoughts of suicide or self-harm Yes   F/U: Self harm-plan No   F/U: Self-harm action No   F/U: Safety concerns No         Patient Instructions   Referral placed for lumbar epidural.          Return in about 1 year (around 1/29/2022) for Routine preventive, with me.    Rozina Ricketts MD  [facility] FREDRICK Ontiveros is a 30 year old who presents to clinic today for the following health issues   History of Present Illness       Back Pain:  She presents for follow up of back pain. Patient's back pain is a chronic problem.  Location of back pain:  Right lower back, left lower back, right buttock, left buttock, right hip and left hip  Description of back pain: fullness and sharp  Back pain spreads: right buttocks, left buttocks, right thigh, left thigh, left knee, right foot and left foot    Since patient first noticed back pain, pain is: gradually worsening  Does back pain interfere with her job:  Yes      Mental Health Follow-up:  Patient presents to follow-up on Depression & Anxiety.Patient's depression since last visit has been:  Bad  The patient is having other symptoms associated with depression.  Patient's anxiety since last visit has been:  Bad  The patient is having other symptoms associated with anxiety.  Any significant life events: financial concerns and health concerns  Patient is feeling anxious or having  "panic attacks.  Patient has no concerns about alcohol or drug use.     Social History  Tobacco Use    Smoking status: Never Smoker    Smokeless tobacco: Never Used  Alcohol use: No    Alcohol/week: 0.0 standard drinks    Comment: on a rare occ   Drug use: Yes    Comment: edible marijuana treats weekly      Today's PHQ-9         PHQ-9 Total Score:     (P) 12   PHQ-9 Q9 Thoughts of better off dead/self-harm past 2 weeks :   (P) Several days   Thoughts of suicide or self harm:  (P) Yes   Self-harm Plan:    (P) No   Self-harm Action:      (P) No   Safety concerns for self or others: (P) No         She eats 0-1 servings of fruits and vegetables daily.She consumes 1 sweetened beverage(s) daily.She exercises with enough effort to increase her heart rate 9 or less minutes per day.  She exercises with enough effort to increase her heart rate 3 or less days per week. She is missing 2 dose(s) of medications per week.  She is not taking prescribed medications regularly due to remembering to take.        Answers for HPI/ROS submitted by the patient on 1/28/2021   Chronic problems general questions HPI Form  If you checked off any problems, how difficult have these problems made it for you to do your work, take care of things at home, or get along with other people?: Very difficult  PHQ9 TOTAL SCORE: 12  MARÍA 7 TOTAL SCORE: 9      Review of Systems   Constitutional, HEENT, cardiovascular, pulmonary, gi and gu systems are negative, except as otherwise noted.      Objective    /72 (BP Location: Right arm, Patient Position: Sitting, Cuff Size: Adult Large)   Pulse 86   Temp 98.4  F (36.9  C) (Warmer)   Resp 18   Ht 1.727 m (5' 7.99\")   Wt 125.6 kg (276 lb 14.4 oz)   SpO2 97%   BMI 42.11 kg/m    Body mass index is 42.11 kg/m .  Physical Exam   ORTHO: +midline vertebral tenderness over sacrum, severe tenderness at bilateral SI joint and buttock.  +left leg raise, right leg raise normal.  Full hip range of motion bilateral "

## 2021-02-01 ENCOUNTER — TELEPHONE (OUTPATIENT)
Dept: PALLIATIVE MEDICINE | Facility: CLINIC | Age: 31
End: 2021-02-01

## 2021-02-01 ENCOUNTER — TRANSFERRED RECORDS (OUTPATIENT)
Dept: HEALTH INFORMATION MANAGEMENT | Facility: CLINIC | Age: 31
End: 2021-02-01

## 2021-02-01 ENCOUNTER — DOCUMENTATION ONLY (OUTPATIENT)
Dept: BEHAVIORAL HEALTH | Facility: CLINIC | Age: 31
End: 2021-02-01

## 2021-02-01 NOTE — TELEPHONE ENCOUNTER
Screening Questions for Radiology Injections:    Injection to be done at which interventional clinic site? [facility]    If [facility] location, tell patient that this procedure requires a COVID-19 lab test be done within 4 days of the procedure. Would you still like to move forward with scheduling the procedure?  Not Applicable   If YES, let patient know that someone will call them to schedule the COVID-19 test and that they will only receive a call back if the result is positive. Route to nursing to enter order.     Instruct patient to arrive as directed prior to the scheduled appointment time:    [place] minutes before      Sarah: 30 minutes before; if IV needed 1 hour before     Procedure ordered by Jamarcus    Procedure ordered? LESI      Transforaminal Cervical KARLEE - no pain provider currently performing    As a reminder, receiving steroids can decrease your body's ability to fight infection.   Would you still like to move forward with scheduling the injection?  Yes    What insurance would patient like us to bill for this procedure? [employer]      Worker's comp or MVA (motor vehicle accident) -Any injection DO NOT SCHEDULE and route to Rowan Aparicio.      [employer] insurance - For SI joint injections, DO NOT SCHEDULE and route Rowan Aparicio.       ALL BCBS, [employer] and [employer]-Route to Rowan for review DO NOT SCHEDULE      IF SCHEDULING IN [place] AND NEEDS A PA, IT IS OKAY TO SCHEDULE. [place] HANDLES THEIR OWN PA'S AFTER THE PATIENT IS SCHEDULED. PLEASE SCHEDULE AT LEAST 1 WEEK OUT SO A PA CAN BE OBTAINED.    Any chance of pregnancy? NO   If YES, do NOT schedule and route to RN [place]    Is an  needed? No     Patient has a drive home? (mandatory) YES: INFORMED    Is patient taking any blood thinners (i.e. plavix, coumadin, jantoven, warfarin, heparin, pradaxa or dabigatran, etc)? No   If hold needed, do NOT schedule, route to RN pool     Is patient taking any aspirin  products (includes Excedrin and Fiorinal)? No     If more than 325mg/day, OK to schedule; Instruct pt to decrease to less than 325 mg for 7 days AND route to RN pool    For CERVICAL procedures, hold all aspirin products for 6 days.     Tell pt that if aspirin product is not held for 6 days, the procedure WILL BE cancelled.      Does the patient have a bleeding or clotting disorder? No     If YES, okay to schedule AND route to RN nurse pool    For any patients with platelet count <100, must be forwarded to provider    Is patient diabetic?  No  If YES, instruct them to bring their glucometer.    Does patient have an active infection or treated for one within the past week? No     Is patient currently taking any antibiotics?  No     For patients on chronic, preventative, or prophylactic antibiotics, procedures may be scheduled.     For patients on antibiotics for active or recent infection:antibiotic course must have been completed for 4 days    Is patient currently taking any steroid medications? (i.e. Prednisone, Medrol)  No     For patients on steroid medications, course must have been completed for 4 days    Is patient actively being treated for cancer or immunocompromised? No  If YES, do NOT schedule and route to RN pool     Are you able to get on and off an exam table with minimal or no assistance? Yes  If NO, do NOT schedule and route to RN pool    Are you able to roll over and lay on your stomach with minimal or no assistance? Yes  If NO, do NOT schedule and route to RN pool     Any allergies to contrast dye, iodine, shellfish, or numbing and steroid medications? No  If YES, route to RN pool AND add allergy information to appointment notes    Allergies: Codeine      Has the patient had a flu shot or any other vaccinations within 7 days before or after the procedure.  No     Does patient have an MRI/CT?  YES: 2018  Check Procedure Scheduling Grid to see if required.      Was the MRI done within the last 3 years?   Yes    If yes, where was the MRI done i.e.[facility], [facility], [facility], [facility] etc? [facility]      If no, do not schedule and route to RN pool    If MRI was not done at [facility], [facility] or [facility] do NOT schedule and route to RN pool.      If pt has an imaging disc, the injection MAY be scheduled but pt has to bring disc to appt.     If they show up without the disc the injection cannot be done    Procedure Specific Instructions:      If celiac plexus block, informed patient NPO for 6 hours and that it is okay to take medications with sips of water, especially blood pressure medications  Not Applicable         If this is for a cervical procedure, informed patient that aspirin needs to be held for 6 days.   Not Applicable      If IV needed:    Do not schedule procedures requiring IV placement in the first appointment of the day or first appointment after lunch. Do NOT schedule at 0745, 0815 or 1245.     Instructed pt to arrive 30 minutes early for IV start if required. (Check Procedure Scheduling Grid)  Not Applicable    Reminders:      If you are started on any steroids or antibiotics between now and your appointment, you must contact us because the procedure may need to be cancelled.  Yes      For all procedures except radiofrequency ablations (RFAs) and spinal cord stimulator (SCS) trials, informed patient:    IV sedation is not provided for this procedure.  If you feel that an oral anti-anxiety medication is needed, you can discuss this further with your referring provider or primary care provider.  The Pain Clinic provider will discuss specifics of what the procedure includes at your appointment.  Most procedures last 10-20 minutes.  We use numbing medications to help with any discomfort during the procedure.  Not Applicable      For patients 85 or older we recommend having an adult stay w/ them for the remainder of the day.       Does the patient have any questions?  NO  Sherie Curran  [facility]

## 2021-02-01 NOTE — PROGRESS NOTES
"[facility] PHQ-9 Follow-up  Behavioral Health Clinician Triage Service    [facility] PHQ-9 Responses:  [facility] Follow-up to PHQ 1/27/2021 1/27/2021 1/27/2021   PHQ-9 9. Suicide Ideation past 2 weeks - Several days Several days   Thoughts of suicide or self harm in past 2 weeks Yes Yes Yes   Thoughts of suicide or self harm in past 2 weeks - Yes Yes   PHQ-9 Self harm plan? No No No   PHQ-9 Self harm action? No No No   PHQ-9 Safety concerns? No No No   PHQ-9 Self harm plan? - No No   PHQ-9 Self harm action? - No No   PHQ-9 Safety concerns? - No No   Some encounter information is confidential and restricted. Go to Review Flowsheets activity to see all data.        [facility] completed chart review. Patient's original appointment was cancelled and rescheduled for 15 minutes earlier. Patient did complete a visit with her PCP and patient's mental health and positive PHQ-9 score was addressed by PCP and a clinic health guide reached out to the patient prior to the appointment to address her positive PHQ-9. The clinic health guide's note stated that patient's SI was passive and not active. [facility] guide reviewed her safety plan and patient has no concerns. Patient does meet with a psychiatrist. [facility] reached out to PCP after completing chart review and PCP reported that she does not have concern at this time and that \"most of this is stemming from chronic pain syndrome\". [facility] will not do an outreach because patient has indicated passive SI and has a safety plan that was reviewed with [facility] health guide.    LINH Angulo, [facility]       Disposition:    - Recommendations / Safety Plan: A safety and risk management plan has been developed in the past and was reviewed by [facility] Health Guide.        "

## 2021-02-03 ENCOUNTER — RADIOLOGY INJECTION OFFICE VISIT (OUTPATIENT)
Dept: PALLIATIVE MEDICINE | Facility: CLINIC | Age: 31
End: 2021-02-03
Payer: COMMERCIAL

## 2021-02-03 VITALS — HEART RATE: 95 BPM | OXYGEN SATURATION: 96 % | SYSTOLIC BLOOD PRESSURE: 126 MMHG | DIASTOLIC BLOOD PRESSURE: 98 MMHG

## 2021-02-03 DIAGNOSIS — G89.29 CHRONIC MIDLINE LOW BACK PAIN WITH LEFT-SIDED SCIATICA: ICD-10-CM

## 2021-02-03 DIAGNOSIS — M54.42 CHRONIC MIDLINE LOW BACK PAIN WITH LEFT-SIDED SCIATICA: ICD-10-CM

## 2021-02-03 DIAGNOSIS — G89.29 CHRONIC BILATERAL LOW BACK PAIN WITH LEFT-SIDED SCIATICA: ICD-10-CM

## 2021-02-03 DIAGNOSIS — M54.42 CHRONIC BILATERAL LOW BACK PAIN WITH LEFT-SIDED SCIATICA: ICD-10-CM

## 2021-02-03 PROCEDURE — 62323 NJX INTERLAMINAR LMBR/SAC: CPT | Performed by: PHYSICAL MEDICINE & REHABILITATION

## 2021-02-03 NOTE — NURSING NOTE
Discharge Information    IV Discontiued Time:  NA    Amount of Fluid Infused:  NA    Discharge Criteria = When patient returns to baseline or as per MD order    Consciousness:  Pt is fully awake    Circulation:  BP +/- 20% of pre-procedure level    Respiration:  Patient is able to breathe deeply    O2 Sat:  Patient is able to maintain O2 Sat >92% on room air    Activity:  Moves 4 extremities on command    Ambulation:  Patient is able to stand and walk or stand and pivot into wheelchair    Dressing:  Clean/dry or No Dressing    Notes:   Discharge instructions and AVS given to patient    Patient meets criteria for discharge?  YES    Admitted to PCU?  No    Responsible adult present to accompany patient home?  Yes    Signature/Title:    Rafaela Patel RN Care Coordinator  [facility] [place]

## 2021-02-03 NOTE — PROGRESS NOTES
[facility] - Procedure Note    Date of Visit: 2/3/2021    Procedure performed: Lumbar L5-S1 interlaminar epidural steroid injection  Diagnosis: Lumbar spondylosis; Lumbar radiculitis/radiculopathy  : Earle Robledo DO & Byron De Leon MD   Anesthesia: none    Indications: Rama Gutierrez is a 30 year old female who is seen at the request of Dr. Ricketts for a lumbar epidural steroid injection. The patient describes chronic low back pain with pain radiating . The patient has been exhibiting symptoms consistent with lumbar intraspinal inflammation and radiculopathy. Symptoms have been persistent, disabling, and intermittently severe. The patient reports minimal improvement with conservative treatment, including chronic low back and bilateral buttock pain.    Lumbar MRI was done on 9/27/18 which showed   IMPRESSION:    1. Grade 1 anterolisthesis of L5 on S1 with bilateral L5 pars defects.  Modic-type degenerative endplate changes and loss of disc height at  L5-S1.  2. Uncovering of the disc at L5-S1 with superimposed left foraminal  disc extrusion extending superiorly into the neural foramen. This  causes severe left neural foraminal narrowing at L5-S1.  3. Mild bilateral neural foraminal narrowing at L4-L5 and mild right  neural foraminal narrowing at L5-S1. No significant spinal canal  narrowing at any level.      NOREEN GARCIA MD    Allergies:      Allergies   Allergen Reactions     Codeine Nausea        Vitals:  /87   Pulse 91   SpO2 97%     Review of Systems: The patient denies recent fever, chills, illness, use of antibiotics or anticoagulants. All other 10-point review of systems negative.     Procedure: The procedure and risks were explained, and informed written consent was obtained from the patient. Risks include but are not limited to: infection, bleeding, increased pain, and damage to soft tissue, nerve, muscle, and vasculature structures. After getting informed  consent, patient was brought into the procedure suite and was placed in a prone position on the procedure table. A Pause for the Cause was performed. Patient was prepped and draped in sterile fashion.     The L5-S1 interspace was identified with use of fluoroscopy in AP view. A 25-gauge, 1.5 inch needle was used to anesthetize the skin and subcutaneous tissue entry site with a total of 2 ml of 1% lidocaine. Under fluoroscopic visualization, a 22-gauge, 4.5 inch Tuohy epidural needle was slowly advanced towards the epidural space a few millimeters left-sided of midline. The latter part of the needle advancement was guided with fluoroscopy in the lateral view. The epidural space was identified using loss of resistance technique. After negative aspiration for heme and cerebrospinal fluid, a total of 1 mL of non-ionic contrast was injected to confirm needle placement with 9 mL of contrast wasted. Epidurogram confirmed spread within the posterior epidural space. 1 ml of 40mg/ml of triamcinolone, 1 ml of 1% lidocaine, and 3 ml of preservative free saline was injected. The needle was removed.  Images were saved to PACS.    The patient tolerated the procedure well, and there was no evidence of procedural complications. No new sensory or motor deficits were noted following the procedure. The patient was stable and able to ambulate on discharge home. Post-procedure instructions were provided.     Pre-procedure pain score: 6/10 in the back, 6/10 in the leg  Post-procedure pain score: 5/10 in the back, 5/10 in the leg    Assessment/Plan: Rama Gutierrez is a 30 year old female s/p lumbar interlaminar epidural steroid injection today for lumbar spondylosis and radiculitis/radiculopathy.     1. Following today's procedure, the patient was advised to contact the [facility] for any of the following:   Fever, chills, or night sweats   New onset of pain, numbness, or weakness   Any questions/concerns regarding the  procedure  If unable to contact the Pain Center, the patient was instructed to go to a local Emergency Room for any complications.   2. The patient will receive a follow-up call in 1 week.   3. Follow-up with the referring provider in 2 weeks for post-procedure evaluation.        Earle Robledo DO  [facility]

## 2021-02-03 NOTE — Clinical Note
Lumbar epidural steroid injection completed.    Thanks for the referral,    DO [facility]

## 2021-02-03 NOTE — PATIENT INSTRUCTIONS
[facility] Procedure Discharge Instructions    Today you saw:      Dr. Earle Robledo    Your procedure:  Epidural steroid injection       Medications used:  Lidocaine (anesthetic)  Kenalog (steroid)  Omnipaque (contrast)    Normal Saline           Be cautious when walking as numbness and/or weakness in the legs may occur up to 6-8 hours after the procedure due to effect of the local anesthetic    Do not drive for 6 hours. The effect of the local anesthetic could slow your reflexes.     Avoid strenuous activity for the first 24 hours. You may resume your regular activities after that.     You may shower, however avoid swimming, tub baths or hot tubs for 24 hours following your procedure    You may have a mild to moderate increase in pain for several days following the injection.      You may use ice packs for 10-15 minutes, 3 to 4 times a day at the injection site for comfort    Do not use heat to painful areas for 6 to 8 hours. This will give the local anesthetic time to wear off and prevent you from accidentally burning your skin.    Unless you have been directed to avoid the use of anti-inflammatory medications (NSAIDS-ibuprofen, Aleve, Motrin), you may use these medications or Tylenol for pain control if needed.     With diabetes, check your blood sugar more frequently than usual as your blood sugar may be higher than normal for 10-14 days following a steroid injection. Contact your doctor who manages your diabetes if your blood sugar is higher than usual    Possible side effects of steroids that you may experience include flushing, elevated blood pressure, increased appetite, mild headaches and restlessness.  All of these symptoms will get better with time.    It may take up to 14 days for the steroid medication to start working although you may feel the effect as early as a few days after the procedure.     Follow up with your referring provider in 2-3 weeks      If you experience any of  the following, call the pain center line during work hours at [phone] or on-call physician after hours at [phone]:  -Fever over 100 degree F  -Swelling, bleeding, redness, drainage, warmth at the injection site  -Progressive weakness or numbness in your legs   -Loss of bowel or bladder function  -Unusual headache that is not relieved by Tylenol or your regular headache medication  -Unusual new onset of pain that is not improving

## 2021-04-30 ENCOUNTER — IMMUNIZATION (OUTPATIENT)
Dept: NURSING | Facility: CLINIC | Age: 31
End: 2021-04-30
Payer: COMMERCIAL

## 2021-04-30 PROCEDURE — 91300 PR COVID VAC PFIZER DIL RECON 30 MCG/0.3 ML IM: CPT

## 2021-04-30 PROCEDURE — 0001A PR COVID VAC PFIZER DIL RECON 30 MCG/0.3 ML IM: CPT

## 2021-05-05 ENCOUNTER — TELEPHONE (OUTPATIENT)
Dept: PEDIATRICS | Facility: CLINIC | Age: 31
End: 2021-05-05

## 2021-05-05 NOTE — TELEPHONE ENCOUNTER
Patient Quality Outreach      Summary:    Patient has the following on her problem list/HM:     Depression / Dysthymia review           PHQ-9 SCORE 1/27/2021 1/27/2021 1/27/2021   PHQ-9 Total Score MyChart 12 (Moderate depression) 12 (Moderate depression) 12 (Moderate depression)   PHQ-9 Total Score - 12 12   Some encounter information is confidential and restricted. Go to Review Flowsheets activity to see all data.       If PHQ-9 recheck is 5 or more, route to provider for next steps.    Patient is due/failing the following:   PHQ-9 Needed and Adult/Adolescent physical, date due: May 2020    Type of outreach:    Sent [employer] message.    Questions for provider review:    None                                                                                                                                     JENELLE HOSKNIS MA on 5/5/2021 at 6:01 PM

## 2021-05-11 ASSESSMENT — PATIENT HEALTH QUESTIONNAIRE - PHQ9
10. IF YOU CHECKED OFF ANY PROBLEMS, HOW DIFFICULT HAVE THESE PROBLEMS MADE IT FOR YOU TO DO YOUR WORK, TAKE CARE OF THINGS AT HOME, OR GET ALONG WITH OTHER PEOPLE: VERY DIFFICULT
SUM OF ALL RESPONSES TO PHQ QUESTIONS 1-9: 12
SUM OF ALL RESPONSES TO PHQ QUESTIONS 1-9: 12

## 2021-05-12 ASSESSMENT — PATIENT HEALTH QUESTIONNAIRE - PHQ9: SUM OF ALL RESPONSES TO PHQ QUESTIONS 1-9: 12

## 2021-05-17 ENCOUNTER — VIRTUAL VISIT (OUTPATIENT)
Dept: PEDIATRICS | Facility: CLINIC | Age: 31
End: 2021-05-17
Payer: COMMERCIAL

## 2021-05-17 DIAGNOSIS — M54.42 CHRONIC BILATERAL LOW BACK PAIN WITH LEFT-SIDED SCIATICA: Primary | ICD-10-CM

## 2021-05-17 DIAGNOSIS — G89.29 CHRONIC BILATERAL LOW BACK PAIN WITH LEFT-SIDED SCIATICA: Primary | ICD-10-CM

## 2021-05-17 PROCEDURE — 99213 OFFICE O/P EST LOW 20 MIN: CPT | Mod: 95 | Performed by: PEDIATRICS

## 2021-05-17 NOTE — Clinical Note
Alvarez Mccallum - I am having this patient schedule with you for possible medical marijuana.  I think she would be a good candidate.  Thanks!  Rozina DIAZ

## 2021-05-17 NOTE — PROGRESS NOTES
"Rama is a 30 year old who is being evaluated via a billable video visit.      How would you like to obtain your AVS? MyChart  If the video visit is dropped, the invitation should be resent by: Text to cell phone: [phone]  Will anyone else be joining your video visit? No    Video Start Time: 1:10pm    Assessment & Plan     Chronic bilateral low back pain with left-sided sciatica  Patient pain likely worsening to increase narrowing.  We discussed repeat/updated imaging, however she is weary of this because she can't afford surgery right now (See below). She has tried oral medications, cymbalta and epidurals for pain.  I will have her meet with Dr. Xena Grijalva to discuss medical marijuana.  I think she would be a good candidate.  She has been a reliable patient and if running out of options.  She does not want to be on opioids, nor do I recommend this.                BMI:   Estimated body mass index is 42.11 kg/m  as calculated from the following:    Height as of 1/29/21: 1.727 m (5' 7.99\").    Weight as of 1/29/21: 125.6 kg (276 lb 14.4 oz).           Return in about 1 week (around 5/24/2021) for appt with Dr. Grijalva for possible medical marijuana (VV or OV ok).    Rozina Ricketts MD  [facility] [place] Ontiveros is a 30 year old who presents for the following health issues     History of Present Illness       Back Pain:  She presents for follow up of back pain. Patient's back pain is a chronic problem.  Location of back pain:  Right lower back, left lower back, right buttock, left buttock, right hip and left hip  Description of back pain: dull ache, fullness, gnawing and sharp  Back pain spreads: right buttocks, left buttocks, right thigh, left thigh, right knee, left knee and left foot    Since patient first noticed back pain, pain is: gradually worsening  Does back pain interfere with her job:  Yes      She eats 0-1 servings of fruits and vegetables daily.She consumes 1 sweetened " beverage(s) daily.She exercises with enough effort to increase her heart rate 9 or less minutes per day.  She exercises with enough effort to increase her heart rate 3 or less days per week. She is missing 1 dose(s) of medications per week.  She is not taking prescribed medications regularly due to remembering to take.     Patient did have epidural steroids injection on 2/3/21 - with our pain clinic - this worked for a little over a month. Pain in both legs - kings knees and tops of thighs. Patient is very concerned about worsening pain.  Last MRI from 2018 did show severe neural foraminal narrowing at L5-S1. Patient has concerns about being able to afford surgery, but she knows this is likely in the future, but doesn't feel like she can right now financially and afford the time off needed afterwards financially as well. If she could afford it, she would prefer surgery.     Patient would like to look into medical marijuana to help her manage the pain.  Hard to ride in cars right now due to pain.    She uses ibuprofen and tylenol - takes either one every day (ibuprofen 600-800mg per day or 1500mg tylenol per day).  She does take these with food.  She has been taking flexeril as well - she has been taking flexeril 10mg as needed, but finds this is becoming less and less effective. She tries to walk daily and stretch to help with the back pain as well. She is also on cymbalta 90mg. She has tried some edible marijuana and has found this helpful.         Answers for HPI/ROS submitted by the patient on 5/11/2021   Chronic problems general questions HPI Form  If you checked off any problems, how difficult have these problems made it for you to do your work, take care of things at home, or get along with other people?: Very difficult  PHQ9 TOTAL SCORE: 12          Review of Systems   Constitutional, HEENT, cardiovascular, pulmonary, gi and gu systems are negative, except as otherwise noted.      Objective            Vitals:  No vitals were obtained today due to virtual visit.    Physical Exam   GENERAL: Healthy, alert and no distress  EYES: Eyes grossly normal to inspection.  No discharge or erythema, or obvious scleral/conjunctival abnormalities.  RESP: No audible wheeze, cough, or visible cyanosis.  No visible retractions or increased work of breathing.    SKIN: Visible skin clear. No significant rash, abnormal pigmentation or lesions.  NEURO: Cranial nerves grossly intact.  Mentation and speech appropriate for age.  PSYCH: Mentation appears normal, affect normal/bright, judgement and insight intact, normal speech and appearance well-groomed.                Video-Visit Details    Type of service:  Video Visit    Video End Time:1:26pm    Originating Location (pt. Location): Home    Distant Location (provider location):  [facility] [place]     Platform used for Video Visit: Virool

## 2021-05-17 NOTE — PATIENT INSTRUCTIONS
Alvarez Wheeler,    I will have the clinic contact you to get an appointment with Dr. Grijalva.    Rozina Ricketts MD  Internal Medicine/Pediatrics  [facility]

## 2021-05-21 ENCOUNTER — IMMUNIZATION (OUTPATIENT)
Dept: NURSING | Facility: CLINIC | Age: 31
End: 2021-05-21
Attending: INTERNAL MEDICINE
Payer: COMMERCIAL

## 2021-05-21 PROCEDURE — 0002A PR COVID VAC PFIZER DIL RECON 30 MCG/0.3 ML IM: CPT

## 2021-05-21 PROCEDURE — 91300 PR COVID VAC PFIZER DIL RECON 30 MCG/0.3 ML IM: CPT

## 2021-05-25 ENCOUNTER — VIRTUAL VISIT (OUTPATIENT)
Dept: PEDIATRICS | Facility: CLINIC | Age: 31
End: 2021-05-25
Payer: COMMERCIAL

## 2021-05-25 DIAGNOSIS — M54.42 CHRONIC BILATERAL LOW BACK PAIN WITH LEFT-SIDED SCIATICA: Primary | ICD-10-CM

## 2021-05-25 DIAGNOSIS — G89.29 CHRONIC BILATERAL LOW BACK PAIN WITH LEFT-SIDED SCIATICA: Primary | ICD-10-CM

## 2021-05-25 PROCEDURE — 99213 OFFICE O/P EST LOW 20 MIN: CPT | Mod: 95 | Performed by: INTERNAL MEDICINE

## 2021-05-25 NOTE — PROGRESS NOTES
"Rama is a 30 year old who is being evaluated via a billable video visit.      How would you like to obtain your AVS? MyChart  If the video visit is dropped, the invitation should be resent by: Text to cell phone: [phone]  Will anyone else be joining your video visit? No      Video Start Time: 10:40    Assessment & Plan     Chronic bilateral low back pain with left-sided sciatica  Discussed medical marijuana registration process, cost and lack of data about effectiveness.  Discussed not driving while impaired.  Discussed not stopping other medications without medical advice and tapering off.  Discussed pharmacist at dispensary will work on products and dosages.  Follow-up in one year for receritfication if helpful.                BMI:   Estimated body mass index is 42.11 kg/m  as calculated from the following:    Height as of 1/29/21: 1.727 m (5' 7.99\").    Weight as of 1/29/21: 125.6 kg (276 lb 14.4 oz).           Return in about 1 year (around 5/25/2022) for Recheck.    Xena Grijalva MD  [facility] [place]    Casimiro Ontiveros is a 30 year old who presents for the following health issues     Consult for medical marijuana.     History of Present Illness       Back Pain:  She presents for follow up of back pain. Patient's back pain is a chronic problem.  Location of back pain:  Right lower back, left lower back, right buttock, left buttock, right hip, left hip, right side of waist and left side of waist  Description of back pain: cramping, fullness, sharp, shooting and stabbing  Back pain spreads: right buttocks, left buttocks, right thigh, left thigh, right knee, left knee, right foot and left foot    Since patient first noticed back pain, pain is: rapidly worsening  Does back pain interfere with her job:  Yes      She eats 2-3 servings of fruits and vegetables daily.She consumes 1 sweetened beverage(s) daily.She exercises with enough effort to increase her heart rate 9 or less minutes per day.  " "She exercises with enough effort to increase her heart rate 3 or less days per week. She is missing 1 dose(s) of medications per week.  She is not taking prescribed medications regularly due to remembering to take.     Back pain is the most bothersome and shooting down legs and causing them to hurt.  It is every day, no matter what she does and getting worse.  Very frustrating to deal with.  Is \"at my wits end\".    PEG=8      Review of Systems         Objective           Vitals:  No vitals were obtained today due to virtual visit.    Physical Exam   GENERAL: Healthy, alert and no distress  EYES: Eyes grossly normal to inspection.  No discharge or erythema, or obvious scleral/conjunctival abnormalities.  RESP: No audible wheeze, cough, or visible cyanosis.  No visible retractions or increased work of breathing.    SKIN: Visible skin clear. No significant rash, abnormal pigmentation or lesions.  NEURO: Cranial nerves grossly intact.  Mentation and speech appropriate for age.  PSYCH: Mentation appears normal, affect normal/bright, judgement and insight intact, normal speech and appearance well-groomed.              Video-Visit Details    Type of service:  Video Visit    Video End Time:10:54 AM    Originating Location (pt. Location): Home    Distant Location (provider location):  [facility] [place]     Platform used for Video Visit: Lo  "

## 2021-05-25 NOTE — PATIENT INSTRUCTIONS
Patient Enrollment Process Medical Marijuana  Before you may register for the Program, a [place] licensed doctor, physician assistant or advanced practice registered nurse must first certify you as suffering from the following conditions:     Cancer*     Glaucoma     HIV/AIDS     Tourette syndrome     Amyotrophic Lateral Sclerosis (ALS)     Seizures, including those characteristic of epilepsy     Severe and persistent muscle spasms, including those characteristic of Multiple Sclerosis (MS)     Inflammatory Bowel Disease, including Crohn s Disease     Terminal illness with a probable life expectancy of less than one year*    Intractable pain    Post-Traumatic Stress Disorder  *To qualify for the Program, you must suffer from cancer or a terminal illness and have a probable life expectancy of under one year, if your illness or its treatment produces one or more of the following: severe or chronic pain; nausea or severe vomiting; or Cachexia or severe wasting.    Once I have certified you, you can proceed to register for the program.  I only provide certification for established patients under my care.  Please do not refer other people to me expecting that I will certify them, as I am not accepting new patients.    After you meet with your health care practitioner, you ll receive an e-mail from the [place] Office of Medical Cannabis that will contain:    Confirmation of your condition by your health care practitioner     Your unique registration link for the medical cannabis patient registry  Once you receive your certification e-mail, you will be ready to register. Before you click on the registration link, gather your:Government-issued ID: state ID,  s license or passport     If applicable, your government medical assistance plan ID: , Medicaid or [place]Care, Social Security Disability or Supplemental Security Income     Camera or a camera-equipped mobile phone to capture/scan images of your IDs on the  registry website     Credit/debit card or check - the Program s payment processer [employer], accepts all of the above methods of payment for the annual registration fee. The annual fee to enroll in the Medical cannabis registry is $200, or $50 for patients with a government assistance plan ID.  The submitted form will be reviewed by the Office of Medical Cannabis and is subject to approval.  When your account has been approved, you ll be notified by e-mail.    If you have questions or have not heard about your certification from the state, please contact them:  [facility] Medical Marijuana Program: Questions? Call us M-F 8 a.m.-4:30 p.m. at [phone] (metro), toll-free at [phone] (non-metro) or email [email or web address] beds at the [email or web address]..

## 2021-06-11 NOTE — PROGRESS NOTES
S: 29 y/o female presented to the [facility] ED with SI.    B: Hx ADHD, depression, anxiety.  SI w/a plan to take all of her medications and go into the woods to die.  Pt's roommate called EMS after she verbalized SI.  Denied taking any medications and denied hx of suicide attempt or SIB.  Pt reported she feels as though she is declining.  Depression and anxiety managed with medications through her PCP and therapist.  Denied HI, psychosis or substance abuse.  Pt reported she uses CBD gummies to help with focus.    A: Voluntary    No acute medical concerns.  Pt has a hx of chronic pain due to a spinal fracture as a toddler.  CSW reported pt's pain is managed with ibuprofen and tylenol.  COVID has been ordered.  Pt denied sx.  Raymond urine in process.    R: 0013 Dr. Madden accepts for 5500/Garbo.  Placed in queue at 0016.  Unit notified at 0018 and will call they are available for report.  ED notified at 0020.

## 2021-06-18 ENCOUNTER — TELEPHONE (OUTPATIENT)
Dept: PEDIATRICS | Facility: CLINIC | Age: 31
End: 2021-06-18

## 2021-06-18 NOTE — TELEPHONE ENCOUNTER
Forms/Letter Request    Name of form/letter: medical opinion    Have you been seen for this request: Yes    Do we have the form/letter: Yes    When is form/letter needed by: soon    How would you like the form/letter returned: Fax   [phone]    Patient Notified form requests are processed in 3-5 business days:Yes    Okay to leave a detailed message? Yes Home number on file [phone] (home)    Please call pt with questions and/or when the form has been completed and faxed.    Thank you.  volodymyr

## 2021-06-21 NOTE — TELEPHONE ENCOUNTER
Dr. Ricketts has previously completed same form (in September 2020). Last visit with Dr. Ricketts was on 05/17/21.    Form placed in Dr. Ricketts's inbasket on her desk.    JENELLE HOSKINS MA on 6/21/2021 at 11:49 AM

## 2021-06-23 NOTE — TELEPHONE ENCOUNTER
Completed, in my outbox.    Rozina Ricketts MD  Internal Medicine/Pediatrics  [facility]

## 2021-08-18 DIAGNOSIS — M62.830 BACK MUSCLE SPASM: ICD-10-CM

## 2021-08-18 RX ORDER — CYCLOBENZAPRINE HCL 10 MG
5-10 TABLET ORAL 3 TIMES DAILY PRN
Qty: 30 TABLET | Refills: 3 | Status: SHIPPED | OUTPATIENT
Start: 2021-08-18 | End: 2022-01-05

## 2021-08-18 NOTE — TELEPHONE ENCOUNTER
Routing refill request to provider for review/approval because:  Drug not on the [facility] refill protocol     Dara Deleon RN   [facility] -- Triage Nurse

## 2021-10-09 ENCOUNTER — HEALTH MAINTENANCE LETTER (OUTPATIENT)
Age: 31
End: 2021-10-09

## 2021-10-29 NOTE — TELEPHONE ENCOUNTER
New forms from law firm placed in Dr. Ricketts in basket.    Larissa Gil, EMT at 8:10 AM on October 29, 2021  [facility]  [phone]

## 2021-11-08 ENCOUNTER — VIRTUAL VISIT (OUTPATIENT)
Dept: PEDIATRICS | Facility: CLINIC | Age: 31
End: 2021-11-08
Payer: COMMERCIAL

## 2021-11-08 DIAGNOSIS — G89.29 CHRONIC BILATERAL LOW BACK PAIN WITH LEFT-SIDED SCIATICA: ICD-10-CM

## 2021-11-08 DIAGNOSIS — M54.42 CHRONIC BILATERAL LOW BACK PAIN WITH LEFT-SIDED SCIATICA: ICD-10-CM

## 2021-11-08 DIAGNOSIS — F40.243 FEAR OF FLYING: Primary | ICD-10-CM

## 2021-11-08 PROCEDURE — 99213 OFFICE O/P EST LOW 20 MIN: CPT | Mod: 95 | Performed by: PEDIATRICS

## 2021-11-08 RX ORDER — ALPRAZOLAM 1 MG
1 TABLET ORAL 3 TIMES DAILY PRN
Qty: 6 TABLET | Refills: 0 | Status: SHIPPED | OUTPATIENT
Start: 2021-11-08

## 2021-11-08 NOTE — PATIENT INSTRUCTIONS
Dear Rama,    It was nice to talk with you.  I will get you paperwork completed.    The xanax for the flights

## 2021-11-08 NOTE — PROGRESS NOTES
"Rama is a 31 year old who is being evaluated via a billable video visit.      How would you like to obtain your AVS? MyChart  If the video visit is dropped, the invitation should be resent by: Text to cell phone: [phone]  Will anyone else be joining your video visit? No    Video Start Time: 9:27am    Assessment & Plan     Fear of flying  - ALPRAZolam (XANAX) 1 MG tablet; Take 1 tablet (1 mg) by mouth 3 times daily as needed for anxiety    Chronic bilateral low back pain with left-sided sciatica  Paperwork completed - patient to consider surgical options in the future.               BMI:   Estimated body mass index is 42.11 kg/m  as calculated from the following:    Height as of 1/29/21: 1.727 m (5' 7.99\").    Weight as of 1/29/21: 125.6 kg (276 lb 14.4 oz).       Patient Instructions   Dear Rama,    It was nice to talk with you.  I will get you paperwork completed.    The xanax for the flights      Return for Appt already scheduled.    Rozina Ricketts MD  [facility] [place] Ontiveros is a 31 year old who presents for the following health issues     HPI     Chronic/Recurring Back Pain Follow Up      Where is your back pain located? (Select all that apply) low back bilateral    How would you describe your back pain?  burning, cramping, dull ache, fullness, gnawing, sharp, shooting and stabbing    Where does your back pain spread? the right and left buttock, the right and left  thigh, the right and left  knee and the right and left foot    Since your last clinic visit for back pain, how has your pain changed? gradually worsening    Does your back pain interfere with your job? No    Since your last visit, have you tried any new treatment? No    Patient getting lumbar epidurals through pain clinic.  She has paperwork to fill out today for social security. Has also been certified for medical marijuana given intracable pain.     Currently things are \"bad\" with the pain.  Last epidural in " Feb 2021 - don't last very long.  She is trying to manage the pain with Flexeril, ibuprofen daily.  Worst in hips and knees (which is new) and tops of her thighs - with sitting or laying down. If she is able to get disability she would like to explore surgery. Working with [employer] - hearing is on 12/9/21.     She very limited walking, sitting, standing abilities - has to frequently change positions.  Cannot carry more than 10 pounds.     Patient will have to fly for [employer]. Would like xanax.  She has previously taken 1mg for this.          Review of Systems         Objective           Vitals:  No vitals were obtained today due to virtual visit.    Physical Exam                   Video-Visit Details    Type of service:  Video Visit    Video End Time:9:39am    Originating Location (pt. Location): Home    Distant Location (provider location):  [facility] [place]     Platform used for Video Visit: [employer]

## 2022-01-04 SDOH — ECONOMIC STABILITY: INCOME INSECURITY: IN THE LAST 12 MONTHS, WAS THERE A TIME WHEN YOU WERE NOT ABLE TO PAY THE MORTGAGE OR RENT ON TIME?: NO

## 2022-01-04 SDOH — HEALTH STABILITY: PHYSICAL HEALTH: ON AVERAGE, HOW MANY MINUTES DO YOU ENGAGE IN EXERCISE AT THIS LEVEL?: 0 MIN

## 2022-01-04 SDOH — ECONOMIC STABILITY: TRANSPORTATION INSECURITY
IN THE PAST 12 MONTHS, HAS LACK OF TRANSPORTATION KEPT YOU FROM MEETINGS, WORK, OR FROM GETTING THINGS NEEDED FOR DAILY LIVING?: YES

## 2022-01-04 SDOH — ECONOMIC STABILITY: TRANSPORTATION INSECURITY
IN THE PAST 12 MONTHS, HAS THE LACK OF TRANSPORTATION KEPT YOU FROM MEDICAL APPOINTMENTS OR FROM GETTING MEDICATIONS?: YES

## 2022-01-04 SDOH — ECONOMIC STABILITY: FOOD INSECURITY: WITHIN THE PAST 12 MONTHS, THE FOOD YOU BOUGHT JUST DIDN'T LAST AND YOU DIDN'T HAVE MONEY TO GET MORE.: SOMETIMES TRUE

## 2022-01-04 SDOH — ECONOMIC STABILITY: FOOD INSECURITY: WITHIN THE PAST 12 MONTHS, YOU WORRIED THAT YOUR FOOD WOULD RUN OUT BEFORE YOU GOT MONEY TO BUY MORE.: OFTEN TRUE

## 2022-01-04 SDOH — HEALTH STABILITY: PHYSICAL HEALTH: ON AVERAGE, HOW MANY DAYS PER WEEK DO YOU ENGAGE IN MODERATE TO STRENUOUS EXERCISE (LIKE A BRISK WALK)?: 0 DAYS

## 2022-01-04 SDOH — ECONOMIC STABILITY: INCOME INSECURITY: HOW HARD IS IT FOR YOU TO PAY FOR THE VERY BASICS LIKE FOOD, HOUSING, MEDICAL CARE, AND HEATING?: VERY HARD

## 2022-01-04 ASSESSMENT — ANXIETY QUESTIONNAIRES
2. NOT BEING ABLE TO STOP OR CONTROL WORRYING: SEVERAL DAYS
7. FEELING AFRAID AS IF SOMETHING AWFUL MIGHT HAPPEN: MORE THAN HALF THE DAYS
1. FEELING NERVOUS, ANXIOUS, OR ON EDGE: SEVERAL DAYS
3. WORRYING TOO MUCH ABOUT DIFFERENT THINGS: SEVERAL DAYS
GAD7 TOTAL SCORE: 9
GAD7 TOTAL SCORE: 9
4. TROUBLE RELAXING: SEVERAL DAYS
6. BECOMING EASILY ANNOYED OR IRRITABLE: MORE THAN HALF THE DAYS
7. FEELING AFRAID AS IF SOMETHING AWFUL MIGHT HAPPEN: MORE THAN HALF THE DAYS
5. BEING SO RESTLESS THAT IT IS HARD TO SIT STILL: SEVERAL DAYS
GAD7 TOTAL SCORE: 9

## 2022-01-04 ASSESSMENT — SOCIAL DETERMINANTS OF HEALTH (SDOH)
ARE YOU MARRIED, WIDOWED, DIVORCED, SEPARATED, NEVER MARRIED, OR LIVING WITH A PARTNER?: NEVER MARRIED
HOW OFTEN DO YOU GET TOGETHER WITH FRIENDS OR RELATIVES?: NEVER
HOW OFTEN DO YOU ATTEND CHURCH OR RELIGIOUS SERVICES?: NEVER
DO YOU BELONG TO ANY CLUBS OR ORGANIZATIONS SUCH AS CHURCH GROUPS UNIONS, FRATERNAL OR ATHLETIC GROUPS, OR SCHOOL GROUPS?: YES
IN A TYPICAL WEEK, HOW MANY TIMES DO YOU TALK ON THE PHONE WITH FAMILY, FRIENDS, OR NEIGHBORS?: PATIENT DECLINED

## 2022-01-04 ASSESSMENT — LIFESTYLE VARIABLES
HOW MANY STANDARD DRINKS CONTAINING ALCOHOL DO YOU HAVE ON A TYPICAL DAY: 1 OR 2
HOW OFTEN DO YOU HAVE A DRINK CONTAINING ALCOHOL: MONTHLY OR LESS
HOW OFTEN DO YOU HAVE SIX OR MORE DRINKS ON ONE OCCASION: NEVER

## 2022-01-04 ASSESSMENT — PATIENT HEALTH QUESTIONNAIRE - PHQ9
SUM OF ALL RESPONSES TO PHQ QUESTIONS 1-9: 15
SUM OF ALL RESPONSES TO PHQ QUESTIONS 1-9: 15
10. IF YOU CHECKED OFF ANY PROBLEMS, HOW DIFFICULT HAVE THESE PROBLEMS MADE IT FOR YOU TO DO YOUR WORK, TAKE CARE OF THINGS AT HOME, OR GET ALONG WITH OTHER PEOPLE: EXTREMELY DIFFICULT

## 2022-01-04 ASSESSMENT — ENCOUNTER SYMPTOMS
MYALGIAS: 1
CHILLS: 0
HEARTBURN: 0
NAUSEA: 0
SHORTNESS OF BREATH: 0
NERVOUS/ANXIOUS: 0
HEADACHES: 1
PALPITATIONS: 0
COUGH: 1
DYSURIA: 0
FREQUENCY: 0
FEVER: 0
ABDOMINAL PAIN: 1
DIZZINESS: 0
CONSTIPATION: 0
HEMATURIA: 0
WEAKNESS: 0
HEMATOCHEZIA: 0
JOINT SWELLING: 0
DIARRHEA: 1
BREAST MASS: 0
PARESTHESIAS: 0
SORE THROAT: 0
EYE PAIN: 0
ARTHRALGIAS: 1

## 2022-01-05 ENCOUNTER — OFFICE VISIT (OUTPATIENT)
Dept: PEDIATRICS | Facility: CLINIC | Age: 32
End: 2022-01-05
Payer: COMMERCIAL

## 2022-01-05 VITALS
DIASTOLIC BLOOD PRESSURE: 78 MMHG | SYSTOLIC BLOOD PRESSURE: 128 MMHG | RESPIRATION RATE: 18 BRPM | TEMPERATURE: 97.2 F | HEART RATE: 114 BPM | OXYGEN SATURATION: 97 %

## 2022-01-05 DIAGNOSIS — G89.29 CHRONIC BILATERAL LOW BACK PAIN WITH LEFT-SIDED SCIATICA: ICD-10-CM

## 2022-01-05 DIAGNOSIS — Z00.01 ENCOUNTER FOR GENERAL ADULT MEDICAL EXAMINATION WITH ABNORMAL FINDINGS: Primary | ICD-10-CM

## 2022-01-05 DIAGNOSIS — Z13.1 SCREENING FOR DIABETES MELLITUS: ICD-10-CM

## 2022-01-05 DIAGNOSIS — M62.830 BACK MUSCLE SPASM: ICD-10-CM

## 2022-01-05 DIAGNOSIS — R05.9 COUGH: ICD-10-CM

## 2022-01-05 DIAGNOSIS — Z13.6 SCREENING FOR CARDIOVASCULAR CONDITION: ICD-10-CM

## 2022-01-05 DIAGNOSIS — F39 EPISODIC MOOD DISORDER (H): ICD-10-CM

## 2022-01-05 DIAGNOSIS — M54.42 CHRONIC BILATERAL LOW BACK PAIN WITH LEFT-SIDED SCIATICA: ICD-10-CM

## 2022-01-05 PROBLEM — R45.851 SUICIDAL IDEATION: Status: ACTIVE | Noted: 2020-09-02

## 2022-01-05 PROBLEM — M54.50 CHRONIC MIDLINE LOW BACK PAIN WITHOUT SCIATICA: Status: RESOLVED | Noted: 2018-08-30 | Resolved: 2022-01-05

## 2022-01-05 LAB — HBA1C MFR BLD: 5.8 % (ref 0–5.6)

## 2022-01-05 PROCEDURE — U0003 INFECTIOUS AGENT DETECTION BY NUCLEIC ACID (DNA OR RNA); SEVERE ACUTE RESPIRATORY SYNDROME CORONAVIRUS 2 (SARS-COV-2) (CORONAVIRUS DISEASE [COVID-19]), AMPLIFIED PROBE TECHNIQUE, MAKING USE OF HIGH THROUGHPUT TECHNOLOGIES AS DESCRIBED BY CMS-2020-01-R: HCPCS | Performed by: PEDIATRICS

## 2022-01-05 PROCEDURE — 83036 HEMOGLOBIN GLYCOSYLATED A1C: CPT | Performed by: PEDIATRICS

## 2022-01-05 PROCEDURE — 99214 OFFICE O/P EST MOD 30 MIN: CPT | Mod: 25 | Performed by: PEDIATRICS

## 2022-01-05 PROCEDURE — 99395 PREV VISIT EST AGE 18-39: CPT | Performed by: PEDIATRICS

## 2022-01-05 PROCEDURE — 36415 COLL VENOUS BLD VENIPUNCTURE: CPT | Performed by: PEDIATRICS

## 2022-01-05 PROCEDURE — U0005 INFEC AGEN DETEC AMPLI PROBE: HCPCS | Performed by: PEDIATRICS

## 2022-01-05 PROCEDURE — 80061 LIPID PANEL: CPT | Performed by: PEDIATRICS

## 2022-01-05 RX ORDER — CYCLOBENZAPRINE HCL 10 MG
5-10 TABLET ORAL 3 TIMES DAILY PRN
Qty: 90 TABLET | Refills: 3 | Status: SHIPPED | OUTPATIENT
Start: 2022-01-05 | End: 2023-08-11

## 2022-01-05 ASSESSMENT — ENCOUNTER SYMPTOMS
DIARRHEA: 1
ARTHRALGIAS: 1
EYE PAIN: 0
HEMATOCHEZIA: 0
DIZZINESS: 0
FEVER: 0
NERVOUS/ANXIOUS: 0
DYSURIA: 0
BREAST MASS: 0
CONSTIPATION: 0
FREQUENCY: 0
COUGH: 1
PALPITATIONS: 0
HEMATURIA: 0
HEADACHES: 1
CHILLS: 0
ABDOMINAL PAIN: 1
MYALGIAS: 1
HEARTBURN: 0
JOINT SWELLING: 0
WEAKNESS: 0
PARESTHESIAS: 0
NAUSEA: 0
SHORTNESS OF BREATH: 0
SORE THROAT: 0

## 2022-01-05 ASSESSMENT — ANXIETY QUESTIONNAIRES: GAD7 TOTAL SCORE: 9

## 2022-01-05 ASSESSMENT — PATIENT HEALTH QUESTIONNAIRE - PHQ9: SUM OF ALL RESPONSES TO PHQ QUESTIONS 1-9: 15

## 2022-01-05 NOTE — PROGRESS NOTES
SUBJECTIVE:   CC: Rama Gutierrez is an 31 year old woman who presents for preventive health visit.       Patient has been advised of split billing requirements and indicates understanding: Yes  Healthy Habits:     Getting at least 3 servings of Calcium per day:  NO    Bi-annual eye exam:  Yes    Dental care twice a year:  NO    Sleep apnea or symptoms of sleep apnea:  Daytime drowsiness and Excessive snoring    Diet:  Regular (no restrictions)    Frequency of exercise:  None    Taking medications regularly:  No    Barriers to taking medications:  Problems remembering to take them and Side effects    Medication side effects:  Significant flushing    PHQ-2 Total Score: 4    Additional concerns today:  Yes    Chronic cough since beginning of December, but worsened x 1 week after coming back from visiting parents.  No SORE THROAT, malaise or fevers. Uses humidifier, using dayquil.    MMH changes - only on cymbalta 60mg now.  Is on Vyvanse, but hoping to decrease this.    Xanax for flights    BILATERAL buttocks with radiation pain down both legs to knees, knees in constant pain, tops of thighs hurt, hobbles when she walks, knees down to feet cramping - worse at night.  Numbness on thighs and bilateral feet.  Regardless of activity patient has the above symptoms.  She cannot sit for prolonged periods of time.  Cannot sit for longer than one hour.         Chronic/Recurring Back Pain Follow Up      Where is your back pain located? (Select all that apply) low back bilateral, hip bilateral and waist bilateral    How would you describe your back pain?  cramping, fullness, shooting and tight muscles     Where does your back pain spread? the right and left buttock, the right and left  thigh, the right and left  knee and the right and left foot, some times bottom of feet go numb, tops of thighs go numb frequently    Since your last clinic visit for back pain, how has your pain changed? gradually worsening    Does your back pain  interfere with your job? 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 No    Using medicdal marijuana with Dr. Vargas and using flexeril every other day.  Very hard to sleep with pain in legs. Sometimes melatonin. Uses ibuprofen 800mg 1-2 times per week.    Today's PHQ-2 Score:   PHQ-2 ( 1999 Pfizer) 1/4/2022   Q1: Little interest or pleasure in doing things 2   Q2: Feeling down, depressed or hopeless 2   PHQ-2 Score 4   PHQ-2 Total Score (12-17 Years)- Positive if 3 or more points; Administer PHQ-A if positive -   Q1: Little interest or pleasure in doing things More than half the days   Q2: Feeling down, depressed or hopeless More than half the days   PHQ-2 Score 4       Abuse: Current or Past (Physical, Sexual or Emotional) - No  Do you feel safe in your environment? Yes    Have you ever done Advance Care Planning? (For example, a Health Directive, POLST, or a discussion with a medical provider or your loved ones about your wishes): No, advance care planning information given to patient to review.  Patient plans to discuss their wishes with loved ones or provider.   Answers for HPI/ROS submitted by the patient on 1/4/2022  If you checked off any problems, how difficult have these problems made it for you to do your work, take care of things at home, or get along with other people?: Extremely difficult  PHQ9 TOTAL SCORE: 15  MARÍA 7 TOTAL SCORE: 9        Social History     Tobacco Use     Smoking status: Never Smoker     Smokeless tobacco: Never Used   Substance Use Topics     Alcohol use: No     Alcohol/week: 0.0 standard drinks     Comment: on a rare occ      If you drink alcohol do you typically have >3 drinks per day or >7 drinks per week? No    No flowsheet data found.    Reviewed orders with patient.  Reviewed health maintenance and updated orders accordingly - Yes  BP Readings from Last 3 Encounters:   01/05/22 128/78   02/03/21 (!) 126/98   01/29/21  126/72    Wt Readings from Last 3 Encounters:   01/29/21 125.6 kg (276 lb 14.4 oz)   05/28/20 121.6 kg (268 lb)   03/09/20 122.5 kg (270 lb)                    Breast Cancer Screening:    FHS-7:   Breast CA Risk Assessment (FHS-7) 1/4/2022   Did any of your relatives have bilateral breast cancer? Unknown   Did any man in your family have breast cancer? No   Did any woman in your family have breast and ovarian cancer? Unknown   Did any woman in your family have breast cancer before age 50 y? No   Do you have 2 or more relatives with breast and/or ovarian cancer? No   Do you have 2 or more relatives with breast and/or bowel cancer? No       Patient under 40 years of age: Routine Mammogram Screening not recommended.   Pertinent mammograms are reviewed under the imaging tab.    History of abnormal Pap smear: NO - age 30-65 PAP every 5 years with negative HPV co-testing recommended  PAP / HPV Latest Ref Rng & Units 5/10/2019   PAP (Historical) - NIL   HPV16 NEG:Negative Negative   HPV18 NEG:Negative Negative   HRHPV NEG:Negative Negative     Reviewed and updated as needed this visit by clinical staff  Tobacco   Meds   Med Hx  Surg Hx  Fam Hx  Soc Hx       Reviewed and updated as needed this visit by Provider    Meds                Review of Systems   Constitutional: Negative for chills and fever.   HENT: Negative for congestion, ear pain, hearing loss and sore throat.    Eyes: Negative for pain and visual disturbance.   Respiratory: Positive for cough. Negative for shortness of breath.    Cardiovascular: Negative for chest pain, palpitations and peripheral edema.   Gastrointestinal: Positive for abdominal pain and diarrhea. Negative for constipation, heartburn, hematochezia and nausea.   Breasts:  Negative for tenderness, breast mass and discharge.   Genitourinary: Positive for pelvic pain. Negative for dysuria, frequency, genital sores, hematuria, urgency, vaginal bleeding and vaginal discharge.   Musculoskeletal:  Positive for arthralgias and myalgias. Negative for joint swelling.   Skin: Negative for rash.   Neurological: Positive for headaches. Negative for dizziness, weakness and paresthesias.   Psychiatric/Behavioral: Negative for mood changes. The patient is not nervous/anxious.           OBJECTIVE:   /78 (BP Location: Right arm, Patient Position: Sitting, Cuff Size: Adult Regular)   Pulse 114   Temp 97.2  F (36.2  C) (Temporal)   Resp 18   SpO2 97%   Physical Exam  GENERAL: healthy, alert and no distress  EYES: Eyes grossly normal to inspection, PERRL and conjunctivae and sclerae normal  HENT: ear canals and TM's normal, nose and mouth without ulcers or lesions  NECK: no adenopathy, no asymmetry, masses, or scars and thyroid normal to palpation  RESP: lungs clear to auscultation - no rales, rhonchi or wheezes  CV: regular rate and rhythm, normal S1 S2, no S3 or S4, no murmur, click or rub, no peripheral edema and peripheral pulses strong  ABDOMEN: soft, nontender, no hepatosplenomegaly, no masses and bowel sounds normal  MS: +straight leg raise bilateral   SKIN: no suspicious lesions or rashes  NEURO: Normal strength and tone, mentation intact and speech normal  PSYCH: mentation appears normal, affect normal/bright    Diagnostic Test Results:  Labs reviewed in Epic    ASSESSMENT/PLAN:   (Z00.01) Encounter for general adult medical examination with abnormal findings  (primary encounter diagnosis)  Comment:   Plan:     (R05.9) Cough  Comment: concern for COVID - test today  Plan: Symptomatic; Yes; 12/29/2021 COVID-19 Virus         (Coronavirus) by PCR Nasopharyngeal            (M62.830) Back muscle spasm  Comment: using more often - see below - will likely move forward with further back evaluation  Plan: cyclobenzaprine (FLEXERIL) 10 MG tablet            (M54.42,  G89.29) Chronic bilateral low back pain with left-sided sciatica  Comment: patient requiring flexeril daily, ibuprofen 800mg several times per week  "and medical marijuana for pain.  She has been concerned about costs for MRI/surgery, but think it's time to do something.  Will start with MRI and then likely ortho surgery consult.  Plan: MR Lumbar Spine w/o Contrast            (Z13.1) Screening for diabetes mellitus  Comment:   Plan: Hemoglobin A1c            (Z13.6) Screening for cardiovascular condition  Comment:   Plan: Lipid Profile (Chol, Trig, HDL, LDL calc)            (F39) Episodic mood disorder (H)  Comment: patient now following with [facility].  Patient does have frequent SI, but no intent to harm self or other.   Plan: per [facility]    Patient has been advised of split billing requirements and indicates understanding: Yes  COUNSELING:  Reviewed preventive health counseling, as reflected in patient instructions    Estimated body mass index is 42.11 kg/m  as calculated from the following:    Height as of 1/29/21: 1.727 m (5' 7.99\").    Weight as of 1/29/21: 125.6 kg (276 lb 14.4 oz).        She reports that she has never smoked. She has never used smokeless tobacco.      Counseling Resources:  ATP IV Guidelines  Pooled Cohorts Equation Calculator  Breast Cancer Risk Calculator  BRCA-Related Cancer Risk Assessment: FHS-7 Tool  FRAX Risk Assessment  ICSI Preventive Guidelines  Dietary Guidelines for Americans, 2010  USDA's MyPlate  ASA Prophylaxis  Lung CA Screening    Rozina Ricketts MD  [facility]    "

## 2022-01-05 NOTE — PATIENT INSTRUCTIONS
1.  Get your COVID booster if your COVID test is negative.    2. You will be called to schedule your MRI.  If you don't hear from someone in 1-2 days, please call radiology department at [phone] to schedule your test at [facility].      Preventive Health Recommendations  Female Ages 26 - 39  Yearly exam:   See your health care provider every year in order to    Review health changes.     Discuss preventive care.      Review your medicines if you your doctor has prescribed any.    Until age 30: Get a Pap test every three years (more often if you have had an abnormal result).    After age 30: Talk to your doctor about whether you should have a Pap test every 3 years or have a Pap test with HPV screening every 5 years.   You do not need a Pap test if your uterus was removed (hysterectomy) and you have not had cancer.  You should be tested each year for STDs (sexually transmitted diseases), if you're at risk.   Talk to your provider about how often to have your cholesterol checked.  If you are at risk for diabetes, you should have a diabetes test (fasting glucose).  Shots: Get a flu shot each year. Get a tetanus shot every 10 years.   Nutrition:     Eat at least 5 servings of fruits and vegetables each day.    Eat whole-grain bread, whole-wheat pasta and brown rice instead of white grains and rice.    Get adequate Calcium and Vitamin D.     Lifestyle    Exercise at least 150 minutes a week (30 minutes a day, 5 days of the week). This will help you control your weight and prevent disease.    Limit alcohol to one drink per day.    No smoking.     Wear sunscreen to prevent skin cancer.    See your dentist every six months for an exam and cleaning.

## 2022-01-06 LAB
CHOLEST SERPL-MCNC: 163 MG/DL
FASTING STATUS PATIENT QL REPORTED: ABNORMAL
HDLC SERPL-MCNC: 29 MG/DL
LDLC SERPL CALC-MCNC: 91 MG/DL
NONHDLC SERPL-MCNC: 134 MG/DL
SARS-COV-2 RNA RESP QL NAA+PROBE: NEGATIVE
TRIGL SERPL-MCNC: 216 MG/DL

## 2022-01-07 PROBLEM — R73.03 PREDIABETES: Status: ACTIVE | Noted: 2022-01-07

## 2022-01-17 ENCOUNTER — HOSPITAL ENCOUNTER (OUTPATIENT)
Dept: MRI IMAGING | Facility: CLINIC | Age: 32
Discharge: HOME | End: 2022-01-17
Attending: PEDIATRICS | Admitting: PEDIATRICS
Payer: COMMERCIAL

## 2022-01-17 DIAGNOSIS — G89.29 CHRONIC BILATERAL LOW BACK PAIN WITH LEFT-SIDED SCIATICA: ICD-10-CM

## 2022-01-17 DIAGNOSIS — M54.42 CHRONIC BILATERAL LOW BACK PAIN WITH LEFT-SIDED SCIATICA: ICD-10-CM

## 2022-01-17 PROCEDURE — 72148 MRI LUMBAR SPINE W/O DYE: CPT

## 2022-01-19 DIAGNOSIS — M54.42 CHRONIC BILATERAL LOW BACK PAIN WITH LEFT-SIDED SCIATICA: Primary | ICD-10-CM

## 2022-01-19 DIAGNOSIS — G89.29 CHRONIC BILATERAL LOW BACK PAIN WITH LEFT-SIDED SCIATICA: Primary | ICD-10-CM

## 2022-01-24 ENCOUNTER — ANCILLARY PROCEDURE (OUTPATIENT)
Dept: GENERAL RADIOLOGY | Facility: CLINIC | Age: 32
End: 2022-01-24
Attending: NEUROLOGICAL SURGERY
Payer: COMMERCIAL

## 2022-01-24 ENCOUNTER — OFFICE VISIT (OUTPATIENT)
Dept: NEUROSURGERY | Facility: CLINIC | Age: 32
End: 2022-01-24
Attending: PEDIATRICS
Payer: COMMERCIAL

## 2022-01-24 VITALS
SYSTOLIC BLOOD PRESSURE: 117 MMHG | DIASTOLIC BLOOD PRESSURE: 86 MMHG | HEIGHT: 68 IN | BODY MASS INDEX: 42.1 KG/M2 | HEART RATE: 96 BPM | OXYGEN SATURATION: 97 %

## 2022-01-24 DIAGNOSIS — M54.42 CHRONIC BILATERAL LOW BACK PAIN WITH BILATERAL SCIATICA: ICD-10-CM

## 2022-01-24 DIAGNOSIS — M54.41 CHRONIC BILATERAL LOW BACK PAIN WITH BILATERAL SCIATICA: ICD-10-CM

## 2022-01-24 DIAGNOSIS — M43.17 SPONDYLOLISTHESIS AT L5-S1 LEVEL: Primary | ICD-10-CM

## 2022-01-24 DIAGNOSIS — G89.29 CHRONIC BILATERAL LOW BACK PAIN WITH LEFT-SIDED SCIATICA: ICD-10-CM

## 2022-01-24 DIAGNOSIS — G89.29 CHRONIC BILATERAL LOW BACK PAIN WITH BILATERAL SCIATICA: ICD-10-CM

## 2022-01-24 DIAGNOSIS — M54.42 CHRONIC BILATERAL LOW BACK PAIN WITH LEFT-SIDED SCIATICA: ICD-10-CM

## 2022-01-24 DIAGNOSIS — M43.17 SPONDYLOLISTHESIS AT L5-S1 LEVEL: ICD-10-CM

## 2022-01-24 DIAGNOSIS — M43.00 PARS DEFECT: ICD-10-CM

## 2022-01-24 PROCEDURE — 72110 X-RAY EXAM L-2 SPINE 4/>VWS: CPT | Performed by: RADIOLOGY

## 2022-01-24 PROCEDURE — 99203 OFFICE O/P NEW LOW 30 MIN: CPT | Performed by: NEUROLOGICAL SURGERY

## 2022-01-24 PROCEDURE — G0463 HOSPITAL OUTPT CLINIC VISIT: HCPCS

## 2022-01-24 ASSESSMENT — PAIN SCALES - GENERAL: PAINLEVEL: MODERATE PAIN (5)

## 2022-01-24 NOTE — LETTER
1/24/2022         RE: Rama Gutierrez  [address]  [place] [id number]        Dear Colleague,    Thank you for referring your patient, Rama Gutierrez, to the [facility] [place]. Please see a copy of my visit note below.    It was a pleasure to see Rama Gutierrez today in Neurosurgery Clinic. She is a 31 year old female who has had many years of low back issues.  She states that these have been worsening over the past 5 to 6 years.    She describes pain in the low back at the lumbosacral junction that radiates out to both sides into the hips and gluteal region as well as down into the legs and knees.  She describes numbness on the bottoms of her feet also on the tops of the thighs.  She describes pain in the calves and knees.  If she sits too long she feels like things get worse.    She is previously done physical therapy, last in 2018 which may have helped somewhat.  She has done injections previously, her last epidural steroid injection was in February 2021 with modest relief.    She has gained weight over the last few years and feels that this has made things worse.    She takes Flexeril and uses medical marijuana with mild benefit for both.      Past Medical History:   Diagnosis Date     Depressive disorder      Past Surgical History:   Procedure Laterality Date     TONSILLECTOMY          Allergies   Allergen Reactions     Codeine Nausea       Current Outpatient Medications:      ALPRAZolam (XANAX) 1 MG tablet, Take 1 tablet (1 mg) by mouth 3 times daily as needed for anxiety, Disp: 6 tablet, Rfl: 0     cyclobenzaprine (FLEXERIL) 10 MG tablet, Take 0.5-1 tablets (5-10 mg) by mouth 3 times daily as needed for muscle spasms, Disp: 90 tablet, Rfl: 3     DULoxetine (CYMBALTA) 60 MG capsule, Take 1 capsule (60 mg) by mouth daily Take with 30mg to total dose 90mg daily., Disp: 90 capsule, Rfl: 1     etonogestrel (IMPLANON/NEXPLANON) 68 MG IMPL, 1 each by Subdermal route  once, Disp: , Rfl:      ibuprofen (ADVIL/MOTRIN) 800 MG tablet, Take 800 mg by mouth, Disp: , Rfl:      melatonin 3 MG tablet, Take 3 mg by mouth, Disp: , Rfl:      Vitamin D, Cholecalciferol, 25 MCG (1000 UT) CAPS, Take 2,000 Int'l Units by mouth daily, Disp: , Rfl:   Social History     Socioeconomic History     Marital status: Single     Spouse name: None     Number of children: None     Years of education: None     Highest education level: None   Occupational History     None   Tobacco Use     Smoking status: Never Smoker     Smokeless tobacco: Never Used   Substance and Sexual Activity     Alcohol use: No     Alcohol/week: 0.0 standard drinks     Comment: on a rare occ      Drug use: Yes     Types: Marijuana     Comment: edible marijuana treats weekly     Sexual activity: Never   Other Topics Concern     Parent/sibling w/ CABG, MI or angioplasty before 65F 55M? No   Social History Narrative     None     Social Determinants of Health     Financial Resource Strain: High Risk     Difficulty of Paying Living Expenses: Very hard   Food Insecurity: Food Insecurity Present     Worried About Running Out of Food in the Last Year: Often true     Ran Out of Food in the Last Year: Sometimes true   Transportation Needs: Unmet Transportation Needs     Lack of Transportation (Medical): Yes     Lack of Transportation (Non-Medical): Yes   Physical Activity: Inactive     Days of Exercise per Week: 0 days     Minutes of Exercise per Session: 0 min   Stress: Stress Concern Present     Feeling of Stress : To some extent   Social Connections: Unknown     Frequency of Communication with Friends and Family: Patient refused     Frequency of Social Gatherings with Friends and Family: Never     Attends [employer] Services: Never     Active Member of Clubs or Organizations: Yes     Attends Club or Organization Meetings: Not on file     Marital Status: Never    Intimate Partner Violence: Not on file   Housing Stability: Low Risk       "Unable to Pay for Housing in the Last Year: No     Number of Places Lived in the Last Year: 1     Unstable Housing in the Last Year: No      Problem (# of Occurrences) Relation (Name,Age of Onset)    Anxiety Disorder (3) Mother (asiya), Paternal Grandmother (gregory), Other (aunt)    Asthma (1) Brother (jen)    Breast Cancer (1) Paternal Grandmother (gregory)    Cerebrovascular Disease (1) Paternal Grandfather (don)    Dementia (1) Paternal Grandmother (gregory)    Depression (1) Other (aunt)    Irritable Bowel Syndrome (2) Mother (asiya), Father (bhavin)    Other Cancer (1) Other (aunt)    Substance Abuse (1) Father (dan)       Negative family history of: Diabetes, Coronary Artery Disease, Colon Cancer           ROS: 10 point ROS neg other than the symptoms noted above in the HPI.    Vitals:    01/24/22 1347   BP: 117/86   Pulse: 96   SpO2: 97%   Height: 1.727 m (5' 8\")     Estimated body mass index is 42.1 kg/m  as calculated from the following:    Height as of this encounter: 1.727 m (5' 8\").    Weight as of 1/29/21: 125.6 kg (276 lb 14.4 oz).  Moderate Pain (5)    Oswestry (RANJAN) Questionnaire    OSWESTRY DISABILITY INDEX 9/27/2018   Count 9   Sum 11   Oswestry Score (%) 24.44       Visual Analog Scale (VAS) Questionnaire    No flowsheet data found.       Awake alert and oriented.  Bilateral upper and lower extremity strength is 5 out of 5 in all muscle groups.    Reflexes 2+ bilateral patella 0 bilateral Achilles.    Sensation intact to pinprick in the lower extremities bilaterally.    Posture erect without obvious deformity.  Minimal tenderness to palpation of the lumbar spine.    Negative straight leg raise bilaterally.    Imaging: MRI from this month as well as from 2018 were reviewed.  In addition x-rays of the lumbar spine from 2018 were also reviewed.  These show an L5-S1 pars defect with grade 1 spondylolisthesis.    Her MRI shows disc degeneration and a significant left foraminal disc herniation.  The MRI from " 2018 and from recently appear very similar.  The imaging was reviewed with the patient shown to the patient in clinic today.    Assessment: L5-S1 spondylolisthesis with pars defects with chronic low back pain and radiculopathy.    Plan: The patient came today to discuss treatment options.  Overall I have recommended continued physical therapy as she has not done this for a while.  This would also be best coupled with some weight loss, which I have encouraged her to discuss with her primary care provider.  However if she continues to fail conservative measures then I would consider an L5-S1 instrumented fusion.  This would likely help some but maybe not all of her symptoms.  She may return to clinic as needed in the future to further discuss these.         Again, thank you for allowing me to participate in the care of your patient.        Sincerely,        Trey Hernandez MD

## 2022-01-24 NOTE — PROGRESS NOTES
It was a pleasure to see Rama Gutierrez today in [facility]. She is a 31 year old female who has had many years of low back issues.  She states that these have been worsening over the past 5 to 6 years.    She describes pain in the low back at the lumbosacral junction that radiates out to both sides into the hips and gluteal region as well as down into the legs and knees.  She describes numbness on the bottoms of her feet also on the tops of the thighs.  She describes pain in the calves and knees.  If she sits too long she feels like things get worse.    She is previously done physical therapy, last in 2018 which may have helped somewhat.  She has done injections previously, her last epidural steroid injection was in February 2021 with modest relief.    She has gained weight over the last few years and feels that this has made things worse.    She takes Flexeril and uses medical marijuana with mild benefit for both.      Past Medical History:   Diagnosis Date     Depressive disorder      Past Surgical History:   Procedure Laterality Date     TONSILLECTOMY          Allergies   Allergen Reactions     Codeine Nausea       Current Outpatient Medications:      ALPRAZolam (XANAX) 1 MG tablet, Take 1 tablet (1 mg) by mouth 3 times daily as needed for anxiety, Disp: 6 tablet, Rfl: 0     cyclobenzaprine (FLEXERIL) 10 MG tablet, Take 0.5-1 tablets (5-10 mg) by mouth 3 times daily as needed for muscle spasms, Disp: 90 tablet, Rfl: 3     DULoxetine (CYMBALTA) 60 MG capsule, Take 1 capsule (60 mg) by mouth daily Take with 30mg to total dose 90mg daily., Disp: 90 capsule, Rfl: 1     etonogestrel (IMPLANON/NEXPLANON) 68 MG IMPL, 1 each by Subdermal route once, Disp: , Rfl:      ibuprofen (ADVIL/MOTRIN) 800 MG tablet, Take 800 mg by mouth, Disp: , Rfl:      melatonin 3 MG tablet, Take 3 mg by mouth, Disp: , Rfl:      Vitamin D, Cholecalciferol, 25 MCG (1000 UT) CAPS, Take 2,000 Int'l Units by mouth daily, Disp: , Rfl:    Social History     Socioeconomic History     Marital status: Single     Spouse name: None     Number of children: None     Years of education: None     Highest education level: None   Occupational History     None   Tobacco Use     Smoking status: Never Smoker     Smokeless tobacco: Never Used   Substance and Sexual Activity     Alcohol use: No     Alcohol/week: 0.0 standard drinks     Comment: on a rare occ      Drug use: Yes     Types: Marijuana     Comment: edible marijuana treats weekly     Sexual activity: Never   Other Topics Concern     Parent/sibling w/ CABG, MI or angioplasty before 65F 55M? No   Social History Narrative     None     Social Determinants of Health     Financial Resource Strain: High Risk     Difficulty of Paying Living Expenses: Very hard   Food Insecurity: Food Insecurity Present     Worried About Running Out of Food in the Last Year: Often true     Ran Out of Food in the Last Year: Sometimes true   Transportation Needs: Unmet Transportation Needs     Lack of Transportation (Medical): Yes     Lack of Transportation (Non-Medical): Yes   Physical Activity: Inactive     Days of Exercise per Week: 0 days     Minutes of Exercise per Session: 0 min   Stress: Stress Concern Present     Feeling of Stress : To some extent   Social Connections: Unknown     Frequency of Communication with Friends and Family: Patient refused     Frequency of Social Gatherings with Friends and Family: Never     Attends Sabianism Services: Never     Active Member of Clubs or Organizations: Yes     Attends Club or Organization Meetings: Not on file     Marital Status: Never    Intimate Partner Violence: Not on file   Housing Stability: Low Risk      Unable to Pay for Housing in the Last Year: No     Number of Places Lived in the Last Year: 1     Unstable Housing in the Last Year: No      Problem (# of Occurrences) Relation (Name,Age of Onset)    Anxiety Disorder (3) Mother (asiya), Paternal Grandmother (gregory),  "Other (aunt)    Asthma (1) Brother (jen)    Breast Cancer (1) Paternal Grandmother (gregory)    Cerebrovascular Disease (1) Paternal Grandfather (don)    Dementia (1) Paternal Grandmother (gregory)    Depression (1) Other (aunt)    Irritable Bowel Syndrome (2) Mother (asiya), Father (bhavin)    Other Cancer (1) Other (aunt)    Substance Abuse (1) Father (dan)       Negative family history of: Diabetes, Coronary Artery Disease, Colon Cancer           ROS: 10 point ROS neg other than the symptoms noted above in the HPI.    Vitals:    01/24/22 1347   BP: 117/86   Pulse: 96   SpO2: 97%   Height: 1.727 m (5' 8\")     Estimated body mass index is 42.1 kg/m  as calculated from the following:    Height as of this encounter: 1.727 m (5' 8\").    Weight as of 1/29/21: 125.6 kg (276 lb 14.4 oz).  Moderate Pain (5)    Oswestry (RANJAN) Questionnaire    OSWESTRY DISABILITY INDEX 9/27/2018   Count 9   Sum 11   Oswestry Score (%) 24.44       Visual Analog Scale (VAS) Questionnaire    No flowsheet data found.       Awake alert and oriented.  Bilateral upper and lower extremity strength is 5 out of 5 in all muscle groups.    Reflexes 2+ bilateral patella 0 bilateral Achilles.    Sensation intact to pinprick in the lower extremities bilaterally.    Posture erect without obvious deformity.  Minimal tenderness to palpation of the lumbar spine.    Negative straight leg raise bilaterally.    Imaging: MRI from this month as well as from 2018 were reviewed.  In addition x-rays of the lumbar spine from 2018 were also reviewed.  These show an L5-S1 pars defect with grade 1 spondylolisthesis.    Her MRI shows disc degeneration and a significant left foraminal disc herniation.  The MRI from 2018 and from recently appear very similar.  The imaging was reviewed with the patient shown to the patient in clinic today.    Assessment: L5-S1 spondylolisthesis with pars defects with chronic low back pain and radiculopathy.    Plan: The patient came today to " discuss treatment options.  Overall I have recommended continued physical therapy as she has not done this for a while.  This would also be best coupled with some weight loss, which I have encouraged her to discuss with her primary care provider.  However if she continues to fail conservative measures then I would consider an L5-S1 instrumented fusion.  This would likely help some but maybe not all of her symptoms.  She may return to clinic as needed in the future to further discuss these.

## 2022-01-24 NOTE — PATIENT INSTRUCTIONS
Patient Next Steps:          Order placed for physical therapy. You can call the phone number highlighted in the order to schedule your appointment. Please call our clinic if symptoms persist after your course of physical therapy.      Order placed for Xray lumbar on your way out.  We will call you with the results and next steps once imaging is completed.        Please call us if you have any further questions or concerns.    [facility]   Phone: [phone]  Fax: [phone]

## 2022-01-24 NOTE — PROGRESS NOTES
It was a pleasure to see Rama Gutierrez today in [facility]surgery Clinic. She is a 31 year old female ***    Past Medical History:   Diagnosis Date     Depressive disorder      Past Surgical History:   Procedure Laterality Date     TONSILLECTOMY          Allergies   Allergen Reactions     Codeine Nausea       Current Outpatient Medications:      ALPRAZolam (XANAX) 1 MG tablet, Take 1 tablet (1 mg) by mouth 3 times daily as needed for anxiety, Disp: 6 tablet, Rfl: 0     cyclobenzaprine (FLEXERIL) 10 MG tablet, Take 0.5-1 tablets (5-10 mg) by mouth 3 times daily as needed for muscle spasms, Disp: 90 tablet, Rfl: 3     DULoxetine (CYMBALTA) 60 MG capsule, Take 1 capsule (60 mg) by mouth daily Take with 30mg to total dose 90mg daily., Disp: 90 capsule, Rfl: 1     etonogestrel (IMPLANON/NEXPLANON) 68 MG IMPL, 1 each by Subdermal route once, Disp: , Rfl:      ibuprofen (ADVIL/MOTRIN) 800 MG tablet, Take 800 mg by mouth, Disp: , Rfl:      melatonin 3 MG tablet, Take 3 mg by mouth, Disp: , Rfl:      Vitamin D, Cholecalciferol, 25 MCG (1000 UT) CAPS, Take 2,000 Int'l Units by mouth daily, Disp: , Rfl:   Social History     Socioeconomic History     Marital status: Single     Spouse name: None     Number of children: None     Years of education: None     Highest education level: None   Occupational History     None   Tobacco Use     Smoking status: Never Smoker     Smokeless tobacco: Never Used   Substance and Sexual Activity     Alcohol use: No     Alcohol/week: 0.0 standard drinks     Comment: on a rare occ      Drug use: Yes     Types: Marijuana     Comment: edible marijuana treats weekly     Sexual activity: Never   Other Topics Concern     Parent/sibling w/ CABG, MI or angioplasty before 65F 55M? No   Social History Narrative     None     Social Determinants of Health     Financial Resource Strain: High Risk     Difficulty of Paying Living Expenses: Very hard   Food Insecurity: Food Insecurity Present     Worried About Running  "Out of Food in the Last Year: Often true     Ran Out of Food in the Last Year: Sometimes true   Transportation Needs: Unmet Transportation Needs     Lack of Transportation (Medical): Yes     Lack of Transportation (Non-Medical): Yes   Physical Activity: Inactive     Days of Exercise per Week: 0 days     Minutes of Exercise per Session: 0 min   Stress: Stress Concern Present     Feeling of Stress : To some extent   Social Connections: Unknown     Frequency of Communication with Friends and Family: Patient refused     Frequency of Social Gatherings with Friends and Family: Never     Attends Anglican Services: Never     Active Member of Clubs or Organizations: Yes     Attends Club or Organization Meetings: Not on file     Marital Status: Never    Intimate Partner Violence: Not on file   Housing Stability: Low Risk      Unable to Pay for Housing in the Last Year: No     Number of Places Lived in the Last Year: 1     Unstable Housing in the Last Year: No      Problem (# of Occurrences) Relation (Name,Age of Onset)    Anxiety Disorder (3) Mother (asiya), Paternal Grandmother (gregory), Other (aunt)    Asthma (1) Brother (jen)    Breast Cancer (1) Paternal Grandmother (gregory)    Cerebrovascular Disease (1) Paternal Grandfather (don)    Dementia (1) Paternal Grandmother (gregory)    Depression (1) Other (aunt)    Irritable Bowel Syndrome (2) Mother (asiya), Father (bhavin)    Other Cancer (1) Other (aunt)    Substance Abuse (1) Father (dan)       Negative family history of: Diabetes, Coronary Artery Disease, Colon Cancer           ROS: 10 point ROS neg other than the symptoms noted above in the HPI.    Vitals:    01/24/22 1347   BP: 117/86   Pulse: 96   SpO2: 97%   Height: 1.727 m (5' 8\")     Estimated body mass index is 42.1 kg/m  as calculated from the following:    Height as of this encounter: 1.727 m (5' 8\").    Weight as of 1/29/21: 125.6 kg (276 lb 14.4 oz).  Moderate Pain (5)    Oswestry (RANJAN) " Questionnaire    OSWESTRY DISABILITY INDEX 9/27/2018   Count 9   Sum 11   Oswestry Score (%) 24.44       Visual Analog Scale (VAS) Questionnaire    No flowsheet data found.       ***    Imaging: ***    Assessment: ***    Plan: ***

## 2022-02-03 ENCOUNTER — THERAPY VISIT (OUTPATIENT)
Dept: PHYSICAL THERAPY | Facility: CLINIC | Age: 32
End: 2022-02-03
Attending: NEUROLOGICAL SURGERY
Payer: COMMERCIAL

## 2022-02-03 ENCOUNTER — MYC MEDICAL ADVICE (OUTPATIENT)
Dept: PEDIATRICS | Facility: CLINIC | Age: 32
End: 2022-02-03

## 2022-02-03 DIAGNOSIS — M54.42 CHRONIC BILATERAL LOW BACK PAIN WITH BILATERAL SCIATICA: ICD-10-CM

## 2022-02-03 DIAGNOSIS — M54.41 CHRONIC BILATERAL LOW BACK PAIN WITH BILATERAL SCIATICA: ICD-10-CM

## 2022-02-03 DIAGNOSIS — M54.42 CHRONIC BILATERAL LOW BACK PAIN WITH LEFT-SIDED SCIATICA: ICD-10-CM

## 2022-02-03 DIAGNOSIS — R63.5 WEIGHT GAIN: Primary | ICD-10-CM

## 2022-02-03 DIAGNOSIS — G89.29 CHRONIC BILATERAL LOW BACK PAIN WITH LEFT-SIDED SCIATICA: ICD-10-CM

## 2022-02-03 DIAGNOSIS — G89.29 CHRONIC BILATERAL LOW BACK PAIN WITH BILATERAL SCIATICA: ICD-10-CM

## 2022-02-03 DIAGNOSIS — M43.17 SPONDYLOLISTHESIS AT L5-S1 LEVEL: ICD-10-CM

## 2022-02-03 PROBLEM — M54.50 CHRONIC BILATERAL LOW BACK PAIN WITHOUT SCIATICA: Status: ACTIVE | Noted: 2018-11-06

## 2022-02-03 PROCEDURE — 97161 PT EVAL LOW COMPLEX 20 MIN: CPT | Mod: GP | Performed by: PHYSICAL THERAPIST

## 2022-02-03 PROCEDURE — 97110 THERAPEUTIC EXERCISES: CPT | Mod: GP | Performed by: PHYSICAL THERAPIST

## 2022-02-03 NOTE — PROGRESS NOTES
Physical Therapy Initial Evaluation  Subjective:  The history is provided by the patient. No  was used.   Patient Health History  Rama Gutierrez being seen for chronic pain, lower lumbar spine.       Problem occurred: injury as a toddler, time + warehouse labor   Pain is reported as 3/10 on pain scale.  General health as reported by patient is poor.  Pertinent medical history includes: depression, mental illness, migraines/headaches and overweight.     Medical allergies: none.   Surgeries include:  None.    Current medications:  Anti-depressants and muscle relaxants.    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   Primary job tasks include:  Computer work and prolonged sitting.                  Therapist Generated HPI Evaluation  Problem details: The patient reports to therapy with a chief complaint of low back pain.   Has had injections in the past, which helped for 2 months or so, but the more injections that she had, the less they helped. (last injection 1 year ago). Walking and standing are most aggravating   Standing 5-10 minutes very painful such as doing dishes.   Sitting and sleeping ok, but still has to get up and move often sit (60 minutes) before onset of pain.   Stomach sleeper because it is most comfortable. Patient walked to PT today and is feeling soreness in her knee (new pain). The patient does express increasing mental health difficulties that she feel are definitely related to her pain. Works as a  and is required to sit for several hours at a time and feels that this is aggravating and impeding her success at work.       .         Type of problem:  Lumbar.    This is a chronic condition.      Patient reports pain:  Lower lumbar spine.  Pain is described as aching and sharp and is constant.    Since onset symptoms are gradually worsening.  Symptoms are exacerbated by bending, walking, carrying, sitting and standing  and relieved by rest.  Special tests included:   X-ray.  Previous treatment includes physical therapy. There was moderate improvement following previous treatment.  Restrictions due to condition include:  Working in normal job with restrictions.                          Objective:  System         Lumbar/SI Evaluation  ROM:    AROM Lumbar:   Flexion:            WNL-pain upon returning to standing   Ext:                    Mod limiteation +P   Side Bend:        Left:  WNL     Right:  WNL  Rotation:           Left:  WNL    Right:  WNL  Side Glide:        Left:     Right:         Strength: Decreased lower abdominal activation. Glute max 3/5 with pain           Neural Tension/Mobility:    Left side:  SLR positive.   Right side:   SLR positive.  Lumbar Palpation:    Tenderness present at Left:    Quadratus Lumborum; Erector Spinae; Piriformis; Gluteus Medius and Vertebral  Tenderness present at Right: Quadratus Lumborum; Erector Spinae; Piriformis; Gluteus Medius and Vertebral    Lumbar Provocation:    Left positive with:  PROM hip  Right positive with: PROM hip                                                 General     ROS    Assessment/Plan:    Patient is a 31 year old female with lumbar complaints.    Patient has the following significant findings with corresponding treatment plan.                Diagnosis 1:  Low Back Pain  Pain -  hot/cold therapy, US, electric stimulation, mechanical traction, manual therapy and home program  Decreased ROM/flexibility - manual therapy and therapeutic exercise  Decreased joint mobility - manual therapy and therapeutic exercise  Decreased strength - therapeutic exercise and therapeutic activities  Decreased function - therapeutic activities  Impaired posture - neuro re-education    Cumulative Therapy Evaluation is: Low complexity.    Previous and current functional limitations:  (See Goal Flow Sheet for this information)    Short term and Long term goals: (See Goal Flow Sheet for this information)     Communication ability:   Patient appears to be able to clearly communicate and understand verbal and written communication and follow directions correctly.  Treatment Explanation - The following has been discussed with the patient:   RX ordered/plan of care  Anticipated outcomes  Possible risks and side effects  This patient would benefit from PT intervention to resume normal activities.   Rehab potential is good.    Frequency:  1 X week, once daily  Duration:  for 10 weeks  Discharge Plan:  Achieve all LTG.  Independent in home treatment program.  Reach maximal therapeutic benefit.    Please refer to the daily flowsheet for treatment today, total treatment time and time spent performing 1:1 timed codes.

## 2022-02-03 NOTE — PROGRESS NOTES
[facility]    OUTPATIENT Physical Therapy ORTHOPEDIC EVALUATION  PLAN OF TREATMENT FOR OUTPATIENT REHABILITATION  (COMPLETE FOR INITIAL CLAIMS ONLY)  Patient's Last Name, First Name, M.I.  YOB: 1990  Rama Gutierrez    Provider s Name:  [facility]   Medical Record No.  [id number]   Start of Care Date:  02/03/22   Onset Date:   01/24/22 (MD visit )   Type:     _X__PT   ___OT Medical Diagnosis:    Encounter Diagnoses   Name Primary?    Chronic bilateral low back pain with left-sided sciatica     Spondylolisthesis at L5-S1 level     Chronic bilateral low back pain with bilateral sciatica         Treatment Diagnosis:  Low Back Pain        Goals:     02/03/22 0500   Body Part   Goals listed below are for Low Back Pain   Goal #1   Goal #1 standing   Previous Functional Level No restrictions   Current Functional Level Minutes patient can stand   Performance level 5 minutes with onset of LBP   STG Target Performance Minutes patient will be able to stand   Performance level 10 minutes with pain <2/10   Rationale for housekeeping tasks such as vacuuming, bed making, mowing, gardening;for personal hygiene;for meal preparation;for safe household ambulation;for safe community ambulation   Due date 03/10/22   LTG Target Performance Minutes patient will be able to stand   Performance Level 60 minutes with pain <2/10   Rationale for housekeeping tasks such as vacuuming, bed making, mowing;for personal hygiene;for meal preparation;for safe household ambulation;for safe community ambulation   Due date 04/14/22       Therapy Frequency:  1x/week  Predicted Duration of Therapy Intervention:  10 weeks    CALE ESPINOSA, PT                 I CERTIFY THE NEED FOR THESE SERVICES FURNISHED UNDER        THIS PLAN OF TREATMENT AND WHILE UNDER MY CARE     (Physician attestation of this  document indicates review and certification of the therapy plan).                       Certification Date From:  02/03/22   Certification Date To:  04/14/22    Referring Provider:  Trey Hernandez    Initial Assessment        See Epic Evaluation SOC Date: 02/03/22

## 2022-02-07 ENCOUNTER — VIRTUAL VISIT (OUTPATIENT)
Dept: NUTRITION | Facility: CLINIC | Age: 32
End: 2022-02-07
Attending: PEDIATRICS
Payer: COMMERCIAL

## 2022-02-07 DIAGNOSIS — E66.01 MORBID OBESITY (H): ICD-10-CM

## 2022-02-07 DIAGNOSIS — R63.5 WEIGHT GAIN: ICD-10-CM

## 2022-02-07 DIAGNOSIS — R73.03 PREDIABETES: Primary | ICD-10-CM

## 2022-02-07 PROCEDURE — 97802 MEDICAL NUTRITION INDIV IN: CPT | Mod: 95

## 2022-02-07 NOTE — PROGRESS NOTES
"Type of service:  Video Visit    If the video visit is dropped, the video visit invitation should be resent by: Send to e-mail at: [email or web address]    Originating Location (pt. Location): Home  Distant Location (provider location): Home  Mode of Communication:  Video Conference via [employer]    Video Start Time: 10:03am  Video End Time (time video stopped): 10:57am    How would patient like to obtain AVS? [facility]    Medical Nutrition Therapy  Visit Type:Initial assessment and intervention    Rama Gutierrez presents today for MNT and education related to weight management.   She is accompanied by self.     ASSESSMENT:   Patient comments/concerns relating to nutrition: Says she has been overweight her whole life.  Says never thought about herself as a person who as a bad relationship with food.  Says recently got diagnosed with prediabetes.  Says she has had changes in appetite with some days finding it hard to eat/food repulsive and will go all day without eating.     Says she likes sweets.  Says she is a big of a picky eater and if it is not quick and she likes it, will make it hard to prepare food.  Also struggling with depression and anxiety.  Says having another person to help will help.  Roommate is helpful but she is quick to want to go out.  Both are on medical marijuana and have the munchies.    Says in the past would have a snack drawer.  Says recently got rid of her sweets drawer.  Says working with a therapist and working on relationship with food.  Says trying to eat smaller meals instead of no meals and then 1 big meal. Says trying to eat to fullness. Fast eater and will do something else when eating- may be drawing when eating.    Says she eats a lot of chicken and eggs, not much red meat.      NUTRITION HISTORY:  Wakes: 10am-12pm  Breakfast: 10:30am: \"getting better\": instant oatmeal and G-Fuel (Sugar free coffee alternative) OR none OR egg sandwich OR 3-4 saltines and PB if not hungry OR egg " "sandwich  Lunch: 2:30pm: chicken salad sandwich, sometimes chips OR G-Fuel with chicken rodriguez and rice OR soup and sandwich   PM: 6pm: chicken salad sandwich OR chicken salad sandwich   Dinner: 9:40pm: big bowl rice with 2 scoops chicken rodriguez and rice Friday and Saturday OR bowl cereal OR large portions when eat out.  Snacks: 2-3 Darian chews  Beverages: Water all day with sugar-free flavoring    Misses meals? Sometimes breakfast  Eats out: not assessed but says trying to cut back.    Previous diet education:  No     Food allergies/intolerances: none    Likes: grapes, apples, carrot sticks, broccoli     Diet is high in: calories and carbs  Diet is low in: calcium, fiber, fruits and vegetables    EXERCISE: no regular exercise program    SOCIO/ECONOMIC:   Lives with: Roommate    MEDICATIONS:  Current Outpatient Medications   Medication     ALPRAZolam (XANAX) 1 MG tablet     cyclobenzaprine (FLEXERIL) 10 MG tablet     DULoxetine (CYMBALTA) 60 MG capsule     etonogestrel (IMPLANON/NEXPLANON) 68 MG IMPL     ibuprofen (ADVIL/MOTRIN) 800 MG tablet     melatonin 3 MG tablet     Vitamin D, Cholecalciferol, 25 MCG (1000 UT) CAPS     No current facility-administered medications for this visit.       LABS:  Last Basic Metabolic Panel:  Lab Results   Component Value Date     05/28/2020      Lab Results   Component Value Date    POTASSIUM 4.7 05/28/2020     Lab Results   Component Value Date    CHLORIDE 107 05/28/2020     Lab Results   Component Value Date    DUARTE 9.6 05/28/2020     Lab Results   Component Value Date    CO2 29 05/28/2020     Lab Results   Component Value Date    BUN 9 05/28/2020     Lab Results   Component Value Date    CR 1.00 05/28/2020     Lab Results   Component Value Date     05/28/2020       ANTHROPOMETRICS:  Vitals: There were no vitals taken for this visit.  Estimated body mass index is 42.1 kg/m  as calculated from the following:    Height as of 1/24/22: 1.727 m (5' 8\").    Weight as of " 1/29/21: 125.6 kg (276 lb 14.4 oz).       Wt Readings from Last 5 Encounters:   01/29/21 125.6 kg (276 lb 14.4 oz)   05/28/20 121.6 kg (268 lb)   03/09/20 122.5 kg (270 lb)   12/06/19 121.8 kg (268 lb 9.6 oz)   10/21/19 121.6 kg (268 lb)       Weight Change: gained 8.8 lbs in the past 8 months.    ESTIMATED KCAL REQUIREMENTS:  2411 kcal per day (based on last clinic weight from 1/29/21)    NUTRITION DIAGNOSIS: Overweight/Obesity related to excessive energy intake as evidenced by low intake fruits and vegetables, weight gain the past 8 months and BMI>40.     NUTRITION INTERVENTION:  Nutrition Prescription: MyPlate at meals  Education given to support: general nutrition guidelines, weight reduction, consistent meals, sodium, exercise, fiber, behavior modification, portion control and heart healthy diet  Education Materials Provided: My Plate Planner/Choose My Plate, 1800 Calorie 5-day Sample Menus and Weight Loss Tips  Motivational Interviewing    Discussion: Commended Rama on her changes to try to eat more often, get rid of her sweets drawer and being more mindful of hunger and how this all is recommended with prediabetes and can help improve health. Informed Rama that the changes she is makign and weight loss are recommended to reduce insulin resistance with goal to lose 5-10% of current body weight.  To help with balancing meals, suggested plate method for simplicity and discussed website ideas. She is agreeable with starting by adding a veggie with dinner and keep eating 3 meals a day since just started doing this last week.    Since meal and preparation may sometimes be limiting, discussed healthy, quick lunch ideas and frozen meal ideas, as well as healthy snack options lower in added sugar.  Encouraged whole grains when able and to continue to eat lean meats.  Educated on how exercise can help with better blood glucose utilization and Rama is open to trying to increase activity as well. Pt verbalized  understanding of concepts discussed and recommendations provided.         PATIENT'S BEHAVIOR CHANGE GOALS:   See Patient Instructions for patient stated behavior change goals. AVS was sent by [employer]    MONITOR / EVALUATE:  RD will monitor/evaluate:  Food / Beverage / Nutrient intake   Pertinent Labs  Progress toward meeting stated nutrition-related goals  Weight change    FOLLOW-UP:  Call RD with questions/concerns.   Follow-up appointment scheduled on 3/16/22.     Yris Dc RDN, LD, [facility]   Time spent in minutes: 54 minutes  Encounter: Individual telephone visit

## 2022-02-07 NOTE — PATIENT INSTRUCTIONS
Goals:    1. Doing a great job with eating 3 meals a day and getting rid of the sweets drawer. Continue to do this.    2. Add veggies or some fruit with dinner    3. Schedule 10 minutes of walking, 2 days a week.    Other tips:    -Fruits and vegetables are good options- try to limit salad dressings to 1-2 Tbsp.    -Try to go with a low-fat, light yogurt or cottage cheese OR string cheese     -Try to buy whole grains for bread when able and low-sodium soup when able (add some lemon juice and fresh veggies to enhance the flavor).    -Plate Method at meals:  1/2 plate veggies and/or fruit (raw, canned, frozen all fine)  1/4 plate lean meat (3-4 oz)  1/4 plate grains, ideally whole grains when able (1/2-1 cup rice/pasta/other grains/potatoes OR 1-2 slices bread)    Helpful Website: [employer].gov    -Being active helps to lower blood glucose and helps your body use insulin better. Physical therapy is also a great form of activity.    FOLLOW UP APPOINTMENT: Video visit follow up on Wednesday, March 16th at 10am.    Yris Dc RDN, LD, [facility]   [phone]         5

## 2022-02-10 ENCOUNTER — THERAPY VISIT (OUTPATIENT)
Dept: PHYSICAL THERAPY | Facility: CLINIC | Age: 32
End: 2022-02-10
Payer: COMMERCIAL

## 2022-02-10 DIAGNOSIS — M54.50 CHRONIC BILATERAL LOW BACK PAIN WITHOUT SCIATICA: Primary | ICD-10-CM

## 2022-02-10 DIAGNOSIS — G89.29 CHRONIC BILATERAL LOW BACK PAIN WITHOUT SCIATICA: Primary | ICD-10-CM

## 2022-02-10 PROCEDURE — 97112 NEUROMUSCULAR REEDUCATION: CPT | Mod: GP | Performed by: PHYSICAL THERAPIST

## 2022-02-10 PROCEDURE — 97110 THERAPEUTIC EXERCISES: CPT | Mod: GP | Performed by: PHYSICAL THERAPIST

## 2022-02-17 ENCOUNTER — THERAPY VISIT (OUTPATIENT)
Dept: PHYSICAL THERAPY | Facility: CLINIC | Age: 32
End: 2022-02-17
Payer: COMMERCIAL

## 2022-02-17 DIAGNOSIS — M54.50 CHRONIC BILATERAL LOW BACK PAIN WITHOUT SCIATICA: Primary | ICD-10-CM

## 2022-02-17 DIAGNOSIS — G89.29 CHRONIC BILATERAL LOW BACK PAIN WITHOUT SCIATICA: Primary | ICD-10-CM

## 2022-02-17 PROCEDURE — 97112 NEUROMUSCULAR REEDUCATION: CPT | Mod: GP | Performed by: PHYSICAL THERAPIST

## 2022-02-17 PROCEDURE — 97110 THERAPEUTIC EXERCISES: CPT | Mod: GP | Performed by: PHYSICAL THERAPIST

## 2022-02-24 ENCOUNTER — THERAPY VISIT (OUTPATIENT)
Dept: PHYSICAL THERAPY | Facility: CLINIC | Age: 32
End: 2022-02-24
Payer: COMMERCIAL

## 2022-02-24 DIAGNOSIS — G89.29 CHRONIC BILATERAL LOW BACK PAIN WITHOUT SCIATICA: Primary | ICD-10-CM

## 2022-02-24 DIAGNOSIS — M54.50 CHRONIC BILATERAL LOW BACK PAIN WITHOUT SCIATICA: Primary | ICD-10-CM

## 2022-02-24 PROCEDURE — 97112 NEUROMUSCULAR REEDUCATION: CPT | Mod: GP | Performed by: PHYSICAL THERAPIST

## 2022-02-24 PROCEDURE — 97110 THERAPEUTIC EXERCISES: CPT | Mod: GP | Performed by: PHYSICAL THERAPIST

## 2022-02-24 PROCEDURE — 97140 MANUAL THERAPY 1/> REGIONS: CPT | Mod: GP | Performed by: PHYSICAL THERAPIST

## 2022-03-10 ENCOUNTER — E-VISIT (OUTPATIENT)
Dept: URGENT CARE | Facility: URGENT CARE | Age: 32
End: 2022-03-10
Payer: COMMERCIAL

## 2022-03-10 ENCOUNTER — THERAPY VISIT (OUTPATIENT)
Dept: PHYSICAL THERAPY | Facility: CLINIC | Age: 32
End: 2022-03-10
Payer: COMMERCIAL

## 2022-03-10 DIAGNOSIS — J20.9 ACUTE BRONCHITIS WITH SYMPTOMS > 10 DAYS: ICD-10-CM

## 2022-03-10 DIAGNOSIS — G89.29 CHRONIC BILATERAL LOW BACK PAIN WITHOUT SCIATICA: ICD-10-CM

## 2022-03-10 DIAGNOSIS — M54.50 CHRONIC BILATERAL LOW BACK PAIN WITHOUT SCIATICA: ICD-10-CM

## 2022-03-10 DIAGNOSIS — R05.9 COUGH: Primary | ICD-10-CM

## 2022-03-10 PROCEDURE — 97110 THERAPEUTIC EXERCISES: CPT | Mod: GP | Performed by: PHYSICAL THERAPIST

## 2022-03-10 PROCEDURE — 97112 NEUROMUSCULAR REEDUCATION: CPT | Mod: GP | Performed by: PHYSICAL THERAPIST

## 2022-03-10 PROCEDURE — 99207 PR NO CHARGE LOS: CPT | Performed by: FAMILY MEDICINE

## 2022-03-10 RX ORDER — PREDNISONE 20 MG/1
20 TABLET ORAL 2 TIMES DAILY
Qty: 10 TABLET | Refills: 0 | Status: SHIPPED | OUTPATIENT
Start: 2022-03-10 | End: 2022-03-15

## 2022-03-10 RX ORDER — AZITHROMYCIN 250 MG/1
TABLET, FILM COATED ORAL
Qty: 6 TABLET | Refills: 0 | Status: SHIPPED | OUTPATIENT
Start: 2022-03-10 | End: 2022-03-15

## 2022-03-10 NOTE — PATIENT INSTRUCTIONS
"    Dear Rama Gutierrez    After reviewing your responses, I've been able to diagnose you with \"Bronchitis\" which is a common infection of your lungs. While this is most commonly caused by a virus, the symptoms you have given suggest you should be treated with antibiotics.     I have sent Zpack and Keesha to your pharmacy to treat this infection.     It is important that you take all of your prescribed medication even if your symptoms are improving after a few doses. Taking all of your medicine helps prevent the symptoms from returning.     If your symptoms worsen, you develop chest pain or shortness of breath, fevers over 101, or are not improving in 5 days, please contact your primary care provider for an appointment or visit any of our convenient Walk-in Care or Urgent Care Centers to be seen which can be found on our website here.    Thanks again for choosing us as your health care partner,    Barrington Cain, DO    "

## 2022-03-16 ENCOUNTER — VIRTUAL VISIT (OUTPATIENT)
Dept: NUTRITION | Facility: CLINIC | Age: 32
End: 2022-03-16
Payer: COMMERCIAL

## 2022-03-16 DIAGNOSIS — R73.03 PREDIABETES: Primary | ICD-10-CM

## 2022-03-16 DIAGNOSIS — E66.01 MORBID OBESITY (H): ICD-10-CM

## 2022-03-16 PROCEDURE — 97803 MED NUTRITION INDIV SUBSEQ: CPT | Mod: 95

## 2022-03-16 NOTE — PATIENT INSTRUCTIONS
"Goals:    1. Work on reducing/controlling portions when you go out.  Ask for a \"To Go\" box and try to save at least 1/2 of it home.  -Could also eat a fruit or few crackers for a snack so you are less hungry.   -Could also try to slow down eating if needed- consciously put down fork between meals.  -Could also share a meal or get an appetizer.     2. Increase walking to 2 days a week.    3. Continue to eat some veggies and/or fruit with lunch and dinner (or plate method) at meals and keep being mindful of hunger and food.     Other tips:   -Consider diet pop over regular pop to help limit added sugars OR if you have a regular pop, cut out the cookie or sweet at night to limit added sugar.     -If you find you are eating fast or very hungry, portion small plate and set timer to wait 20 minutes before eating more or drink another glass of water.     FOLLOW UP: Reach out by Tone if you are struggling and we can schedule a follow up    Yris Dc RDN, LD, [facility]   [phone]      "

## 2022-03-16 NOTE — PROGRESS NOTES
Type of service:  Video Visit    If the video visit is dropped, the video visit invitation should be resent by: Send to e-mail at: [email or web address]    Originating Location (pt. Location): Home  Distant Location (provider location): [facility]  Mode of Communication:  Video Conference via [employer]    Video Start Time: 10:04am  Video End Time (time video stopped): 10:33am    How would patient like to obtain AVS? [facility]    Medical Nutrition Therapy  Visit Type:Reassessment and intervention    Rama Gutierrez presents today for MNT and education related to prediabetes and weight management.   She is accompanied by self.     ASSESSMENT:   Patient comments/concerns relating to nutrition: Says things are pretty good. She is still working on shifting her view with food but what was discussed helped a lot.  Liked the plate method and is eating more veggies and fruits.    Feels like doing ok. Says she has been sick with bronchitis an appetite all over the place. Says last week was only eating 1 meal due to being tired.    Says she had a mental break through around eating out and had trouble with money as well- felt she had to eat it or it will be wasteful.  Says she is trying to not let her eyes be bigger than stomach.     Says not seeing much change to weight but feels she is approaching food better. Says trying to be happy with eating and not be frustrated.     NUTRITION HISTORY:    Breakfast: egg sandwich with slice of cheese and few grapes and cotage cheese with honey OR instant oatmeal and coffee or G-Fuel  Lunch: 2pm: leftovers (tacos) OR soup (Progresso) and sandwich (ham and cheese or grilled cheese)   Dinner: sandwich with chicken salad, grapes, broccoli and carrots and snap peas and green beans with small portion of ranch.  Snacks: turtle chips OR 5 Girl  Cookies -5 Thin Mints  Beverages: Coffee 1x/day, Water all day (alternates between plain and flavoring)- 2-3 bottles a day (64  "oz).  Says had 3 cans of soda last week- regular.     Misses meals? Last week when not feeling well, was only 1 meal a day.   Eats out:  1-3 meals/per week     Previous diet education:  Yes     Food allergies/intolerances: none     Diet is high in: carbs and sodium at some sittings  Diet is low in: calcium and vegetables    EXERCISE: Walked to physical therapy to 1 time a week and got down to the garage but not as much as planned.  Plans to walk more with nicer weather.     SOCIO/ECONOMIC:   Lives with: Roommate    MEDICATIONS:  Current Outpatient Medications   Medication     ALPRAZolam (XANAX) 1 MG tablet     cyclobenzaprine (FLEXERIL) 10 MG tablet     DULoxetine (CYMBALTA) 60 MG capsule     etonogestrel (IMPLANON/NEXPLANON) 68 MG IMPL     ibuprofen (ADVIL/MOTRIN) 800 MG tablet     melatonin 3 MG tablet     Vitamin D, Cholecalciferol, 25 MCG (1000 UT) CAPS     No current facility-administered medications for this visit.       LABS:  Last Basic Metabolic Panel:  Lab Results   Component Value Date     05/28/2020      Lab Results   Component Value Date    POTASSIUM 4.7 05/28/2020     Lab Results   Component Value Date    CHLORIDE 107 05/28/2020     Lab Results   Component Value Date    DUARTE 9.6 05/28/2020     Lab Results   Component Value Date    CO2 29 05/28/2020     Lab Results   Component Value Date    BUN 9 05/28/2020     Lab Results   Component Value Date    CR 1.00 05/28/2020     Lab Results   Component Value Date     05/28/2020       ANTHROPOMETRICS:  Vitals: There were no vitals taken for this visit.  Estimated body mass index is 42.1 kg/m  as calculated from the following:    Height as of 1/24/22: 1.727 m (5' 8\").    Weight as of 1/29/21: 125.6 kg (276 lb 14.4 oz).       Wt Readings from Last 5 Encounters:   01/29/21 125.6 kg (276 lb 14.4 oz)   05/28/20 121.6 kg (268 lb)   03/09/20 122.5 kg (270 lb)   12/06/19 121.8 kg (268 lb 9.6 oz)   10/21/19 121.6 kg (268 lb)       Weight Change: unable to " assess- no new weight    ESTIMATED KCAL REQUIREMENTS:  2411 kcal per day (based on last clinic weight from 1/29/21)    NUTRITION DIAGNOSIS: Overweight/Obesity related to prior excessive energy intake as evidenced by diet discussion and BMI>40.    NUTRITION INTERVENTION:  Nutrition Prescription: MyPlate at meals and Mindful Eating  Education given to support: general nutrition guidelines, weight reduction, consistent meals, artificial sweeteners, exercise, dining out/special occasions, fiber, behavior modification, portion control and heart healthy diet  Motivational Interviewing    Discussion: Rama feels things are going well this month. She is uncertain if seeing much weight loss but feels she is making healthy choices and mentally is approaching food changes in a healthy way. She has been trying to eat more fruits and vegetables with meals and has partially met goal with walking by walking 1 day a week when she has physical therapy. With the nicer weather, feels this will improve but is agreeable to reset goal of walking at least 2 days a week. She feels she is more mindful about hunger and still working to control portions of sweets when eating and continues to eat 3 meals a day most days. Commended Rama on her changes and to keep it going.    Her one struggle today is eating too much when going out to eat- says she feels her eyes are bigger than her stomach.  Offered tips to try to control hunger portions better such as sharing a meal, ordering a to-go box and bringing home at least 1/2 the meal, ordering an appetizer instead of main entree and/or eating snack before going out to eat so feels less hungry and easier to make a healthier choice. She likes the idea of the to go box as she does not mind leftovers and usually brings things home with Covid.     She also mentions drinking more soda this week which is not typical for her.  Since soda has a lot of added sugar and she tends to eat something sweet at  night, suggested choosing between soda or cookies to help lower carbohydrate intake. She could also try to find a healthier snack at night if she has a regular soda during the day, such as fruit, or could drink a diet soda if enjoys the taste/selection.  Pt verbalized understanding of concepts discussed and recommendations provided.       PATIENT'S BEHAVIOR CHANGE GOALS:   See Patient Instructions for patient stated behavior change goals. AVS was sent by [employer].    MONITOR / EVALUATE:  RD will monitor/evaluate:  Food / Beverage / Nutrient intake   Pertinent Labs  Progress toward meeting stated nutrition-related goals  Weight change    FOLLOW-UP:  Follow up with RD as needed.  Call RD with questions/concerns. Right now Rama feels she has what she needs.  Will reach out for follow up if struggling in the future.    Yris Dc RDN, LD, [facility]   Time spent in minutes: 29 minutes  Encounter: Individual

## 2022-03-24 ENCOUNTER — THERAPY VISIT (OUTPATIENT)
Dept: PHYSICAL THERAPY | Facility: CLINIC | Age: 32
End: 2022-03-24
Payer: COMMERCIAL

## 2022-03-24 DIAGNOSIS — G89.29 CHRONIC BILATERAL LOW BACK PAIN WITHOUT SCIATICA: Primary | ICD-10-CM

## 2022-03-24 DIAGNOSIS — M54.50 CHRONIC BILATERAL LOW BACK PAIN WITHOUT SCIATICA: Primary | ICD-10-CM

## 2022-03-24 PROCEDURE — 97112 NEUROMUSCULAR REEDUCATION: CPT | Mod: GP | Performed by: PHYSICAL THERAPIST

## 2022-03-24 PROCEDURE — 97110 THERAPEUTIC EXERCISES: CPT | Mod: GP | Performed by: PHYSICAL THERAPIST

## 2022-03-24 NOTE — PROGRESS NOTES
PROGRESS  REPORT    Progress reporting period is from IE to 3/24/22.       SUBJECTIVE  Subjective: The patient reports that she was doing really well, but then was experiencing a lot of pain (maybe 2 days ago) while doing art. Reports that she sat 7 hours, but was getting up frequently to move. She reports that she had not been as consistent with her exerises and she attributes her increased pain to her decreased consistency with her exercises.   She is motivated to resume doing her exercises more consistently.  Current Pain level: 5-6/10.     Initial Pain level: 7/10.   Changes in function:  Yes (See Goal flowsheet attached for changes in current functional level)  Adverse reaction to treatment or activity: None    OBJECTIVE  Changes noted in objective findings:  Yes. Improved overall tolerance to movement.   Objective: Lumbar AROM: min limited in all planes   Nikki: improved today compared to IE     ASSESSMENT/PLAN  Updated problem list and treatment plan: Diagnosis 1:  Chronic Low Back Pain  Pain -  hot/cold therapy, manual therapy and home program  Decreased ROM/flexibility - manual therapy and therapeutic exercise  Decreased strength - therapeutic exercise and therapeutic activities  Impaired muscle performance - neuro re-education  Decreased function - therapeutic activities  Impaired posture - neuro re-education  STG/LTGs have been met or progress has been made towards goals:  Yes (See Goal flow sheet completed today.)  Assessment of Progress: The patient's condition is improving.  Self Management Plans:  Patient has been instructed in a home treatment program.  I have re-evaluated this patient and find that the nature, scope, duration and intensity of the therapy is appropriate for the medical condition of the patient.  Rama continues to require the following intervention to meet STG and LTG's:  PT    Recommendations:  This patient would benefit from continued therapy.     Frequency:  2 X month, once  daily  Duration:  for 4 weeks        Please refer to the daily flowsheet for treatment today, total treatment time and time spent performing 1:1 timed codes.

## 2022-04-07 ENCOUNTER — THERAPY VISIT (OUTPATIENT)
Dept: PHYSICAL THERAPY | Facility: CLINIC | Age: 32
End: 2022-04-07
Payer: COMMERCIAL

## 2022-04-07 DIAGNOSIS — G89.29 CHRONIC BILATERAL LOW BACK PAIN WITHOUT SCIATICA: ICD-10-CM

## 2022-04-07 DIAGNOSIS — M54.50 CHRONIC BILATERAL LOW BACK PAIN WITHOUT SCIATICA: ICD-10-CM

## 2022-04-07 PROCEDURE — 97110 THERAPEUTIC EXERCISES: CPT | Mod: GP | Performed by: PHYSICAL THERAPIST

## 2022-04-07 PROCEDURE — 97140 MANUAL THERAPY 1/> REGIONS: CPT | Mod: GP | Performed by: PHYSICAL THERAPIST

## 2022-04-07 PROCEDURE — 97112 NEUROMUSCULAR REEDUCATION: CPT | Mod: GP | Performed by: PHYSICAL THERAPIST

## 2022-08-02 ENCOUNTER — TELEPHONE (OUTPATIENT)
Dept: PEDIATRICS | Facility: CLINIC | Age: 32
End: 2022-08-02

## 2022-08-03 ENCOUNTER — PATIENT OUTREACH (OUTPATIENT)
Dept: BEHAVIORAL HEALTH | Facility: CLINIC | Age: 32
End: 2022-08-03

## 2022-08-03 NOTE — TELEPHONE ENCOUNTER
[facility] MyChart PHQ-9 Follow-up  Behavioral Health Clinician Triage Service    MyChart PHQ-9 Responses:  [facility] [place] Follow-up to PHQ 5/11/2021 1/4/2022 7/30/2022   PHQ-9 9. Suicide Ideation past 2 weeks Several days Several days Several days   Thoughts of suicide or self harm in past 2 weeks Yes Yes No   Thoughts of suicide or self harm in past 2 weeks Yes Yes No   PHQ-9 Self harm plan? No No -   PHQ-9 Self harm action? No No -   PHQ-9 Safety concerns? No No No   PHQ-9 Self harm plan? No No -   PHQ-9 Self harm action? No No -   PHQ-9 Safety concerns? No No No   Some encounter information is confidential and restricted. Go to Review Flowsheets activity to see all data.        1st Outreach Date: August 3, 2022 Time: 827  Outcome: Completed phone conversation / triage service.  See assessment and disposition below.    2nd Outreach Date: August 3, 2022 Time: NA  Outcome: Completed phone conversation / triage service.  See assessment and disposition below.    Hi my name is Shyanne Childress [facility].  I m calling from  [employer] to follow-up on a questionnaire you completed on [employer].      If it s ok I d like review a few of your symptoms/responses and a talk briefly  about how you have been doing to see if I might be able to provide some support and guidance.       Address/Location of patient: [address] [place] [address] [id number], [place]  Have any supportive people near them? Y    Risk Assessment:  Patient denies a history of suicidal ideation, suicide attempts, self-injurious behavior, homicidal ideation, homicidal behavior and and other safety concerns  Patient denies current or recent suicidal ideation or behaviors.  Patient denies current or recent homicidal ideation or behaviors.  Patient denies current or recent self injurious behavior or ideation.  Patient denies other safety concerns.  Patient reports there are no firearms in the house  Protective Factors Positive problem-solving skills, Positive social support and Positive  "therapeutic releationships   Risk Factors Current high stress    ASQ Assessment:  1. In the past few weeks, have you wished you were dead?  Yes: \"don't want to be here\". Passive, fleeting thoughts. No concerns for personal safety  2. In the past few weeks, have you felt that you or your family would be better off if you         were dead?  No  3. In the past week, have you been having thoughts about killing yourself?  No  4. Have you ever tried to kill yourself?  No    Disposition:    - Recommendations / Safety Plan: A safety and risk management plan has not been developed at this time, however patient was encouraged to call [facility] should there be a change in any of these risk factors. Pt sees a therapist bi weekly at [facility] and has an appointment next week. She reports that she and her therapist have developed a safety plan including information about crisis resources.    Shyanne Childress [facility]        "

## 2022-09-11 ENCOUNTER — HEALTH MAINTENANCE LETTER (OUTPATIENT)
Age: 32
End: 2022-09-11

## 2022-09-20 NOTE — PROGRESS NOTES
"                                                         Outpatient Psychiatric Evaluation- Standard  Adult    Name:  Rama Gutierrez  : 1990    Source of Referral:  Primary Care Provider: Rozina Ricketts MD - last visit 3/18/2019  Current Psychotherapist: Intake today with Fior Murguia - first visit Today at 11am    Identifying Data:  Patient is a 28 year old, single  White American female  who presents for initial visit with me.  Patient is currently employed full time in an office. Patient attended the session alone. Consent to communicate signed. Consent for treatment signed and included in electronic medical record. Discussed limits of confidentiality today. My Practice Policy was reviewed and signed.     Chief Complaint:  Consultation.  Patient reports: \"depression and anxiety- medications could be working better\"  Patient prefers to be called: \"Rama \"    HPI:  History of psychiatry years ago. Has struggled with depression and anxiety since adolescence. She was on Paxil (paroxetine) first and has side effects, then was on Celexa (citalopram) that did not work. She stopped medications and was off of them for a few years until she moved to [place] from [place] in 2017. Patient was started on Zoloft (sertraline) in early  from Primary Care Provider and dosing has continued to increase to address symptoms. Zoloft (sertraline) was increased to 150 mg about 6 weeks ago. Taking Xanax (alprazolam) very rarely when flying or when has panic. Still struggling with symptoms of depression and anxiety. Also specifically notes social anxiety. Depression has been the biggest stressor compared to anxiety lately. Feels Zoloft (sertraline) has reduced her anxiety a lot. Patient has struggled with a chronic back issue. When she was a child she had an accident where vertebrae was broken off. She has recently done physical therapy and met with pain clinic in [place].     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  Coping mechanisms and supports include: Parents, Art, YouTube, Roommate, Cat    Answers for HPI/ROS submitted by the patient on 5/2/2019   If you checked off any problems, how difficult have these problems made it for you to do your work, take care of things at home, or get along with other people?: Extremely difficult  PHQ9 TOTAL SCORE: 13  MARÍA 7 TOTAL SCORE: 11    Psychiatric Review of Symptoms:  Depression: Interest: Decrease    Depressed Mood    Sleep:   only affected by back pain    Energy: Decrease    Appetite: Increase and Decrease - 50 pound weight gain over the last 2 years    Guilt: Increase    Concentration: Decrease    Suicide: Yes    Irritability: Increase     Ruminations: Increase    PHQ-9 scores:   PHQ-9 SCORE 8/30/2018 11/5/2018 5/2/2019   PHQ-9 Total Score 8 9 13     Sho:  Distractibility: Increase    Racing Thoughts: Increase    Sleepless: Increase    Irritability   MDQ Score: Negative Screen  Anxiety: Feeling nervous, anxious, or on edge    Uncontrolled worrying    Worrying too much about different things    Trouble relaxing    Restlessness    Easily annoyed or irritable    Thoughts of impending doom    MARÍA-7 scores:    MARÍA-7 SCORE 8/30/2018 11/5/2018 5/2/2019   Total Score 2 2 11     Panic:  No symptoms   Agoraphobia:  No   PTSD:  No symptoms   OCD:  No symptoms   Psychosis: No symptoms   ADD / ADHD: No symptoms  Gambling or shoplifting: No   Eating Disorder:  No symptoms  Sleep:   Nightmares/strange dreams     Psychiatric History:  "  Hospitalizations: None and no Emergency Department visits  History of Commitment? No   Past Treatment: counseling, physician / PCP, medication(s) from physician / PCP and psychiatry  Suicide Attempts: No   Current Suicide Risk:  Suicide Assessment Completed Today.  Self-injurious Behavior: Denies  Electroconvulsive Therapy (ECT) or Transcranial Magnetic Stimulation (TMS): No   GeneSight Genetic Testing: No     Past medication trials include but are not limited to:   Zoloft (sertraline) 150 mg   Celexa (citalopram) was not helpful at all for her  Paxil (paroxetine) caused vivid dreams  Xanax (alprazolam) 0.5 mg as needed for flying and panic    Substance Use History:  Current use of drugs or alcohol: Denies but later admits to drinking alcohol very rarely in social situations. Patient has also been using Marijuana edibles the last two weeks and Patient likes this to help with her anxiety and to relax. Patient also uses CBD oil.   Patient reports no problems as a result of their drinking / drug use.   Based on the clinical interview, there  are not indications of drug or alcohol abuse. Continue to monitor.  CAGE-AID Score today was: 0  Tobacco use: No  Caffeine: Rarely and can cause tremors with too much caffeine  Patient has not received chemical dependency treatment in the past  Recovery Programming Involvement: Not Applicable and None    Past Medical History:  History reviewed. No pertinent past medical history.   Surgery:   Past Surgical History:   Procedure Laterality Date     TONSILLECTOMY       Food and Medicine Allergies:     Allergies   Allergen Reactions     Codeine Nausea     Primary Care Provider: Rozina Ricketts MD  Seizures or Head Injury: No  Diet: No Restrictions and reported as \"bad\" but has tried to start reducing use of dairy and milk  Exercise: No regular exercise program  Supplements: Reviewed per Electronic Medical Record Today    Current Medications:    Current Outpatient Medications:      " "ALPRAZolam (XANAX) 0.5 MG tablet, Take 1 tablet (0.5 mg) by mouth 3 times daily as needed (panic attacks), Disp: 10 tablet, Rfl: 0     etonogestrel (IMPLANON/NEXPLANON) 68 MG IMPL, 1 each by Subdermal route once, Disp: , Rfl:      sertraline (ZOLOFT) 100 MG tablet, Take 1.5 tablets (150 mg) by mouth daily, Disp: 135 tablet, Rfl: 0     methocarbamol (ROBAXIN) 750 MG tablet, Take 1 tablet (750 mg) by mouth 4 times daily as needed for muscle spasms (Patient not taking: Reported on 3/18/2019), Disp: 180 tablet, Rfl: 0    The [place] Prescription Monitoring Program has been reviewed and there are no concerns about diversionary activity for controlled substances at this time.      PRESCRIPTIONS  Total Prescriptions: 1  Total Private Pay: 0  Fill Date ID Written Drug Qty Days Prescriber Rx # Pharmacy Refill Daily Dose * Pymt Type   03/18/2019 1 03/18/2019 Alprazolam 0.5 Mg Tablet 10 4 An Ashkan [id number] [facility] (0747) 0 2.50 LME Comm Ins [place]    Vital Signs:  Vitals: /70 (BP Location: Left arm, Patient Position: Chair, Cuff Size: Adult Large)   Pulse 73   Temp 97.6  F (36.4  C) (Oral)   Resp 16   Ht 1.702 m (5' 7\")   Wt 116.1 kg (256 lb)   SpO2 95%   BMI 40.10 kg/m      Labs:  Most recent laboratory results reviewed and the pertinent results include:   No visits with results within 1 Year(s) from this visit.   Latest known visit with results is:   External Order Results on 01/01/2016   Component Date Value Ref Range Status     PAP Smear - HIM Patient Reported 01/01/2016 Unknown   Final     Most recent labs reviewed and no new labs.   No EKG on file.     Review of Systems:  10 systems (general, cardiovascular, respiratory, eyes, ENT, endocrine, GI, , M/S, neurological) were reviewed. Most pertinent finding(s) is/are: sinus problems, chronic back pain, stomach pain, diarrhea, diarrhea, rare dry mouth, feels dehydrated often and this can cause headaches. The remaining systems are all unremarkable.    Family History: "   Patient reported family history includes:   Family History   Problem Relation Age of Onset     Breast Cancer Paternal Grandmother      Cerebrovascular Disease Paternal Grandfather      Diabetes No family hx of      Coronary Artery Disease No family hx of      Colon Cancer No family hx of      Mental Illness History: Yes: paternal Aunt with anxiety and depression and possible bipolar disorder. Father with Attention Deficit Hyperactivity Disorder (ADHD) undiagnosed. Brother with Attention Deficit Hyperactivity Disorder (ADHD) and takes medication. Mom with recent diagnosis of anxiety. No schizophrenia. No dementia.   Substance Abuse History: Yes: alcohol abuse in father and many other family members  Suicide History: Denies  Medications: Mom takes Cymbalta (duloxetine).     Social History:   Birth place: [place], [place]  Childhood: Yes intact home- conflict with parents is an ongoing source of stress - they live in [place]- parents and her brother have different political ideologies   Siblings:  one older Brother(s),  zero Sister(s)  Highest education level was college graduate.   Current Living situation:  [place], [place] with Roommate and cats- 1 is hers and 2 are her roommates. Feels safe at home.  Children: zero   Firearms/Weapons Access: No: Patient denies   Service: No, but applied to [facility] [employer] and was denied due to medical reasons of her back pain. Paternal grandfather was in the [employer]    Legal History:  No: Patient denies any legal history    Significant Losses / Trauma / Abuse / Neglect Issues:  There are indications or report of significant loss, trauma, abuse or neglect issues related to: major medical problems  .   Issues of possible neglect are not present.   A safety and risk management plan has not been developed at this time, however client was given the after-hours number / 911 should there be a change in any of these risk factors.    Mental Status Examination:     Appearance:  awake, alert,  adequately groomed, appeared stated age, no apparent distress, morbidly obese and two lower lip piercings  Attitude:  cooperative   Eye Contact:  adequate  Gait and Station: Normal, No assistive Devices used and No dizziness or falls  Psychomotor Behavior:  no evidence of tardive dyskinesia, dystonia, or tics  Oriented to:  time, person, and place  Attention Span and Concentration:  Normal  Speech:  clear, coherent, regular rate, regular rhythm and fluent  Mood:  sad  and depressed  Affect:  intensity is blunted  Associations:  no loose associations  Thought Process:  logical, linear and goal oriented  Thought Content:  no evidence of psychotic thought and passive suicidal ideation present  Recent and Remote Memory:  Intact to interview. Not formally assessed. No amnesia.  Fund of Knowledge: appropriate  Insight:  good  Judgment:  intact and adequate for safety  Impulse Control:  intact    Suicide Risk Assessment:  Today Rama Gutierrez reports long history of depression. Reports thoughts that she thinks she would be better off dead and has days that she feels she would like to die. Is easily able to distract self from these thoughts. In addition, there are notable risk factors for self-harm, including age, single status, anxiety, substance abuse, comorbid medical condition of chronic back pain and suicidal ideation. She identifies reasons to die related to her pain. However, risk is mitigated by commitment to family, sobriety, absence of past attempts, ability to volunteer a safety plan, history of seeking help when needed, future oriented, no access to firearms or weapons, identifies reasons to live including her cat, and does not want to leave her roommate, denies suicidal intent or plan, no family history of suicide and denies homicidal ideation, intent, or plan. Therefore, based on all available evidence including the factors cited above, Rama Gutierrez does not appear to be at imminent risk for self-harm, does not meet  criteria for a 72-hr hold, and therefore remains appropriate for ongoing outpatient level of care.  A thorough assessment of risk factors related to suicide and self-harm have been reviewed and are noted above. The patient convincingly denies acute suicidality on several occasions. Local community safety resources reviewed and printed for patient to use if needed. There was no deceit detected, and the patient presented in a manner that was believable.     DSM5  Diagnosis:  296.32 (F33.1) Major Depressive Disorder, Recurrent Episode, Moderate With anxious distress  300.23 (F40.10) Social Anxiety Disorder  300.02 (F41.1) Generalized Anxiety Disorder   Obesity per BMI of 40    Medical Comorbidities Include:   Patient Active Problem List    Diagnosis Date Noted     Chronic bilateral low back pain with left-sided sciatica 11/06/2018     Priority: Medium     Chronic midline low back pain without sciatica 08/30/2018     Priority: Medium     Depression with anxiety 09/12/2017     Priority: Medium       Psychosocial & Contextual Factors:  Parent/Child Relationship Difficulties, Financial Difficulties, Relationship Difficulties and Medical Comorbidites    Strengths and Opportunities:   Rama Gutierrez identified the following strengths or resources that will help she succeed in counseling: commitment to health and well being, friends / good social support, intelligence and positive work environment. Things that may interfere with the patient's success include:  financial hardship.    There are no language or communication issues or need for modification in treatment.   There are no ethnic, cultural or Nondenominational factors that may be relevant for therapy.  Client identified their preferred language to be English.  Client does not need the assistance of an  or other support involved in therapy.    A 12-item WHODAS 2.0 assessment was completed by the patient today and recorded in EPIC.    WHODAS 2.0 Total Score 5/2/2019    Total Score 30   Total Score MyChart 30     Impression:  Rama Gutierrez has lifelong history of anxiety and depression. Chronic pain and other psychosocial stressors exacerbate these symptoms. Medication side effects and alternatives reviewed. Health promotion activities recommended and reviewed today. All questions addressed. Education and counseling completed regarding risks and benefits of medications and psychotherapy options. Collaborative Care Psychiatry Service model reviewed today. Recommend therapy for additional support. Intake is scheduled for today.    Patient has not had updated labs and this could be helpful to rule out medical causes of symptoms. I can coordinate with Primary Care Provider to get these drawn.      Patient has been on 3 serotonin specific reuptake inhibitor medications and one benzodiazepine medication. I will not be providing more Xanax (alprazolam). I do not believe benzodiazepine medications are a good option at this time. Patient reports she rarely takes this. Primary Care Provider can continue to prescribe if they would like. I would be very cautious with this as Patient has started to use Marijuana edibles.     With anxiety, likely need higher doses of antidepressant medications. She may also need to be changed to an SNRI and possibly augment with Buspar (buspirone). Patient has chronic pain so Cymbalta (duloxetine) or Effexor (venlafaxine) may be options. Her anxiety has been less with the Zoloft (sertraline) but it may be causing more side effects, but we can start with augmenting with Wellbutrin (buproprion).    Patient also asked about CBD oil and I reviewed the lack of evidence for effectiveness and use. Reviewed that supplements and herbal remedies are not regulated by the FDA. Would recommend against oral ingestion of this and be cautious if patient tries it because it could affect symptoms and interact with prescription medications.       Treatment Plan:    Increase Zoloft  (sertraline) to 200 mg by mouth daily for mood and anxiety    Start Wellbutrin (buproprion)  mg by mouth daily in AM for depression. May consider dose increase if needed.     Continue Xanax (alprazolam) 0.5 mg by mouth daily as needed for panic per primary care provider.     Continue all other medications as reviewed per electronic medical record today.     Safety plan reviewed. To the Emergency Department as needed or call after hours crisis line at [phone] or [phone]. [place] Crisis Text Line: Text [place] to 893971  or  Suicide LifeLine Chat: [employer].org/chat    Continue individual therapy as planned with Fior today at 11 am.    Schedule an appointment with me in 4-6 weeks or sooner as needed.    Follow up with primary care provider as planned or for acute medical concerns.    Call the psychiatric nurse line with medication questions or concerns at [phone].    [facility] may be used to communicate with your provider, but this is not intended to be used for emergencies.    Community Resources:    National Suicide Prevention Lifeline: 282.135.9621 (TTY: 146.162.8478). Call anytime for help.  (www.suicidepreventionlifeline.org)  National [place] on Mental Illness (www.shant.org): 535.646.6952 or 399-489-2902.   Mental Health Association (www.mentalhealth.org): 404.862.6384 or 579-339-7939.  [place] Crisis Text Line: Text [place] to 616692  Suicide LifeLine Chat: [employer].org/chat    Administrative Billing:   Time spent with patient was 60 minutes and greater than 50% of time or 40 minutes was spent in counseling and coordination of care regarding above diagnoses and treatment plan.    Patient Status:  Patient will continue to be seen for ongoing consultation and stabilization.    Signed:   Sarah Martinez, PhD, APRN, CNP  Psychiatry        Unable to assess

## 2022-10-13 ENCOUNTER — OFFICE VISIT (OUTPATIENT)
Dept: PEDIATRICS | Facility: CLINIC | Age: 32
End: 2022-10-13
Payer: COMMERCIAL

## 2022-10-13 VITALS
HEART RATE: 88 BPM | RESPIRATION RATE: 16 BRPM | SYSTOLIC BLOOD PRESSURE: 118 MMHG | TEMPERATURE: 98.7 F | BODY MASS INDEX: 41.11 KG/M2 | WEIGHT: 270.4 LBS | DIASTOLIC BLOOD PRESSURE: 66 MMHG | OXYGEN SATURATION: 96 %

## 2022-10-13 DIAGNOSIS — Z30.09 BIRTH CONTROL COUNSELING: ICD-10-CM

## 2022-10-13 DIAGNOSIS — F33.1 MAJOR DEPRESSIVE DISORDER, RECURRENT EPISODE, MODERATE (H): Primary | ICD-10-CM

## 2022-10-13 DIAGNOSIS — M25.561 CHRONIC PAIN OF RIGHT KNEE: ICD-10-CM

## 2022-10-13 DIAGNOSIS — E66.01 MORBID OBESITY (H): ICD-10-CM

## 2022-10-13 DIAGNOSIS — F41.1 GAD (GENERALIZED ANXIETY DISORDER): ICD-10-CM

## 2022-10-13 DIAGNOSIS — G89.29 CHRONIC PAIN OF RIGHT KNEE: ICD-10-CM

## 2022-10-13 PROCEDURE — 99213 OFFICE O/P EST LOW 20 MIN: CPT | Mod: 25 | Performed by: PEDIATRICS

## 2022-10-13 PROCEDURE — 91312 COVID-19,PF,PFIZER BOOSTER BIVALENT: CPT | Performed by: PEDIATRICS

## 2022-10-13 PROCEDURE — 90686 IIV4 VACC NO PRSV 0.5 ML IM: CPT | Performed by: PEDIATRICS

## 2022-10-13 PROCEDURE — 90471 IMMUNIZATION ADMIN: CPT | Performed by: PEDIATRICS

## 2022-10-13 PROCEDURE — 0124A COVID-19,PF,PFIZER BOOSTER BIVALENT: CPT | Performed by: PEDIATRICS

## 2022-10-13 PROCEDURE — 96127 BRIEF EMOTIONAL/BEHAV ASSMT: CPT | Performed by: PEDIATRICS

## 2022-10-13 RX ORDER — LISDEXAMFETAMINE DIMESYLATE 20 MG/1
CAPSULE ORAL
COMMUNITY
Start: 2022-07-18 | End: 2023-08-11

## 2022-10-13 ASSESSMENT — ENCOUNTER SYMPTOMS
HEARTBURN: 0
MYALGIAS: 0
CHILLS: 0
FREQUENCY: 0
CONSTIPATION: 0
NERVOUS/ANXIOUS: 0
DIARRHEA: 0
PARESTHESIAS: 0
ARTHRALGIAS: 0
DIZZINESS: 0
EYE PAIN: 0
SHORTNESS OF BREATH: 0
HEADACHES: 0
COUGH: 1
NAUSEA: 0
BREAST MASS: 0
FEVER: 0
ABDOMINAL PAIN: 0
HEMATURIA: 0
SORE THROAT: 0
PALPITATIONS: 0
DYSURIA: 0
JOINT SWELLING: 0
HEMATOCHEZIA: 0
WEAKNESS: 0

## 2022-10-13 ASSESSMENT — ANXIETY QUESTIONNAIRES
IF YOU CHECKED OFF ANY PROBLEMS ON THIS QUESTIONNAIRE, HOW DIFFICULT HAVE THESE PROBLEMS MADE IT FOR YOU TO DO YOUR WORK, TAKE CARE OF THINGS AT HOME, OR GET ALONG WITH OTHER PEOPLE: SOMEWHAT DIFFICULT
GAD7 TOTAL SCORE: 5
7. FEELING AFRAID AS IF SOMETHING AWFUL MIGHT HAPPEN: SEVERAL DAYS
5. BEING SO RESTLESS THAT IT IS HARD TO SIT STILL: NOT AT ALL
2. NOT BEING ABLE TO STOP OR CONTROL WORRYING: SEVERAL DAYS
GAD7 TOTAL SCORE: 5
GAD7 TOTAL SCORE: 5
6. BECOMING EASILY ANNOYED OR IRRITABLE: SEVERAL DAYS
8. IF YOU CHECKED OFF ANY PROBLEMS, HOW DIFFICULT HAVE THESE MADE IT FOR YOU TO DO YOUR WORK, TAKE CARE OF THINGS AT HOME, OR GET ALONG WITH OTHER PEOPLE?: SOMEWHAT DIFFICULT
1. FEELING NERVOUS, ANXIOUS, OR ON EDGE: SEVERAL DAYS
3. WORRYING TOO MUCH ABOUT DIFFERENT THINGS: SEVERAL DAYS
7. FEELING AFRAID AS IF SOMETHING AWFUL MIGHT HAPPEN: SEVERAL DAYS
4. TROUBLE RELAXING: NOT AT ALL

## 2022-10-13 ASSESSMENT — PATIENT HEALTH QUESTIONNAIRE - PHQ9
10. IF YOU CHECKED OFF ANY PROBLEMS, HOW DIFFICULT HAVE THESE PROBLEMS MADE IT FOR YOU TO DO YOUR WORK, TAKE CARE OF THINGS AT HOME, OR GET ALONG WITH OTHER PEOPLE: VERY DIFFICULT
SUM OF ALL RESPONSES TO PHQ QUESTIONS 1-9: 9
SUM OF ALL RESPONSES TO PHQ QUESTIONS 1-9: 9

## 2022-10-13 ASSESSMENT — PAIN SCALES - GENERAL: PAINLEVEL: SEVERE PAIN (6)

## 2022-10-13 NOTE — PATIENT INSTRUCTIONS
Right knee - keep walking, plan to ice for 15 min afterwards.  Start doing body weight squats (put chair behind you). Try for goal of 5 every time you go to the bathroom.  Then slowly increase.    If you want to see physical therapy please send me note.    .

## 2022-10-13 NOTE — PROGRESS NOTES
"  Assessment & Plan     Major depressive disorder, recurrent episode, moderate (H)  Stable - following the [facility] - therapy and psychiatry    MARÍA (generalized anxiety disorder)  Stable - as above    Birth control counseling  Patient would like \"tubes tied\"  - Ob/Gyn Referral; Future    Morbid obesity (H)  Working on walking, watching diet    Chronic pain of right knee  Likely 2/2 weight, no red flag findings on exam.  Likely also from weak glutes/quads, see PI.                 Patient Instructions   1. Right knee - keep walking, plan to ice for 15 min afterwards.  Start doing body weight squats (put chair behind you). Try for goal of 5 every time you go to the bathroom.  Then slowly increase.    If you want to see physical therapy please send me note.    .       Return in about 5 months (around 3/13/2023) for Routine preventive, with me, in person (next available).    Rozina Ricketts MD  [facility] FREDRICK Ontiveros is a 32 year old, presenting for the following health issues:  RECHECK         Depression and Anxiety Follow-Up    How are you doing with your depression since your last visit? Improved     How are you doing with your anxiety since your last visit?  No change    Are you having other symptoms that might be associated with depression or anxiety? No    Have you had a significant life event? Financial Concerns     Do you have any concerns with your use of alcohol or other drugs? No     Slowly some improvement - seeing therapist regularly. Little bumpy lately as she has filed for bankruptcy. Mental health NOT getting worse.     Back pain - maybe getting a little worse, but lack of activity.  Bilateral knees hurting more, kings with extended walking (outside of apt), even walk down to get mail. R>L pain, no swelling. Hurts during and after.  Does take ibuprofen occasionally.    Flexeril - atleast 3 times a week, full tab, does make her sleepy, usually takes in later afternoon.     ADHD - " [facility], Dr. Dawson prescribing Vyvanse and cymbalta 60mg.  Very rare use of xanax.     Social History     Tobacco Use     Smoking status: Never     Smokeless tobacco: Never   Vaping Use     Vaping Use: Never used   Substance Use Topics     Alcohol use: No     Alcohol/week: 0.0 standard drinks     Comment: on a rare occ      Drug use: Yes     Types: Marijuana     Comment: edible marijuana treats weekly     PHQ 1/4/2022 7/30/2022 10/13/2022   PHQ-9 Total Score 15 12 9   Q9: Thoughts of better off dead/self-harm past 2 weeks Several days Several days Not at all   F/U: Thoughts of suicide or self-harm Yes No -   F/U: Self harm-plan No - -   F/U: Self-harm action No - -   F/U: Safety concerns No No -   Some encounter information is confidential and restricted. Go to Review Flowsembraase activity to see all data.     MARÍA-7 SCORE 1/27/2021 1/4/2022 10/13/2022   Total Score 9 (mild anxiety) 9 (mild anxiety) 5 (mild anxiety)   Total Score 9 9 5   Some encounter information is confidential and restricted. Go to Review Flowsheets activity to see all data.     Last PHQ-9 10/13/2022   1.  Little interest or pleasure in doing things 0   2.  Feeling down, depressed, or hopeless 1   3.  Trouble falling or staying asleep, or sleeping too much 1   4.  Feeling tired or having little energy 3   5.  Poor appetite or overeating 2   6.  Feeling bad about yourself 1   7.  Trouble concentrating 1   8.  Moving slowly or restless 0   Q9: Thoughts of better off dead/self-harm past 2 weeks 0   PHQ-9 Total Score 9   Difficulty at work, home, or with people -   In the past two weeks have you had thoughts of suicide or self harm? -   Do you have concerns about your personal safety or the safety of others? -   In the past 2 weeks have you thought about a plan or had intention to harm yourself? -   In the past 2 weeks have you acted on these thoughts in any way? -   Some encounter information is confidential and restricted. Go to Review Flowsheets  activity to see all data.     MARÍA-7  10/13/2022   1. Feeling nervous, anxious, or on edge 1   2. Not being able to stop or control worrying 1   3. Worrying too much about different things 1   4. Trouble relaxing 0   5. Being so restless that it is hard to sit still 0   6. Becoming easily annoyed or irritable 1   7. Feeling afraid, as if something awful might happen 1   MARÍA-7 Total Score 5   If you checked any problems, how difficult have they made it for you to do your work, take care of things at home, or get along with other people? Somewhat difficult   Some encounter information is confidential and restricted. Go to Review Flowsheets activity to see all data.       Suicide Assessment Five-step Evaluation and Treatment (SAFE-T)          Review of Systems   Constitutional: Negative for chills and fever.   HENT: Positive for congestion. Negative for ear pain, hearing loss and sore throat.    Eyes: Negative for pain and visual disturbance.   Respiratory: Positive for cough. Negative for shortness of breath.    Cardiovascular: Negative for chest pain, palpitations and peripheral edema.   Gastrointestinal: Negative for abdominal pain, constipation, diarrhea, heartburn, hematochezia and nausea.   Breasts:  Negative for tenderness, breast mass and discharge.   Genitourinary: Negative for dysuria, frequency, genital sores, hematuria, pelvic pain, urgency, vaginal bleeding and vaginal discharge.   Musculoskeletal: Negative for arthralgias, joint swelling and myalgias.   Skin: Negative for rash.   Neurological: Negative for dizziness, weakness, headaches and paresthesias.   Psychiatric/Behavioral: Negative for mood changes. The patient is not nervous/anxious.             Objective    /66 (BP Location: Right arm, Patient Position: Sitting, Cuff Size: Adult Large)   Pulse 88   Temp 98.7  F (37.1  C) (Tympanic)   Resp 16   Wt 122.7 kg (270 lb 6.4 oz)   LMP 10/11/2022 (Exact Date)   SpO2 96%   BMI 41.11 kg/m     Body mass index is 41.11 kg/m .  Physical Exam   GENERAL APPEARANCE: healthy, alert and no distress  ORTHO: knees equal in appearance, no warmth, swelling or tenderness. No point tenderness: distal patella, tibial tuborosity, medial or lateral joint line. No laxiety ant/post.  No pain/laxity with varus/valgus pressure.  Full hip range of motion and hip flexor strength 5/5.                      Answers for HPI/ROS submitted by the patient on 10/13/2022  If you checked off any problems, how difficult have these problems made it for you to do your work, take care of things at home, or get along with other people?: Very difficult  PHQ9 TOTAL SCORE: 9  MARÍA 7 TOTAL SCORE: 5

## 2022-12-16 PROBLEM — G89.29 CHRONIC BILATERAL LOW BACK PAIN WITHOUT SCIATICA: Status: RESOLVED | Noted: 2018-11-06 | Resolved: 2022-12-16

## 2022-12-16 PROBLEM — M54.50 CHRONIC BILATERAL LOW BACK PAIN WITHOUT SCIATICA: Status: RESOLVED | Noted: 2018-11-06 | Resolved: 2022-12-16

## 2022-12-16 NOTE — PROGRESS NOTES
Discharge Note    Progress reporting period is from last progress note on 03/24/22 to Apr 7, 2022.    Terra failed to follow up and current status is unknown.  Please see information below for last relevant information on current status.  Patient seen for 7 visits.    SUBJECTIVE  Subjective changes noted by patient:  Back has been doing pretty good the past couple of weeks. Has been doing some of the exercises every day. On Monday walked to [place], this felt ok, other then fatigue. Has been avoiding staying in 1 position for too long. Today the R buttock and hip is a little sore.  .  Current pain level is 4/10.     Previous pain level was  7/10.   Changes in function:  Yes (See Goal flowsheet attached for changes in current functional level)  Adverse reaction to treatment or activity: None    OBJECTIVE  Changes noted in objective findings: AROM Lumbar Flx: distal shins, Ext: 75% slight LBP     ASSESSMENT/PLAN  Diagnosis: Low Back Pain   Updated problem list and treatment plan:   Pain - HEP  Decreased ROM/flexibility - HEP  Decreased function - HEP  Impaired muscle performance - HEP  STG/LTGs have been met or progress has been made towards goals:  Yes, please see goal flowsheet for most current information  Assessment of Progress: current status is unknown.    Last current status: Pt is progressing slower than anticipated   Self Management Plans:  HEP  I have re-evaluated this patient and find that the nature, scope, duration and intensity of the therapy is appropriate for the medical condition of the patient.  Rama continues to require the following intervention to meet STG and LTG's:  HEP.    Recommendations:  Discharge with current home program.  Patient to follow up with MD as needed.    Please refer to the daily flowsheet for treatment today, total treatment time and time spent performing 1:1 timed codes.

## 2023-01-18 SDOH — ECONOMIC STABILITY: FOOD INSECURITY: WITHIN THE PAST 12 MONTHS, THE FOOD YOU BOUGHT JUST DIDN'T LAST AND YOU DIDN'T HAVE MONEY TO GET MORE.: SOMETIMES TRUE

## 2023-01-18 SDOH — HEALTH STABILITY: PHYSICAL HEALTH: ON AVERAGE, HOW MANY DAYS PER WEEK DO YOU ENGAGE IN MODERATE TO STRENUOUS EXERCISE (LIKE A BRISK WALK)?: 0 DAYS

## 2023-01-18 SDOH — HEALTH STABILITY: PHYSICAL HEALTH: ON AVERAGE, HOW MANY MINUTES DO YOU ENGAGE IN EXERCISE AT THIS LEVEL?: 0 MIN

## 2023-01-18 SDOH — ECONOMIC STABILITY: INCOME INSECURITY: IN THE LAST 12 MONTHS, WAS THERE A TIME WHEN YOU WERE NOT ABLE TO PAY THE MORTGAGE OR RENT ON TIME?: NO

## 2023-01-18 SDOH — ECONOMIC STABILITY: INCOME INSECURITY: HOW HARD IS IT FOR YOU TO PAY FOR THE VERY BASICS LIKE FOOD, HOUSING, MEDICAL CARE, AND HEATING?: HARD

## 2023-01-18 SDOH — ECONOMIC STABILITY: FOOD INSECURITY: WITHIN THE PAST 12 MONTHS, YOU WORRIED THAT YOUR FOOD WOULD RUN OUT BEFORE YOU GOT MONEY TO BUY MORE.: OFTEN TRUE

## 2023-01-18 ASSESSMENT — ANXIETY QUESTIONNAIRES
5. BEING SO RESTLESS THAT IT IS HARD TO SIT STILL: NOT AT ALL
IF YOU CHECKED OFF ANY PROBLEMS ON THIS QUESTIONNAIRE, HOW DIFFICULT HAVE THESE PROBLEMS MADE IT FOR YOU TO DO YOUR WORK, TAKE CARE OF THINGS AT HOME, OR GET ALONG WITH OTHER PEOPLE: SOMEWHAT DIFFICULT
3. WORRYING TOO MUCH ABOUT DIFFERENT THINGS: SEVERAL DAYS
7. FEELING AFRAID AS IF SOMETHING AWFUL MIGHT HAPPEN: SEVERAL DAYS
6. BECOMING EASILY ANNOYED OR IRRITABLE: SEVERAL DAYS
4. TROUBLE RELAXING: MORE THAN HALF THE DAYS
2. NOT BEING ABLE TO STOP OR CONTROL WORRYING: SEVERAL DAYS
GAD7 TOTAL SCORE: 7
1. FEELING NERVOUS, ANXIOUS, OR ON EDGE: SEVERAL DAYS

## 2023-01-18 ASSESSMENT — SOCIAL DETERMINANTS OF HEALTH (SDOH)
ARE YOU MARRIED, WIDOWED, DIVORCED, SEPARATED, NEVER MARRIED, OR LIVING WITH A PARTNER?: NEVER MARRIED
HOW OFTEN DO YOU GET TOGETHER WITH FRIENDS OR RELATIVES?: NEVER
DO YOU BELONG TO ANY CLUBS OR ORGANIZATIONS SUCH AS CHURCH GROUPS UNIONS, FRATERNAL OR ATHLETIC GROUPS, OR SCHOOL GROUPS?: NO
HOW OFTEN DO YOU ATTEND CHURCH OR RELIGIOUS SERVICES?: NEVER
IN A TYPICAL WEEK, HOW MANY TIMES DO YOU TALK ON THE PHONE WITH FAMILY, FRIENDS, OR NEIGHBORS?: ONCE A WEEK

## 2023-01-18 ASSESSMENT — LIFESTYLE VARIABLES
HOW OFTEN DO YOU HAVE SIX OR MORE DRINKS ON ONE OCCASION: NEVER
HOW OFTEN DO YOU HAVE A DRINK CONTAINING ALCOHOL: NEVER
AUDIT-C TOTAL SCORE: 0
HOW MANY STANDARD DRINKS CONTAINING ALCOHOL DO YOU HAVE ON A TYPICAL DAY: PATIENT DOES NOT DRINK
SKIP TO QUESTIONS 9-10: 1

## 2023-03-03 ENCOUNTER — TELEPHONE (OUTPATIENT)
Dept: PEDIATRICS | Facility: CLINIC | Age: 33
End: 2023-03-03

## 2023-03-03 NOTE — TELEPHONE ENCOUNTER
Forms/Letter Request    Type of form/letter: disability    Have you been seen for this request: No    Do we have the form/letter: Yes: Dr Ricketts     When is form/letter needed by: asap    How would you like the form/letter returned: Fax    Patient Notified form requests are processed in 3-5 business days:No    Could we send this information to you in [employer] or would you prefer to receive a phone call?:   Patient would prefer a phone call   Okay to leave a detailed message?: Yes at Cell number on file:    Telephone Information:   Mobile [phone]

## 2023-03-08 NOTE — TELEPHONE ENCOUNTER
Patient has been called and scheduled for video visit appt. Paperwork is in Dr. Ricketts's in-basket.

## 2023-03-20 ENCOUNTER — VIRTUAL VISIT (OUTPATIENT)
Dept: PEDIATRICS | Facility: CLINIC | Age: 33
End: 2023-03-20
Payer: COMMERCIAL

## 2023-03-20 DIAGNOSIS — M43.17 SPONDYLOLISTHESIS AT L5-S1 LEVEL: Primary | ICD-10-CM

## 2023-03-20 PROCEDURE — 99213 OFFICE O/P EST LOW 20 MIN: CPT | Mod: VID | Performed by: PEDIATRICS

## 2023-03-20 NOTE — PROGRESS NOTES
Rama is a 32 year old who is being evaluated via a billable video visit.      How would you like to obtain your AVS? MyChart  If the video visit is dropped, the invitation should be resent by: Text to cell phone: [phone]  Will anyone else be joining your video visit? No        Assessment & Plan     Spondylolisthesis at L5-S1 level  Paperwork completed for Social security               There are no Patient Instructions on file for this visit.    Return for Appt already scheduled.    Rozina Ricketts MD  [facility]    Casimiro Ontiveros is a 32 year old, presenting for the following health issues:  No chief complaint on file.      History of Present Illness       Reason for visit:  Paperwork for SSI disablity application    She eats 0-1 servings of fruits and vegetables daily.She consumes 2 sweetened beverage(s) daily.She exercises with enough effort to increase her heart rate 9 or less minutes per day.  She exercises with enough effort to increase her heart rate 3 or less days per week. She is missing 3 dose(s) of medications per week.  She is not taking prescribed medications regularly due to remembering to take.     Rama is applying for social security disability for chronic back pain from spondylolisthesis L5-S1    She has done physical therapy multiple times, most recently last month.    Patient has also met recently with Dr. Hernandez in Neurosurgery - he recommended continue physical therapy, weight loss. If these are not sucessful, then could consider L5-S1 fusion.     She has noticed her  is decreased.  Cannot open certain jars or soda bottles. Both hands affected. Getting worse x 1 year. Patient does art with hands, she is wondering if this is from using hands too much. She can't work as long as she'd like to. She does try to do hand stretches.     Patient is limited physical to lifting less than 5#, cannot stand or walk for more than 2 hours at a time or cumulative in 8 hours  shift.     She is also sensitive to heat, humidity, noise and vibrations.          Review of Systems         Objective           Vitals:  No vitals were obtained today due to virtual visit.    Physical Exam                   Video-Visit Details    Type of service:  Video Visit   Video Start Time: 2:00pm  Video End Time:2:21 PM    Originating Location (pt. Location): Home  Distant Location (provider location):  Off-site  Platform used for Video Visit: Lo

## 2023-03-22 ENCOUNTER — TELEPHONE (OUTPATIENT)
Dept: PEDIATRICS | Facility: CLINIC | Age: 33
End: 2023-03-22
Payer: COMMERCIAL

## 2023-05-06 ENCOUNTER — HEALTH MAINTENANCE LETTER (OUTPATIENT)
Age: 33
End: 2023-05-06

## 2023-06-21 ENCOUNTER — TELEPHONE (OUTPATIENT)
Dept: PEDIATRICS | Facility: CLINIC | Age: 33
End: 2023-06-21
Payer: COMMERCIAL

## 2023-08-10 SDOH — ECONOMIC STABILITY: FOOD INSECURITY: WITHIN THE PAST 12 MONTHS, YOU WORRIED THAT YOUR FOOD WOULD RUN OUT BEFORE YOU GOT MONEY TO BUY MORE.: OFTEN TRUE

## 2023-08-10 SDOH — ECONOMIC STABILITY: FOOD INSECURITY: WITHIN THE PAST 12 MONTHS, THE FOOD YOU BOUGHT JUST DIDN'T LAST AND YOU DIDN'T HAVE MONEY TO GET MORE.: SOMETIMES TRUE

## 2023-08-10 SDOH — ECONOMIC STABILITY: INCOME INSECURITY: IN THE LAST 12 MONTHS, WAS THERE A TIME WHEN YOU WERE NOT ABLE TO PAY THE MORTGAGE OR RENT ON TIME?: NO

## 2023-08-10 SDOH — HEALTH STABILITY: PHYSICAL HEALTH: ON AVERAGE, HOW MANY MINUTES DO YOU ENGAGE IN EXERCISE AT THIS LEVEL?: 0 MIN

## 2023-08-10 SDOH — HEALTH STABILITY: PHYSICAL HEALTH: ON AVERAGE, HOW MANY DAYS PER WEEK DO YOU ENGAGE IN MODERATE TO STRENUOUS EXERCISE (LIKE A BRISK WALK)?: 0 DAYS

## 2023-08-10 SDOH — ECONOMIC STABILITY: INCOME INSECURITY: HOW HARD IS IT FOR YOU TO PAY FOR THE VERY BASICS LIKE FOOD, HOUSING, MEDICAL CARE, AND HEATING?: HARD

## 2023-08-10 ASSESSMENT — SOCIAL DETERMINANTS OF HEALTH (SDOH)
IN A TYPICAL WEEK, HOW MANY TIMES DO YOU TALK ON THE PHONE WITH FAMILY, FRIENDS, OR NEIGHBORS?: ONCE A WEEK
DO YOU BELONG TO ANY CLUBS OR ORGANIZATIONS SUCH AS CHURCH GROUPS UNIONS, FRATERNAL OR ATHLETIC GROUPS, OR SCHOOL GROUPS?: NO
HOW OFTEN DO YOU GET TOGETHER WITH FRIENDS OR RELATIVES?: NEVER
ARE YOU MARRIED, WIDOWED, DIVORCED, SEPARATED, NEVER MARRIED, OR LIVING WITH A PARTNER?: NEVER MARRIED
HOW OFTEN DO YOU ATTEND CHURCH OR RELIGIOUS SERVICES?: NEVER

## 2023-08-10 ASSESSMENT — ANXIETY QUESTIONNAIRES
4. TROUBLE RELAXING: MORE THAN HALF THE DAYS
3. WORRYING TOO MUCH ABOUT DIFFERENT THINGS: SEVERAL DAYS
6. BECOMING EASILY ANNOYED OR IRRITABLE: SEVERAL DAYS
GAD7 TOTAL SCORE: 7
GAD7 TOTAL SCORE: 7
7. FEELING AFRAID AS IF SOMETHING AWFUL MIGHT HAPPEN: SEVERAL DAYS
2. NOT BEING ABLE TO STOP OR CONTROL WORRYING: SEVERAL DAYS
1. FEELING NERVOUS, ANXIOUS, OR ON EDGE: SEVERAL DAYS
IF YOU CHECKED OFF ANY PROBLEMS ON THIS QUESTIONNAIRE, HOW DIFFICULT HAVE THESE PROBLEMS MADE IT FOR YOU TO DO YOUR WORK, TAKE CARE OF THINGS AT HOME, OR GET ALONG WITH OTHER PEOPLE: SOMEWHAT DIFFICULT
5. BEING SO RESTLESS THAT IT IS HARD TO SIT STILL: NOT AT ALL

## 2023-08-10 ASSESSMENT — ENCOUNTER SYMPTOMS
BREAST MASS: 0
HEMATOCHEZIA: 0
CHILLS: 0
ARTHRALGIAS: 0
CONSTIPATION: 0
DIZZINESS: 0
HEADACHES: 1
PALPITATIONS: 0
FEVER: 0
HEMATURIA: 0
SHORTNESS OF BREATH: 0
NERVOUS/ANXIOUS: 0
FREQUENCY: 0
SORE THROAT: 0
ABDOMINAL PAIN: 0
HEARTBURN: 0
COUGH: 0
DYSURIA: 0
DIARRHEA: 0
PARESTHESIAS: 0
NAUSEA: 0
MYALGIAS: 0
EYE PAIN: 0
JOINT SWELLING: 0
WEAKNESS: 0

## 2023-08-10 ASSESSMENT — LIFESTYLE VARIABLES
SKIP TO QUESTIONS 9-10: 1
HOW OFTEN DO YOU HAVE SIX OR MORE DRINKS ON ONE OCCASION: NEVER
HOW OFTEN DO YOU HAVE A DRINK CONTAINING ALCOHOL: NEVER
HOW MANY STANDARD DRINKS CONTAINING ALCOHOL DO YOU HAVE ON A TYPICAL DAY: PATIENT DOES NOT DRINK
AUDIT-C TOTAL SCORE: 0

## 2023-08-11 ENCOUNTER — OFFICE VISIT (OUTPATIENT)
Dept: PEDIATRICS | Facility: CLINIC | Age: 33
End: 2023-08-11
Payer: COMMERCIAL

## 2023-08-11 VITALS
HEART RATE: 106 BPM | DIASTOLIC BLOOD PRESSURE: 84 MMHG | BODY MASS INDEX: 42.25 KG/M2 | SYSTOLIC BLOOD PRESSURE: 128 MMHG | WEIGHT: 278.8 LBS | RESPIRATION RATE: 16 BRPM | HEIGHT: 68 IN | OXYGEN SATURATION: 96 % | TEMPERATURE: 98 F

## 2023-08-11 DIAGNOSIS — E66.01 MORBID OBESITY (H): ICD-10-CM

## 2023-08-11 DIAGNOSIS — F33.1 MAJOR DEPRESSIVE DISORDER, RECURRENT EPISODE, MODERATE (H): ICD-10-CM

## 2023-08-11 DIAGNOSIS — Z13.1 SCREENING FOR DIABETES MELLITUS: ICD-10-CM

## 2023-08-11 DIAGNOSIS — Z00.01 ENCOUNTER FOR ROUTINE ADULT MEDICAL EXAM WITH ABNORMAL FINDINGS: Primary | ICD-10-CM

## 2023-08-11 DIAGNOSIS — M62.830 BACK MUSCLE SPASM: ICD-10-CM

## 2023-08-11 LAB — HBA1C MFR BLD: 5.7 % (ref 0–5.6)

## 2023-08-11 PROCEDURE — 99213 OFFICE O/P EST LOW 20 MIN: CPT | Mod: 25 | Performed by: PEDIATRICS

## 2023-08-11 PROCEDURE — 83036 HEMOGLOBIN GLYCOSYLATED A1C: CPT | Performed by: PEDIATRICS

## 2023-08-11 PROCEDURE — 36415 COLL VENOUS BLD VENIPUNCTURE: CPT | Performed by: PEDIATRICS

## 2023-08-11 PROCEDURE — 99395 PREV VISIT EST AGE 18-39: CPT | Performed by: PEDIATRICS

## 2023-08-11 RX ORDER — CYCLOBENZAPRINE HCL 10 MG
10 TABLET ORAL 3 TIMES DAILY PRN
Qty: 90 TABLET | Refills: 3 | Status: SHIPPED | OUTPATIENT
Start: 2023-08-11 | End: 2024-04-29

## 2023-08-11 ASSESSMENT — ENCOUNTER SYMPTOMS
MYALGIAS: 0
CHILLS: 0
NAUSEA: 0
HEADACHES: 1
PALPITATIONS: 0
ARTHRALGIAS: 0
PARESTHESIAS: 0
HEMATURIA: 0
COUGH: 0
WEAKNESS: 0
DIZZINESS: 0
JOINT SWELLING: 0
EYE PAIN: 0
DYSURIA: 0
DIARRHEA: 0
ABDOMINAL PAIN: 0
SORE THROAT: 0
SHORTNESS OF BREATH: 0
BREAST MASS: 0
FREQUENCY: 0
FEVER: 0
HEMATOCHEZIA: 0
HEARTBURN: 0
NERVOUS/ANXIOUS: 0
CONSTIPATION: 0

## 2023-08-11 ASSESSMENT — PATIENT HEALTH QUESTIONNAIRE - PHQ9: SUM OF ALL RESPONSES TO PHQ QUESTIONS 1-9: 13

## 2023-08-11 ASSESSMENT — PAIN SCALES - GENERAL: PAINLEVEL: MODERATE PAIN (4)

## 2023-08-11 NOTE — PROGRESS NOTES
SUBJECTIVE:   CC: Rama is an 33 year old who presents for preventive health visit.       8/11/2023     3:04 PM   Additional Questions   Roomed by hermann trevizo   Accompanied by n/a         8/11/2023     3:04 PM   Patient Reported Additional Medications   Patient reports taking the following new medications n/a       Healthy Habits:     Getting at least 3 servings of Calcium per day:  NO    Bi-annual eye exam:  Yes    Dental care twice a year:  NO    Sleep apnea or symptoms of sleep apnea:  None    Diet:  Regular (no restrictions)    Frequency of exercise:  None    Taking medications regularly:  No    Barriers to taking medications:  Problems remembering to take them    Medication side effects:  None    Additional concerns today:  No    Last visit 3/23 - visit for social security    --still working to get disability    --chronic pain    --might have to switch therapists (her's left) - was going to [facility]. They are currently prescribing her duloxetine and vyvanse. She isn't on the vyvanse right now because she has to see them monthly for this and can't remember. Just duloxetine for now and vit D.     --still using flexeril for back pain - atleast twice a week. Not planning to do anymore epidurals through pain clinic.             Social History     Tobacco Use    Smoking status: Never    Smokeless tobacco: Never   Substance Use Topics    Alcohol use: No     Alcohol/week: 0.0 standard drinks of alcohol     Comment: on a rare occ              8/10/2023     6:47 PM   Alcohol Use   Prescreen: >3 drinks/day or >7 drinks/week? Not Applicable       Reviewed orders with patient.  Reviewed health maintenance and updated orders accordingly - Yes      Breast Cancer Screening:    FHS-7:       1/4/2022     3:35 PM 7/30/2022     2:19 PM 8/16/2022     1:10 PM 10/7/2022    12:02 PM 10/13/2022    12:52 PM 1/18/2023     7:51 PM 8/10/2023     6:47 PM   Breast CA Risk Assessment (FHS-7)   Did any of your first-degree relatives have breast or  ovarian cancer?  No No No No No    No No   Did any of your relatives have bilateral breast cancer? Unknown Unknown Unknown Unknown Unknown Unknown    Unknown Unknown   Did any man in your family have breast cancer? No No Unknown No No Unknown    Unknown Unknown   Did any woman in your family have breast and ovarian cancer? Unknown No No No No No    No No   Did any woman in your family have breast cancer before age 50 y? No No No No No No    No No   Do you have 2 or more relatives with breast and/or ovarian cancer? No No Unknown No No Unknown    Unknown Unknown   Do you have 2 or more relatives with breast and/or bowel cancer? No No Unknown Unknown Unknown No    No No       Patient under 40 years of age: Routine Mammogram Screening not recommended.   Pertinent mammograms are reviewed under the imaging tab.    History of abnormal Pap smear: NO - age 30-65 PAP every 5 years with negative HPV co-testing recommended      Latest Ref Rng & Units 5/10/2019     1:31 PM 5/10/2019     1:25 PM   PAP / HPV   PAP (Historical)  NIL     HPV 16 DNA NEG^Negative  Negative    HPV 18 DNA NEG^Negative  Negative    Other HR HPV NEG^Negative  Negative      Reviewed and updated as needed this visit by clinical staff   Tobacco  Allergies  Meds              Reviewed and updated as needed this visit by Provider                     Review of Systems   Constitutional:  Negative for chills and fever.   HENT:  Negative for congestion, ear pain, hearing loss and sore throat.    Eyes:  Negative for pain and visual disturbance.   Respiratory:  Negative for cough and shortness of breath.    Cardiovascular:  Negative for chest pain, palpitations and peripheral edema.   Gastrointestinal:  Negative for abdominal pain, constipation, diarrhea, heartburn, hematochezia and nausea.   Breasts:  Negative for tenderness, breast mass and discharge.   Genitourinary:  Negative for dysuria, frequency, genital sores, hematuria, pelvic pain, urgency, vaginal  "bleeding and vaginal discharge.   Musculoskeletal:  Negative for arthralgias, joint swelling and myalgias.   Skin:  Negative for rash.   Neurological:  Positive for headaches. Negative for dizziness, weakness and paresthesias.   Psychiatric/Behavioral:  Negative for mood changes. The patient is not nervous/anxious.           OBJECTIVE:   /84 (BP Location: Right arm, Patient Position: Sitting, Cuff Size: Adult Large)   Pulse 106   Temp 98  F (36.7  C)   Resp 16   Ht 1.735 m (5' 8.31\")   Wt 126.5 kg (278 lb 12.8 oz)   LMP 08/11/2023 (Exact Date)   SpO2 96%   BMI 42.01 kg/m    Physical Exam  GENERAL: healthy, alert and no distress  EYES: Eyes grossly normal to inspection, PERRL and conjunctivae and sclerae normal  HENT: ear canals and TM's normal, nose and mouth without ulcers or lesions  NECK: no adenopathy, no asymmetry, masses, or scars and thyroid normal to palpation  RESP: lungs clear to auscultation - no rales, rhonchi or wheezes  CV: regular rate and rhythm, normal S1 S2, no S3 or S4, no murmur, click or rub, no peripheral edema and peripheral pulses strong  ABDOMEN: soft, nontender, no hepatosplenomegaly, no masses and bowel sounds normal  MS: no gross musculoskeletal defects noted, no edema  SKIN: no suspicious lesions or rashes  NEURO: Normal strength and tone, mentation intact and speech normal  PSYCH: mentation appears normal, affect normal/bright    Diagnostic Test Results:  Labs reviewed in Epic    ASSESSMENT/PLAN:   (Z00.01) Encounter for routine adult medical exam with abnormal findings  (primary encounter diagnosis)  Comment:   Plan:     (M62.830) Back muscle spasm  Comment: finds this helpful - using a few times per week.   Plan: cyclobenzaprine (FLEXERIL) 10 MG tablet            (Z13.1) Screening for diabetes mellitus  Comment:   Plan: Hemoglobin A1c            (F33.1) Major depressive disorder, recurrent episode, moderate (H)  Comment: managed through [facility]  Plan:     (E66.01) Morbid " "obesity (H)  Comment:   Plan: continue working on walking    Patient has been advised of split billing requirements and indicates understanding: Yes      COUNSELING:  Reviewed preventive health counseling, as reflected in patient instructions      BMI:   Estimated body mass index is 42.01 kg/m  as calculated from the following:    Height as of this encounter: 1.735 m (5' 8.31\").    Weight as of this encounter: 126.5 kg (278 lb 12.8 oz).         She reports that she has never smoked. She has never used smokeless tobacco.          Rozina Ricketts MD  [facility] submitted by the patient for this visit:  MARÍA-7 (Submitted on 8/10/2023)  MARÍA 7 TOTAL SCORE: 7    "

## 2023-08-11 NOTE — PATIENT INSTRUCTIONS
If you still want to meet with OB to discuss surgery options:    [facility]   [address]   [address]   [place] [address]   Phone: [phone]   Fax: [phone]       Preventive Health Recommendations  Female Ages 26 - 39  Yearly exam:   See your health care provider every year in order to  Review health changes.   Discuss preventive care.    Review your medicines if you your doctor has prescribed any.    Until age 30: Get a Pap test every three years (more often if you have had an abnormal result).    After age 30: Talk to your doctor about whether you should have a Pap test every 3 years or have a Pap test with HPV screening every 5 years.   You do not need a Pap test if your uterus was removed (hysterectomy) and you have not had cancer.  You should be tested each year for STDs (sexually transmitted diseases), if you're at risk.   Talk to your provider about how often to have your cholesterol checked.  If you are at risk for diabetes, you should have a diabetes test (fasting glucose).  Shots: Get a flu shot each year. Get a tetanus shot every 10 years.   Nutrition:   Eat at least 5 servings of fruits and vegetables each day.  Eat whole-grain bread, whole-wheat pasta and brown rice instead of white grains and rice.  Get adequate Calcium and Vitamin D.     Lifestyle  Exercise at least 150 minutes a week (30 minutes a day, 5 days of the week). This will help you control your weight and prevent disease.  Limit alcohol to one drink per day.  No smoking.   Wear sunscreen to prevent skin cancer.  See your dentist every six months for an exam and cleaning.

## 2023-10-20 ENCOUNTER — E-VISIT (OUTPATIENT)
Dept: PEDIATRICS | Facility: CLINIC | Age: 33
End: 2023-10-20
Payer: COMMERCIAL

## 2023-10-20 ENCOUNTER — TELEPHONE (OUTPATIENT)
Dept: PEDIATRICS | Facility: CLINIC | Age: 33
End: 2023-10-20

## 2023-10-20 DIAGNOSIS — F33.1 MAJOR DEPRESSIVE DISORDER, RECURRENT EPISODE, MODERATE (H): ICD-10-CM

## 2023-10-20 DIAGNOSIS — F33.1 MAJOR DEPRESSIVE DISORDER, RECURRENT EPISODE, MODERATE (H): Primary | ICD-10-CM

## 2023-10-20 DIAGNOSIS — F41.1 GAD (GENERALIZED ANXIETY DISORDER): ICD-10-CM

## 2023-10-20 PROCEDURE — 99207 PR NON-BILLABLE SERV PER CHARTING: CPT | Performed by: PHYSICIAN ASSISTANT

## 2023-10-20 RX ORDER — DULOXETIN HYDROCHLORIDE 60 MG/1
60 CAPSULE, DELAYED RELEASE ORAL DAILY
Qty: 90 CAPSULE | Refills: 1 | Status: CANCELLED | OUTPATIENT
Start: 2023-10-20

## 2023-10-20 ASSESSMENT — ANXIETY QUESTIONNAIRES
2. NOT BEING ABLE TO STOP OR CONTROL WORRYING: SEVERAL DAYS
1. FEELING NERVOUS, ANXIOUS, OR ON EDGE: MORE THAN HALF THE DAYS
3. WORRYING TOO MUCH ABOUT DIFFERENT THINGS: SEVERAL DAYS
6. BECOMING EASILY ANNOYED OR IRRITABLE: MORE THAN HALF THE DAYS
GAD7 TOTAL SCORE: 9
5. BEING SO RESTLESS THAT IT IS HARD TO SIT STILL: SEVERAL DAYS
GAD7 TOTAL SCORE: 9
4. TROUBLE RELAXING: SEVERAL DAYS
7. FEELING AFRAID AS IF SOMETHING AWFUL MIGHT HAPPEN: SEVERAL DAYS

## 2023-10-20 ASSESSMENT — PATIENT HEALTH QUESTIONNAIRE - PHQ9
SUM OF ALL RESPONSES TO PHQ QUESTIONS 1-9: 14
10. IF YOU CHECKED OFF ANY PROBLEMS, HOW DIFFICULT HAVE THESE PROBLEMS MADE IT FOR YOU TO DO YOUR WORK, TAKE CARE OF THINGS AT HOME, OR GET ALONG WITH OTHER PEOPLE: VERY DIFFICULT
SUM OF ALL RESPONSES TO PHQ QUESTIONS 1-9: 14

## 2023-10-20 NOTE — TELEPHONE ENCOUNTER
Pt called and she is requesting a refill of her cymbalta  She no longer sees the provider  that had been ordering it  She has 1 week left    Pt takes 60 mg daily- sig updated    Routing to refill pool    Nadia Blake RN on 10/20/2023 at 2:39 PM

## 2023-10-20 NOTE — TELEPHONE ENCOUNTER
Attempted to ann marie LICSW and phone number is not correct.     Pt was seen by [facility], [place]. Last seen 1/10/23

## 2023-10-20 NOTE — TELEPHONE ENCOUNTER
Ok to hold for PCP. Pt needs PCP to take over Cymbalta until she can get into new psych provider. Pt is almost of medication.     Spoke with pt and she agrees with plan to wait for PCP. My information was given and I will also send it to her Mychart. She was informed to reach out to me for anything.     Larissa Gil, EMT at 4:08 PM on October 20, 2023  [facility] Health Guide  [phone]

## 2023-10-20 NOTE — TELEPHONE ENCOUNTER
Pt sent an Evisit with positive suicidal Ideation. Routing to triage High priority      Questionnaire: Depression/Anxiety Related Issues     Question: Is Depression one of your symptoms?  Answer:   Yes     Questionnaire: Patient Health Questionnaire     Question: Over the last 2 weeks, how often have you been bothered by any of the following problems?  Answer:        Question: 1. Little interest or pleasure in doing things  Answer:   Several days     Question: 2.  Feeling down, depressed, or hopeless  Answer:   More than half the days     Question: 3.  Trouble falling or staying asleep, or sleeping too much  Answer:   More than half the days     Question: 4.  Feeling tired or having little energy  Answer:   Nearly every day     Question: 5.  Poor appetite or overeating  Answer:   More than half the days     Question: 6.  Feeling bad about yourself - or that you are a failure or have let yourself or your family down  Answer:   More than half the days     Question: 7.  Trouble concentrating on things, such as reading the newspaper or watching television  Answer:   Several days     Question: 8.  Moving or speaking so slowly that other people could have noticed. Or the opposite - being so fidgety or restless that you have been moving around a lot more than usual  Answer:   Not at all     Question: 9.  Thoughts that you would be better off dead, or of hurting yourself in some way  Answer:   Several days     Question: If you checked off any problems, how difficult have these problems made it for you to do your work, take care of things at home, or get along with other people?  Answer:   Very difficult     Questionnaire: Follow-up Questions to your PHQ9 responses     Question: In the past two weeks have you had thoughts of suicide or self harm?  Answer:   Yes     Question: Do you have concerns about your personal safety or the safety of others?  Answer:   No     Questionnaire: Plan     Question: In the past 2 weeks have  you thought about a plan or had intention to harm yourself?  Answer:   No     Question: In the past 2 weeks have you acted on these thoughts in any way?  Answer:   No

## 2023-10-20 NOTE — TELEPHONE ENCOUNTER
Call placed to pt   She has been feeling down, but has a good support system of friends and family  She submitted the E-visit to have Dr. Ricketts take over ordering her cymbalta  She has about a week left    She denies any plan to hurt herself  Pt states she has resources and phones numbers that she can call    Advised that pt can call triage 24/7 to speak to a RN  Pt verbalized understanding and agrees to the plan    Nadia Blake RN on 10/20/2023 at 2:42 PM

## 2023-10-21 ASSESSMENT — ANXIETY QUESTIONNAIRES: GAD7 TOTAL SCORE: 9

## 2023-10-21 ASSESSMENT — PATIENT HEALTH QUESTIONNAIRE - PHQ9: SUM OF ALL RESPONSES TO PHQ QUESTIONS 1-9: 14

## 2023-10-22 NOTE — TELEPHONE ENCOUNTER
Patient has one week left.  Will wait for primary provider to fill this as this is from an outside provider.Dr. Ricketts, there is also a telephone call from [facility] regarding this patient as well.  Please review and fill if you find appropriate.

## 2023-10-23 ENCOUNTER — E-VISIT (OUTPATIENT)
Dept: PEDIATRICS | Facility: CLINIC | Age: 33
End: 2023-10-23
Payer: COMMERCIAL

## 2023-10-23 DIAGNOSIS — F41.1 GAD (GENERALIZED ANXIETY DISORDER): ICD-10-CM

## 2023-10-23 DIAGNOSIS — F33.1 MAJOR DEPRESSIVE DISORDER, RECURRENT EPISODE, MODERATE (H): ICD-10-CM

## 2023-10-23 PROCEDURE — 99207 PR NO CHARGE LOS: CPT | Performed by: PEDIATRICS

## 2023-10-23 RX ORDER — DULOXETIN HYDROCHLORIDE 60 MG/1
60 CAPSULE, DELAYED RELEASE ORAL DAILY
Qty: 90 CAPSULE | Refills: 1 | Status: SHIPPED | OUTPATIENT
Start: 2023-10-23 | End: 2024-04-29

## 2023-10-23 NOTE — TELEPHONE ENCOUNTER
Now that I'm back, please have patient schedule evisit or video visit with me.    Rozina Ricketts MD  Internal Medicine/Pediatrics  [facility]

## 2023-10-23 NOTE — TELEPHONE ENCOUNTER
Pt will start an evist.       Larissa Gil, EMT at 11:41 AM on October 23, 2023  [facility] Guide  [phone]

## 2023-10-23 NOTE — PATIENT INSTRUCTIONS
Alvarez Ontiveros,    Ok, I've sent in the Cymbalta 60mg daily.  I've written for 90 day supply with 1 refill. Please plan to follow up with me in 6 months (video visit ok).    Take care,    Rozina Ricketts MD  Internal Medicine/Pediatrics  [facility]

## 2023-10-23 NOTE — TELEPHONE ENCOUNTER
Mychart sent.     Larissa Gil, EMT at 11:16 AM on October 23, 2023  [facility] Guide  [phone]

## 2023-10-30 NOTE — TELEPHONE ENCOUNTER
Provider E-Visit time total (minutes): 1    This evisit was sent to Patient's PAL 4, Breanna, who contacted the patient when PCP, Dr. Ricketts was out.  This was cared for on another encounter.  Ok to close this.

## 2024-03-27 ENCOUNTER — OFFICE VISIT (OUTPATIENT)
Dept: OBGYN | Facility: CLINIC | Age: 34
End: 2024-03-27
Payer: COMMERCIAL

## 2024-03-27 VITALS
HEIGHT: 68 IN | WEIGHT: 277 LBS | BODY MASS INDEX: 41.98 KG/M2 | SYSTOLIC BLOOD PRESSURE: 128 MMHG | DIASTOLIC BLOOD PRESSURE: 82 MMHG

## 2024-03-27 DIAGNOSIS — N94.6 DYSMENORRHEA: Primary | ICD-10-CM

## 2024-03-27 DIAGNOSIS — E66.01 MORBID OBESITY (H): ICD-10-CM

## 2024-03-27 DIAGNOSIS — Z11.3 SCREEN FOR STD (SEXUALLY TRANSMITTED DISEASE): ICD-10-CM

## 2024-03-27 DIAGNOSIS — R10.2 CHRONIC PELVIC PAIN IN FEMALE: ICD-10-CM

## 2024-03-27 DIAGNOSIS — Z12.4 SCREENING FOR MALIGNANT NEOPLASM OF CERVIX: ICD-10-CM

## 2024-03-27 DIAGNOSIS — G89.29 CHRONIC PELVIC PAIN IN FEMALE: ICD-10-CM

## 2024-03-27 DIAGNOSIS — N92.0 MENORRHAGIA WITH REGULAR CYCLE: ICD-10-CM

## 2024-03-27 PROCEDURE — G0145 SCR C/V CYTO,THINLAYER,RESCR: HCPCS | Performed by: OBSTETRICS & GYNECOLOGY

## 2024-03-27 PROCEDURE — 87591 N.GONORRHOEAE DNA AMP PROB: CPT | Performed by: OBSTETRICS & GYNECOLOGY

## 2024-03-27 PROCEDURE — 99204 OFFICE O/P NEW MOD 45 MIN: CPT | Performed by: OBSTETRICS & GYNECOLOGY

## 2024-03-27 PROCEDURE — 87624 HPV HI-RISK TYP POOLED RSLT: CPT | Performed by: OBSTETRICS & GYNECOLOGY

## 2024-03-27 PROCEDURE — 87491 CHLMYD TRACH DNA AMP PROBE: CPT | Performed by: OBSTETRICS & GYNECOLOGY

## 2024-03-27 NOTE — PROGRESS NOTES
"  Assessment & Plan     Dysmenorrhea (Chronic w/ exacerbation)  - Unrelieved by medical management including NSAIDs and prior use of Nexplanon which she had trouble tolerating side effects from hormones  - Need to assess uterus and adnexa for pathology by U/S as well as r/o STDs  - US Pelvic Complete with Transvaginal; Future  - If U/S and GC/Chlam neg, Pt wishes to undergo definitive Rx via Robotic TLH, Bilat Salp as she has no desire for future childbearing and medical management does not relieve sx's.  - Therefore if the above is neg, will schedule Pt for above surgery, get her preop exam and bowel prep info.    Chronic pelvic pain in female (Chronic w/ exacerbation)  - Plan as above.  - US Pelvic Complete with Transvaginal; Future  - She understands that if endometriosis is discovered and part of the cause of her pain, that hysterectomy alone may not relieve her pain. Removal of the ovaries is not recommended due to risk of long-term effects of premature menopause.    Menorrhagia with regular cycle (Chronic w/ exacerbation)  - Unresponsive to medical management and does not tolerate hormonal Rx due to side effects  - Plan to r/o uterine abnormalities w/ U/S  - US Pelvic Complete with Transvaginal; Future  - Plan as above    Screening for malignant neoplasm of cervix  - Overdue for screening  - Pap screen with HPV - recommended age 30 - 65 years  - If undergoes hysterectomy, no Paps needed if today's is LGSIL or less  - If abnl, will need colpo before hysterectomy    Screen for STD (sexually transmitted disease)  - Chlamydia trachomatis/Neisseria gonorrhoeae by PCR - Clinic Collect    Morbid obesity (H) (Chronic)  - Risk factor for hysterectomy            BMI  Estimated body mass index is 42.12 kg/m  as calculated from the following:    Height as of this encounter: 1.727 m (5' 8\").    Weight as of this encounter: 125.6 kg (277 lb).             No follow-ups on file.    Casimiro Ontiveros is a 33 year old, " "presenting for the following health issues:  Gyn Exam (Bleeding and pain during period /) and Consult (Tubal ligation discussion )    Pt here for worsening chronic severe menstrual cramps, heavy periods, and chronic pelvic pain between menses. Pt states she has had these issues for years. She did have a Nexplanon for 3 years but she couldn't tolerate the side effects from the hormone and had it removed over a year ago, with return of bad cramps, pelvic pain midcycle, and really heavy periods Q 28 days but flooding and clots during CD#2-3. No IMB. No abnl discharge. She takes Ibuprofen 800 mg Q 6 hours for the cramps with minimal improvement. She has no desire for future childbearing or pregnancy and would like definitive Rx via hysterectomy. She is overdue for Pap/HPV co-test.                       Objective    /82 (BP Location: Left arm, Patient Position: Sitting, Cuff Size: Adult Regular)   Ht 1.727 m (5' 8\")   Wt 125.6 kg (277 lb)   LMP 02/25/2024 (Within Days)   BMI 42.12 kg/m    Body mass index is 42.12 kg/m .  Physical Exam  Constitutional:       General: She is not in acute distress.     Appearance: Normal appearance. She is obese. She is not ill-appearing.   Neurological:      Mental Status: She is alert.     Abdomen- Abdomen soft, non-tender. BS normal. No masses, organomegaly, positive findings: obese  Pelvic- Exam chaperoned by nurse, External genitalia normal, Bartholin's glands normal, [place]'s glands normal, Urethral meatus normal, Urethra normal, Bladder normal, Vagina with normal rugae, no abnormal lesions, no abnormal discharge, Normal cervix without lesions or mucopus, no cervical motion tenderness, Uterus normal size, shape, and contour, no masses, non-tender, Adnexa normal size without masses or tenderness bilaterally, Anus normal, Pelvic exam limited by obesity, Pap smear was Done,  HPV Done, GC/Chlam probe was Done                Signed Electronically by: Ken Zaman MD    "

## 2024-03-27 NOTE — NURSING NOTE
"Chief Complaint   Patient presents with    Gyn Exam     Bleeding and pain during period       Consult     Tubal ligation discussion        Initial /82 (BP Location: Left arm, Patient Position: Sitting, Cuff Size: Adult Regular)   Ht 1.727 m (5' 8\")   Wt 125.6 kg (277 lb)   LMP 2024 (Within Days)   BMI 42.12 kg/m   Estimated body mass index is 42.12 kg/m  as calculated from the following:    Height as of this encounter: 1.727 m (5' 8\").    Weight as of this encounter: 125.6 kg (277 lb).  BP completed using cuff size: large    Questioned patient about current smoking habits.  Pt. has never smoked.          The following HM Due: NONE    Terrence Jennings CMA                "

## 2024-03-28 LAB
C TRACH DNA SPEC QL PROBE+SIG AMP: NEGATIVE
N GONORRHOEA DNA SPEC QL NAA+PROBE: NEGATIVE

## 2024-03-29 LAB
BKR LAB AP GYN ADEQUACY: NORMAL
BKR LAB AP GYN INTERPRETATION: NORMAL
BKR LAB AP HPV REFLEX: NORMAL
BKR LAB AP LMP: NORMAL
BKR LAB AP PREVIOUS ABNORMAL: NORMAL
PATH REPORT.COMMENTS IMP SPEC: NORMAL
PATH REPORT.COMMENTS IMP SPEC: NORMAL
PATH REPORT.RELEVANT HX SPEC: NORMAL

## 2024-04-01 LAB
HUMAN PAPILLOMA VIRUS 16 DNA: NEGATIVE
HUMAN PAPILLOMA VIRUS 18 DNA: NEGATIVE
HUMAN PAPILLOMA VIRUS FINAL DIAGNOSIS: NORMAL
HUMAN PAPILLOMA VIRUS OTHER HR: NEGATIVE

## 2024-04-12 ENCOUNTER — TRANSFERRED RECORDS (OUTPATIENT)
Dept: HEALTH INFORMATION MANAGEMENT | Facility: CLINIC | Age: 34
End: 2024-04-12
Payer: COMMERCIAL

## 2024-04-17 ENCOUNTER — ANCILLARY PROCEDURE (OUTPATIENT)
Dept: ULTRASOUND IMAGING | Facility: CLINIC | Age: 34
End: 2024-04-17
Attending: OBSTETRICS & GYNECOLOGY
Payer: COMMERCIAL

## 2024-04-17 DIAGNOSIS — G89.29 CHRONIC PELVIC PAIN IN FEMALE: ICD-10-CM

## 2024-04-17 DIAGNOSIS — N94.6 DYSMENORRHEA: ICD-10-CM

## 2024-04-17 DIAGNOSIS — N92.0 MENORRHAGIA WITH REGULAR CYCLE: ICD-10-CM

## 2024-04-17 DIAGNOSIS — R10.2 CHRONIC PELVIC PAIN IN FEMALE: ICD-10-CM

## 2024-04-17 PROCEDURE — 76856 US EXAM PELVIC COMPLETE: CPT | Performed by: OBSTETRICS & GYNECOLOGY

## 2024-04-17 PROCEDURE — 76830 TRANSVAGINAL US NON-OB: CPT | Performed by: OBSTETRICS & GYNECOLOGY

## 2024-04-19 ENCOUNTER — TELEPHONE (OUTPATIENT)
Dept: OBGYN | Facility: CLINIC | Age: 34
End: 2024-04-19
Payer: COMMERCIAL

## 2024-04-19 DIAGNOSIS — G89.29 CHRONIC PELVIC PAIN IN FEMALE: ICD-10-CM

## 2024-04-19 DIAGNOSIS — R10.2 CHRONIC PELVIC PAIN IN FEMALE: ICD-10-CM

## 2024-04-19 DIAGNOSIS — N94.6 DYSMENORRHEA: ICD-10-CM

## 2024-04-19 DIAGNOSIS — N92.0 MENORRHAGIA WITH REGULAR CYCLE: Primary | ICD-10-CM

## 2024-04-19 NOTE — TELEPHONE ENCOUNTER
Type of surgery: robot assisted total laparoscopic hysterectomy with bilateral salpingectomy  Location of surgery: [facility]  Date and time of surgery: 5/28/24 @ 7:50 am  Surgeon: Dr. Zaman  Pre-Op Appt Date: Patient advised to schedule.  Post-Op Appt Date: 7/10/24   Packet sent out: Yes  Pre-cert/Authorization completed:  No  Date: 4/19/24

## 2024-04-19 NOTE — TELEPHONE ENCOUNTER
Patient Name: Rama Gutierrez   MRN: [id number]   Case#: [id number]   Surgeons and Role:      * Ken Zaman MD - Primary   Date requested: * No dates entered *   Location: [facility] OR   Procedure(s):   HYSTERECTOMY, TOTAL, ROBOT-ASSISTED, USING DA SUSANNE XI, WITH BILATERAL SALPINGECTOMY

## 2024-04-26 ASSESSMENT — ANXIETY QUESTIONNAIRES
IF YOU CHECKED OFF ANY PROBLEMS ON THIS QUESTIONNAIRE, HOW DIFFICULT HAVE THESE PROBLEMS MADE IT FOR YOU TO DO YOUR WORK, TAKE CARE OF THINGS AT HOME, OR GET ALONG WITH OTHER PEOPLE: VERY DIFFICULT
7. FEELING AFRAID AS IF SOMETHING AWFUL MIGHT HAPPEN: MORE THAN HALF THE DAYS
8. IF YOU CHECKED OFF ANY PROBLEMS, HOW DIFFICULT HAVE THESE MADE IT FOR YOU TO DO YOUR WORK, TAKE CARE OF THINGS AT HOME, OR GET ALONG WITH OTHER PEOPLE?: VERY DIFFICULT
4. TROUBLE RELAXING: MORE THAN HALF THE DAYS
5. BEING SO RESTLESS THAT IT IS HARD TO SIT STILL: NOT AT ALL
1. FEELING NERVOUS, ANXIOUS, OR ON EDGE: SEVERAL DAYS
2. NOT BEING ABLE TO STOP OR CONTROL WORRYING: SEVERAL DAYS
GAD7 TOTAL SCORE: 10
3. WORRYING TOO MUCH ABOUT DIFFERENT THINGS: MORE THAN HALF THE DAYS
6. BECOMING EASILY ANNOYED OR IRRITABLE: MORE THAN HALF THE DAYS
7. FEELING AFRAID AS IF SOMETHING AWFUL MIGHT HAPPEN: MORE THAN HALF THE DAYS
GAD7 TOTAL SCORE: 10

## 2024-04-29 ENCOUNTER — VIRTUAL VISIT (OUTPATIENT)
Dept: PEDIATRICS | Facility: CLINIC | Age: 34
End: 2024-04-29
Attending: PEDIATRICS
Payer: COMMERCIAL

## 2024-04-29 DIAGNOSIS — F33.1 MAJOR DEPRESSIVE DISORDER, RECURRENT EPISODE, MODERATE (H): ICD-10-CM

## 2024-04-29 DIAGNOSIS — M62.830 BACK MUSCLE SPASM: ICD-10-CM

## 2024-04-29 DIAGNOSIS — F41.1 GAD (GENERALIZED ANXIETY DISORDER): ICD-10-CM

## 2024-04-29 PROCEDURE — 99213 OFFICE O/P EST LOW 20 MIN: CPT | Mod: 95 | Performed by: PEDIATRICS

## 2024-04-29 RX ORDER — CYCLOBENZAPRINE HCL 10 MG
10 TABLET ORAL 3 TIMES DAILY PRN
Qty: 90 TABLET | Refills: 3 | Status: SHIPPED | OUTPATIENT
Start: 2024-04-29

## 2024-04-29 RX ORDER — DULOXETIN HYDROCHLORIDE 60 MG/1
60 CAPSULE, DELAYED RELEASE ORAL DAILY
Qty: 90 CAPSULE | Refills: 1 | Status: SHIPPED | OUTPATIENT
Start: 2024-04-29

## 2024-04-29 NOTE — PROGRESS NOTES
"Rama is a 33 year old who is being evaluated via a billable video visit.    How would you like to obtain your AVS? MyChart  If the video visit is dropped, the invitation should be resent by: Text to cell phone: [phone]  Will anyone else be joining your video visit? No      Assessment & Plan     (F33.1) Major depressive disorder, recurrent episode, moderate (H)  Comment: uncontrolled - recommend starting therapy  Plan: DULoxetine (CYMBALTA) 60 MG capsule        Follow up in 6m    (F41.1) MARÍA (generalized anxiety disorder)  Comment: as above  Plan: DULoxetine (CYMBALTA) 60 MG capsule            (M62.830) Back muscle spasm  Comment: stable  Plan: cyclobenzaprine (FLEXERIL) 10 MG tablet                    BMI  Estimated body mass index is 42.12 kg/m  as calculated from the following:    Height as of 3/27/24: 1.727 m (5' 8\").    Weight as of 3/27/24: 125.6 kg (277 lb).       Depression Screening Follow Up                  Follow Up Actions Taken      Discussed the following ways the patient can remain in a safe environment:          Subjective   Rama is a 33 year old, presenting for the following health issues:  No chief complaint on file.      Video Start Time: 1:00pm    History of Present Illness       Back Pain:  She presents for follow up of back pain. Patient's back pain is a chronic problem.  Location of back pain:  Right lower back, left lower back, right buttock, left buttock, right hip and left hip  Description of back pain: burning, cramping, fullness and shooting  Back pain spreads: right buttocks, left buttocks, right thigh, left thigh, right knee, left knee, right foot and left foot    Since patient first noticed back pain, pain is: gradually worsening  Does back pain interfere with her job:  Yes       Mental Health Follow-up:  Patient presents to follow-up on Depression & Anxiety.Patient's depression since last visit has been:  Medium  The patient is not having other symptoms associated with " "depression.  Patient's anxiety since last visit has been:  Better  The patient is not having other symptoms associated with anxiety.  Any significant life events: No  Patient is not feeling anxious or having panic attacks.  Patient has no concerns about alcohol or drug use.    Reason for visit:  General check up    She eats 0-1 servings of fruits and vegetables daily.She consumes 2 sweetened beverage(s) daily.She exercises with enough effort to increase her heart rate 9 or less minutes per day.  She exercises with enough effort to increase her heart rate 3 or less days per week. She is missing 3 dose(s) of medications per week.  She is not taking prescribed medications regularly due to remembering to take.         8/11/2023     3:07 PM 10/20/2023     1:30 PM 4/28/2024     2:34 PM   PHQ   PHQ-9 Total Score 13 14 15   Q9: Thoughts of better off dead/self-harm past 2 weeks Not at all Several days Several days   F/U: Thoughts of suicide or self-harm  Yes Yes   F/U: Self harm-plan  No No   F/U: Self-harm action  No No   F/U: Safety concerns  No No         8/10/2023     6:46 PM 10/20/2023     1:30 PM 4/26/2024     6:13 PM   MARÍA-7 SCORE   Total Score 7 (mild anxiety) 9 (mild anxiety) 10 (moderate anxiety)   Total Score 7 9 10     Last visit 8/11/23 - Px:     [facility] - prescribing the duloxetine and vyvanse (wasn't doing vyvanse at the time)    Flexeril for back pain    ___________    Today:    \"[place] of the road\" - she is just on the duloxetine. Anxiety maybe a little improved, but depression the same. No panic attacks. She would like to stay on the duloxetine for now, maybe consider psychiatry in the future.     Still using flexeril for back pain and ibuprofen 800mg per day.    Patient is still trying to get disability.                     Objective           Vitals:  No vitals were obtained today due to virtual visit.    Physical Exam   GENERAL: alert and no distress  EYES: Eyes grossly normal to inspection.  No discharge " or erythema, or obvious scleral/conjunctival abnormalities.  RESP: No audible wheeze, cough, or visible cyanosis.    SKIN: Visible skin clear. No significant rash, abnormal pigmentation or lesions.  NEURO: Cranial nerves grossly intact.  Mentation and speech appropriate for age.  PSYCH: Appropriate affect, tone, and pace of words          Video-Visit Details    Type of service:  Video Visit   Video End Time:1:11pm  Originating Location (pt. Location): Home    Distant Location (provider location):  Off-site  Platform used for Video Visit: Lo  Signed Electronically by: Rozina Ricketts MD

## 2024-05-01 ENCOUNTER — TRANSFERRED RECORDS (OUTPATIENT)
Dept: HEALTH INFORMATION MANAGEMENT | Facility: CLINIC | Age: 34
End: 2024-05-01
Payer: COMMERCIAL

## 2024-05-08 ENCOUNTER — OFFICE VISIT (OUTPATIENT)
Dept: PEDIATRICS | Facility: CLINIC | Age: 34
End: 2024-05-08
Payer: COMMERCIAL

## 2024-05-08 VITALS
OXYGEN SATURATION: 97 % | HEIGHT: 69 IN | DIASTOLIC BLOOD PRESSURE: 81 MMHG | RESPIRATION RATE: 12 BRPM | TEMPERATURE: 97.6 F | SYSTOLIC BLOOD PRESSURE: 138 MMHG | BODY MASS INDEX: 41.07 KG/M2 | WEIGHT: 277.3 LBS | HEART RATE: 105 BPM

## 2024-05-08 DIAGNOSIS — E66.01 MORBID OBESITY (H): ICD-10-CM

## 2024-05-08 DIAGNOSIS — Z01.818 PREOP GENERAL PHYSICAL EXAM: Primary | ICD-10-CM

## 2024-05-08 DIAGNOSIS — N92.0 MENORRHAGIA WITH REGULAR CYCLE: Chronic | ICD-10-CM

## 2024-05-08 DIAGNOSIS — R10.2 CHRONIC PELVIC PAIN IN FEMALE: Chronic | ICD-10-CM

## 2024-05-08 DIAGNOSIS — F41.1 GAD (GENERALIZED ANXIETY DISORDER): ICD-10-CM

## 2024-05-08 DIAGNOSIS — N94.6 DYSMENORRHEA: Chronic | ICD-10-CM

## 2024-05-08 DIAGNOSIS — F33.2 MAJOR DEPRESSIVE DISORDER, RECURRENT EPISODE, SEVERE WITH ANXIOUS DISTRESS (H): ICD-10-CM

## 2024-05-08 DIAGNOSIS — G89.29 CHRONIC PELVIC PAIN IN FEMALE: Chronic | ICD-10-CM

## 2024-05-08 PROCEDURE — 99214 OFFICE O/P EST MOD 30 MIN: CPT | Performed by: PHYSICIAN ASSISTANT

## 2024-05-08 PROCEDURE — 96127 BRIEF EMOTIONAL/BEHAV ASSMT: CPT | Performed by: PHYSICIAN ASSISTANT

## 2024-05-08 ASSESSMENT — PATIENT HEALTH QUESTIONNAIRE - PHQ9
SUM OF ALL RESPONSES TO PHQ QUESTIONS 1-9: 13
10. IF YOU CHECKED OFF ANY PROBLEMS, HOW DIFFICULT HAVE THESE PROBLEMS MADE IT FOR YOU TO DO YOUR WORK, TAKE CARE OF THINGS AT HOME, OR GET ALONG WITH OTHER PEOPLE: VERY DIFFICULT
SUM OF ALL RESPONSES TO PHQ QUESTIONS 1-9: 13

## 2024-05-08 ASSESSMENT — PAIN SCALES - GENERAL: PAINLEVEL: MILD PAIN (2)

## 2024-05-08 NOTE — PROGRESS NOTES
Preoperative Evaluation  [facility]  [address]  [address]  [place] [address]  Phone: [phone]  Fax: [phone]  Primary Provider: Rozina Ricketts  Pre-op Performing Provider: MARGIE OBRIEN  May 8, 2024       Rama is a 33 year old, presenting for the following:  Pre-Op Exam        5/8/2024     2:06 PM   Additional Questions   Roomed by YAHIR Oden   Accompanied by no         5/8/2024     2:06 PM   Patient Reported Additional Medications   Patient reports taking the following new medications no     Surgical Information  Surgery/Procedure: ROBOT-ASSISTED TOTAL LAPAROSCOPIC HYSTERECTOMY, WITH BILATERAL SALPINGECTOMY   Surgery Location: [facility]  Surgeon:   Ken Zaman MD   Surgery Date: 5/28/2024  Time of Surgery: 7:30AM  Where patient plans to recover: At home with family  Fax number for surgical facility: Note does not need to be faxed, will be available electronically in Epic.    CONCERNS: Post surgery soap     Assessment & Plan     The proposed surgical procedure is considered INTERMEDIATE risk.    Preop general physical exam    Chronic pelvic pain in female    Dysmenorrhea    Menorrhagia with regular cycle    Morbid obesity (H)    Major depressive disorder, recurrent episode, severe with anxious distress (H)    MARÍA (generalized anxiety disorder)       - No identified additional risk factors other than previously addressed    Antiplatelet or Anticoagulation Medication Instructions   - Patient is on no antiplatelet or anticoagulation medications.    Additional Medication Instructions  Patient is to take all scheduled medications on the day of surgery    Recommendation  APPROVAL GIVEN to proceed with proposed procedure, without further diagnostic evaluation.        Subjective       HPI related to upcoming procedure: Hysterectomy being done to treat chronic pain.          5/6/2024    12:51 PM   Preop Questions   1. Have you  ever had a heart attack or stroke? No   2. Have you ever had surgery on your heart or blood vessels, such as a stent placement, a coronary artery bypass, or surgery on an artery in your head, neck, heart, or legs? No   3. Do you have chest pain with activity? No   4. Do you have a history of  heart failure? No   5. Do you currently have a cold, bronchitis or symptoms of other infection? No   6. Do you have a cough, shortness of breath, or wheezing? No   7. Do you or anyone in your family have previous history of blood clots? No   8. Do you or does anyone in your family have a serious bleeding problem such as prolonged bleeding following surgeries or cuts? No   9. Have you ever had problems with anemia or been told to take iron pills? No   10. Have you had any abnormal blood loss such as black, tarry or bloody stools, or abnormal vaginal bleeding? No   11. Have you ever had a blood transfusion? No   12. Are you willing to have a blood transfusion if it is medically needed before, during, or after your surgery? Yes   13. Have you or any of your relatives ever had problems with anesthesia? No   14. Do you have sleep apnea, excessive snoring or daytime drowsiness? No   15. Do you have any artifical heart valves or other implanted medical devices like a pacemaker, defibrillator, or continuous glucose monitor? No   16. Do you have artificial joints? No   17. Are you allergic to latex? No   18. Is there any chance that you may be pregnant? No       Health Care Directive  Patient does not have a Health Care Directive or Living Will: Discussed advance care planning with patient; information given to patient to review.    Preoperative Review of    reviewed - no record of controlled substances prescribed.      Status of Chronic Conditions:  See problem list for active medical problems.  Problems all longstanding and stable, except as noted/documented.  See ROS for pertinent symptoms related to these  conditions.    Patient Active Problem List    Diagnosis Date Noted    Dysmenorrhea 03/27/2024     Priority: Medium    Chronic pelvic pain in female 03/27/2024     Priority: Medium    Menorrhagia with regular cycle 03/27/2024     Priority: Medium    Chronic pain of right knee 10/13/2022     Priority: Medium    Spondylolisthesis at L5-S1 level 01/24/2022     Priority: Medium    Pars defect 01/24/2022     Priority: Medium    Prediabetes 01/07/2022     Priority: Medium    Major depressive disorder, recurrent episode, severe with anxious distress (H) 09/10/2020     Priority: Medium    Suicidal ideation 09/02/2020     Priority: Medium    Episodic mood disorder (H24) 09/02/2020     Priority: Medium    Attention deficit hyperactivity disorder (ADHD), combined type 06/26/2020     Priority: Medium    Major depressive disorder, recurrent episode, moderate (H) 05/02/2019     Priority: Medium    Morbid obesity (H) 05/02/2019     Priority: Medium    Social anxiety disorder 05/02/2019     Priority: Medium    MARÍA (generalized anxiety disorder) 09/12/2017     Priority: Medium      Past Medical History:   Diagnosis Date    Depressive disorder      Past Surgical History:   Procedure Laterality Date    TONSILLECTOMY       Current Outpatient Medications   Medication Sig Dispense Refill    cyclobenzaprine (FLEXERIL) 10 MG tablet Take 1 tablet (10 mg) by mouth 3 times daily as needed for muscle spasms 90 tablet 3    DULoxetine (CYMBALTA) 60 MG capsule Take 1 capsule (60 mg) by mouth daily 90 capsule 1    ibuprofen (ADVIL/MOTRIN) 800 MG tablet Take 800 mg by mouth      melatonin 3 MG tablet Take 3 mg by mouth      ALPRAZolam (XANAX) 1 MG tablet Take 1 tablet (1 mg) by mouth 3 times daily as needed for anxiety (Patient not taking: Reported on 3/27/2024) 6 tablet 0    Vitamin D, Cholecalciferol, 25 MCG (1000 UT) CAPS Take 2,000 Int'l Units by mouth daily (Patient not taking: Reported on 5/8/2024)         Allergies   Allergen Reactions     "Codeine Nausea        Social History     Tobacco Use    Smoking status: Never     Passive exposure: Never    Smokeless tobacco: Never   Substance Use Topics    Alcohol use: No     Comment: on a rare occ      Family History   Problem Relation Age of Onset    Irritable Bowel Syndrome Mother     Anxiety Disorder Mother     Endometriosis Mother     Irritable Bowel Syndrome Father     Substance Abuse Father     Breast Cancer Paternal Grandmother     Anxiety Disorder Paternal Grandmother     Dementia Paternal Grandmother     Cerebrovascular Disease Paternal Grandfather     Asthma Brother     Anxiety Disorder Other     Depression Other     Other Cancer Other     Diabetes No family hx of     Coronary Artery Disease No family hx of     Colon Cancer No family hx of      History   Drug Use    Types: Marijuana     Comment: edible marijuana treats weekly         Review of Systems    Review of Systems  Constitutional, neuro, ENT, endocrine, pulmonary, cardiac, gastrointestinal, genitourinary, musculoskeletal, integument and psychiatric systems are negative, except as otherwise noted.    Objective    /81 (BP Location: Right arm, Patient Position: Sitting, Cuff Size: Adult Large)   Pulse 105   Temp 97.6  F (36.4  C) (Oral)   Resp 12   Ht 1.75 m (5' 8.9\")   Wt 125.8 kg (277 lb 4.8 oz)   LMP 05/01/2024 (Within Days)   SpO2 97%   Breastfeeding No   BMI 41.07 kg/m     Estimated body mass index is 41.07 kg/m  as calculated from the following:    Height as of this encounter: 1.75 m (5' 8.9\").    Weight as of this encounter: 125.8 kg (277 lb 4.8 oz).  Physical Exam  GENERAL: alert and no distress  EYES: Eyes grossly normal to inspection, PERRL and conjunctivae and sclerae normal  HENT: ear canals and TM's normal, nose and mouth without ulcers or lesions  NECK: no adenopathy, no asymmetry, masses, or scars  RESP: lungs clear to auscultation - no rales, rhonchi or wheezes  CV: regular rate and rhythm, normal S1 S2, no S3 or " S4, no murmur, click or rub, no peripheral edema  ABDOMEN: soft, nontender, no hepatosplenomegaly, no masses and bowel sounds normal  MS: no gross musculoskeletal defects noted, no edema  NEURO: Normal strength and tone, mentation intact and speech normal    Recent Labs   Lab Test 08/11/23  1549   A1C 5.7*        Diagnostics  Labs ordered and pending with surgeon for morning of surgery.     No EKG required, no history of coronary heart disease, significant arrhythmia, peripheral arterial disease or other structural heart disease.    Revised Cardiac Risk Index (RCRI)  The patient has the following serious cardiovascular risks for perioperative complications:   - No serious cardiac risks = 0 points     RCRI Interpretation: 0 points: Class I (very low risk - 0.4% complication rate)         Signed Electronically by: Sheila Echevarria PA-C  Copy of this evaluation report is provided to requesting physician.         Answers submitted by the patient for this visit:  Patient Health Questionnaire (Submitted on 5/8/2024)  If you checked off any problems, how difficult have these problems made it for you to do your work, take care of things at home, or get along with other people?: Very difficult  PHQ9 TOTAL SCORE: 13

## 2024-05-21 ENCOUNTER — OFFICE VISIT (OUTPATIENT)
Dept: OBGYN | Facility: CLINIC | Age: 34
End: 2024-05-21
Payer: COMMERCIAL

## 2024-05-21 VITALS — WEIGHT: 277.3 LBS | DIASTOLIC BLOOD PRESSURE: 80 MMHG | SYSTOLIC BLOOD PRESSURE: 132 MMHG | BODY MASS INDEX: 41.07 KG/M2

## 2024-05-21 DIAGNOSIS — Z79.2 PROPHYLACTIC ANTIBIOTIC: ICD-10-CM

## 2024-05-21 DIAGNOSIS — R10.2 CHRONIC PELVIC PAIN IN FEMALE: Chronic | ICD-10-CM

## 2024-05-21 DIAGNOSIS — G89.29 CHRONIC PELVIC PAIN IN FEMALE: Chronic | ICD-10-CM

## 2024-05-21 DIAGNOSIS — N94.6 DYSMENORRHEA: Primary | Chronic | ICD-10-CM

## 2024-05-21 DIAGNOSIS — N92.0 MENORRHAGIA WITH REGULAR CYCLE: ICD-10-CM

## 2024-05-21 PROCEDURE — 99214 OFFICE O/P EST MOD 30 MIN: CPT | Performed by: OBSTETRICS & GYNECOLOGY

## 2024-05-21 RX ORDER — CLINDAMYCIN PHOSPHATE 20 MG/G
1 CREAM VAGINAL AT BEDTIME
Qty: 40 G | Refills: 0 | Status: SHIPPED | OUTPATIENT
Start: 2024-05-21 | End: 2024-05-26

## 2024-05-21 NOTE — PROGRESS NOTES
"  Assessment & Plan     Dysmenorrhea  - Chronic, unresponsive to medical management  - Scheduled for definitive Rx via Robotic TLH/Bilat Salp 5/28/2024  - She understands the indications/risks/benefits/alternatives of the proposed procedure and has given informed consent.  - ERAS reviewed  - Has bowel prep info.    Chronic pelvic pain in female  - As above    Menorrhagia with regular cycle  - Chronic, plan as above    Prophylactic antibiotic  - Rx clindamycin (CLEOCIN) 2 % vaginal cream; Place 1 applicator vaginally at bedtime for 5 days Use for 5 nights prior to surgery date.    Review of the result(s) of each unique test - Pelvic U/S as noted below        BMI  Estimated body mass index is 41.07 kg/m  as calculated from the following:    Height as of 5/8/24: 1.75 m (5' 8.9\").    Weight as of this encounter: 125.8 kg (277 lb 4.8 oz).             No follow-ups on file.    Casimiro Ontiveros is a 33 year old, presenting for the following health issues:  Md Request F/U (Patient here for surgery discussion, per MD request. Patient is scheduled for robotic- total laparoscopic hysterectomy with bilateral salpingectomy on 05/28/24, no questions today)    Pt w/ chronic dysmenorrhea, chronic pelvic pain, chronic menorrhagia, who desires definitive management and is scheduled for Robotic TLH, Bilat Salp 5/28/2024. She is here to review surgery plan and answer questions.                       Objective    /80   Wt 125.8 kg (277 lb 4.8 oz)   LMP 05/01/2024 (Within Days)   BMI 41.07 kg/m    Body mass index is 41.07 kg/m .  Physical Exam  Constitutional:       General: She is not in acute distress.     Appearance: Normal appearance. She is obese. She is not ill-appearing.   Neurological:      Mental Status: She is alert.     Abdomen- Abdomen soft, non-tender. BS normal. No masses, organomegaly, positive findings: obese    Pelvic U/S 4/17/2024:  Results for orders placed or performed in visit on 04/17/24   US Pelvic " Complete with Transvaginal    Narrative    [facility]ne[facility]     ULTRASOUND - PELVIC GYN- Transabdominal and Transvaginal     Referring MD: Ken Zaman MD     ===================================     CLINICAL INFORMATION     Indications for ultrasound:   Bleeding/Menses - Menorrhagia (heavy menses) and Pain - Pelvic pain     LMP: 28 Mar 2024    Hormones: none     Measurements:  Uterus:  8.0 x 5.1 x 4.1  cm     Position is retroverted.  Contour is smooth/regular.     Endo cav: 13.73 mm       Smooth/regular/wnl     Right ovary: 2.0 x 2.3 x 2.3  cm  Wnl  Left ovary:   3.0 x 2.6 x 2.1 cm Wnl     Cul de sac: no free fluid     Technique: Transvaginal Imaging performed  Transabdominal Imaging performed     ===================================    Complete pelvic ultrasound using realtime   transabdominal and transvaginal scanning.    Bladder appears normal.    Normal uterus and bilateral adnexa.  Normal pelvic ultrasound study.    Shayla Orozco MD  Obstetrics and Gynecology  [facility]     Note: federal law requires the release of results to patients even prior   to the ordering provider viewing the result. Your provider will notify   you, generally within 24 hours, of any critical results. If follow up is   necessary, you will be notified at that time. Normal results, and abnormal   but non-urgent results, will generally be addressed within 48-72 hours.                 Signed Electronically by: Ken Zaman MD

## 2024-05-21 NOTE — NURSING NOTE
"Chief Complaint   Patient presents with    Md Request F/U     Patient here for surgery discussion, per MD request. Patient is scheduled for robotic- total laparoscopic hysterectomy with bilateral salpingectomy on 24, no questions today       Initial /80   Wt 125.8 kg (277 lb 4.8 oz)   LMP 2024 (Within Days)   BMI 41.07 kg/m   Estimated body mass index is 41.07 kg/m  as calculated from the following:    Height as of 24: 1.75 m (5' 8.9\").    Weight as of this encounter: 125.8 kg (277 lb 4.8 oz).  BP completed using cuff size: large    Questioned patient about current smoking habits.  Pt. has never smoked.          The following HM Due: NONE    Octavia Smart CMA               "

## 2024-05-28 ENCOUNTER — HOSPITAL ENCOUNTER (OUTPATIENT)
Facility: CLINIC | Age: 34
Discharge: HOME | End: 2024-05-28
Attending: OBSTETRICS & GYNECOLOGY | Admitting: OBSTETRICS & GYNECOLOGY
Payer: COMMERCIAL

## 2024-05-28 ENCOUNTER — ANESTHESIA (OUTPATIENT)
Dept: SURGERY | Facility: CLINIC | Age: 34
End: 2024-05-28
Payer: COMMERCIAL

## 2024-05-28 ENCOUNTER — ANESTHESIA EVENT (OUTPATIENT)
Dept: SURGERY | Facility: CLINIC | Age: 34
End: 2024-05-28
Payer: COMMERCIAL

## 2024-05-28 VITALS
BODY MASS INDEX: 40.24 KG/M2 | DIASTOLIC BLOOD PRESSURE: 76 MMHG | OXYGEN SATURATION: 99 % | TEMPERATURE: 97.5 F | HEART RATE: 79 BPM | WEIGHT: 271.7 LBS | RESPIRATION RATE: 16 BRPM | SYSTOLIC BLOOD PRESSURE: 129 MMHG

## 2024-05-28 DIAGNOSIS — N94.6 DYSMENORRHEA: Primary | Chronic | ICD-10-CM

## 2024-05-28 DIAGNOSIS — R10.2 CHRONIC PELVIC PAIN IN FEMALE: Chronic | ICD-10-CM

## 2024-05-28 DIAGNOSIS — G89.29 CHRONIC PELVIC PAIN IN FEMALE: Chronic | ICD-10-CM

## 2024-05-28 DIAGNOSIS — G89.18 POSTOPERATIVE PAIN: ICD-10-CM

## 2024-05-28 LAB
BASOPHILS # BLD AUTO: 0 10E3/UL (ref 0–0.2)
BASOPHILS NFR BLD AUTO: 0 %
CREAT SERPL-MCNC: 0.94 MG/DL (ref 0.51–0.95)
EGFRCR SERPLBLD CKD-EPI 2021: 81 ML/MIN/1.73M2
EOSINOPHIL # BLD AUTO: 0 10E3/UL (ref 0–0.7)
EOSINOPHIL NFR BLD AUTO: 0 %
ERYTHROCYTE [DISTWIDTH] IN BLOOD BY AUTOMATED COUNT: 15.1 % (ref 10–15)
HCG UR QL: NEGATIVE
HCT VFR BLD AUTO: 38.2 % (ref 35–47)
HGB BLD-MCNC: 11.7 G/DL (ref 11.7–15.7)
HOLD SPECIMEN: NORMAL
IMM GRANULOCYTES # BLD: 0 10E3/UL
IMM GRANULOCYTES NFR BLD: 0 %
LYMPHOCYTES # BLD AUTO: 2.6 10E3/UL (ref 0.8–5.3)
LYMPHOCYTES NFR BLD AUTO: 30 %
MCH RBC QN AUTO: 23.4 PG (ref 26.5–33)
MCHC RBC AUTO-ENTMCNC: 30.6 G/DL (ref 31.5–36.5)
MCV RBC AUTO: 76 FL (ref 78–100)
MONOCYTES # BLD AUTO: 0.6 10E3/UL (ref 0–1.3)
MONOCYTES NFR BLD AUTO: 6 %
NEUTROPHILS # BLD AUTO: 5.4 10E3/UL (ref 1.6–8.3)
NEUTROPHILS NFR BLD AUTO: 64 %
NRBC # BLD AUTO: 0 10E3/UL
NRBC BLD AUTO-RTO: 0 /100
PLATELET # BLD AUTO: 314 10E3/UL (ref 150–450)
RBC # BLD AUTO: 5 10E6/UL (ref 3.8–5.2)
WBC # BLD AUTO: 8.6 10E3/UL (ref 4–11)

## 2024-05-28 PROCEDURE — 250N000009 HC RX 250: Performed by: NURSE ANESTHETIST, CERTIFIED REGISTERED

## 2024-05-28 PROCEDURE — 58571 TLH W/T/O 250 G OR LESS: CPT | Mod: 80 | Performed by: STUDENT IN AN ORGANIZED HEALTH CARE EDUCATION/TRAINING PROGRAM

## 2024-05-28 PROCEDURE — 81025 URINE PREGNANCY TEST: CPT | Performed by: OBSTETRICS & GYNECOLOGY

## 2024-05-28 PROCEDURE — 250N000009 HC RX 250: Performed by: OBSTETRICS & GYNECOLOGY

## 2024-05-28 PROCEDURE — 250N000013 HC RX MED GY IP 250 OP 250 PS 637: Performed by: OBSTETRICS & GYNECOLOGY

## 2024-05-28 PROCEDURE — 710N000009 HC RECOVERY PHASE 1, LEVEL 1, PER MIN: Performed by: OBSTETRICS & GYNECOLOGY

## 2024-05-28 PROCEDURE — 999N000141 HC STATISTIC PRE-PROCEDURE NURSING ASSESSMENT: Performed by: OBSTETRICS & GYNECOLOGY

## 2024-05-28 PROCEDURE — C1765 ADHESION BARRIER: HCPCS | Performed by: OBSTETRICS & GYNECOLOGY

## 2024-05-28 PROCEDURE — 82565 ASSAY OF CREATININE: CPT | Performed by: OBSTETRICS & GYNECOLOGY

## 2024-05-28 PROCEDURE — 250N000011 HC RX IP 250 OP 636: Performed by: ANESTHESIOLOGY

## 2024-05-28 PROCEDURE — 710N000012 HC RECOVERY PHASE 2, PER MINUTE: Performed by: OBSTETRICS & GYNECOLOGY

## 2024-05-28 PROCEDURE — 250N000013 HC RX MED GY IP 250 OP 250 PS 637: Performed by: ANESTHESIOLOGY

## 2024-05-28 PROCEDURE — 85025 COMPLETE CBC W/AUTO DIFF WBC: CPT | Performed by: OBSTETRICS & GYNECOLOGY

## 2024-05-28 PROCEDURE — 258N000003 HC RX IP 258 OP 636: Performed by: NURSE ANESTHETIST, CERTIFIED REGISTERED

## 2024-05-28 PROCEDURE — 360N000080 HC SURGERY LEVEL 7, PER MIN: Performed by: OBSTETRICS & GYNECOLOGY

## 2024-05-28 PROCEDURE — 370N000017 HC ANESTHESIA TECHNICAL FEE, PER MIN: Performed by: OBSTETRICS & GYNECOLOGY

## 2024-05-28 PROCEDURE — 88307 TISSUE EXAM BY PATHOLOGIST: CPT | Mod: 26 | Performed by: PATHOLOGY

## 2024-05-28 PROCEDURE — 250N000011 HC RX IP 250 OP 636: Performed by: OBSTETRICS & GYNECOLOGY

## 2024-05-28 PROCEDURE — 272N000001 HC OR GENERAL SUPPLY STERILE: Performed by: OBSTETRICS & GYNECOLOGY

## 2024-05-28 PROCEDURE — 258N000001 HC RX 258: Performed by: OBSTETRICS & GYNECOLOGY

## 2024-05-28 PROCEDURE — 88307 TISSUE EXAM BY PATHOLOGIST: CPT | Mod: TC | Performed by: OBSTETRICS & GYNECOLOGY

## 2024-05-28 PROCEDURE — 58571 TLH W/T/O 250 G OR LESS: CPT | Performed by: OBSTETRICS & GYNECOLOGY

## 2024-05-28 PROCEDURE — 250N000011 HC RX IP 250 OP 636: Performed by: NURSE ANESTHETIST, CERTIFIED REGISTERED

## 2024-05-28 PROCEDURE — 258N000003 HC RX IP 258 OP 636: Performed by: ANESTHESIOLOGY

## 2024-05-28 PROCEDURE — 250N000025 HC SEVOFLURANE, PER MIN: Performed by: OBSTETRICS & GYNECOLOGY

## 2024-05-28 PROCEDURE — 36415 COLL VENOUS BLD VENIPUNCTURE: CPT | Performed by: OBSTETRICS & GYNECOLOGY

## 2024-05-28 PROCEDURE — 250N000009 HC RX 250: Performed by: ANESTHESIOLOGY

## 2024-05-28 RX ORDER — DEXAMETHASONE SODIUM PHOSPHATE 4 MG/ML
4 INJECTION, SOLUTION INTRA-ARTICULAR; INTRALESIONAL; INTRAMUSCULAR; INTRAVENOUS; SOFT TISSUE
Status: DISCONTINUED | OUTPATIENT
Start: 2024-05-28 | End: 2024-05-28 | Stop reason: HOSPADM

## 2024-05-28 RX ORDER — BUPIVACAINE HYDROCHLORIDE 5 MG/ML
INJECTION, SOLUTION EPIDURAL; INTRACAUDAL PRN
Status: DISCONTINUED | OUTPATIENT
Start: 2024-05-28 | End: 2024-05-28 | Stop reason: HOSPADM

## 2024-05-28 RX ORDER — CEFAZOLIN SODIUM/WATER 3 G/30 ML
3 SYRINGE (ML) INTRAVENOUS SEE ADMIN INSTRUCTIONS
Status: DISCONTINUED | OUTPATIENT
Start: 2024-05-28 | End: 2024-05-28 | Stop reason: HOSPADM

## 2024-05-28 RX ORDER — FENTANYL CITRATE 50 UG/ML
50 INJECTION, SOLUTION INTRAMUSCULAR; INTRAVENOUS EVERY 5 MIN PRN
Status: DISCONTINUED | OUTPATIENT
Start: 2024-05-28 | End: 2024-05-28 | Stop reason: HOSPADM

## 2024-05-28 RX ORDER — ONDANSETRON 4 MG/1
4 TABLET, ORALLY DISINTEGRATING ORAL EVERY 30 MIN PRN
Status: DISCONTINUED | OUTPATIENT
Start: 2024-05-28 | End: 2024-05-28 | Stop reason: HOSPADM

## 2024-05-28 RX ORDER — IBUPROFEN 600 MG/1
600 TABLET, FILM COATED ORAL EVERY 6 HOURS PRN
Qty: 30 TABLET | Refills: 0 | Status: SHIPPED | OUTPATIENT
Start: 2024-05-28

## 2024-05-28 RX ORDER — HYDRALAZINE HYDROCHLORIDE 20 MG/ML
2.5-5 INJECTION INTRAMUSCULAR; INTRAVENOUS EVERY 10 MIN PRN
Status: DISCONTINUED | OUTPATIENT
Start: 2024-05-28 | End: 2024-05-28 | Stop reason: HOSPADM

## 2024-05-28 RX ORDER — PHENAZOPYRIDINE HYDROCHLORIDE 200 MG/1
200 TABLET, FILM COATED ORAL ONCE
Status: COMPLETED | OUTPATIENT
Start: 2024-05-28 | End: 2024-05-28

## 2024-05-28 RX ORDER — NALOXONE HYDROCHLORIDE 0.4 MG/ML
0.1 INJECTION, SOLUTION INTRAMUSCULAR; INTRAVENOUS; SUBCUTANEOUS
Status: DISCONTINUED | OUTPATIENT
Start: 2024-05-28 | End: 2024-05-28 | Stop reason: HOSPADM

## 2024-05-28 RX ORDER — CEFAZOLIN SODIUM/WATER 3 G/30 ML
3 SYRINGE (ML) INTRAVENOUS
Status: COMPLETED | OUTPATIENT
Start: 2024-05-28 | End: 2024-05-28

## 2024-05-28 RX ORDER — DEXAMETHASONE SODIUM PHOSPHATE 4 MG/ML
INJECTION, SOLUTION INTRA-ARTICULAR; INTRALESIONAL; INTRAMUSCULAR; INTRAVENOUS; SOFT TISSUE PRN
Status: DISCONTINUED | OUTPATIENT
Start: 2024-05-28 | End: 2024-05-28

## 2024-05-28 RX ORDER — ACETAMINOPHEN 325 MG/1
975 TABLET ORAL ONCE
Status: COMPLETED | OUTPATIENT
Start: 2024-05-28 | End: 2024-05-28

## 2024-05-28 RX ORDER — OXYCODONE HYDROCHLORIDE 5 MG/1
5 TABLET ORAL EVERY 6 HOURS PRN
Qty: 12 TABLET | Refills: 0 | Status: SHIPPED | OUTPATIENT
Start: 2024-05-28 | End: 2024-07-22

## 2024-05-28 RX ORDER — HYDROMORPHONE HCL IN WATER/PF 6 MG/30 ML
0.4 PATIENT CONTROLLED ANALGESIA SYRINGE INTRAVENOUS EVERY 5 MIN PRN
Status: DISCONTINUED | OUTPATIENT
Start: 2024-05-28 | End: 2024-05-28 | Stop reason: HOSPADM

## 2024-05-28 RX ORDER — PROPOFOL 10 MG/ML
INJECTION, EMULSION INTRAVENOUS CONTINUOUS PRN
Status: DISCONTINUED | OUTPATIENT
Start: 2024-05-28 | End: 2024-05-28

## 2024-05-28 RX ORDER — KETOROLAC TROMETHAMINE 15 MG/ML
15 INJECTION, SOLUTION INTRAMUSCULAR; INTRAVENOUS
Status: COMPLETED | OUTPATIENT
Start: 2024-05-28 | End: 2024-05-28

## 2024-05-28 RX ORDER — SODIUM CHLORIDE, SODIUM LACTATE, POTASSIUM CHLORIDE, CALCIUM CHLORIDE 600; 310; 30; 20 MG/100ML; MG/100ML; MG/100ML; MG/100ML
INJECTION, SOLUTION INTRAVENOUS CONTINUOUS PRN
Status: DISCONTINUED | OUTPATIENT
Start: 2024-05-28 | End: 2024-05-28

## 2024-05-28 RX ORDER — KETOROLAC TROMETHAMINE 15 MG/ML
15 INJECTION, SOLUTION INTRAMUSCULAR; INTRAVENOUS ONCE
Status: DISCONTINUED | OUTPATIENT
Start: 2024-05-28 | End: 2024-05-28 | Stop reason: HOSPADM

## 2024-05-28 RX ORDER — ONDANSETRON 2 MG/ML
4 INJECTION INTRAMUSCULAR; INTRAVENOUS EVERY 30 MIN PRN
Status: DISCONTINUED | OUTPATIENT
Start: 2024-05-28 | End: 2024-05-28 | Stop reason: HOSPADM

## 2024-05-28 RX ORDER — HYDROMORPHONE HCL IN WATER/PF 6 MG/30 ML
0.2 PATIENT CONTROLLED ANALGESIA SYRINGE INTRAVENOUS EVERY 5 MIN PRN
Status: DISCONTINUED | OUTPATIENT
Start: 2024-05-28 | End: 2024-05-28 | Stop reason: HOSPADM

## 2024-05-28 RX ORDER — SODIUM CHLORIDE, SODIUM LACTATE, POTASSIUM CHLORIDE, CALCIUM CHLORIDE 600; 310; 30; 20 MG/100ML; MG/100ML; MG/100ML; MG/100ML
INJECTION, SOLUTION INTRAVENOUS CONTINUOUS
Status: DISCONTINUED | OUTPATIENT
Start: 2024-05-28 | End: 2024-05-28 | Stop reason: HOSPADM

## 2024-05-28 RX ORDER — LIDOCAINE HYDROCHLORIDE 20 MG/ML
INJECTION, SOLUTION INFILTRATION; PERINEURAL PRN
Status: DISCONTINUED | OUTPATIENT
Start: 2024-05-28 | End: 2024-05-28

## 2024-05-28 RX ORDER — FENTANYL CITRATE 50 UG/ML
25 INJECTION, SOLUTION INTRAMUSCULAR; INTRAVENOUS
Status: DISCONTINUED | OUTPATIENT
Start: 2024-05-28 | End: 2024-05-28 | Stop reason: HOSPADM

## 2024-05-28 RX ORDER — OXYCODONE HYDROCHLORIDE 5 MG/1
5 TABLET ORAL
Status: COMPLETED | OUTPATIENT
Start: 2024-05-28 | End: 2024-05-28

## 2024-05-28 RX ORDER — EPHEDRINE SULFATE 50 MG/ML
25 INJECTION, SOLUTION INTRAVENOUS ONCE
Status: COMPLETED | OUTPATIENT
Start: 2024-05-28 | End: 2024-05-28

## 2024-05-28 RX ORDER — ONDANSETRON 2 MG/ML
INJECTION INTRAMUSCULAR; INTRAVENOUS PRN
Status: DISCONTINUED | OUTPATIENT
Start: 2024-05-28 | End: 2024-05-28

## 2024-05-28 RX ORDER — OXYCODONE HYDROCHLORIDE 5 MG/1
10 TABLET ORAL
Status: DISCONTINUED | OUTPATIENT
Start: 2024-05-28 | End: 2024-05-28 | Stop reason: HOSPADM

## 2024-05-28 RX ORDER — FENTANYL CITRATE 50 UG/ML
25 INJECTION, SOLUTION INTRAMUSCULAR; INTRAVENOUS EVERY 5 MIN PRN
Status: DISCONTINUED | OUTPATIENT
Start: 2024-05-28 | End: 2024-05-28 | Stop reason: HOSPADM

## 2024-05-28 RX ORDER — ACETAMINOPHEN 325 MG/1
975 TABLET ORAL ONCE
Status: DISCONTINUED | OUTPATIENT
Start: 2024-05-28 | End: 2024-05-28 | Stop reason: HOSPADM

## 2024-05-28 RX ORDER — FENTANYL CITRATE 50 UG/ML
INJECTION, SOLUTION INTRAMUSCULAR; INTRAVENOUS PRN
Status: DISCONTINUED | OUTPATIENT
Start: 2024-05-28 | End: 2024-05-28

## 2024-05-28 RX ORDER — PROPOFOL 10 MG/ML
INJECTION, EMULSION INTRAVENOUS PRN
Status: DISCONTINUED | OUTPATIENT
Start: 2024-05-28 | End: 2024-05-28

## 2024-05-28 RX ORDER — PROMETHAZINE HYDROCHLORIDE 25 MG/ML
25 INJECTION INTRAMUSCULAR; INTRAVENOUS
Status: COMPLETED | OUTPATIENT
Start: 2024-05-28 | End: 2024-05-28

## 2024-05-28 RX ORDER — GLYCOPYRROLATE 0.2 MG/ML
INJECTION, SOLUTION INTRAMUSCULAR; INTRAVENOUS PRN
Status: DISCONTINUED | OUTPATIENT
Start: 2024-05-28 | End: 2024-05-28

## 2024-05-28 RX ADMIN — MIDAZOLAM 2 MG: 1 INJECTION INTRAMUSCULAR; INTRAVENOUS at 07:45

## 2024-05-28 RX ADMIN — PROMETHAZINE HYDROCHLORIDE 25 MG: 25 INJECTION INTRAMUSCULAR; INTRAVENOUS at 12:01

## 2024-05-28 RX ADMIN — Medication 1 G: at 08:00

## 2024-05-28 RX ADMIN — FENTANYL CITRATE 25 MCG: 50 INJECTION, SOLUTION INTRAMUSCULAR; INTRAVENOUS at 11:09

## 2024-05-28 RX ADMIN — SUGAMMADEX 100 MG: 100 INJECTION, SOLUTION INTRAVENOUS at 10:38

## 2024-05-28 RX ADMIN — PROPOFOL 200 MG: 10 INJECTION, EMULSION INTRAVENOUS at 07:54

## 2024-05-28 RX ADMIN — GLYCOPYRROLATE 0.2 MG: 0.2 INJECTION, SOLUTION INTRAMUSCULAR; INTRAVENOUS at 07:54

## 2024-05-28 RX ADMIN — ONDANSETRON 4 MG: 2 INJECTION INTRAMUSCULAR; INTRAVENOUS at 10:12

## 2024-05-28 RX ADMIN — DEXAMETHASONE SODIUM PHOSPHATE 8 MG: 4 INJECTION, SOLUTION INTRA-ARTICULAR; INTRALESIONAL; INTRAMUSCULAR; INTRAVENOUS; SOFT TISSUE at 07:54

## 2024-05-28 RX ADMIN — SODIUM CHLORIDE, POTASSIUM CHLORIDE, SODIUM LACTATE AND CALCIUM CHLORIDE: 600; 310; 30; 20 INJECTION, SOLUTION INTRAVENOUS at 11:22

## 2024-05-28 RX ADMIN — LIDOCAINE HYDROCHLORIDE 50 MG: 20 INJECTION, SOLUTION INFILTRATION; PERINEURAL at 07:54

## 2024-05-28 RX ADMIN — EPHEDRINE SULFATE 25 MG: 50 INJECTION, SOLUTION INTRAVENOUS at 12:01

## 2024-05-28 RX ADMIN — ROCURONIUM BROMIDE 50 MG: 50 INJECTION, SOLUTION INTRAVENOUS at 07:54

## 2024-05-28 RX ADMIN — HYDROMORPHONE HYDROCHLORIDE 0.2 MG: 0.2 INJECTION, SOLUTION INTRAMUSCULAR; INTRAVENOUS; SUBCUTANEOUS at 12:19

## 2024-05-28 RX ADMIN — SODIUM CHLORIDE, POTASSIUM CHLORIDE, SODIUM LACTATE AND CALCIUM CHLORIDE: 600; 310; 30; 20 INJECTION, SOLUTION INTRAVENOUS at 08:09

## 2024-05-28 RX ADMIN — HYDROMORPHONE HYDROCHLORIDE 1 MG: 1 INJECTION, SOLUTION INTRAMUSCULAR; INTRAVENOUS; SUBCUTANEOUS at 08:19

## 2024-05-28 RX ADMIN — SODIUM CHLORIDE, POTASSIUM CHLORIDE, SODIUM LACTATE AND CALCIUM CHLORIDE: 600; 310; 30; 20 INJECTION, SOLUTION INTRAVENOUS at 07:09

## 2024-05-28 RX ADMIN — PHENAZOPYRIDINE 200 MG: 200 TABLET ORAL at 07:14

## 2024-05-28 RX ADMIN — ONDANSETRON 4 MG: 2 INJECTION INTRAMUSCULAR; INTRAVENOUS at 11:29

## 2024-05-28 RX ADMIN — ROCURONIUM BROMIDE 20 MG: 50 INJECTION, SOLUTION INTRAVENOUS at 08:37

## 2024-05-28 RX ADMIN — FENTANYL CITRATE 25 MCG: 50 INJECTION, SOLUTION INTRAMUSCULAR; INTRAVENOUS at 11:25

## 2024-05-28 RX ADMIN — OXYCODONE HYDROCHLORIDE 5 MG: 5 TABLET ORAL at 12:35

## 2024-05-28 RX ADMIN — KETOROLAC TROMETHAMINE 15 MG: 15 INJECTION, SOLUTION INTRAMUSCULAR; INTRAVENOUS at 11:08

## 2024-05-28 RX ADMIN — SUGAMMADEX 200 MG: 100 INJECTION, SOLUTION INTRAVENOUS at 10:24

## 2024-05-28 RX ADMIN — ACETAMINOPHEN 975 MG: 325 TABLET, FILM COATED ORAL at 07:14

## 2024-05-28 RX ADMIN — FENTANYL CITRATE 100 MCG: 50 INJECTION INTRAMUSCULAR; INTRAVENOUS at 07:54

## 2024-05-28 RX ADMIN — Medication 2 G: at 07:45

## 2024-05-28 RX ADMIN — PROPOFOL 50 MCG/KG/MIN: 10 INJECTION, EMULSION INTRAVENOUS at 07:57

## 2024-05-28 ASSESSMENT — ACTIVITIES OF DAILY LIVING (ADL)
ADLS_ACUITY_SCORE: 31
ADLS_ACUITY_SCORE: 31
ADLS_ACUITY_SCORE: 32
ADLS_ACUITY_SCORE: 31
ADLS_ACUITY_SCORE: 31
ADLS_ACUITY_SCORE: 32
ADLS_ACUITY_SCORE: 31

## 2024-05-28 NOTE — DISCHARGE INSTRUCTIONS
Maximum acetaminophen (Tylenol) dose from all sources should not exceed 4 grams (4000 mg) per day.  You last had 975 mg at 7:15 AM; do not take Tylenol products again until after 1:15 PM if needed.    You received Toradol, an IV form of Ibuprofen (Motrin) at 11:10 AM.  Do not take any Ibuprofen products until after 5:10 PM.            DR. SHELBY LEBRON M.D.   CLINIC PHONE NUMBER [phone].  [facility]

## 2024-05-28 NOTE — ANESTHESIA CARE TRANSFER NOTE
Patient: Rama Gutierrez    Procedure: Procedure(s):  ROBOT-ASSISTED TOTAL LAPAROSCOPIC HYSTERECTOMY, WITH BILATERAL SALPINGECTOMY, CYSTOSCOPY       Diagnosis: Menorrhagia with regular cycle [N92.0]  Dysmenorrhea [N94.6]  Chronic pelvic pain in female [R10.2, G89.29]  Diagnosis Additional Information: No value filed.    Anesthesia Type:   General     Note:    Oropharynx: oropharynx clear of all foreign objects  Level of Consciousness: drowsy  Oxygen Supplementation: face mask  Level of Supplemental Oxygen (L/min / FiO2): 6  Independent Airway: airway patency satisfactory and stable  Dentition: dentition unchanged  Vital Signs Stable: post-procedure vital signs reviewed and stable  Report to RN Given: handoff report given  Patient transferred to: PACU    Handoff Report: Identifed the Patient, Identified the Reponsible Provider, Reviewed the pertinent medical history, Discussed the surgical course, Reviewed Intra-OP anesthesia mangement and issues during anesthesia, Set expectations for post-procedure period and Allowed opportunity for questions and acknowledgement of understanding      Vitals:  Vitals Value Taken Time   /74 05/28/24 1045   Temp 98.3  F (36.8  C) 05/28/24 1045   Pulse 85 05/28/24 1046   Resp 22 05/28/24 1046   SpO2 98 % 05/28/24 1046   Vitals shown include unfiled device data.    Electronically Signed By: JUAN Rebolledo CRNA  May 28, 2024  10:48 AM

## 2024-05-28 NOTE — ANESTHESIA PROCEDURE NOTES
Airway       Patient location during procedure: OR       Procedure Start/Stop Times: 5/28/2024 7:53 AM  Staff -        Anesthesiologist:  Fransisco Pinto MD       CRNA: Marilee Barrera APRN CRNA       Performed By: anesthesiologist and CRNA  Consent for Airway        Urgency: elective  Indications and Patient Condition       Indications for airway management: seth-procedural       Induction type:intravenous       Mask difficulty assessment: 1 - vent by mask    Final Airway Details       Final airway type: endotracheal airway       Successful airway: ETT - single and Oral  Endotracheal Airway Details        ETT size (mm): 7.0       Cuffed: yes       Successful intubation technique: direct laryngoscopy       DL Blade Type: Sanchez 2       Grade View of Cords: 1       Adjucts: stylet       Position: Right       Measured from: gums/teeth       Secured at (cm): 22       Bite block used: None    Post intubation assessment        Placement verified by: capnometry, equal breath sounds and chest rise        Number of attempts at approach: 1       Number of other approaches attempted: 0       Secured with: tape       Ease of procedure: easy       Dentition: Unchanged    Medication(s) Administered   Medication Administration Time: 5/28/2024 7:53 AM

## 2024-05-28 NOTE — OP NOTE
Operative Report    PREOPERATIVE DIAGNOSIS:  Dysmenorrhea, Chronic pelvic pain   POSTOPERATIVE DIAGNOSES:  Same  PROCEDURE: Robotic-assisted laparoscopic total hysterectomy, Bilateral salpingectomy, Cystoscopy  SURGEON: Ken Zaman MD   ASSISTANT: Juan Francisco Jimenez MD   ANESTHESIA: General endotracheal anesthesia.   FLUIDS: 1500 cc of lactated Ringers, Ancef 3 gram(s)  IV preop.   ESTIMATED BLOOD LOSS: 20 cc.   DRAINS: None.   INDICATIONS FOR PROCEDURE:  Patient  is a 33 year old woman G0, who has had severe chronic worsening dysmenorrhea and pelvic pain unresponsive to medical management.  Therefore she desires definitive therapy via hysterectomy. Despite her nulliparity, she has no desire for future childbearing in her life plans. As to the route of the procedure, she wants the robotic-assisted approach as given her enlarged uterus and limited descent due to the uterine size.  This will maximize her chances of avoiding abdominal hysterectomy.  To reduce the risk of possible tubal sources of ovarian cancers, a bilateral salpingectomy will also be done.  The ovaries will be preserved assuming they are normal.  She understands that the risks of the procedure includes bleeding, infection, injury to the bowel, bladder, ureters, ovaries, nerves, blood vessels, and any other intra-abdominal or pelvic organs and the risks of general anesthesia including aspiration, malignant hypothermia and death.  She accepts all these risks and factors in the decisionmaking process of proceeding with this procedure and she has given informed consent.   FINDINGS: On pelvic exam under anesthesia, the uterus is approximately 8 weeks size with normal contour. The adnexa are without masses. On robotic-assisted laparoscopy, the uterus was noted to be 6-8 weeks size.  The ovaries and tubes appeared normal bilaterally, except for a couple small paratubal cysts. The cul-de-sac appeared normal.  There were no adhesions.  There was no  endometriosis.  The upper abdomen was normal.   SPECIMENS: Uterus and cervix, both tubes.   Total weight was < 250 g.  PROCEDURE IN DETAIL:   After proper patient identification and consent for procedure was obtained, the patient was taken to the operating room where adequate general endotracheal anesthesia was obtained. The patient was placed in dorsal lithotomy position with legs in Blair stirrups and positioned appropriately for robotic-assisted hysterectomy. She was prepped and draped in the usual sterile manner for this procedure.   Dr. Jimenez actively first-assisted during this operation because of the added complexity of this case presented by Patient's body habitus, as well as providing necessary retraction and exposure as well as maintaining hemostasis and a clear operative field.  This was helpful in allowing the operation to proceed in a safe and expeditious manner.  Dr. Jimenez was present for the entire duration of the operation.  A Emery catheter was placed. A single arm speculum was placed in the vagina to visualize the cervix, which was then grasped at 12 o'clock with a single-tooth tenaculum for downward traction of the uterus. The cervix was gently dilated up to a #8 Hegar dilator. The medium V-Care was inserted through the endocervical canal into the uterine cavity, and the balloon was inflated with saline. The tenaculum and speculum were removed.  The V-Care cup was advanced to the fornices, and the locking cup was advanced to lock the cup in place.  Attention was then turned to the abdomen.   With abdominal wall tenting, a Veress needle was inserted through the umbilicus into the peritoneal cavity.  Low flow insufflation with CO2 was begun with opening pressure of 5 mmHg.  After 1 liter was insufflated with low flow, high flow insufflation completed the process for a total of 2.5 L. The Veress needle was removed.  An 8 mm incision was placed approximately 3 cm above the umbilicus in the  midline transversely and then the 8-mm robotic camera port was inserted under direct visualization with the 5 mm laparoscope using the Optiview trocar into the peritoneal cavity without difficulty.  The initial assessment revealed the above noted findings.   A 5 mm skin incision was made with the knife on the right lateral abdomen for the assistant port.   A 5 mm AirSeal port was inserted under direct visualization with the laparoscope without trauma to underlying viscera.  The trocar was removed. There were no intraabdominal adhesions underneath this port site as well as no injury to the abdominal viscera.  The scope was placed through the camera port.  The left and right 8 mm robotic ports were then placed after determining proper position for each port. An 8 mm skin incision was made with a knife bilaterally and the ports were placed under direct visualization with the laparoscope.    Once all ports were placed correctly as per the [facility] specifications, the [facility] surgical robot was then moved into position and docked to the camera and robotic arm ports. The camera was then placed into the central port and a SynchroSeal vessel sealer was placed in the left port and a monopolar scissor was placed in the right port.   At this point I assumed my position at the console, and the ureters were identified bilaterally and avoided during the procedure.  The right mesosalpinx was sealed and divided using the SynchroSeal and monopolar scissors. The uterus was always elevated cranially with one of the assistants elevating the V-Care to keep the ureters out of harm's way.  Attention was then turned to the contralateral side where a similar process was performed sealing and dividing the mesosalpinx. The round ligaments were likewise sealed and divided bilaterally.  This allowed opening of the broad ligament and access to the retroperitoneal space.  The utero-ovarian ligaments were sealed and divided.  The anterior leaf of  the broad ligament was then divided by combination of the SynchroSeal and/or monopolar cautery with the scissors to the level of the bladder flap. The 2 sides were connected in the central portion and the bladder flap was developed with sharp dissection and also using monopolar cautery keeping it well away from the actual bladder.   Once the bladder was well below the V-Care cup assuring that the bladder and ureters were out of harm's way, the uterine vessels were then sealed and divided along the uterine sidewall. This was done on both sides using the SynchroSeal and monopolar scissors.  A colpotomy was then performed using scissors on pure cutting current posteriorly using the V-Care cup as a guide, and the vaginal cuff was incised bilaterally until near the uterine vessels, still preserving some minimal vaginal bleeding from the cuff edges to maintain good vascularization of the tissues.  The anterior colpotomy was likewise performed.  These transection sites were merged to the anterior and posterior colpotomies resulting in amputation of the cervix from the vaginal vault.  The uterus and tubes were brought out by the assistant into the vagina and removed.  A packing was placed in the vagina to maintain pneumoperitoneum.   The vaginal cuff was then closed using a 2-0 V-Loc 180 suture in a running horizontal manner starting from the right apex and going across to the left apex and over-sewing it back re-approximating the endopelvic fascial layer the full width of the cuff to make a full 2-layer closure.  The stitch continued back again for 2 throws to anchor it.  The pelvis was irrigated with normal saline and cleared of any clots and debris. All pedicles and the vaginal cuff were noted to be hemostatic after depressurizing the abdomen to 8 mmHg. A swatch of Interceed was placed over the vaginal cuff, covering the barbed suture line as well as surrounding tissues.   At this point, all pedicles were still  hemostatic and so all the robot ports were undocked, and the robot was moved away from the operative field.   The pneumoperitoneum was released from the abdomen and all ports were removed. All skin incisions were reapproximated with 4-0 Vicryl subcuticular simple sutures. 0.5% Marcaine was infiltrated into all port sites for postop analgesia. Steri-Strips were placed.   Attention was turned to the cystoscopy.  This was indicated to confirm ureteral integrity after noting some more concentrated Pyridium in the Emery catheter that may have been consistent with the color of blood.  The vaginal packing and Emery were removed.  The external sheath and obturator were inserted through the urethra into the bladder.  The obturator was removed.  The 70 degree cystoscope was placed through the sheath using saline as distention medium.  Both ureteral orifices were seen and effluxed Pyridium-stained urine confirming both are patent and intact.  The rest of the bladder was also intact with no suture present.  The cystoscope was removed after the bladder was drained.  The patient tolerated the procedure well. Sponge, instrument and needle counts were correct x3. The patient was taken to the recovery room and extubated in stable condition.     Ken Zaman MD

## 2024-05-28 NOTE — ANESTHESIA PREPROCEDURE EVALUATION
Anesthesia Pre-Procedure Evaluation    Patient: Rama Gutierrez   MRN: [id number] : 1990        Procedure : Procedure(s):  ROBOT-ASSISTED TOTAL LAPAROSCOPIC HYSTERECTOMY, WITH BILATERAL SALPINGECTOMY          Past Medical History:   Diagnosis Date    Depressive disorder       Past Surgical History:   Procedure Laterality Date    TONSILLECTOMY        Allergies   Allergen Reactions    Codeine Nausea      Social History     Tobacco Use    Smoking status: Never     Passive exposure: Never    Smokeless tobacco: Never   Substance Use Topics    Alcohol use: No     Comment: on a rare occ       Wt Readings from Last 1 Encounters:   24 123.2 kg (271 lb 11.2 oz)        Anesthesia Evaluation   Pt has had prior anesthetic. Type: General.        ROS/MED HX  ENT/Pulmonary:  - neg pulmonary ROS     Neurologic:  - neg neurologic ROS     Cardiovascular:  - neg cardiovascular ROS     METS/Exercise Tolerance: >4 METS    Hematologic: Comments: Lab Test        05/28/20     09/19/19     05/10/19                       1247          1225          1332          WBC          10.3         10.3         11.3*         HGB          12.5         12.7         12.5          MCV          77*          76*          77*           PLT          326          318          313            Lab Test        05/28/20     09/19/19     05/10/19                       1247          1225          1332          NA           143          143          139           POTASSIUM    4.7          4.3          4.0           CHLORIDE     107          106          109           CO2          29           30           28            BUN          9            11           10            CR           1.00         1.00         0.96          ANIONGAP     7            7            2*            DUARTE          9.6          8.9          8.9           GLC          133*         83           88             - neg hematologic  ROS     Musculoskeletal:  - neg musculoskeletal ROS    "  GI/Hepatic:  - neg GI/hepatic ROS     Renal/Genitourinary:  - neg Renal ROS     Endo:     (+)               Obesity,       Psychiatric/Substance Use:     (+) psychiatric history depression       Infectious Disease:  - neg infectious disease ROS     Malignancy:  - neg malignancy ROS     Other:            Physical Exam    Airway        Mallampati: II   TM distance: > 3 FB   Neck ROM: full   Mouth opening: > 3 cm    Respiratory Devices and Support         Dental       (+) Minor Abnormalities - some fillings, tiny chips      Cardiovascular   cardiovascular exam normal          Pulmonary   pulmonary exam normal                OUTSIDE LABS:  CBC:   Lab Results   Component Value Date    WBC 10.3 05/28/2020    WBC 10.3 09/19/2019    HGB 12.5 05/28/2020    HGB 12.7 09/19/2019    HCT 41.3 05/28/2020    HCT 42.6 09/19/2019     05/28/2020     09/19/2019     BMP:   Lab Results   Component Value Date     05/28/2020     09/19/2019    POTASSIUM 4.7 05/28/2020    POTASSIUM 4.3 09/19/2019    CHLORIDE 107 05/28/2020    CHLORIDE 106 09/19/2019    CO2 29 05/28/2020    CO2 30 09/19/2019    BUN 9 05/28/2020    BUN 11 09/19/2019    CR 1.00 05/28/2020    CR 1.00 09/19/2019     (H) 05/28/2020    GLC 83 09/19/2019     COAGS: No results found for: \"PTT\", \"INR\", \"FIBR\"  POC:   Lab Results   Component Value Date    HCG Negative 05/28/2024     HEPATIC:   Lab Results   Component Value Date    ALBUMIN 3.7 05/28/2020    PROTTOTAL 7.4 05/28/2020    ALT 17 05/28/2020    AST 22 05/28/2020    ALKPHOS 130 05/28/2020    BILITOTAL 0.4 05/28/2020     OTHER:   Lab Results   Component Value Date    A1C 5.7 (H) 08/11/2023    DUARTE 9.6 05/28/2020    MAG 2.3 05/28/2020    TSH 0.96 09/19/2019       Anesthesia Plan    ASA Status:  2       Anesthesia Type: General.     - Airway: ETT   Induction: Intravenous, Propofol.           Consents    Anesthesia Plan(s) and associated risks, benefits, and realistic alternatives discussed. " "Questions answered and patient/representative(s) expressed understanding.     - Discussed:     - Discussed with:  Patient      - Extended Intubation/Ventilatory Support Discussed: No.      - Patient is DNR/DNI Status: No     Use of blood products discussed: Yes.     - Discussed with: Patient.     - Consented: consented to blood products     Postoperative Care    Pain management: IV analgesics.   PONV prophylaxis: Dexamethasone or Solumedrol, Ondansetron (or other 5HT-3)     Comments:               Fransisco Pinto MD    I have reviewed the pertinent notes and labs in the chart from the past 30 days and (re)examined the patient.  Any updates or changes from those notes are reflected in this note.              # Severe Obesity: Estimated body mass index is 40.24 kg/m  as calculated from the following:    Height as of 5/8/24: 1.75 m (5' 8.9\").    Weight as of this encounter: 123.2 kg (271 lb 11.2 oz).      "

## 2024-05-29 LAB
PATH REPORT.COMMENTS IMP SPEC: NORMAL
PATH REPORT.COMMENTS IMP SPEC: NORMAL
PATH REPORT.FINAL DX SPEC: NORMAL
PATH REPORT.GROSS SPEC: NORMAL
PATH REPORT.MICROSCOPIC SPEC OTHER STN: NORMAL
PATH REPORT.RELEVANT HX SPEC: NORMAL
PHOTO IMAGE: NORMAL

## 2024-07-10 ENCOUNTER — OFFICE VISIT (OUTPATIENT)
Dept: OBGYN | Facility: CLINIC | Age: 34
End: 2024-07-10
Payer: COMMERCIAL

## 2024-07-10 VITALS
HEIGHT: 68 IN | BODY MASS INDEX: 41.83 KG/M2 | SYSTOLIC BLOOD PRESSURE: 122 MMHG | WEIGHT: 276 LBS | DIASTOLIC BLOOD PRESSURE: 80 MMHG

## 2024-07-10 DIAGNOSIS — Z09 POSTOP CHECK: Primary | ICD-10-CM

## 2024-07-10 PROCEDURE — 99024 POSTOP FOLLOW-UP VISIT: CPT | Performed by: OBSTETRICS & GYNECOLOGY

## 2024-07-10 NOTE — PROGRESS NOTES
CC:  Here for post-op check-up.      HPI:  Procedure: 5/28/2024  Date of procedure: Robotic-assisted laparoscopic total hysterectomy, Bilateral salpingectomy, Cystoscopy   Post-op course:  Uncomplicated thus far.  Normal bowel and bladder habits.  No vaginal bleeding or drainage.    Pathology:  Cervix, uterus, and bilateral fallopian tubes, robotic-assisted laparoscopic total hysterectomy and bilateral salpingectomy (112 grams):  Cervix:                -Benign squamous and endocervical mucosa.  Endometrium:  -Weakly proliferative pattern endometrium.  Myometrium:  -Unremarkable.  Bilateral fallopian tubes:                -Bilateral paratubal cysts.      OBJECTIVE:   There were no vitals taken for this visit.  Abdomen:  ND, soft, NT  Incisions: 4 laparoscopy scars well-healed.  Pelvic:  Vaginal cuff healing well with no erythema, induration, or drainage.    ASSESSMENT:   Encounter Diagnosis   Name Primary?    Postop check Yes         PLAN:      Pt. to f/u for routine annual exams with bimanual exams for ovaries only.  She no longer needs Pap testing due to absence of cervix and no history of cervical cancer.      Ken Zaman MD  [facility]

## 2024-07-10 NOTE — NURSING NOTE
"Chief Complaint   Patient presents with    Post-op Visit     [facility]        Initial /80 (BP Location: Right arm, Patient Position: Sitting, Cuff Size: Adult Large)   Ht 1.727 m (5' 8\")   Wt 125.2 kg (276 lb)   LMP 2024 (Within Days)   BMI 41.97 kg/m   Estimated body mass index is 41.97 kg/m  as calculated from the following:    Height as of this encounter: 1.727 m (5' 8\").    Weight as of this encounter: 125.2 kg (276 lb).  BP completed using cuff size: large    Questioned patient about current smoking habits.  Pt. has never smoked.          The following HM Due: NONE    Terrence Jennings CMA                "

## 2024-07-22 ENCOUNTER — OFFICE VISIT (OUTPATIENT)
Dept: URGENT CARE | Facility: URGENT CARE | Age: 34
End: 2024-07-22
Payer: COMMERCIAL

## 2024-07-22 VITALS
BODY MASS INDEX: 34.52 KG/M2 | SYSTOLIC BLOOD PRESSURE: 128 MMHG | OXYGEN SATURATION: 96 % | HEART RATE: 83 BPM | RESPIRATION RATE: 16 BRPM | TEMPERATURE: 96.6 F | DIASTOLIC BLOOD PRESSURE: 83 MMHG | WEIGHT: 227 LBS

## 2024-07-22 DIAGNOSIS — R07.0 THROAT PAIN: ICD-10-CM

## 2024-07-22 DIAGNOSIS — R05.1 ACUTE COUGH: ICD-10-CM

## 2024-07-22 DIAGNOSIS — Z20.822 EXPOSURE TO 2019 NOVEL CORONAVIRUS: Primary | ICD-10-CM

## 2024-07-22 LAB
DEPRECATED S PYO AG THROAT QL EIA: NEGATIVE
GROUP A STREP BY PCR: NOT DETECTED

## 2024-07-22 PROCEDURE — 99213 OFFICE O/P EST LOW 20 MIN: CPT | Performed by: NURSE PRACTITIONER

## 2024-07-22 PROCEDURE — 87635 SARS-COV-2 COVID-19 AMP PRB: CPT | Performed by: NURSE PRACTITIONER

## 2024-07-22 PROCEDURE — 87651 STREP A DNA AMP PROBE: CPT | Performed by: NURSE PRACTITIONER

## 2024-07-22 RX ORDER — ALBUTEROL SULFATE 90 UG/1
2 AEROSOL, METERED RESPIRATORY (INHALATION) EVERY 6 HOURS PRN
Qty: 18 G | Refills: 0 | Status: SHIPPED | OUTPATIENT
Start: 2024-07-22

## 2024-07-22 ASSESSMENT — PAIN SCALES - GENERAL: PAINLEVEL: SEVERE PAIN (7)

## 2024-07-22 NOTE — PATIENT INSTRUCTIONS
Discharge Instructions for COVID-19 Patients  You were tested for COVID-19. Your result was positive. This means you do have COVID-19. Follow the steps below. If you were tested for an upcoming treatment (procedure), you should also contact your care team for next steps.  How can I take care of myself at home?  Get lots of rest.  Drink extra fluids (unless a doctor has told you not to).  Take acetaminophen (Tylenol) for fever or pain. If you have liver or kidney problems, first ask your care team if it's safe to take acetaminophen.  Adults can take either:  650 mg (two 325 mg pills) every 4 to 6 hours as needed (but no more than 10 pills per day), OR   1,000 mg (two 500 mg pills) every 6 hours as needed (but no more than 6 pills per day).  Note: Don't take more than 3,000 mg of acetaminophen (Tylenol) in one day. Acetaminophen is found in many medicines, even over-the-counter medicines. Read all labels to be sure you don't take too much.  For children:  Check the acetaminophen (Tylenol) bottle to find out the right dose based on their age or weight.  Don't give children more than 1,625 mg of Tylenol in one day.  Know when to call 911. Emergency warning signs include:  Trouble breathing or shortness of breath  Pain or pressure in the chest that doesn't go away  Feeling confused like you haven't felt before, or not being able to wake up  Bluish-colored lips or face  If you have other health problems (like cancer, heart failure, an organ transplant, or severe kidney disease): Call your specialty clinic if you don't feel better in the next 2 days.  How can I protect others?  If you DO have symptoms:  Stay home and away from others (self-isolate). You can go back to being with other people when:  You've had no fever for 24 hours--without taking any medicine that reduces fever, AND  Your other symptoms (such as a cough) are better.  Wear a mask or face covering for 5 full days anytime you're around other people.  If you  DON'T have symptoms:  Wear a mask or face covering for 5 full days anytime you're around other people.  If you plan to visit a clinic or hospital, please check their guidelines before you arrive--healthcare sites may have different rules. If you were tested because you're going to have surgery or another treatment, contact your care team for next steps.  During self-isolation  Stay home until it's safe to be around others.  At home, stay away from other people and pets. Or, wear a well-fitting mask when you need to be around others.  Monitor your symptoms. If you have any emergency warning signs listed at the link (such as trouble breathing, chest pain that won't go away, or confusion that's new to you), then get emergency medical care right away.  Stay in a separate room from other household members, if possible.  Use a separate bathroom, if possible.  Improve ventilation (air flow) at home, if possible.  Don't share personal household items, like cups, towels, and utensils.  Is there medicine to treat COVID-19?  Yes, there are safe and effective medicines. They may make you feel better faster, keep you out of the hospital, and prevent death.   It's very important to take these medicines early in your illness before you get worse.   Who should take this medicine?   These treatments are for people who are not in the hospital, but who are at risk of getting very sick from COVID. This includes people who:  Are over age 65  Are members of the [place] community (Black, indigenous, and people of color)  Are overweight (body mass index is over 25)  Are inactive (don't exercise)  Are pregnant  Smoke or vape (now or in the past)  Have a disability  Have any of the following health problems: diabetes, high blood pressure, cancer, heart problems, liver disease, lung disease, kidney disease, sickle cell disease, cystic fibrosis (CF), dementia and other neuro (brain) diseases, HIV, thalassemia, or tuberculosis (TB)  Have ever had  "a stroke, organ transplant, or blood cell transplant  Have a mental health problem or substance abuse disorder (drugs, alcohol)  Have a weak immune system (are immuno-compromised)  COVID medicines can affect the safety of other medicines you take. It's important to talk to your care team before you take any new medicines. Sometimes you'll need to make short-term changes to your other medicines.  What should I do if I have symptoms now and want to discuss treatments?  Call your family clinic. Or, dial [phone] ([phone]) and say \"COVID\" when prompted, OR  Go to [email or web address] (click \"Message Your Care Team\"), OR  Request an appointment on [employer]  When can I go back to work?  You should NOT go back to work until you meet the guidelines on page 1. (See the \"How can I protect others?\" section.) You don't need to be re-tested for COVID before going back to work. Studies show that you won't spread the virus if it's been at least 10 days since your symptoms started (or 20 days if you have a weak immune system).  Employers, schools, and daycares: This document serves as formal notice of medical guidelines before your employee or student can return to work or school. They must meet the guidelines in the \"How can I protect others?\" section on page 1 before going back in person.   Where can I get more information?  [facility] - About COVID-19:   [employer]/covid19    Preventing Spread of Respiratory Viruses when You're Sick: bit.ly/CDCVirus      For informational purposes only. Not to replace the advice of your health care provider. Clinically reviewed by Dr. Juan Francisco Choudhary. Copyright   2020 [employer]. All rights reserved. [employer] [id number] - REV 04/24.    "

## 2024-07-22 NOTE — PROGRESS NOTES
Assessment & Plan      Diagnosis Comments   1. Exposure to 2019 novel coronavirus  Symptomatic COVID-19 Virus (Coronavirus) by PCR Nose, Streptococcus A Rapid Screen w/Reflex to PCR - Clinic Collect       2. Acute cough  Symptomatic COVID-19 Virus (Coronavirus) by PCR Nose, Streptococcus A Rapid Screen w/Reflex to PCR - Clinic Collect, albuterol (PROAIR HFA/PROVENTIL HFA/VENTOLIN HFA) 108 (90 Base) MCG/ACT inhaler       3. Throat pain  Streptococcus A Rapid Screen w/Reflex to PCR - Clinic Collect       Rest, Push fluids, vaporizer, inhaler as directed side effects reviewedinhaler asas directed s/e reviewed  Pending covid and strep culture.   Ibuprofen and or Tylenol for any fever or body aches.  If symptoms worsen, recheck immediately otherwise follow up with your PCP in 1 week if symptoms are not improving.  Worrisome symptoms discussed with instructions to go to the ED.  Mother verbalized understanding and agreed with this plan.      [facility] U[facility] FREDRICK Ontiveros is a 34 year old female who presents to clinic today for the following health issues:  Chief Complaint   Patient presents with    Urgent Care    Cough     Pt is NEG to covi, roommate is positive since last week     Sinus Problem    Fatigue       HPI      URI Adult    Onset of symptoms was 3 day(s) ago.  Course of illness is waxing and waning.    Severity moderate  Current and Associated symptoms: chills, runny nose, cough - non-productive, sore throat, body aches, and fatigue  Treatment measures tried include Tylenol/Ibuprofen, OTC Cough med, Fluids, and Rest.  Predisposing factors include ill contact: Family member .      Review of Systems  Constitutional, HEENT, cardiovascular, pulmonary, gi and gu systems are negative, except as otherwise noted.      Objective    /83   Pulse 83   Temp (!) 96.6  F (35.9  C) (Tympanic)   Wt 103 kg (227 lb)   LMP 05/01/2024 (Within Days)   SpO2 96%    BMI 34.52 kg/m    Physical Exam   GENERAL: alert and no distress  EYES: Eyes grossly normal to inspection, PERRL and conjunctivae and sclerae normal  HENT: normal cephalic/atraumatic, ear canals and TM's normal, nose and mouth without ulcers or lesions, nasal mucosa edematous , rhinorrhea clear, oropharynx clear, and oral mucous membranes moist  NECK: bilateral anterior cervical adenopathy, no asymmetry, masses, or scars, and thyroid normal to palpation  RESP: lungs clear to auscultation - no rales, rhonchi or wheezes  CV: regular rate and rhythm, normal S1 S2, no S3 or S4, no murmur, click or rub, no peripheral edema  ABDOMEN: soft, nontender, no hepatosplenomegaly, no masses and bowel sounds normal  MS: no gross musculoskeletal defects noted, no edema  SKIN: no suspicious lesions or rashes    Results for orders placed or performed in visit on 07/22/24   Streptococcus A Rapid Screen w/Reflex to PCR - Clinic Collect     Status: Normal    Specimen: Throat; Swab   Result Value Ref [facility]    Group A Strep antigen Negative Negative

## 2024-07-23 ENCOUNTER — TELEPHONE (OUTPATIENT)
Dept: NURSING | Facility: CLINIC | Age: 34
End: 2024-07-23
Payer: COMMERCIAL

## 2024-07-23 LAB — SARS-COV-2 RNA RESP QL NAA+PROBE: POSITIVE

## 2024-07-23 NOTE — TELEPHONE ENCOUNTER
Coronavirus (COVID-19) Notification    Caller Name (Patient, parent, daughter/son, grandparent, etc)  Patient    Reason for call  Notify of Positive Coronavirus (COVID-19) lab results, assess symptoms,  review [facility] recommendations    Lab Result    Lab test:  2019-nCoV rRt-PCR or SARS-CoV-2 PCR    Oropharyngeal AND/OR nasopharyngeal swabs is POSITIVE for 2019-nCoV RNA/SARS-COV-2 PCR (COVID-19 virus)      Gather patient reported symptoms   Assessment   Current Symptoms at time of phone call, reported by patient: (if no symptoms, document: No symptoms] Cough, fever, sore throat   Date of symptom(s) onset (if applicable) 7/21/2024     If at time of call, Patients symptoms have worsened, the Patient should contact 911 or have someone drive them to Emergency Dept promptly:    If Patient calling 911, inform 911 personal that you have tested positive for the Coronavirus (COVID-19).  Place mask on and await 911 to arrive.  If Emergency Dept, If possible, please have another adult drive you to the Emergency Dept but you need to wear mask when in contact with other people.      Treatment Options:   Is patient interested in discussing COVID treatment? Yes.   Is this a [place] or [facility] Patient? No:     Are you an agent trained to scheduling? Yes.        Review information with Patient    Your result was positive. This means you have COVID-19 (coronavirus).    How can I protect others?    These guidelines are for isolating before returning to work, school or .    If you DO have symptoms  Stay home and away from others   For at least 5 days after your symptoms started, AND  You are fever free for 24 hours (with no medicine that reduces fever), AND  Your other symptoms are better  Wear a mask for 10 full days anytime you are around others    If you DON'T have symptoms  Stay home and away from others for at least 5 days after your positive test  Wear a mask for 10 full days anytime you are around  others    There may be different guidelines for healthcare facilities.  Please check with the specific sites before arriving.    If you have been told by a doctor that you were severely ill with COVID-19 or are immunocompromised, you should isolate for at least 10 days.    You should not go back to work until you meet the guidelines above for ending your home isolation. You don't need to be retested for COVID-19 before going back to work--studies show that you won't spread the virus if it's been at least 10 days since your symptoms started (or 20 days, if you have a weak immune system).    Employers, schools, and daycares: This is an official notice for this person's medical guidelines for returning in-person.  They must meet the above guidelines before going back to work, school or  in person.    You will receive a positive COVID-19 letter via [employer] or the mail soon with additional self-care information.    Would you like me to review some of that information with you now?  Yes    How can I take care of myself?    Get lots of rest. Drink extra fluids (unless a doctor has told you not to).    Take Tylenol (acetaminophen) for fever or pain. If you have liver or kidney problems, ask your family doctor if it's okay to take Tylenol.     Take either:   650 mg (two 325 mg pills) every 4 to 6 hours, or   1,000 mg (two 500 mg pills) every 8 hours as needed.   Note: Do not take more than 3,000 mg in one day. Acetaminophen is found in many medicines (both prescribed and over-the-counter medicines). Read all labels to be sure you don't take too much.    For children, check the Tylenol bottle for the right dose (based on their age or weight).    If you have other health problems (like cancer, heart failure, an organ transplant or severe kidney disease): Call your specialty clinic if you don't feel better in the next 2 days.    Know when to call 911: Emergency warning signs include:  Trouble breathing or shortness of  breath  Pain or pressure in the chest that doesn't go away  Feeling confused like you haven't felt before, or not being able to wake up  Bluish-colored lips or face      If you were tested for an upcoming procedure, please contact your provider for next steps.    Kaylan Bianchi

## 2024-09-21 ENCOUNTER — HEALTH MAINTENANCE LETTER (OUTPATIENT)
Age: 34
End: 2024-09-21

## 2024-10-15 ENCOUNTER — MYC REFILL (OUTPATIENT)
Dept: PEDIATRICS | Facility: CLINIC | Age: 34
End: 2024-10-15
Payer: COMMERCIAL

## 2024-10-15 DIAGNOSIS — F41.1 GAD (GENERALIZED ANXIETY DISORDER): ICD-10-CM

## 2024-10-15 DIAGNOSIS — F33.1 MAJOR DEPRESSIVE DISORDER, RECURRENT EPISODE, MODERATE (H): ICD-10-CM

## 2024-10-16 RX ORDER — DULOXETIN HYDROCHLORIDE 60 MG/1
60 CAPSULE, DELAYED RELEASE ORAL DAILY
Qty: 30 CAPSULE | Refills: 0 | Status: SHIPPED | OUTPATIENT
Start: 2024-10-16 | End: 2024-10-30

## 2024-10-27 SDOH — HEALTH STABILITY: PHYSICAL HEALTH: ON AVERAGE, HOW MANY MINUTES DO YOU ENGAGE IN EXERCISE AT THIS LEVEL?: 10 MIN

## 2024-10-27 SDOH — HEALTH STABILITY: PHYSICAL HEALTH: ON AVERAGE, HOW MANY DAYS PER WEEK DO YOU ENGAGE IN MODERATE TO STRENUOUS EXERCISE (LIKE A BRISK WALK)?: 1 DAY

## 2024-10-27 ASSESSMENT — SOCIAL DETERMINANTS OF HEALTH (SDOH): HOW OFTEN DO YOU GET TOGETHER WITH FRIENDS OR RELATIVES?: ONCE A WEEK

## 2024-10-29 ASSESSMENT — PATIENT HEALTH QUESTIONNAIRE - PHQ9
SUM OF ALL RESPONSES TO PHQ QUESTIONS 1-9: 16
10. IF YOU CHECKED OFF ANY PROBLEMS, HOW DIFFICULT HAVE THESE PROBLEMS MADE IT FOR YOU TO DO YOUR WORK, TAKE CARE OF THINGS AT HOME, OR GET ALONG WITH OTHER PEOPLE: EXTREMELY DIFFICULT
SUM OF ALL RESPONSES TO PHQ QUESTIONS 1-9: 16

## 2024-10-30 ENCOUNTER — OFFICE VISIT (OUTPATIENT)
Dept: PEDIATRICS | Facility: CLINIC | Age: 34
End: 2024-10-30
Attending: PEDIATRICS
Payer: COMMERCIAL

## 2024-10-30 ENCOUNTER — LAB (OUTPATIENT)
Dept: LAB | Facility: CLINIC | Age: 34
End: 2024-10-30
Payer: COMMERCIAL

## 2024-10-30 VITALS
DIASTOLIC BLOOD PRESSURE: 80 MMHG | WEIGHT: 276.8 LBS | HEIGHT: 69 IN | RESPIRATION RATE: 18 BRPM | TEMPERATURE: 98.6 F | SYSTOLIC BLOOD PRESSURE: 109 MMHG | BODY MASS INDEX: 41 KG/M2 | HEART RATE: 98 BPM | OXYGEN SATURATION: 97 %

## 2024-10-30 DIAGNOSIS — F41.1 GAD (GENERALIZED ANXIETY DISORDER): ICD-10-CM

## 2024-10-30 DIAGNOSIS — Z13.1 SCREENING FOR DIABETES MELLITUS: ICD-10-CM

## 2024-10-30 DIAGNOSIS — Z00.00 ROUTINE GENERAL MEDICAL EXAMINATION AT A HEALTH CARE FACILITY: Primary | ICD-10-CM

## 2024-10-30 DIAGNOSIS — F33.2 MAJOR DEPRESSIVE DISORDER, RECURRENT EPISODE, SEVERE WITH ANXIOUS DISTRESS (H): ICD-10-CM

## 2024-10-30 LAB
EST. AVERAGE GLUCOSE BLD GHB EST-MCNC: 114 MG/DL
HBA1C MFR BLD: 5.6 % (ref 0–5.6)

## 2024-10-30 PROCEDURE — 83036 HEMOGLOBIN GLYCOSYLATED A1C: CPT

## 2024-10-30 PROCEDURE — 99395 PREV VISIT EST AGE 18-39: CPT | Mod: 25 | Performed by: PEDIATRICS

## 2024-10-30 PROCEDURE — 96127 BRIEF EMOTIONAL/BEHAV ASSMT: CPT | Performed by: PEDIATRICS

## 2024-10-30 PROCEDURE — 90471 IMMUNIZATION ADMIN: CPT | Performed by: PEDIATRICS

## 2024-10-30 PROCEDURE — 91320 SARSCV2 VAC 30MCG TRS-SUC IM: CPT | Performed by: PEDIATRICS

## 2024-10-30 PROCEDURE — 90480 ADMN SARSCOV2 VAC 1/ONLY CMP: CPT | Performed by: PEDIATRICS

## 2024-10-30 PROCEDURE — 99214 OFFICE O/P EST MOD 30 MIN: CPT | Mod: 25 | Performed by: PEDIATRICS

## 2024-10-30 PROCEDURE — 90656 IIV3 VACC NO PRSV 0.5 ML IM: CPT | Performed by: PEDIATRICS

## 2024-10-30 PROCEDURE — 36415 COLL VENOUS BLD VENIPUNCTURE: CPT

## 2024-10-30 RX ORDER — DULOXETIN HYDROCHLORIDE 60 MG/1
60 CAPSULE, DELAYED RELEASE ORAL DAILY
Qty: 90 CAPSULE | Refills: 1 | Status: SHIPPED | OUTPATIENT
Start: 2024-10-30

## 2024-10-30 ASSESSMENT — PAIN SCALES - GENERAL: PAINLEVEL_OUTOF10: MODERATE PAIN (5)

## 2024-10-30 ASSESSMENT — ANXIETY QUESTIONNAIRES: GAD7 TOTAL SCORE: 14

## 2024-10-30 ASSESSMENT — PATIENT HEALTH QUESTIONNAIRE - PHQ9: SUM OF ALL RESPONSES TO PHQ QUESTIONS 1-9: 12

## 2024-10-30 NOTE — PROGRESS NOTES
"Preventive Care Visit  [facility], Internal Medicine - Pediatrics  Oct 30, 2024      Assessment & Plan     (Z00.00) Routine general medical examination at a health care facility  (primary encounter diagnosis)  Comment: flu and covid  Plan:     (F41.1) MARÍA (generalized anxiety disorder)  Comment: stable - most of concerns are financical. Contracts for safety. Continues to have intrusive thoughts. We discussed exercise goal of 10 min per day.   Plan: DULoxetine (CYMBALTA) 60 MG capsule        Ok for evisit follow up in 6m    (Z13.1) Screening for diabetes mellitus  Comment:   Plan: Hemoglobin A1c            (F33.2) Major depressive disorder, recurrent episode, severe with anxious distress (H)  Comment: as above  Plan:     Patient has been advised of split billing requirements and indicates understanding: Yes        BMI  Estimated body mass index is 40.72 kg/m  as calculated from the following:    Height as of this encounter: 1.756 m (5' 9.13\").    Weight as of this encounter: 125.6 kg (276 lb 12.8 oz).       Depression Screening Follow Up        10/29/2024     3:10 PM   PHQ   PHQ-9 Total Score 16    Q9: Thoughts of better off dead/self-harm past 2 weeks Several days    F/U: Thoughts of suicide or self-harm Yes    F/U: Self harm-plan Yes    F/U: Self-harm action No    F/U: Safety concerns No        Patient-reported                     Follow Up Actions Taken  Crisis resource information provided in the After Visit Summary    Discussed the following ways the patient can remain in a safe environment:  be around others  Counseling  Appropriate preventive services were addressed with this patient via screening, questionnaire, or discussion as appropriate for fall prevention, nutrition, physical activity, Tobacco-use cessation, social engagement, weight loss and cognition.  Checklist reviewing preventive services available has been given to the patient.  Reviewed patient's diet, " addressing concerns and/or questions.   She is at risk for lack of exercise and has been provided with information to increase physical activity for the benefit of her well-being.   The patient was instructed to see the dentist every 6 months.   The patient's PHQ-9 score is consistent with moderate depression. She was provided with information regarding depression.       See Patient Instructions    Casimiro Ontiveros is a 34 year old, presenting for the following:  Physical        10/30/2024     9:06 AM   Additional Questions   Roomed by Nilam Conrad   Accompanied by N/A          HPI          Health Care Directive  Patient does not have a Health Care Directive: Discussed advance care planning with patient; information given to patient to review.      10/27/2024   General Health   How would you rate your overall physical health? (!) FAIR   Feel stress (tense, anxious, or unable to sleep) Rather much      (!) STRESS CONCERN      10/27/2024   Nutrition   Three or more servings of calcium each day? (!) NO   Diet: Regular (no restrictions)   How many servings of fruit and vegetables per day? (!) 0-1   How many sweetened beverages each day? 0-1          4/28/2024     2:34 PM 5/8/2024    11:57 AM 10/29/2024     3:10 PM   PHQ   PHQ-9 Total Score 15 13 16    Q9: Thoughts of better off dead/self-harm past 2 weeks Several days  Several days  Several days    F/U: Thoughts of suicide or self-harm Yes  Yes  Yes    F/U: Self harm-plan No  No  Yes    F/U: Self-harm action No  No  No    F/U: Safety concerns No  No  No        Patient-reported         8/10/2023     6:46 PM 10/20/2023     1:30 PM 4/26/2024     6:13 PM   MARÍA-7 SCORE   Total Score 7 (mild anxiety) 9 (mild anxiety) 10 (moderate anxiety)   Total Score 7 9 10     Still living with roommate, but stressed with finances. Was previously seeing therapist, but feels she has gotten the most out of it.             10/27/2024   Exercise   Days per week of moderate/strenous exercise 1  day   Average minutes spent exercising at this level 10 min      (!) EXERCISE CONCERN      10/27/2024   Social Factors   Frequency of gathering with friends or relatives Once a week   Worry food won't last until get money to buy more Yes   Food not last or not have enough money for food? Yes   Do you have housing? (Housing is defined as stable permanent housing and does not include staying ouside in a car, in a tent, in an abandoned building, in an overnight shelter, or couch-surfing.) Yes   Are you worried about losing your housing? Yes   Lack of transportation? No   Unable to get utilities (heat,electricity)? No   Want help with housing or utility concern? (!) YES      (!) FOOD SECURITY CONCERN PRESENT(!) HOUSING CONCERN PRESENT      10/27/2024   Dental   Dentist two times every year? (!) NO            10/27/2024   TB Screening   Were you born outside of the [place]? No          Today's PHQ-9 Score:       10/29/2024     3:10 PM   PHQ-9 SCORE   PHQ-9 Total Score MyChart 16 (Moderately severe depression)   PHQ-9 Total Score 16        Patient-reported         10/27/2024   Substance Use   Alcohol more than 3/day or more than 7/wk Not Applicable   Do you use any other substances recreationally? (!) CANNABIS PRODUCTS        Social History     Tobacco Use    Smoking status: Never     Passive exposure: Never    Smokeless tobacco: Never   Vaping Use    Vaping status: Never Used   Substance Use Topics    Alcohol use: No     Comment: on a rare occ     Drug use: Yes     Types: Marijuana     Comment: edible marijuana treats weekly           8/10/2023   LAST FHS-7 RESULTS   1st degree relative breast or ovarian cancer No    Any relative bilateral breast cancer Unknown    Any male have breast cancer Unknown    Any ONE woman have BOTH breast AND ovarian cancer No    Any woman with breast cancer before 50yrs No    2 or more relatives with breast AND/OR ovarian cancer Unknown    2 or more relatives with breast AND/OR bowel cancer No      "   Patient-reported        Mammogram Screening - Patient under 40 years of age: Routine Mammogram Screening not recommended.         10/27/2024   STI Screening   New sexual partner(s) since last STI/HIV test? No        History of abnormal Pap smear: Status post hysterectomy with removal of cervix and no history of CIN2 or greater or cervical cancer. Health Maintenance and Surgical History updated.        Latest Ref Rng & Units 3/27/2024    11:53 AM 5/10/2019     1:31 PM 5/10/2019     1:25 PM   PAP / HPV   PAP  Negative for Intraepithelial Lesion or Malignancy (NILM)      PAP (Historical)   NIL     HPV 16 DNA Negative Negative   Negative    HPV 18 DNA Negative Negative   Negative    Other HR HPV Negative Negative   Negative           Reviewed and updated as needed this visit by Provider                             Objective    Exam  /80 (BP Location: Right arm, Patient Position: Sitting, Cuff Size: Adult Large)   Pulse 98   Temp 98.6  F (37  C) (Tympanic)   Resp 18   Ht 1.756 m (5' 9.13\")   Wt 125.6 kg (276 lb 12.8 oz)   LMP 05/01/2024 (Within Days)   SpO2 97%   BMI 40.72 kg/m     Estimated body mass index is 40.72 kg/m  as calculated from the following:    Height as of this encounter: 1.756 m (5' 9.13\").    Weight as of this encounter: 125.6 kg (276 lb 12.8 oz).    Physical Exam  GENERAL: alert and no distress  EYES: Eyes grossly normal to inspection, PERRL and conjunctivae and sclerae normal  HENT: ear canals and TM's normal, nose and mouth without ulcers or lesions  NECK: no adenopathy, no asymmetry, masses, or scars  RESP: lungs clear to auscultation - no rales, rhonchi or wheezes  CV: regular rate and rhythm, normal S1 S2, no S3 or S4, no murmur, click or rub, no peripheral edema  ABDOMEN: soft, nontender, no hepatosplenomegaly, no masses and bowel sounds normal  MS: no gross musculoskeletal defects noted, no edema  SKIN: no suspicious lesions or rashes  NEURO: Normal strength and tone, mentation " intact and speech normal  PSYCH: mentation appears normal, affect normal/bright        Signed Electronically by: Rozina Ricketts MD

## 2024-10-30 NOTE — PATIENT INSTRUCTIONS
"Yoga ideas:    You Tube    \"Yoga with Annmarie\"    Patient Education   Preventive Care Advice   This is general advice given by our system to help you stay healthy. However, your care team may have specific advice just for you. Please talk to your care team about your preventive care needs.  Nutrition  Eat 5 or more servings of fruits and vegetables each day.  Try wheat bread, brown rice and whole grain pasta (instead of white bread, rice, and pasta).  Get enough calcium and vitamin D. Check the label on foods and aim for 100% of the RDA (recommended daily allowance).  Lifestyle  Exercise at least 150 minutes each week  (30 minutes a day, 5 days a week).  Do muscle strengthening activities 2 days a week. These help control your weight and prevent disease.  No smoking.  Wear sunscreen to prevent skin cancer.  Have a dental exam and cleaning every 6 months.  Yearly exams  See your health care team every year to talk about:  Any changes in your health.  Any medicines your care team has prescribed.  Preventive care, family planning, and ways to prevent chronic diseases.  Shots (vaccines)   HPV shots (up to age 26), if you've never had them before.  Hepatitis B shots (up to age 59), if you've never had them before.  COVID-19 shot: Get this shot when it's due.  Flu shot: Get a flu shot every year.  Tetanus shot: Get a tetanus shot every 10 years.  Pneumococcal, hepatitis A, and RSV shots: Ask your care team if you need these based on your risk.  Shingles shot (for age 50 and up)  General health tests  Diabetes screening:  Starting at age 35, Get screened for diabetes at least every 3 years.  If you are younger than age 35, ask your care team if you should be screened for diabetes.  Cholesterol test: At age 39, start having a cholesterol test every 5 years, or more often if advised.  Bone density scan (DEXA): At age 50, ask your care team if you should have this scan for osteoporosis (brittle bones).  Hepatitis C: Get " tested at least once in your life.  STIs (sexually transmitted infections)  Before age 24: Ask your care team if you should be screened for STIs.  After age 24: Get screened for STIs if you're at risk. You are at risk for STIs (including HIV) if:  You are sexually active with more than one person.  You don't use condoms every time.  You or a partner was diagnosed with a sexually transmitted infection.  If you are at risk for HIV, ask about PrEP medicine to prevent HIV.  Get tested for HIV at least once in your life, whether you are at risk for HIV or not.  Cancer screening tests  Cervical cancer screening: If you have a cervix, begin getting regular cervical cancer screening tests starting at age 21.  Breast cancer scan (mammogram): If you've ever had breasts, begin having regular mammograms starting at age 40. This is a scan to check for breast cancer.  Colon cancer screening: It is important to start screening for colon cancer at age 45.  Have a colonoscopy test every 10 years (or more often if you're at risk) Or, ask your provider about stool tests like a FIT test every year or Cologuard test every 3 years.  To learn more about your testing options, visit:   .  For help making a decision, visit:   https://bit.ly/kr48790.  Prostate cancer screening test: If you have a prostate, ask your care team if a prostate cancer screening test (PSA) at age 55 is right for you.  Lung cancer screening: If you are a current or former smoker ages 50 to 80, ask your care team if ongoing lung cancer screenings are right for you.  For informational purposes only. Not to replace the advice of your health care provider. Copyright   2023 [facility]. All rights reserved. Clinically reviewed by the [facility]. [employer] [id number] - REV 01/24.  Learning About Stress  What is stress?     Stress is your body's response to a hard situation. Your body can have a physical, emotional, or mental response.  Stress is a fact of life for most people, and it affects everyone differently. What causes stress for you may not be stressful for someone else.  A lot of things can cause stress. You may feel stress when you go on a job interview, take a test, or run a race. This kind of short-term stress is normal and even useful. It can help you if you need to work hard or react quickly. For example, stress can help you finish an important job on time.  Long-term stress is caused by ongoing stressful situations or events. Examples of long-term stress include long-term health problems, ongoing problems at work, or conflicts in your family. Long-term stress can harm your health.  How does stress affect your health?  When you are stressed, your body responds as though you are in danger. It makes hormones that speed up your heart, make you breathe faster, and give you a burst of energy. This is called the fight-or-flight stress response. If the stress is over quickly, your body goes back to normal and no harm is done.  But if stress happens too often or lasts too long, it can have bad effects. Long-term stress can make you more likely to get sick, and it can make symptoms of some diseases worse. If you tense up when you are stressed, you may develop neck, shoulder, or low back pain. Stress is linked to high blood pressure and heart disease.  Stress also harms your emotional health. It can make you potter, tense, or depressed. Your relationships may suffer, and you may not do well at work or school.  What can you do to manage stress?  You can try these things to help manage stress:   Do something active. Exercise or activity can help reduce stress. Walking is a great way to get started. Even everyday activities such as housecleaning or yard work can help.  Try yoga or aline chi. These techniques combine exercise and meditation. You may need some training at first to learn them.  Do something you enjoy. For example, listen to music or go  "to a movie. Practice your hobby or do volunteer work.  Meditate. This can help you relax, because you are not worrying about what happened before or what may happen in the future.  Do guided imagery. Imagine yourself in any setting that helps you feel calm. You can use online videos, books, or a teacher to guide you.  Do breathing exercises. For example:  From a standing position, bend forward from the waist with your knees slightly bent. Let your arms dangle close to the floor.  Breathe in slowly and deeply as you return to a standing position. Roll up slowly and lift your head last.  Hold your breath for just a few seconds in the standing position.  Breathe out slowly and bend forward from the waist.  Let your feelings out. Talk, laugh, cry, and express anger when you need to. Talking with supportive friends or family, a counselor, or a mer leader about your feelings is a healthy way to relieve stress. Avoid discussing your feelings with people who make you feel worse.  Write. It may help to write about things that are bothering you. This helps you find out how much stress you feel and what is causing it. When you know this, you can find better ways to cope.  What can you do to prevent stress?  You might try some of these things to help prevent stress:  Manage your time. This helps you find time to do the things you want and need to do.  Get enough sleep. Your body recovers from the stresses of the day while you are sleeping.  Get support. Your family, friends, and community can make a difference in how you experience stress.  Limit your news feed. Avoid or limit time on social media or news that may make you feel stressed.  Do something active. Exercise or activity can help reduce stress. Walking is a great way to get started.  Where can you learn more?  Go to https://www.healthwise.net/patiented  Enter N032 in the search box to learn more about \"Learning About Stress.\"  Current as of: October 24, 2023  Content " Version: 14.2 2024 [facility] [employer].   Care instructions adapted under license by your healthcare professional. If you have questions about a medical condition or this instruction, always ask your healthcare professional. Healthwise, Incorporated disclaims any warranty or liability for your use of this information.    Learning About Depression Screening  What is depression screening?  Depression screening is a way to see if you have depression symptoms. It may be done by a doctor or counselor. It's often part of a routine checkup. That's because your mental health is just as important as your physical health.  Depression is a mental health condition that affects how you feel, think, and act. You may:  Have less energy.  Lose interest in your daily activities.  Feel sad and grouchy for a long time.  Depression is very common. It affects people of all ages.  Many things can lead to depression. Some people become depressed after they have a stroke or find out they have a major illness like cancer or heart disease. The death of a loved one or a breakup may lead to depression. It can run in families. Most experts believe that a combination of inherited genes and stressful life events can cause it.  What happens during screening?  You may be asked to fill out a form about your depression symptoms. You and the doctor will discuss your answers. The doctor may ask you more questions to learn more about how you think, act, and feel.  What happens after screening?  If you have symptoms of depression, your doctor will talk to you about your options.  Doctors usually treat depression with medicines or counseling. Often, combining the two works best. Many people don't get help because they think that they'll get over the depression on their own. But people with depression may not get better unless they get treatment.  The cause of depression is not well understood. There may be many factors involved. But if you have  "depression, it's not your fault.  A serious symptom of depression is thinking about death or suicide. If you or someone you care about talks about this or about feeling hopeless, get help right away.  It's important to know that depression can be treated. Medicine, counseling, and self-care may help.  Where can you learn more?  Go to https://www.[employer].net/patiented  Enter T185 in the search box to learn more about \"Learning About Depression Screening.\"  Current as of: June 24, 2023  Content Version: 14.2 2024 [employer].   Care instructions adapted under license by your healthcare professional. If you have questions about a medical condition or this instruction, always ask your healthcare professional. Healthwise, Incorporated disclaims any warranty or liability for your use of this information.    Substance Use Disorder: Care Instructions  Overview     You can improve your life and health by stopping your use of alcohol or drugs. When you don't drink or use drugs, you may feel and sleep better. You may get along better with your family, friends, and coworkers. There are medicines and programs that can help with substance use disorder.  How can you care for yourself at home?  Here are some ways to help you stay sober and prevent relapse.  If you have been given medicine to help keep you sober or reduce your cravings, be sure to take it exactly as prescribed.  Talk to your doctor about programs that can help you stop using drugs or drinking alcohol.  Do not keep alcohol or drugs in your home.  Plan ahead. Think about what you'll say if other people ask you to drink or use drugs. Try not to spend time with people who drink or use drugs.  Use the time and money spent on drinking or drugs to do something that's important to you.  Preventing a relapse  Have a plan to deal with relapse. Learn to recognize changes in your thinking that lead you to drink or use drugs. Get help before you start to drink " or use drugs again.  Try to stay away from situations, friends, or places that may lead you to drink or use drugs.  If you feel the need to drink alcohol or use drugs again, seek help right away. Call a trusted friend or family member. Some people get support from organizations such as Narcotics Anonymous or [employer] or from treatment facilities.  If you relapse, get help as soon as you can. Some people make a plan with another person that outlines what they want that person to do for them if they relapse. The plan usually includes how to handle the relapse and who to notify in case of relapse.  Don't give up. Remember that a relapse doesn't mean that you have failed. Use the experience to learn the triggers that lead you to drink or use drugs. Then quit again. Recovery is a lifelong process. Many people have several relapses before they are able to quit for good.  Follow-up care is a key part of your treatment and safety. Be sure to make and go to all appointments, and call your doctor if you are having problems. It's also a good idea to know your test results and keep a list of the medicines you take.  When should you call for help?   Call 251  anytime you think you may need emergency care. For example, call if you or someone else:    Has overdosed or has withdrawal signs. Be sure to tell the emergency workers that you are or someone else is using or trying to quit using drugs. Overdose or withdrawal signs may include:  Losing consciousness.  Seizure.  Seeing or hearing things that aren't there (hallucinations).     Is thinking or talking about suicide or harming others.   Where to get help 24 hours a day, 7 days a week   If you or someone you know talks about suicide, self-harm, a mental health crisis, a substance use crisis, or any other kind of emotional distress, get help right away. You can:    Call the Suicide and Crisis Lifeline at 341.     Call 1-039-256-TALK (1-771.944.3728).     Text HOME to 357224  "to access the Crisis Text Line.   Consider saving these numbers in your phone.  Go to [email or web address] for more information or to chat online.  Call your doctor now or seek immediate medical care if:    You are having withdrawal symptoms. These may include nausea or vomiting, sweating, shakiness, and anxiety.   Watch closely for changes in your health, and be sure to contact your doctor if:    You have a relapse.     You need more help or support to stop.   Where can you learn more?  Go to https://www.[employer].net/patiented  Enter H573 in the search box to learn more about \"Substance Use Disorder: Care Instructions.\"  Current as of: November 15, 2023  Content Version: 14.2 2024 [facility] [employer].   Care instructions adapted under license by your healthcare professional. If you have questions about a medical condition or this instruction, always ask your healthcare professional. Healthwise, Incorporated disclaims any warranty or liability for your use of this information.       "

## 2024-12-02 ENCOUNTER — MYC REFILL (OUTPATIENT)
Dept: PEDIATRICS | Facility: CLINIC | Age: 34
End: 2024-12-02
Payer: COMMERCIAL

## 2024-12-02 DIAGNOSIS — F41.1 GAD (GENERALIZED ANXIETY DISORDER): ICD-10-CM

## 2024-12-02 RX ORDER — DULOXETIN HYDROCHLORIDE 60 MG/1
60 CAPSULE, DELAYED RELEASE ORAL DAILY
Qty: 90 CAPSULE | Refills: 1 | OUTPATIENT
Start: 2024-12-02

## 2025-03-30 ENCOUNTER — MYC REFILL (OUTPATIENT)
Dept: PEDIATRICS | Facility: CLINIC | Age: 35
End: 2025-03-30
Payer: COMMERCIAL

## 2025-03-30 DIAGNOSIS — F41.1 GAD (GENERALIZED ANXIETY DISORDER): ICD-10-CM

## 2025-03-31 RX ORDER — DULOXETIN HYDROCHLORIDE 60 MG/1
60 CAPSULE, DELAYED RELEASE ORAL DAILY
Qty: 90 CAPSULE | Refills: 0 | Status: SHIPPED | OUTPATIENT
Start: 2025-03-31

## 2025-06-12 ENCOUNTER — E-VISIT (OUTPATIENT)
Dept: PEDIATRICS | Facility: CLINIC | Age: 35
End: 2025-06-12
Payer: COMMERCIAL

## 2025-06-12 DIAGNOSIS — F33.2 MAJOR DEPRESSIVE DISORDER, RECURRENT EPISODE, SEVERE WITH ANXIOUS DISTRESS (H): ICD-10-CM

## 2025-06-12 DIAGNOSIS — F41.1 GAD (GENERALIZED ANXIETY DISORDER): Primary | ICD-10-CM

## 2025-06-12 DIAGNOSIS — F41.1 GAD (GENERALIZED ANXIETY DISORDER): ICD-10-CM

## 2025-06-12 NOTE — PATIENT INSTRUCTIONS
Thank you for choosing us for your care. I have placed the below referral(s) for you:  Orders Placed This Encounter   Procedures     Adult Mental Health  Referral     [facility] will call you to coordinate your care as prescribed by your provider. If you don't hear from a representative within 2 business days, please call [phone].     Please click the link for your After Visit Summary to view scheduling instructions for your referral. In most cases, you will be contacted within 2 business days to schedule. If you do not hear from a representative within that time, please call [phone] to be connected to a .

## 2025-06-18 ASSESSMENT — ANXIETY QUESTIONNAIRES
5. BEING SO RESTLESS THAT IT IS HARD TO SIT STILL: MORE THAN HALF THE DAYS
GAD7 TOTAL SCORE: 19
3. WORRYING TOO MUCH ABOUT DIFFERENT THINGS: NEARLY EVERY DAY
4. TROUBLE RELAXING: NEARLY EVERY DAY
7. FEELING AFRAID AS IF SOMETHING AWFUL MIGHT HAPPEN: NEARLY EVERY DAY
7. FEELING AFRAID AS IF SOMETHING AWFUL MIGHT HAPPEN: NEARLY EVERY DAY
2. NOT BEING ABLE TO STOP OR CONTROL WORRYING: NEARLY EVERY DAY
GAD7 TOTAL SCORE: 19
8. IF YOU CHECKED OFF ANY PROBLEMS, HOW DIFFICULT HAVE THESE MADE IT FOR YOU TO DO YOUR WORK, TAKE CARE OF THINGS AT HOME, OR GET ALONG WITH OTHER PEOPLE?: EXTREMELY DIFFICULT
6. BECOMING EASILY ANNOYED OR IRRITABLE: MORE THAN HALF THE DAYS
1. FEELING NERVOUS, ANXIOUS, OR ON EDGE: NEARLY EVERY DAY
IF YOU CHECKED OFF ANY PROBLEMS ON THIS QUESTIONNAIRE, HOW DIFFICULT HAVE THESE PROBLEMS MADE IT FOR YOU TO DO YOUR WORK, TAKE CARE OF THINGS AT HOME, OR GET ALONG WITH OTHER PEOPLE: EXTREMELY DIFFICULT
GAD7 TOTAL SCORE: 19

## 2025-06-18 ASSESSMENT — PATIENT HEALTH QUESTIONNAIRE - PHQ9
10. IF YOU CHECKED OFF ANY PROBLEMS, HOW DIFFICULT HAVE THESE PROBLEMS MADE IT FOR YOU TO DO YOUR WORK, TAKE CARE OF THINGS AT HOME, OR GET ALONG WITH OTHER PEOPLE: EXTREMELY DIFFICULT
SUM OF ALL RESPONSES TO PHQ QUESTIONS 1-9: 25
SUM OF ALL RESPONSES TO PHQ QUESTIONS 1-9: 25

## 2025-06-19 RX ORDER — DULOXETIN HYDROCHLORIDE 60 MG/1
60 CAPSULE, DELAYED RELEASE ORAL DAILY
Qty: 90 CAPSULE | Refills: 1 | Status: SHIPPED | OUTPATIENT
Start: 2025-06-19

## 2025-06-19 NOTE — PATIENT INSTRUCTIONS
Thank you for choosing us for your care. I have placed an order for your treatment:    I have placed a refill for the Cymbalta.  It looked like from your message that you were not looking for a medication change, but please let me know if I interpretted that incorrectly.    Please follow up with me in 6 months, or sooner if needed.          Orders Placed This Encounter   Medications     DULoxetine (CYMBALTA) 60 MG capsule     Sig: Take 1 capsule (60 mg) by mouth daily.     Dispense:  90 capsule     Refill:  1    View your full visit summary for details by clicking on the link below. Your pharmacist will able to address any questions you may have about the medication.

## 2025-07-01 NOTE — TELEPHONE ENCOUNTER
"[facility] outreach to patient to follow up on medication adherence and [facility] and [place] paperwork.    Patient says that they may have missed 1 dose since adding calander and alarms onto their phone. Says that since they have not been missing doses they have not had the dizzy spells they were having.    Says they have most of the [facility] paperwork done, got their [place] card and waiting on cash assistance and unemployment assistance, but they need to have a hearing via phone for that.     Says they have started Vitamin D and says that they feel like it is working and that they feel \"brighter\" than they were before taking it.    [facility] ended call asking them to call if they need anything.    Randy Zhao at 11:31 AM on 3/25/2020  [facility]  Phone [phone]     " Male

## (undated) DEVICE — SU VICRYL 4-0 PS-2 18" UND J496H

## (undated) DEVICE — DAVINCI XI SEAL UNIVERSAL 5-8MM 470361

## (undated) DEVICE — GLOVE BIOGEL PI MICRO INDICATOR UNDERGLOVE SZ 6.5 48965

## (undated) DEVICE — ESU GROUND PAD ADULT W/CORD E7507

## (undated) DEVICE — RETR ELEV / UTERINE MANIPULATOR V-CARE MED CUP 60-6085-201A

## (undated) DEVICE — DAVINCI XI TISSUE SEALER SYNCROSEAL 8MM 480440

## (undated) DEVICE — PAD CHUX UNDERPAD 30X36" P3036C

## (undated) DEVICE — PROTECTOR ARM ONE-STEP TRENDELENBURG 40418

## (undated) DEVICE — GLOVE BIOGEL PI MICRO INDICATOR UNDERGLOVE SZ 6.0 48960

## (undated) DEVICE — ANTIFOG SOLUTION SEE SHARP 150M TROCAR SWABS 30978

## (undated) DEVICE — NDL INSUFFLATION 13GA 120MM C2201

## (undated) DEVICE — DAVINCI XI DRAPE ARM 470015

## (undated) DEVICE — SUCTION MANIFOLD NEPTUNE 2 SYS 4 PORT 0702-020-000

## (undated) DEVICE — ENDO TROCAR CONMED AIRSEAL BLADELESS 05X120MM IAS5-120LP

## (undated) DEVICE — BARRIER INTERCEED 3X4" 4350

## (undated) DEVICE — CATH TRAY FOLEY SURESTEP 16FR DRAIN BAG STATOCK A899916

## (undated) DEVICE — SUCTION IRR STRYKERFLOW II W/TIP 250-070-520

## (undated) DEVICE — PREP CHLORAPREP 26ML TINTED HI-LITE ORANGE 930815

## (undated) DEVICE — TUBING IRRIG TUR Y TYPE 96" LF 6543-01

## (undated) DEVICE — PACK DAVINCI GYN SMA15GDFS1

## (undated) DEVICE — LUBRICANT INST ELECTROLUBE EL101

## (undated) DEVICE — SU WND CLOSURE VLOC 180 ABS 2-0 9" GS-22 VLOCL2145

## (undated) DEVICE — DAVINCI XI DRAPE COLUMN 470341

## (undated) DEVICE — PREP SCRUB SOL EXIDINE 4% CHG 4OZ 29002-404

## (undated) DEVICE — BASIN SET MINOR DISP

## (undated) DEVICE — SOL NACL 0.9% IRRIG 1000ML BOTTLE 2F7124

## (undated) DEVICE — DAVINCI HOT SHEARS TIP COVER  400180

## (undated) DEVICE — LINEN ORTHO ACL PACK 5447

## (undated) DEVICE — KIT PATIENT POSITIONING PIGAZZI LATEX FREE 40580

## (undated) DEVICE — SOL WATER IRRIG 1000ML BOTTLE 2F7114

## (undated) DEVICE — DAVINCI XI OBTURATOR BLADELESS 8MM 470359

## (undated) DEVICE — TUBING FILTER TRI-LUMEN AIRSEAL ASC-EVAC1

## (undated) RX ORDER — DEXAMETHASONE SODIUM PHOSPHATE 4 MG/ML
INJECTION, SOLUTION INTRA-ARTICULAR; INTRALESIONAL; INTRAMUSCULAR; INTRAVENOUS; SOFT TISSUE
Status: DISPENSED
Start: 2024-05-28

## (undated) RX ORDER — KETOROLAC TROMETHAMINE 15 MG/ML
INJECTION, SOLUTION INTRAMUSCULAR; INTRAVENOUS
Status: DISPENSED
Start: 2024-05-28

## (undated) RX ORDER — CEFAZOLIN SODIUM/WATER 2 G/20 ML
SYRINGE (ML) INTRAVENOUS
Status: DISPENSED
Start: 2024-05-28

## (undated) RX ORDER — PROPOFOL 10 MG/ML
INJECTION, EMULSION INTRAVENOUS
Status: DISPENSED
Start: 2024-05-28

## (undated) RX ORDER — KETOROLAC TROMETHAMINE 30 MG/ML
INJECTION, SOLUTION INTRAMUSCULAR; INTRAVENOUS
Status: DISPENSED
Start: 2024-05-28

## (undated) RX ORDER — FENTANYL CITRATE 50 UG/ML
INJECTION, SOLUTION INTRAMUSCULAR; INTRAVENOUS
Status: DISPENSED
Start: 2024-05-28

## (undated) RX ORDER — ONDANSETRON 2 MG/ML
INJECTION INTRAMUSCULAR; INTRAVENOUS
Status: DISPENSED
Start: 2024-05-28

## (undated) RX ORDER — LIDOCAINE HYDROCHLORIDE 10 MG/ML
INJECTION, SOLUTION EPIDURAL; INFILTRATION; INTRACAUDAL; PERINEURAL
Status: DISPENSED
Start: 2024-05-28

## (undated) RX ORDER — CEFAZOLIN SODIUM 1 G/3ML
INJECTION, POWDER, FOR SOLUTION INTRAMUSCULAR; INTRAVENOUS
Status: DISPENSED
Start: 2024-05-28

## (undated) RX ORDER — EPHEDRINE SULFATE 50 MG/ML
INJECTION, SOLUTION INTRAVENOUS
Status: DISPENSED
Start: 2024-05-28

## (undated) RX ORDER — OXYCODONE HYDROCHLORIDE 5 MG/1
TABLET ORAL
Status: DISPENSED
Start: 2024-05-28

## (undated) RX ORDER — ACETAMINOPHEN 325 MG/1
TABLET ORAL
Status: DISPENSED
Start: 2024-05-28

## (undated) RX ORDER — HYDROMORPHONE HCL IN WATER/PF 6 MG/30 ML
PATIENT CONTROLLED ANALGESIA SYRINGE INTRAVENOUS
Status: DISPENSED
Start: 2024-05-28

## (undated) RX ORDER — BUPIVACAINE HYDROCHLORIDE 5 MG/ML
INJECTION, SOLUTION EPIDURAL; INTRACAUDAL
Status: DISPENSED
Start: 2024-05-28

## (undated) RX ORDER — PHENAZOPYRIDINE HYDROCHLORIDE 200 MG/1
TABLET, FILM COATED ORAL
Status: DISPENSED
Start: 2024-05-28

## (undated) RX ORDER — PROMETHAZINE HYDROCHLORIDE 25 MG/ML
INJECTION, SOLUTION INTRAMUSCULAR; INTRAVENOUS
Status: DISPENSED
Start: 2024-05-28